# Patient Record
Sex: MALE | Race: BLACK OR AFRICAN AMERICAN | NOT HISPANIC OR LATINO | URBAN - METROPOLITAN AREA
[De-identification: names, ages, dates, MRNs, and addresses within clinical notes are randomized per-mention and may not be internally consistent; named-entity substitution may affect disease eponyms.]

---

## 2019-10-12 ENCOUNTER — EMERGENCY (EMERGENCY)
Facility: HOSPITAL | Age: 59
LOS: 1 days | Discharge: HOME | End: 2019-10-12
Attending: EMERGENCY MEDICINE | Admitting: EMERGENCY MEDICINE
Payer: MEDICAID

## 2019-10-12 VITALS
OXYGEN SATURATION: 99 % | WEIGHT: 154.98 LBS | HEART RATE: 89 BPM | DIASTOLIC BLOOD PRESSURE: 90 MMHG | SYSTOLIC BLOOD PRESSURE: 145 MMHG | TEMPERATURE: 98 F | RESPIRATION RATE: 16 BRPM

## 2019-10-12 LAB
ANION GAP SERPL CALC-SCNC: 8 MMOL/L — LOW (ref 9–16)
BASOPHILS NFR BLD AUTO: 0.5 % — SIGNIFICANT CHANGE UP (ref 0–2)
BUN SERPL-MCNC: 13 MG/DL — SIGNIFICANT CHANGE UP (ref 7–23)
CALCIUM SERPL-MCNC: 9.6 MG/DL — SIGNIFICANT CHANGE UP (ref 8.5–10.5)
CHLORIDE SERPL-SCNC: 101 MMOL/L — SIGNIFICANT CHANGE UP (ref 96–108)
CO2 SERPL-SCNC: 31 MMOL/L — SIGNIFICANT CHANGE UP (ref 22–31)
CREAT SERPL-MCNC: 1.08 MG/DL — SIGNIFICANT CHANGE UP (ref 0.5–1.3)
EOSINOPHIL NFR BLD AUTO: 1.4 % — SIGNIFICANT CHANGE UP (ref 0–6)
GLUCOSE SERPL-MCNC: 173 MG/DL — HIGH (ref 70–99)
HCT VFR BLD CALC: 39.3 % — SIGNIFICANT CHANGE UP (ref 39–50)
HGB BLD-MCNC: 12.7 G/DL — LOW (ref 13–17)
IMM GRANULOCYTES NFR BLD AUTO: 0.8 % — SIGNIFICANT CHANGE UP (ref 0–1.5)
LYMPHOCYTES # BLD AUTO: 21.3 % — SIGNIFICANT CHANGE UP (ref 13–44)
MCHC RBC-ENTMCNC: 26.7 PG — LOW (ref 27–34)
MCHC RBC-ENTMCNC: 32.3 G/DL — SIGNIFICANT CHANGE UP (ref 32–36)
MCV RBC AUTO: 82.6 FL — SIGNIFICANT CHANGE UP (ref 80–100)
MONOCYTES NFR BLD AUTO: 5.8 % — SIGNIFICANT CHANGE UP (ref 2–14)
NEUTROPHILS NFR BLD AUTO: 70.2 % — SIGNIFICANT CHANGE UP (ref 43–77)
PLATELET # BLD AUTO: 182 K/UL — SIGNIFICANT CHANGE UP (ref 150–400)
POTASSIUM SERPL-MCNC: 3.1 MMOL/L — LOW (ref 3.5–5.3)
POTASSIUM SERPL-SCNC: 3.1 MMOL/L — LOW (ref 3.5–5.3)
RBC # BLD: 4.76 M/UL — SIGNIFICANT CHANGE UP (ref 4.2–5.8)
RBC # FLD: 15.9 % — HIGH (ref 10.3–14.5)
SODIUM SERPL-SCNC: 140 MMOL/L — SIGNIFICANT CHANGE UP (ref 132–145)
WBC # BLD: 13.2 K/UL — HIGH (ref 3.8–10.5)
WBC # FLD AUTO: 13.2 K/UL — HIGH (ref 3.8–10.5)

## 2019-10-12 PROCEDURE — 93010 ELECTROCARDIOGRAM REPORT: CPT

## 2019-10-12 PROCEDURE — 99284 EMERGENCY DEPT VISIT MOD MDM: CPT | Mod: 25

## 2019-10-12 NOTE — ED ADULT TRIAGE NOTE - CHIEF COMPLAINT QUOTE
Patient brought in by ambulance for c/o hypoglycemia. Pt given oral glucose for BG 65 on scene as per EMS. Pt alert and awake in triage, oriented x4. PMH IDDM. POC Glucose check in progress.

## 2019-10-12 NOTE — ED ADULT NURSE NOTE - OBJECTIVE STATEMENT
Pt BIBA hypoglycemic at 68 in field. Given oral glucose. Homeless. States missed meal today. Eating with good appetite in ED

## 2019-10-12 NOTE — ED PROVIDER NOTE - NSFOLLOWUPINSTRUCTIONS_ED_ALL_ED_FT
-PLEASE FOLLOW-UP WITH YOUR PRIMARY CARE DOCTOR IN 1-2 DAYS.  BRING ALL PAPERWORK FROM TODAY'S VISIT TO YOUR FOLLOW-UP VISIT.      -PLEASE RETURN TO THE ER IMMEDIATELY OR CALL 911 FOR ANY HIGH FEVER, TROUBLE BREATHING, VOMITING, SEVERE PAIN, OR ANY OTHER CONCERNS.

## 2019-10-12 NOTE — ED PROVIDER NOTE - OBJECTIVE STATEMENT
Pt is a 60yo M with a h/o DM (Takes Lispro sliding scale and Levamer long acting at night).  Reports he is very compliant with his medications.  Pt is currently undomociled and usually has a steady intake of food but today he missed his meals.  He reports a woman usually feeds him but he didn't see her today.  He started feeling dizzy and nauseated so called 911.  EMS reports FS of 68 and they gave oral glucose.  pt reports feeling somewhat better but requesting a more hardy meal.  Currently denies any CP, SOB, HA, F/C, vomiting, diarrhea, abd pain, or other concerns.

## 2019-10-12 NOTE — ED PROVIDER NOTE - PROGRESS NOTE DETAILS
BS stable.  pt tolerating PO without issue.  Feeling much better.  Offered homeless outreach but pt declined.  Resources given.  Instructed to increase potassium rish foods.

## 2019-10-12 NOTE — ED ADULT NURSE NOTE - NSIMPLEMENTINTERV_GEN_ALL_ED
Implemented All Universal Safety Interventions:  [place] to call system. Call bell, personal items and telephone within reach. Instruct patient to call for assistance. Room bathroom lighting operational. Non-slip footwear when patient is off stretcher. Physically safe environment: no spills, clutter or unnecessary equipment. Stretcher in lowest position, wheels locked, appropriate side rails in place.

## 2019-10-12 NOTE — ED PROVIDER NOTE - PATIENT PORTAL LINK FT
You can access the FollowMyHealth Patient Portal offered by [facility] by registering at the following website: [email or web address]. By joining [facility]’s FollowMyHealth portal, you will also be able to view your health information using other applications (apps) compatible with our system.

## 2019-10-12 NOTE — ED PROVIDER NOTE - CLINICAL SUMMARY MEDICAL DECISION MAKING FREE TEXT BOX
Hypoglycemia due to insulin use with poor PO intake.  FS checked in triage and 190s.  Will give full meal as oral glucose is likely to wear off quicker than complex carbohydrates.  Will check basic labs and EKG to r/o other underlying causes.  Will re-evaluated blood sugar.      Pt currently undomociled so will be sure to give resources and offer homeless outreach program.

## 2019-10-13 RX ORDER — POTASSIUM CHLORIDE 20 MEQ
40 PACKET (EA) ORAL ONCE
Refills: 0 | Status: COMPLETED | OUTPATIENT
Start: 2019-10-13 | End: 2019-10-13

## 2019-10-13 RX ADMIN — Medication 40 MILLIEQUIVALENT(S): at 00:27

## 2019-10-18 DIAGNOSIS — E11.649 TYPE 2 DIABETES MELLITUS WITH HYPOGLYCEMIA WITHOUT COMA: ICD-10-CM

## 2019-10-18 DIAGNOSIS — E16.2 HYPOGLYCEMIA, UNSPECIFIED: ICD-10-CM

## 2019-11-27 ENCOUNTER — INPATIENT (INPATIENT)
Facility: HOSPITAL | Age: 59
LOS: 6 days | Discharge: HOME | DRG: 638 | End: 2019-12-04
Attending: STUDENT IN AN ORGANIZED HEALTH CARE EDUCATION/TRAINING PROGRAM | Admitting: STUDENT IN AN ORGANIZED HEALTH CARE EDUCATION/TRAINING PROGRAM
Payer: MEDICAID

## 2019-11-27 VITALS
OXYGEN SATURATION: 100 % | TEMPERATURE: 98 F | SYSTOLIC BLOOD PRESSURE: 201 MMHG | HEART RATE: 100 BPM | DIASTOLIC BLOOD PRESSURE: 126 MMHG | RESPIRATION RATE: 18 BRPM

## 2019-11-27 DIAGNOSIS — L08.9 LOCAL INFECTION OF THE SKIN AND SUBCUTANEOUS TISSUE, UNSPECIFIED: ICD-10-CM

## 2019-11-27 DIAGNOSIS — Z98.890 OTHER SPECIFIED POSTPROCEDURAL STATES: Chronic | ICD-10-CM

## 2019-11-27 DIAGNOSIS — R63.8 OTHER SYMPTOMS AND SIGNS CONCERNING FOOD AND FLUID INTAKE: ICD-10-CM

## 2019-11-27 DIAGNOSIS — E87.1 HYPO-OSMOLALITY AND HYPONATREMIA: ICD-10-CM

## 2019-11-27 DIAGNOSIS — Z91.89 OTHER SPECIFIED PERSONAL RISK FACTORS, NOT ELSEWHERE CLASSIFIED: ICD-10-CM

## 2019-11-27 DIAGNOSIS — Z29.9 ENCOUNTER FOR PROPHYLACTIC MEASURES, UNSPECIFIED: ICD-10-CM

## 2019-11-27 DIAGNOSIS — I10 ESSENTIAL (PRIMARY) HYPERTENSION: ICD-10-CM

## 2019-11-27 DIAGNOSIS — M21.332 WRIST DROP, LEFT WRIST: ICD-10-CM

## 2019-11-27 DIAGNOSIS — R73.9 HYPERGLYCEMIA, UNSPECIFIED: ICD-10-CM

## 2019-11-27 DIAGNOSIS — E11.9 TYPE 2 DIABETES MELLITUS WITHOUT COMPLICATIONS: ICD-10-CM

## 2019-11-27 LAB
ALBUMIN SERPL ELPH-MCNC: 4.4 G/DL — SIGNIFICANT CHANGE UP (ref 3.3–5)
ALP SERPL-CCNC: 176 U/L — HIGH (ref 40–120)
ALT FLD-CCNC: 16 U/L — SIGNIFICANT CHANGE UP (ref 10–45)
ANION GAP SERPL CALC-SCNC: 15 MMOL/L — SIGNIFICANT CHANGE UP (ref 5–17)
APPEARANCE UR: CLEAR — SIGNIFICANT CHANGE UP
AST SERPL-CCNC: 28 U/L — SIGNIFICANT CHANGE UP (ref 10–40)
B-OH-BUTYR SERPL-SCNC: 0.3 MMOL/L — SIGNIFICANT CHANGE UP
BASE EXCESS BLDV CALC-SCNC: 2.3 MMOL/L — SIGNIFICANT CHANGE UP
BASOPHILS # BLD AUTO: 0.07 K/UL — SIGNIFICANT CHANGE UP (ref 0–0.2)
BASOPHILS NFR BLD AUTO: 0.7 % — SIGNIFICANT CHANGE UP (ref 0–2)
BILIRUB SERPL-MCNC: 0.3 MG/DL — SIGNIFICANT CHANGE UP (ref 0.2–1.2)
BILIRUB UR-MCNC: NEGATIVE — SIGNIFICANT CHANGE UP
BUN SERPL-MCNC: 17 MG/DL — SIGNIFICANT CHANGE UP (ref 7–23)
CALCIUM SERPL-MCNC: 9.9 MG/DL — SIGNIFICANT CHANGE UP (ref 8.4–10.5)
CHLORIDE SERPL-SCNC: 92 MMOL/L — LOW (ref 96–108)
CO2 SERPL-SCNC: 26 MMOL/L — SIGNIFICANT CHANGE UP (ref 22–31)
COLOR SPEC: YELLOW — SIGNIFICANT CHANGE UP
CREAT SERPL-MCNC: 0.75 MG/DL — SIGNIFICANT CHANGE UP (ref 0.5–1.3)
DIFF PNL FLD: ABNORMAL
EOSINOPHIL # BLD AUTO: 0.09 K/UL — SIGNIFICANT CHANGE UP (ref 0–0.5)
EOSINOPHIL NFR BLD AUTO: 0.9 % — SIGNIFICANT CHANGE UP (ref 0–6)
GLUCOSE SERPL-MCNC: 867 MG/DL — CRITICAL HIGH (ref 70–99)
GLUCOSE UR QL: >=1000
HCO3 BLDV-SCNC: 27 MMOL/L — SIGNIFICANT CHANGE UP (ref 20–27)
HCT VFR BLD CALC: 40.2 % — SIGNIFICANT CHANGE UP (ref 39–50)
HGB BLD-MCNC: 12.1 G/DL — LOW (ref 13–17)
IMM GRANULOCYTES NFR BLD AUTO: 0.3 % — SIGNIFICANT CHANGE UP (ref 0–1.5)
KETONES UR-MCNC: NEGATIVE — SIGNIFICANT CHANGE UP
LEUKOCYTE ESTERASE UR-ACNC: NEGATIVE — SIGNIFICANT CHANGE UP
LYMPHOCYTES # BLD AUTO: 1.81 K/UL — SIGNIFICANT CHANGE UP (ref 1–3.3)
LYMPHOCYTES # BLD AUTO: 18.7 % — SIGNIFICANT CHANGE UP (ref 13–44)
MCHC RBC-ENTMCNC: 25.8 PG — LOW (ref 27–34)
MCHC RBC-ENTMCNC: 30.1 GM/DL — LOW (ref 32–36)
MCV RBC AUTO: 85.7 FL — SIGNIFICANT CHANGE UP (ref 80–100)
MONOCYTES # BLD AUTO: 0.51 K/UL — SIGNIFICANT CHANGE UP (ref 0–0.9)
MONOCYTES NFR BLD AUTO: 5.3 % — SIGNIFICANT CHANGE UP (ref 2–14)
NEUTROPHILS # BLD AUTO: 7.16 K/UL — SIGNIFICANT CHANGE UP (ref 1.8–7.4)
NEUTROPHILS NFR BLD AUTO: 74.1 % — SIGNIFICANT CHANGE UP (ref 43–77)
NITRITE UR-MCNC: NEGATIVE — SIGNIFICANT CHANGE UP
NRBC # BLD: 0 /100 WBCS — SIGNIFICANT CHANGE UP (ref 0–0)
PCO2 BLDV: 44 MMHG — SIGNIFICANT CHANGE UP (ref 41–51)
PH BLDV: 7.41 — SIGNIFICANT CHANGE UP (ref 7.32–7.43)
PH UR: 6.5 — SIGNIFICANT CHANGE UP (ref 5–8)
PLATELET # BLD AUTO: 214 K/UL — SIGNIFICANT CHANGE UP (ref 150–400)
PO2 BLDV: 45 MMHG — SIGNIFICANT CHANGE UP
POTASSIUM SERPL-MCNC: 4.1 MMOL/L — SIGNIFICANT CHANGE UP (ref 3.5–5.3)
POTASSIUM SERPL-SCNC: 4.1 MMOL/L — SIGNIFICANT CHANGE UP (ref 3.5–5.3)
PROT SERPL-MCNC: 8.3 G/DL — SIGNIFICANT CHANGE UP (ref 6–8.3)
PROT UR-MCNC: NEGATIVE MG/DL — SIGNIFICANT CHANGE UP
RBC # BLD: 4.69 M/UL — SIGNIFICANT CHANGE UP (ref 4.2–5.8)
RBC # FLD: 14 % — SIGNIFICANT CHANGE UP (ref 10.3–14.5)
SAO2 % BLDV: 82 % — SIGNIFICANT CHANGE UP
SODIUM SERPL-SCNC: 133 MMOL/L — LOW (ref 135–145)
SP GR SPEC: <=1.005 — SIGNIFICANT CHANGE UP (ref 1–1.03)
UROBILINOGEN FLD QL: 0.2 E.U./DL — SIGNIFICANT CHANGE UP
WBC # BLD: 9.67 K/UL — SIGNIFICANT CHANGE UP (ref 3.8–10.5)
WBC # FLD AUTO: 9.67 K/UL — SIGNIFICANT CHANGE UP (ref 3.8–10.5)

## 2019-11-27 PROCEDURE — 99222 1ST HOSP IP/OBS MODERATE 55: CPT | Mod: GC

## 2019-11-27 PROCEDURE — 99221 1ST HOSP IP/OBS SF/LOW 40: CPT

## 2019-11-27 PROCEDURE — 99223 1ST HOSP IP/OBS HIGH 75: CPT | Mod: GC

## 2019-11-27 PROCEDURE — 73630 X-RAY EXAM OF FOOT: CPT | Mod: 26,LT

## 2019-11-27 PROCEDURE — 71045 X-RAY EXAM CHEST 1 VIEW: CPT | Mod: 26

## 2019-11-27 PROCEDURE — 99285 EMERGENCY DEPT VISIT HI MDM: CPT

## 2019-11-27 RX ORDER — INSULIN LISPRO 100/ML
6 VIAL (ML) SUBCUTANEOUS
Refills: 0 | Status: DISCONTINUED | OUTPATIENT
Start: 2019-11-27 | End: 2019-11-27

## 2019-11-27 RX ORDER — INSULIN HUMAN 100 [IU]/ML
10 INJECTION, SOLUTION SUBCUTANEOUS ONCE
Refills: 0 | Status: COMPLETED | OUTPATIENT
Start: 2019-11-27 | End: 2019-11-27

## 2019-11-27 RX ORDER — VANCOMYCIN HCL 1 G
1000 VIAL (EA) INTRAVENOUS EVERY 12 HOURS
Refills: 0 | Status: DISCONTINUED | OUTPATIENT
Start: 2019-11-27 | End: 2019-11-27

## 2019-11-27 RX ORDER — PIPERACILLIN AND TAZOBACTAM 4; .5 G/20ML; G/20ML
3.38 INJECTION, POWDER, LYOPHILIZED, FOR SOLUTION INTRAVENOUS ONCE
Refills: 0 | Status: DISCONTINUED | OUTPATIENT
Start: 2019-11-27 | End: 2019-11-27

## 2019-11-27 RX ORDER — ENOXAPARIN SODIUM 100 MG/ML
40 INJECTION SUBCUTANEOUS EVERY 24 HOURS
Refills: 0 | Status: DISCONTINUED | OUTPATIENT
Start: 2019-11-28 | End: 2019-12-03

## 2019-11-27 RX ORDER — SODIUM CHLORIDE 9 MG/ML
1000 INJECTION INTRAMUSCULAR; INTRAVENOUS; SUBCUTANEOUS
Refills: 0 | Status: DISCONTINUED | OUTPATIENT
Start: 2019-11-27 | End: 2019-11-27

## 2019-11-27 RX ORDER — PIPERACILLIN AND TAZOBACTAM 4; .5 G/20ML; G/20ML
4.5 INJECTION, POWDER, LYOPHILIZED, FOR SOLUTION INTRAVENOUS EVERY 6 HOURS
Refills: 0 | Status: DISCONTINUED | OUTPATIENT
Start: 2019-11-27 | End: 2019-11-27

## 2019-11-27 RX ORDER — AMLODIPINE BESYLATE 2.5 MG/1
10 TABLET ORAL ONCE
Refills: 0 | Status: COMPLETED | OUTPATIENT
Start: 2019-11-27 | End: 2019-11-27

## 2019-11-27 RX ORDER — LISINOPRIL 2.5 MG/1
40 TABLET ORAL EVERY 24 HOURS
Refills: 0 | Status: DISCONTINUED | OUTPATIENT
Start: 2019-11-28 | End: 2019-12-01

## 2019-11-27 RX ORDER — AMLODIPINE BESYLATE 2.5 MG/1
10 TABLET ORAL EVERY 24 HOURS
Refills: 0 | Status: DISCONTINUED | OUTPATIENT
Start: 2019-11-28 | End: 2019-12-04

## 2019-11-27 RX ORDER — VANCOMYCIN HCL 1 G
1000 VIAL (EA) INTRAVENOUS ONCE
Refills: 0 | Status: DISCONTINUED | OUTPATIENT
Start: 2019-11-27 | End: 2019-11-27

## 2019-11-27 RX ORDER — SODIUM CHLORIDE 9 MG/ML
1000 INJECTION INTRAMUSCULAR; INTRAVENOUS; SUBCUTANEOUS ONCE
Refills: 0 | Status: COMPLETED | OUTPATIENT
Start: 2019-11-27 | End: 2019-11-27

## 2019-11-27 RX ORDER — DEXTROSE 50 % IN WATER 50 %
15 SYRINGE (ML) INTRAVENOUS ONCE
Refills: 0 | Status: DISCONTINUED | OUTPATIENT
Start: 2019-11-27 | End: 2019-12-04

## 2019-11-27 RX ORDER — DEXTROSE 50 % IN WATER 50 %
12.5 SYRINGE (ML) INTRAVENOUS ONCE
Refills: 0 | Status: DISCONTINUED | OUTPATIENT
Start: 2019-11-27 | End: 2019-12-04

## 2019-11-27 RX ORDER — DEXTROSE 50 % IN WATER 50 %
25 SYRINGE (ML) INTRAVENOUS ONCE
Refills: 0 | Status: DISCONTINUED | OUTPATIENT
Start: 2019-11-27 | End: 2019-12-04

## 2019-11-27 RX ORDER — INSULIN GLARGINE 100 [IU]/ML
25 INJECTION, SOLUTION SUBCUTANEOUS
Refills: 0 | Status: DISCONTINUED | OUTPATIENT
Start: 2019-11-28 | End: 2019-11-28

## 2019-11-27 RX ORDER — PIPERACILLIN AND TAZOBACTAM 4; .5 G/20ML; G/20ML
3.38 INJECTION, POWDER, LYOPHILIZED, FOR SOLUTION INTRAVENOUS ONCE
Refills: 0 | Status: COMPLETED | OUTPATIENT
Start: 2019-11-27 | End: 2019-11-27

## 2019-11-27 RX ORDER — GLUCAGON INJECTION, SOLUTION 0.5 MG/.1ML
1 INJECTION, SOLUTION SUBCUTANEOUS ONCE
Refills: 0 | Status: DISCONTINUED | OUTPATIENT
Start: 2019-11-27 | End: 2019-12-04

## 2019-11-27 RX ORDER — SODIUM CHLORIDE 9 MG/ML
1000 INJECTION, SOLUTION INTRAVENOUS
Refills: 0 | Status: DISCONTINUED | OUTPATIENT
Start: 2019-11-27 | End: 2019-12-04

## 2019-11-27 RX ORDER — FOLIC ACID 0.8 MG
1 TABLET ORAL DAILY
Refills: 0 | Status: DISCONTINUED | OUTPATIENT
Start: 2019-11-27 | End: 2019-12-04

## 2019-11-27 RX ORDER — ASPIRIN/CALCIUM CARB/MAGNESIUM 324 MG
81 TABLET ORAL DAILY
Refills: 0 | Status: DISCONTINUED | OUTPATIENT
Start: 2019-11-27 | End: 2019-12-04

## 2019-11-27 RX ORDER — INSULIN GLARGINE 100 [IU]/ML
20 INJECTION, SOLUTION SUBCUTANEOUS ONCE
Refills: 0 | Status: COMPLETED | OUTPATIENT
Start: 2019-11-27 | End: 2019-11-27

## 2019-11-27 RX ORDER — INSULIN LISPRO 100/ML
10 VIAL (ML) SUBCUTANEOUS
Refills: 0 | Status: DISCONTINUED | OUTPATIENT
Start: 2019-11-27 | End: 2019-12-04

## 2019-11-27 RX ORDER — VANCOMYCIN HCL 1 G
1000 VIAL (EA) INTRAVENOUS ONCE
Refills: 0 | Status: COMPLETED | OUTPATIENT
Start: 2019-11-27 | End: 2019-11-27

## 2019-11-27 RX ORDER — INSULIN LISPRO 100/ML
10 VIAL (ML) SUBCUTANEOUS
Refills: 0 | Status: DISCONTINUED | OUTPATIENT
Start: 2019-11-27 | End: 2019-11-27

## 2019-11-27 RX ORDER — INSULIN LISPRO 100/ML
VIAL (ML) SUBCUTANEOUS
Refills: 0 | Status: DISCONTINUED | OUTPATIENT
Start: 2019-11-27 | End: 2019-11-30

## 2019-11-27 RX ORDER — INSULIN GLARGINE 100 [IU]/ML
30 INJECTION, SOLUTION SUBCUTANEOUS EVERY MORNING
Refills: 0 | Status: DISCONTINUED | OUTPATIENT
Start: 2019-11-27 | End: 2019-11-27

## 2019-11-27 RX ORDER — LISINOPRIL 2.5 MG/1
40 TABLET ORAL ONCE
Refills: 0 | Status: COMPLETED | OUTPATIENT
Start: 2019-11-27 | End: 2019-11-27

## 2019-11-27 RX ADMIN — PIPERACILLIN AND TAZOBACTAM 200 GRAM(S): 4; .5 INJECTION, POWDER, LYOPHILIZED, FOR SOLUTION INTRAVENOUS at 07:44

## 2019-11-27 RX ADMIN — Medication 10 UNIT(S): at 19:21

## 2019-11-27 RX ADMIN — LISINOPRIL 40 MILLIGRAM(S): 2.5 TABLET ORAL at 07:05

## 2019-11-27 RX ADMIN — Medication 12: at 11:31

## 2019-11-27 RX ADMIN — INSULIN HUMAN 10 UNIT(S): 100 INJECTION, SOLUTION SUBCUTANEOUS at 07:44

## 2019-11-27 RX ADMIN — SODIUM CHLORIDE 1000 MILLILITER(S): 9 INJECTION INTRAMUSCULAR; INTRAVENOUS; SUBCUTANEOUS at 06:10

## 2019-11-27 RX ADMIN — INSULIN GLARGINE 30 UNIT(S): 100 INJECTION, SOLUTION SUBCUTANEOUS at 08:47

## 2019-11-27 RX ADMIN — SODIUM CHLORIDE 90 MILLILITER(S): 9 INJECTION INTRAMUSCULAR; INTRAVENOUS; SUBCUTANEOUS at 10:00

## 2019-11-27 RX ADMIN — Medication 2: at 22:38

## 2019-11-27 RX ADMIN — Medication 81 MILLIGRAM(S): at 11:31

## 2019-11-27 RX ADMIN — AMLODIPINE BESYLATE 10 MILLIGRAM(S): 2.5 TABLET ORAL at 07:04

## 2019-11-27 RX ADMIN — Medication 1 MILLIGRAM(S): at 11:31

## 2019-11-27 RX ADMIN — INSULIN GLARGINE 20 UNIT(S): 100 INJECTION, SOLUTION SUBCUTANEOUS at 22:38

## 2019-11-27 RX ADMIN — Medication 250 MILLIGRAM(S): at 09:59

## 2019-11-27 RX ADMIN — Medication 1 TABLET(S): at 11:31

## 2019-11-27 RX ADMIN — Medication 10 UNIT(S): at 11:31

## 2019-11-27 RX ADMIN — SODIUM CHLORIDE 1000 MILLILITER(S): 9 INJECTION INTRAMUSCULAR; INTRAVENOUS; SUBCUTANEOUS at 08:39

## 2019-11-27 NOTE — CONSULT NOTE ADULT - ASSESSMENT
58 y/o undomiciled m h/o uncontrolled DM, uncontrolled HTN, diabetic foot ulcer presents to ER with hyperglycemia and diabetic left foot ulcers. . No DKA. Pt evaluated for PAD. He has no palpable pulses b/l DP/PT  VENTURA 1.0 b/l which may be falsely elevated 2/2 calcifications from DM.  He has had ulcer in the same area of left foot in March that healed with Abx and local wound care. No h/o vascular interventions.  d/w Dr Delgado. Will d/w Dr Springer.     1. Please order PVR/VENTURA study.  2. Unasyn  3. x-ray left foot r/o osteo  4. Start Statin and baby ASA. 58 y/o undomiciled m h/o uncontrolled DM, uncontrolled HTN, diabetic foot ulcer presents to ER with hyperglycemia and diabetic left foot ulcers. . No DKA. Pt evaluated for PAD. He has no palpable pulses b/l DP/PT  VENTURA 1.0 b/l which may be falsely elevated 2/2 calcifications from DM.  He has had ulcer in the same area of left foot in March that healed with Abx and local wound care. No h/o vascular interventions.       1. Please order PVR/VENTURA study.  2. Unasyn  3. x-ray left foot r/o osteo  4. Start Statin and baby ASA.   5.d/w Dr Springer - plan for LLE angio when DM and hypertension controlled.

## 2019-11-27 NOTE — ED PROVIDER NOTE - PHYSICAL EXAMINATION
CONSTITUTIONAL: WD,WN. NAD.    SKIN: Dry skin and lips.  Normal color. No rash.     HEAD: NC/AT.  EYES: Conjunctiva clear. EOMI. PERRL.    ENT: Airway patent, OP without erythema, tonsillar swelling or exudate; uvula midline without swelling. Nasal mucosa clear, no rhinorrhea.   RESPIRATORY:  Breathing non-labored. No retractions or accessory muscle use.  Lungs CTA bilat.  CARDIOVASCULAR:  RRR, S1S2. No M/R/G.      GI:  Abdomen soft, nontender.    MSK: Neck supple with painless ROM.  Left foot MTP and plantar aspect forefoot macerated with shallow ulcer.  DP pulse present.    NEURO: Alert and oriented; CN II-XII grossly intact. Speech clear. 5/5 strength in all extremities.  Normal balance and gait. CONSTITUTIONAL: WD,WN. NAD.    SKIN: Dry skin and lips.  Normal color. No rash.     HEAD: NC/AT.  EYES: Conjunctiva clear. EOMI. PERRL.    ENT: Airway patent, OP without erythema, tonsillar swelling or exudate; uvula midline without swelling. Nasal mucosa clear, no rhinorrhea.   RESPIRATORY:  Breathing non-labored. No retractions or accessory muscle use.  Lungs CTA bilat.  CARDIOVASCULAR:  RRR, S1S2. No M/R/G.      GI:  Abdomen soft, nontender.    MSK: Neck supple with painless ROM.  Left foot MTP and plantar aspect forefoot macerated with shallow ulcer.  DP pulse present.    NEURO: Alert and oriented; CN II-XII grossly intact. Speech clear. 4/5 left wrist extension, slow wrist drop.  Decreased sensation in radial n distribution.  Normal balance and gait.

## 2019-11-27 NOTE — H&P ADULT - PROBLEM SELECTOR PLAN 3
-Diabetic Foot Ulcer present on medial dorsal aspect of left forefoot. Pulses faint on doppler compared to +2 on right foot.  -Podiatry and vascular consulted   -Will continue hunter/zosyn for now   -Wound care recs -Diabetic Foot Ulcer present on medial dorsal aspect of left forefoot. Pulses faint on doppler compared to +2 on right foot.  -Podiatry and vascular consulted   -Will continue vanc/zosyn for now   -Wound care recs  -Follow up ESR/CRP  -Follow up foot x ray. History of hardware in left foot after surgery few years ago -Diabetic Foot Ulcer present on medial dorsal aspect of left forefoot. Pulses faint on doppler compared to +2 on right foot. No drainage. 1/4 SIRS criteria (). No fevers/chills, wbc wnl   -Podiatry and vascular consulted   -Will continue vanc/zosyn for now   -Wound care recs  -Follow up ESR/CRP  -Follow up foot x ray. History of hardware in left foot after surgery few years ago -Diabetic Foot Ulcer present on medial dorsal aspect of left forefoot. Pulses faint on doppler compared to +2 on right foot. No drainage. 1/4 SIRS criteria (). No fevers/chills, wbc wnl   -Podiatry and vascular consulted   -Will continue vanc/zosyn for now   -Wound care recs  -Follow up ESR/CRP  -Follow up foot x ray. History of hardware in left foot after surgery few years ago    ADDENDUM:  ESR mildly elevated, CRP wnl. X ray with soft tissue swelling. More likely chronic ulcer and less likely acutely infected. Will discontinue antibiotics for now  Wound care recs

## 2019-11-27 NOTE — ED ADULT NURSE NOTE - NSIMPLEMENTINTERV_GEN_ALL_ED
Implemented All Fall Risk Interventions:  [place] to call system. Call bell, personal items and telephone within reach. Instruct patient to call for assistance. Room bathroom lighting operational. Non-slip footwear when patient is off stretcher. Physically safe environment: no spills, clutter or unnecessary equipment. Stretcher in lowest position, wheels locked, appropriate side rails in place. Provide visual cue, wrist band, yellow gown, etc. Monitor gait and stability. Monitor for mental status changes and reorient to person, place, and time. Review medications for side effects contributing to fall risk. Reinforce activity limits and safety measures with patient and family.

## 2019-11-27 NOTE — ED ADULT NURSE NOTE - OBJECTIVE STATEMENT
pt states "my sugar is high and it's so bad I can't hold my urine. My left arm is also week for some days now. " Pt noted with "high" BS.

## 2019-11-27 NOTE — CONSULT NOTE ADULT - ASSESSMENT
A/P 58 yo undomiciled male with history of HTN and poorly controlled DM complicated by neuropathy and diabetic foot ulcers presenting with left foot Bright grade 2 overlying dorsum 1st MCJ, Bright grade 2 overlying medial submet 1     - F/u final Xray read  - WBAT LLE  - Applied dry sterile dressing with betadine to left foot   - No acute sign of infection. Left dorsum ulcer overlying 1st MCJ overlying internal hardware from previous Lisfranc fracture in 2007.     ***INCOMPLETE NOTE*** A/P 60 yo undomiciled male with history of HTN and poorly controlled DM complicated by neuropathy and diabetic foot ulcers presenting with left foot Bright grade 2 overlying dorsum 1st MCJ, Bright grade 2 overlying medial submet 1     - F/u final Xray read  - WBAT LLE  - Applied dry sterile dressing with betadine to left foot   - No acute sign of infection. Left dorsum ulcer overlying 1st MCJ overlying internal hardware from previous Lisfranc fracture in 2007.   - Excisional debridement of Bright grade 1 ulcer submet1 with #15 blade down to viable, bleeding dermis tissue. NO clinical signs of infection (drainage, purulence)  Podiatry following     ***INCOMPLETE NOTE*** A/P 58 yo undomiciled male with history of HTN and poorly controlled DM complicated by neuropathy and diabetic foot ulcers presenting with LLE cellulitis 2/2 left foot Bright grade 2 overlying dorsum 1st MCJ, Bright grade 2 overlying medial submet 1     - F/u final Xray read  - WBAT LLE  - Applied dry sterile dressing with betadine to left foot   - Stable wounds. C/w local wound care and abx  - Excisional debridement of Bright grade 1 ulcer submet1 with #15 blade down to viable, bleeding dermis tissue. NO clinical signs of infection (drainage, purulence)  - Podiatry following

## 2019-11-27 NOTE — H&P ADULT - PROBLEM SELECTOR PLAN 8
1) PCP Contacted on Admission: Dr. Augustine ([facility])   2) Date of Contact with PCP:  3) PCP Contacted at Discharge: (Y/N, N/A)  4) Summary of Handoff Given to PCP:   5) Post-Discharge Appointment Date and Location:

## 2019-11-27 NOTE — ED PROVIDER NOTE - OBJECTIVE STATEMENT
60 yo m PMHx HTN, DM c/o frequent large volume urination and urinary incontinence for the past few days; says he is very thirsty all the time.  Just discharged from [place] yesterday admission for a few days due to left foot infection and left hand weakness.  Hand weakness has been present for nearly a week.  He was told that the left hand weakness was not from a stroke.  He says he was supposed to be treated with antibiotics for the foot infection, but was not prescribed any  when he was discharged.

## 2019-11-27 NOTE — ED ADULT TRIAGE NOTE - RESPIRATORY RATE (BREATHS/MIN)
18 Quality 431: Preventive Care And Screening: Unhealthy Alcohol Use - Screening: Patient screened for unhealthy alcohol use using a single question and scores less than 2 times per year Quality 130: Documentation Of Current Medications In The Medical Record: Current Medications Documented Name And Contact Information For Health Care Proxy: Donna Watson [phone] Quality 226: Preventive Care And Screening: Tobacco Use: Screening And Cessation Intervention: Patient screened for tobacco and is an ex-smoker Quality 47: Advance Care Plan: Advance Care Planning discussed and documented; advance care plan or surrogate decision maker documented in the medical record. Detail Level: Detailed Quality 111:Pneumonia Vaccination Status For Older Adults: Pneumococcal Vaccination Previously Received Quality 110: Preventive Care And Screening: Influenza Immunization: Influenza Immunization previously received during influenza season

## 2019-11-27 NOTE — H&P ADULT - NSHPLABSRESULTS_GEN_ALL_CORE
.  LABS:                         12.1   9.67  )-----------( 214      ( 27 Nov 2019 06:05 )             40.2     11-27    133<L>  |  92<L>  |  17  ----------------------------<  867<HH>  4.1   |  26  |  0.75    Ca    9.9      27 Nov 2019 06:05    TPro  8.3  /  Alb  4.4  /  TBili  0.3  /  DBili  x   /  AST  28  /  ALT  16  /  AlkPhos  176<H>  11-27      Urinalysis Basic - ( 27 Nov 2019 06:16 )    Color: Yellow / Appearance: Clear / SG: <=1.005 / pH: x  Gluc: x / Ketone: NEGATIVE  / Bili: Negative / Urobili: 0.2 E.U./dL   Blood: x / Protein: NEGATIVE mg/dL / Nitrite: NEGATIVE   Leuk Esterase: NEGATIVE / RBC: < 5 /HPF / WBC < 5 /HPF   Sq Epi: x / Non Sq Epi: 0-5 /HPF / Bacteria: None /HPF                RADIOLOGY, EKG & ADDITIONAL TESTS: Reviewed. LABS:                         12.1   9.67  )-----------( 214      ( 27 Nov 2019 06:05 )             40.2     11-27    133<L>  |  92<L>  |  17  ----------------------------<  867<HH>  4.1   |  26  |  0.75    Ca    9.9      27 Nov 2019 06:05    TPro  8.3  /  Alb  4.4  /  TBili  0.3  /  DBili  x   /  AST  28  /  ALT  16  /  AlkPhos  176<H>  11-27      Urinalysis Basic - ( 27 Nov 2019 06:16 )    Color: Yellow / Appearance: Clear / SG: <=1.005 / pH: x  Gluc: x / Ketone: NEGATIVE  / Bili: Negative / Urobili: 0.2 E.U./dL   Blood: x / Protein: NEGATIVE mg/dL / Nitrite: NEGATIVE   Leuk Esterase: NEGATIVE / RBC: < 5 /HPF / WBC < 5 /HPF   Sq Epi: x / Non Sq Epi: 0-5 /HPF / Bacteria: None /HPF      RADIOLOGY, EKG & ADDITIONAL TESTS: Reviewed.

## 2019-11-27 NOTE — H&P ADULT - ASSESSMENT
60 yo undomiciled M with history of poorly controlled DM and HTN who presents with increase urination in the setting of hyperglycemia, found to have a diabetic foot ulcer on presentation and likely left wrist drop 60 yo undomiciled M with history of poorly controlled DM and HTN who presents with increase urination in the setting of hyperglycemia, admitted to regional medicine floors for further management.

## 2019-11-27 NOTE — H&P ADULT - NSHPSOCIALHISTORY_GEN_ALL_CORE
Undomiciled  Socially drinks alcohol. 3-4 drinks per week  Smokes 1/2 PPD for the past years, 1 PPD prior to that for over 10 years  Denies any illicit drug abuse Undomiciled  Socially drinks alcohol. 3-4 drinks per week  Smokes 1/2 PPD for the past 5 years, 1 PPD prior to that for over 10 years  Denies any illicit drug abuse

## 2019-11-27 NOTE — CONSULT NOTE ADULT - SUBJECTIVE AND OBJECTIVE BOX
HPI: 59yMale    Age at Dx:  How dx:  Hx and duration of insulin:  Current Therapy:  Hx of hypoglycemia  Hx of DKA/HHS?    Home FSG:  Fasting  Lunch  Dinner  Bed    Hx of other regimens  Complications:  Outpatient Endo:    PMH & Surgical Hx:HYPERGLYCEMIA;FOOT INFEC  FH: type 2 diabetes mellitus  Handoff  MEWS Score  Hypertension  IDDM (insulin dependent diabetes mellitus)  IDDM (insulin dependent diabetes mellitus)  Hyperglycemia  Hyponatremia  Wrist drop, left wrist  Transition of care performed with sharing of clinical summary  Prophylactic measure  Nutrition, metabolism, and development symptoms  Hypertension  Foot infection  IDDM (insulin dependent diabetes mellitus)  Hyperglycemia  History of open reduction and internal fixation (ORIF) procedure  No significant past surgical history  MED EVAL  90+  Foot infection      FH:  DM:  Thyroid:  Autoimmune:  Other:    SH:  Smoking  Etoh:  Recreational Drugs:  Social Life:    Current Meds:  aspirin enteric coated 81 milliGRAM(s) Oral daily  dextrose 40% Gel 15 Gram(s) Oral once PRN  dextrose 5%. 1000 milliLiter(s) IV Continuous <Continuous>  dextrose 50% Injectable 12.5 Gram(s) IV Push once  dextrose 50% Injectable 25 Gram(s) IV Push once  dextrose 50% Injectable 25 Gram(s) IV Push once  folic acid 1 milliGRAM(s) Oral daily  glucagon  Injectable 1 milliGRAM(s) IntraMuscular once PRN  insulin glargine Injectable (LANTUS) 20 Unit(s) SubCutaneous once  insulin lispro (HumaLOG) corrective regimen sliding scale   SubCutaneous Before meals and at bedtime  insulin lispro Injectable (HumaLOG) 10 Unit(s) SubCutaneous three times a day before meals  multivitamin 1 Tablet(s) Oral daily      Allergies:  No Known Allergies      ROS:  Denies the following except as indicated.    General: weight loss/weight gain, decreased appetite, fatigue  Eyes: Blurry vision, double vision, visual changes  ENT: Throat pain, changes in voice,   CV: palpitations, SOB, CP, cough  GI: NVD, difficulty swallowing, abdominal pain  : polyuria, dysuria  Endo: abnormal menses, temperature intolerance, decreased libido  MSK: weakness, joint pain  Skin: rash, dryness, diaphoresis  Heme: Easy bruising,bleeding  Neuro: HA, dizziness, lightheadedness, numbness tingling  Psych: Anxiety, Depression    Vital Signs Last 24 Hrs  T(C): 37.1 (27 Nov 2019 17:18), Max: 37.1 (27 Nov 2019 13:30)  T(F): 98.8 (27 Nov 2019 17:18), Max: 98.8 (27 Nov 2019 13:30)  HR: 90 (27 Nov 2019 17:18) (90 - 100)  BP: 144/82 (27 Nov 2019 17:18) (144/82 - 201/126)  BP(mean): --  RR: 18 (27 Nov 2019 17:18) (18 - 18)  SpO2: 97% (27 Nov 2019 17:18) (95% - 100%)    Weight (kg): 72.5 (11-27 @ 08:37)      Constitutional: wn/wd in NAD.   HEENT: NCAT, MMM, OP clear, EOMI, , no proptosis or lid retraction  Neck: no thyromegaly or palpable thyroid nodules   Respiratory: lungs CTAB.  Cardiovascular: regular rhythm, normal S1 and S2, no audible murmurs, no peripheral edema  GI: soft, NT/ND, no masses/HSM appreciated.  Neurology: no tremors, DTR 2+  Skin: no visible rashes/lesions  Psychiatric: AAO x 3, normal affect/mood.  Ext: radial pulses intact, DP pulses intact, extremities warm, no cyanosis, clubbing or edema.       LABS:                        12.1   9.67  )-----------( 214      ( 27 Nov 2019 06:05 )             40.2     11-27    133<L>  |  92<L>  |  17  ----------------------------<  867<HH>  4.1   |  26  |  0.75    Ca    9.9      27 Nov 2019 06:05    TPro  8.3  /  Alb  4.4  /  TBili  0.3  /  DBili  x   /  AST  28  /  ALT  16  /  AlkPhos  176<H>  11-27      Urinalysis Basic - ( 27 Nov 2019 06:16 )    Color: Yellow / Appearance: Clear / SG: <=1.005 / pH: x  Gluc: x / Ketone: NEGATIVE  / Bili: Negative / Urobili: 0.2 E.U./dL   Blood: x / Protein: NEGATIVE mg/dL / Nitrite: NEGATIVE   Leuk Esterase: NEGATIVE / RBC: < 5 /HPF / WBC < 5 /HPF   Sq Epi: x / Non Sq Epi: 0-5 /HPF / Bacteria: None /HPF      Hemoglobin A1C, Whole Blood: 12.5 (11-27 @ 06:05)        RADIOLOGY & ADDITIONAL STUDIES:  CAPILLARY BLOOD GLUCOSE      POCT Blood Glucose.: 83 mg/dL (27 Nov 2019 17:31)  POCT Blood Glucose.: 472 mg/dL (27 Nov 2019 11:29)  POCT Blood Glucose.: 384 mg/dL (27 Nov 2019 10:23)  POCT Blood Glucose.: 481 mg/dL (27 Nov 2019 08:22)  POCT Blood Glucose.: >600 mg/dL (27 Nov 2019 07:43)  POCT Blood Glucose.: >600 mg/dL (27 Nov 2019 05:33)        A/P:59y Male    1.  DM  Please continue lantus       units at night / morning.  Please continue lispro      units before each meal.  Please continue lispro moderate / low dose sliding scale four times daily with meals and at bedtime    Pt's fingerstick glucose goal is     Will continue to monitor     For discharge, pt can continue    Pt can follow up at discharge with [facility] by calling  to make an appointment.   Will discuss case with     and update primary team HPI: Patient is a 58 yo undomiciled male with history of HTN and poorly controlled DM complicated by neuropathy and diabetic foot ulcers who presents with complaints of increase urination for the past few days. He states he was recently admitted to [facility] for left hand weakness and difficulty grasping objects. The initial suspected diagnosis was stroke however he had an MRI done and there was no evidence of CVA and he was told he has a nerve problem. He presents today with increase urination and increase thirst. His diabetes is very poorly controlled, his last A1c was 19%. He has had 5 diabetic foot ulcers in the past, mostly in his left leg. He has been told his left foot pulses are faint and he should get an amputation. He does not know what happened however states that around 2 weeks ago, he started noticing an ulcer with bloody drainage on the medial plantar aspect of the left forefoot which was gradually worsening and expanding. Denies any trauma. He denies any fevers or chills. He denies taking any antibiotics. He also endorses nausea and up to 3 episodes of NBNB vomiting yesterday. Remaining ROS negative except as mentioned otherwise.   Of note, he has had left foot surgery a few years ago for broken metatarsals and phalanges and has hardware in place. On arrival to [facility] ED: VS: afebrile, , bp 201/126, no respiratory distress. Initial labs s/f Hgb of 12.1, Na 133, Cl 92, , BHB negative, AG 15, VBG with no acidosis. ED course: 2L NS bolus, amlodipine 10 mg x1, lisinopril 40 mg x1, insulin 10 units regular IVx1, vanc/zosyn. Patient is being admitted to regional medical floors for further management. He was seen by podiatry and was told that he did not have any infection at the moment. vascular surgery is going to evaluate the patient and may need an angiogram    Endocrine was consulted for his DM management. His Hba1c was 12.5. On admission, his FSg was 867    11/27  600 Am FSg > 600 - 10 units regular insulin  800 AM FSG > 600 - 30 units of lantus  8:22 Am   1030 AM   1130 AM  - 10 + 12 units lispro     DM history - patient is a very poor historian and is not able to provide any good history  Age at Dx: for years  How dx: blood work  Current Therapy: basaglar 40 units BID and admelog 10 to 15 units each meal. He was on metformin before which he did not tolerate. he said that he was on glipizide which was working for him better but no one is prescribing that to him anymore  Hx of hypoglycemia - he says that he may have FSg in 80s - but unclear when he may have them   Hx of DKA/HHS - denies    Home FSG:  He checks his FSg randomly and may be in 120 to 130s. But for the past 1 week, it was running high with FSg high > 600. he eats lot of fast foods.    Hx of other regimens  Complications: neuropathy.  Outpatient Endo: none. PCP manages    PMH & Surgical Hx:HYPERGLYCEMIA;FOOT INFEC  FH: type 2 diabetes mellitus  Hypertension  IDDM (insulin dependent diabetes mellitus)  Wrist drop, left wrist  Hypertension  Foot infection  History of open reduction and internal fixation (ORIF) procedure    Current Meds:  aspirin enteric coated 81 milliGRAM(s) Oral daily  dextrose 40% Gel 15 Gram(s) Oral once PRN  dextrose 5%. 1000 milliLiter(s) IV Continuous <Continuous>  dextrose 50% Injectable 12.5 Gram(s) IV Push once  dextrose 50% Injectable 25 Gram(s) IV Push once  dextrose 50% Injectable 25 Gram(s) IV Push once  folic acid 1 milliGRAM(s) Oral daily  glucagon  Injectable 1 milliGRAM(s) IntraMuscular once PRN  insulin glargine Injectable (LANTUS) 20 Unit(s) SubCutaneous once  insulin lispro (HumaLOG) corrective regimen sliding scale   SubCutaneous Before meals and at bedtime  insulin lispro Injectable (HumaLOG) 10 Unit(s) SubCutaneous three times a day before meals  multivitamin 1 Tablet(s) Oral daily      Allergies:  No Known Allergies      ROS:  Denies the following except as indicated.    General: weight loss/weight gain, decreased appetite, fatigue  Eyes: Blurry vision, double vision, visual changes  ENT: Throat pain, changes in voice,   CV: palpitations, SOB, CP, cough  GI: NVD, difficulty swallowing, abdominal pain  : polyuria, dysuria  Endo: temperature intolerance, decreased libido  MSK: weakness, joint pain  Skin: rash, dryness, diaphoresis  Heme: Easy bruising,bleeding  Neuro: HA, dizziness, lightheadedness, numbness tingling  Psych: Anxiety, Depression    Vital Signs Last 24 Hrs  T(C): 37.1 (27 Nov 2019 17:18), Max: 37.1 (27 Nov 2019 13:30)  T(F): 98.8 (27 Nov 2019 17:18), Max: 98.8 (27 Nov 2019 13:30)  HR: 90 (27 Nov 2019 17:18) (90 - 100)  BP: 144/82 (27 Nov 2019 17:18) (144/82 - 201/126)  BP(mean): --  RR: 18 (27 Nov 2019 17:18) (18 - 18)  SpO2: 97% (27 Nov 2019 17:18) (95% - 100%)    Weight (kg): 72.5 (11-27 @ 08:37)      Constitutional: wn/wd in NAD.   HEENT: NCAT, MMM, OP clear, EOMI, , no proptosis or lid retraction  Neck: no thyromegaly or palpable thyroid nodules   Respiratory: lungs CTAB.  Cardiovascular: regular rhythm, normal S1 and S2, no audible murmurs, no peripheral edema  GI: soft, NT/ND, no masses/HSM appreciated.  Neurology: no tremors, DTR 2+  Skin: no visible rashes/lesions  Psychiatric: AAO x 3, normal affect/mood.  Ext: radial pulses intact, DP pulses feeble on the right foot. Left foot wrapped.       LABS:                        12.1   9.67  )-----------( 214      ( 27 Nov 2019 06:05 )             40.2     11-27    133<L>  |  92<L>  |  17  ----------------------------<  867<HH>  4.1   |  26  |  0.75    Ca    9.9      27 Nov 2019 06:05    TPro  8.3  /  Alb  4.4  /  TBili  0.3  /  DBili  x   /  AST  28  /  ALT  16  /  AlkPhos  176<H>  11-27      Urinalysis Basic - ( 27 Nov 2019 06:16 )    Color: Yellow / Appearance: Clear / SG: <=1.005 / pH: x  Gluc: x / Ketone: NEGATIVE  / Bili: Negative / Urobili: 0.2 E.U./dL   Blood: x / Protein: NEGATIVE mg/dL / Nitrite: NEGATIVE   Leuk Esterase: NEGATIVE / RBC: < 5 /HPF / WBC < 5 /HPF   Sq Epi: x / Non Sq Epi: 0-5 /HPF / Bacteria: None /HPF      Hemoglobin A1C, Whole Blood: 12.5 (11-27 @ 06:05)        RADIOLOGY & ADDITIONAL STUDIES:  CAPILLARY BLOOD GLUCOSE      POCT Blood Glucose.: 83 mg/dL (27 Nov 2019 17:31)  POCT Blood Glucose.: 472 mg/dL (27 Nov 2019 11:29)  POCT Blood Glucose.: 384 mg/dL (27 Nov 2019 10:23)  POCT Blood Glucose.: 481 mg/dL (27 Nov 2019 08:22)  POCT Blood Glucose.: >600 mg/dL (27 Nov 2019 07:43)  POCT Blood Glucose.: >600 mg/dL (27 Nov 2019 05:33)        A/P: Patient is a 58 yo undomiciled male with history of HTN and poorly controlled DM complicated by neuropathy and diabetic foot ulcers who got admitted for diabetic foot ulcers.    1.  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  From tomorrow, Please continue lantus 25 units BID.  Please continue lispro   10   units before each meal.    Please continue lispro moderate dose sliding scale four times daily with meals and at bedtime    Pt's fingerstick glucose goal is 120 to 150    Will continue to monitor     For discharge, TBD    Pt can follow up at discharge with [facility] by calling  to make an appointment.   discussed case with     and updated primary team  To Make an appointment at [address] [place] [address] for the patient, either:  1. Send a STAT task via [employer] to Sarah Darling or Aleida Pimentel (office managers)   OR  2. Call supervisor's line. P: [phone] (do not give this number to patient). VM is checked periodically  In the message, specify that this is a hospital discharge follow-up, and that the appt can be made with a NP if there is no timely MD availability    REMINDERS FOR INSULIN/DIABETES SUPPLIES at DISCHARGE:  INSULIN:   Long actin/Basal Insulin: Examples: Toujeo, Basaglar, Tresiba, Lantus   Short acting/Bolus Insulin: Humalog, Admelog, Novolog  Please ensure that BOTH short acting and long acting insulin are prescribed in the same preparation (Ex: PEN vs VIAL/SOLUTION)     TESTING SUPPLIES:   All glucometer supplies should be written as generic to avoid issues with insurance. Use the free text option in sunrise prescription writer, and type in glucometer test strips, lancets, etc to order.    If sending patient home on insulin PEN, please send:   •	BD amol insulin pen needles for use up to 4 times daily (total quantity 100)  •	Lancets for use up to 4 times daily (total quantity 100)  •	Glucometer Test strips for use up to 4 times daily (total quantity 100)  •	Alcohol swabs for use up to 4 times daily (total quantity 100)  •	Glucometer (If provided by hospital, still provide scripts for lancets, test strips, and swabs)  If sending patient home on insulin VIAL, please send:   •	Insulin syringes (6mm) - for use up to 4 times daily (total quantity 100)  •	Lancets for use up to 4 times daily (total quantity 100)  •	Generic Glucometer Test strips for use up to 4 times daily (total quantity 100)  •	Alcohol swabs for use up to 4 times daily (total quantity 100)  •	Generic Glucometer (If provided by hospital, still provide scripts for lancets, test strips, and swabs)  •	Do not specify brand for testing supplies (such as contour, freestyle, one touch etc) that way the pharmacy has the freedom to pick and change according to what the insurance dictates.  For patients without insurance:   •	Provide social work with appropriate scripts so they may obtain 1 week of samples  •	Provide with glucometer. Glucometers are located at various nursing stations, the nursing office, education, and endocrine fellows office.  •	Please make appointment with Beverly Teixeira NP or Anyi Yang RN and NAGI Thompson at the [address] [place] [address] endocrinology clinic. They can see patients without insurance, provide appropriate samples, and assist in getting insurance coverage.     PREFERRED PHARMACY:  [facility] (located on 1st floor next to admitting)  P: [phone]  Hours: M – F 8AM – 8PM, Sat 8AM – 4PM, Sun—closed  If not using [facility], please follow up with chosen pharmacy to ensure insulin prescribed is covered.

## 2019-11-27 NOTE — CONSULT NOTE ADULT - SUBJECTIVE AND OBJECTIVE BOX
Vascular Attending:  Diana Springer      HPI:  Patient is a 58 yo undomiciled male with history of HTN and poorly controlled DM complicated by neuropathy and diabetic foot ulcers who presents with complaints of increase urination for the past few days. He states he was recently admitted to [facility] for left hand weakness and difficulty grasping objects. The initial suspected diagnosis was stroke however he had an MRI done and there was no evidence of CVA and he was told he has a nerve problem. He presents today with increase urination, increase thirst and worsening ulcer on his left foot. His diabetes is very poorly controlled, his last A1c was 19%. He has had 5 diabetic foot ulcers in the past, mostly in his left leg. He has been told his left foot pulses are faint and he should get an amputation. He does not know what happened however states that around 2 weeks ago, he started noticing an ulcer with bloody drainage on the medial plantar aspect of the left forefoot which was gradually worsening and expanding. He denies any fevers or chills. He denies taking any antibiotics. He also endorses nausea and up to 3 episodes of NBNB vomiting yesterday. Remaining ROS negative except as mentioned otherwise.   Of note, he has had left foot surgery a few years ago for broken metatarsals and phalanges and has hardware.   On arrival to [facility] ED: VS: afebrile, , bp 201/126, no respiratory distress. Initial labs s/f Hgb of 12.1, Na 133, Cl 92, , BHB negative, AG 15, VBG with no acidosis.   ED course: 2L NS bolus, amlodipine 10 mg x1, lisinopril 40 mg x1, insulin 10 units regular IVx1, vanc/zosyn. Patient is being admitted to regional medical floors for further management. (27 Nov 2019 07:25)      Vascular consulted to evaluate Left diabetic foot ulcers with no pedal pulses.  Pt states recently developed ulcers on left foot. Denies trauma.  He had similar ulcers of the left foot in March 2019 and he was treated at [facility] with antibiotics and local wound care. The ulcers healed. He had no vascular interventions at that time or in the past. He notes 3 -4 block claudication in left foot and leg for several years.  In addition he has h/o left foot fx in the past.     PAST MEDICAL & SURGICAL HISTORY:  Hypertension  IDDM (insulin dependent diabetes mellitus)  History of open reduction and internal fixation (ORIF) procedure: LEFT FOOT      MEDICATIONS  (STANDING):  aspirin enteric coated 81 milliGRAM(s) Oral daily  dextrose 5%. 1000 milliLiter(s) (50 mL/Hr) IV Continuous <Continuous>  dextrose 50% Injectable 12.5 Gram(s) IV Push once  dextrose 50% Injectable 25 Gram(s) IV Push once  dextrose 50% Injectable 25 Gram(s) IV Push once  folic acid 1 milliGRAM(s) Oral daily  insulin glargine Injectable (LANTUS) 30 Unit(s) SubCutaneous every morning  insulin lispro (HumaLOG) corrective regimen sliding scale   SubCutaneous Before meals and at bedtime  insulin lispro Injectable (HumaLOG) 10 Unit(s) SubCutaneous three times a day before meals  multivitamin 1 Tablet(s) Oral daily  sodium chloride 0.9%. 1000 milliLiter(s) (90 mL/Hr) IV Continuous <Continuous>    MEDICATIONS  (PRN):  dextrose 40% Gel 15 Gram(s) Oral once PRN Blood Glucose LESS THAN 70 milliGRAM(s)/deciliter  glucagon  Injectable 1 milliGRAM(s) IntraMuscular once PRN Glucose LESS THAN 70 milligrams/deciliter      Allergies    No Known Allergies    Intolerances      SOCIAL HISTORY:  FAMILY HISTORY:  FH: type 2 diabetes mellitus      Vital Signs Last 24 Hrs  T(C): 36.9 (27 Nov 2019 05:36), Max: 36.9 (27 Nov 2019 05:36)  T(F): 98.4 (27 Nov 2019 05:36), Max: 98.4 (27 Nov 2019 05:36)  HR: 100 (27 Nov 2019 05:36) (100 - 100)  BP: 189/94 (27 Nov 2019 06:50) (189/94 - 201/126)  BP(mean): --  RR: 18 (27 Nov 2019 05:36) (18 - 18)  SpO2: 100% (27 Nov 2019 05:36) (100% - 100%)    General: 58 y/o m awake, alert, NAD  Neck: No bruits  Heart: RRR  Lungs: Clear b/l  Abd: soft, nontender. No bruits  EXt: Left foot deformity with 2 superficial ulcers. 2cm round on dorsum and 4cm round 1st metatarsal head.   Pulses: b/l fem/pop 2+  Doppler: Right DP/PT triphasic.   Left DP/PT biphasic  VENTURA: b/l 1.0      LABS:                        12.1   9.67  )-----------( 214      ( 27 Nov 2019 06:05 )             40.2     11-27    133<L>  |  92<L>  |  17  ----------------------------<  867<HH>  4.1   |  26  |  0.75    Ca    9.9      27 Nov 2019 06:05    TPro  8.3  /  Alb  4.4  /  TBili  0.3  /  DBili  x   /  AST  28  /  ALT  16  /  AlkPhos  176<H>  11-27      Urinalysis Basic - ( 27 Nov 2019 06:16 )    Color: Yellow / Appearance: Clear / SG: <=1.005 / pH: x  Gluc: x / Ketone: NEGATIVE  / Bili: Negative / Urobili: 0.2 E.U./dL   Blood: x / Protein: NEGATIVE mg/dL / Nitrite: NEGATIVE   Leuk Esterase: NEGATIVE / RBC: < 5 /HPF / WBC < 5 /HPF   Sq Epi: x / Non Sq Epi: 0-5 /HPF / Bacteria: None /HPF        RADIOLOGY & ADDITIONAL STUDIES

## 2019-11-27 NOTE — CONSULT NOTE ADULT - SUBJECTIVE AND OBJECTIVE BOX
Attending: Charissa     Patient is a 59y old  Male who presents with a chief complaint of Hyperglycemia  Diabetic Foot Infection (27 Nov 2019 07:25)    HPI:  Patient is a 58 yo undomiciled male with history of HTN and poorly controlled DM complicated by neuropathy and diabetic foot ulcers who presents with complaints of increase urination for the past few days. He states he was recently admitted to [facility] for left hand weakness and difficulty grasping objects. The initial suspected diagnosis was stroke however he had an MRI done and there was no evidence of CVA and he was told he has a nerve problem. He presents today with increase urination, increase thirst and worsening ulcer on his left foot. His diabetes is very poorly controlled, his last A1c was 19%. He has had 5 diabetic foot ulcers in the past, mostly in his left leg. He has been told his left foot pulses are faint and he should get an amputation. He does not know what happened however states that around 2 weeks ago, he started noticing an ulcer with bloody drainage on the medial plantar aspect of the left forefoot which was gradually worsening and expanding. He denies any fevers or chills. He denies taking any antibiotics. He also endorses nausea and up to 3 episodes of NBNB vomiting yesterday. Remaining ROS negative except as mentioned otherwise.   Of note, he has had left foot surgery a few years ago for broken metatarsals and phalanges and has hardware.   On arrival to [facility] ED: VS: afebrile, , bp 201/126, no respiratory distress. Initial labs s/f Hgb of 12.1, Na 133, Cl 92, , BHB negative, AG 15, VBG with no acidosis.   ED course: 2L NS bolus, amlodipine 10 mg x1, lisinopril 40 mg x1, insulin 10 units regular IVx1, vanc/zosyn. Patient is being admitted to regional medical floors for further management. (27 Nov 2019 07:25)    Review of systems negative except per HPI    PAST MEDICAL & SURGICAL HISTORY:  Hypertension  IDDM (insulin dependent diabetes mellitus)  History of open reduction and internal fixation (ORIF) procedure: LEFT FOOT    Home Medications:  Admelog 100 units/mL injectable solution: 10 units TID before meals (27 Nov 2019 08:19)  amLODIPine 10 mg oral tablet: 1 tab(s) orally once a day (27 Nov 2019 08:19)  Aspirin Enteric Coated 81 mg oral delayed release tablet: 1 tab(s) orally once a day (27 Nov 2019 08:19)  Basaglar KwikPen 100 units/mL subcutaneous solution: 30 unit(s) subcutaneous 2 times a day (27 Nov 2019 08:19)  folic acid 1 mg oral tablet: 1 tab(s) orally once a day (27 Nov 2019 08:19)  lisinopril 40 mg oral tablet: 1 tab(s) orally once a day (27 Nov 2019 08:19)  Multiple Vitamins oral tablet: 1 tab(s) orally once a day (27 Nov 2019 08:19)    Allergies  No Known Allergies    FAMILY HISTORY:  FH: type 2 diabetes mellitus    Social History: undomiciled     LABS                        12.1   9.67  )-----------( 214      ( 27 Nov 2019 06:05 )             40.2     11-27    133<L>  |  92<L>  |  17  ----------------------------<  867<HH>  4.1   |  26  |  0.75    Ca    9.9      27 Nov 2019 06:05    TPro  8.3  /  Alb  4.4  /  TBili  0.3  /  DBili  x   /  AST  28  /  ALT  16  /  AlkPhos  176<H>  11-27    ESR: 23  CRP: --  11-27 @ 06:05    Vital Signs Last 24 Hrs  T(C): 36.9 (27 Nov 2019 05:36), Max: 36.9 (27 Nov 2019 05:36)  T(F): 98.4 (27 Nov 2019 05:36), Max: 98.4 (27 Nov 2019 05:36)  HR: 100 (27 Nov 2019 05:36) (100 - 100)  BP: 189/94 (27 Nov 2019 06:50) (189/94 - 201/126)  BP(mean): --  RR: 18 (27 Nov 2019 05:36) (18 - 18)  SpO2: 100% (27 Nov 2019 05:36) (100% - 100%)    PHYSICAL EXAM  General: NAD, AA0x3    Lower Extremity Focused:  Vasc: Nonpalpable pulses, monophasic L DP/ biphasic L PT, biphasic R DP/triphasic R PT  Derm: Bright grade 2 ulcer overlying L dorsum 1st MCJ with fibrogranular base with extension into deep tissue overlying joint. Bright grade 1 ulcer medial aspect submet 1 left foot. NO purulence, abscess, drainage, fluctuance.   Neuro: Grossly diminished B/L  MSK: ambulatory     RADIOLOGY  Final read pending   - Lisfranc ORIF 2 screw fixation. [place] screw across 2nd met-medial cuneiform fractured. Attending: Charissa     Patient is a 59y old  Male who presents with a chief complaint of Hyperglycemia  Diabetic Foot Infection (27 Nov 2019 07:25)    HPI:  Patient is a 58 yo undomiciled male with history of HTN and poorly controlled DM complicated by neuropathy and diabetic foot ulcers who presents with complaints of increase urination for the past few days. He states he was recently admitted to [facility] for left hand weakness and difficulty grasping objects. The initial suspected diagnosis was stroke however he had an MRI done and there was no evidence of CVA and he was told he has a nerve problem. He presents today with increase urination, increase thirst and worsening ulcer on his left foot. His diabetes is very poorly controlled, his last A1c was 19%. He has had 5 diabetic foot ulcers in the past, mostly in his left leg. He has been told his left foot pulses are faint and he should get an amputation. He does not know what happened however states that around 2 weeks ago, he started noticing an ulcer with bloody drainage on the medial plantar aspect of the left forefoot which was gradually worsening and expanding. He denies any fevers or chills. He denies taking any antibiotics. He also endorses nausea and up to 3 episodes of NBNB vomiting yesterday. Remaining ROS negative except as mentioned otherwise.   Of note, he has had left foot surgery a few years ago for broken metatarsals and phalanges and has hardware.   On arrival to [facility] ED: VS: afebrile, , bp 201/126, no respiratory distress. Initial labs s/f Hgb of 12.1, Na 133, Cl 92, , BHB negative, AG 15, VBG with no acidosis.   ED course: 2L NS bolus, amlodipine 10 mg x1, lisinopril 40 mg x1, insulin 10 units regular IVx1, vanc/zosyn. Patient is being admitted to regional medical floors for further management. (27 Nov 2019 07:25)    Review of systems negative except per HPI    PAST MEDICAL & SURGICAL HISTORY:  Hypertension  IDDM (insulin dependent diabetes mellitus)  History of open reduction and internal fixation (ORIF) procedure: LEFT FOOT    Home Medications:  Admelog 100 units/mL injectable solution: 10 units TID before meals (27 Nov 2019 08:19)  amLODIPine 10 mg oral tablet: 1 tab(s) orally once a day (27 Nov 2019 08:19)  Aspirin Enteric Coated 81 mg oral delayed release tablet: 1 tab(s) orally once a day (27 Nov 2019 08:19)  Basaglar KwikPen 100 units/mL subcutaneous solution: 30 unit(s) subcutaneous 2 times a day (27 Nov 2019 08:19)  folic acid 1 mg oral tablet: 1 tab(s) orally once a day (27 Nov 2019 08:19)  lisinopril 40 mg oral tablet: 1 tab(s) orally once a day (27 Nov 2019 08:19)  Multiple Vitamins oral tablet: 1 tab(s) orally once a day (27 Nov 2019 08:19)    Allergies  No Known Allergies    FAMILY HISTORY:  FH: type 2 diabetes mellitus    Social History: undomiciled     LABS                        12.1   9.67  )-----------( 214      ( 27 Nov 2019 06:05 )             40.2     11-27    133<L>  |  92<L>  |  17  ----------------------------<  867<HH>  4.1   |  26  |  0.75    Ca    9.9      27 Nov 2019 06:05    TPro  8.3  /  Alb  4.4  /  TBili  0.3  /  DBili  x   /  AST  28  /  ALT  16  /  AlkPhos  176<H>  11-27    ESR: 23  CRP: --  11-27 @ 06:05    Vital Signs Last 24 Hrs  T(C): 36.9 (27 Nov 2019 05:36), Max: 36.9 (27 Nov 2019 05:36)  T(F): 98.4 (27 Nov 2019 05:36), Max: 98.4 (27 Nov 2019 05:36)  HR: 100 (27 Nov 2019 05:36) (100 - 100)  BP: 189/94 (27 Nov 2019 06:50) (189/94 - 201/126)  BP(mean): --  RR: 18 (27 Nov 2019 05:36) (18 - 18)  SpO2: 100% (27 Nov 2019 05:36) (100% - 100%)    PHYSICAL EXAM  General: NAD, AA0x3    Lower Extremity Focused:  Vasc: Nonpalpable pulses, monophasic L DP/ biphasic L PT, biphasic R DP/triphasic R PT  Derm: Bright grade 2 ulcer overlying L dorsum 1st MCJ with fibrogranular base with extension into deep tissue overlying joint. Bright grade 1 ulcer medial aspect submet 1 left foot. NO purulence, abscess, drainage, fluctuance.   Neuro: Grossly diminished B/L  MSK: ambulatory. Fixed forefoot abduction at level of midfoot left side     RADIOLOGY  Final read pending   - Lisfranc ORIF 2 screw fixation. Screw breakage with overlying ST defect overlying 1st MCJ. Midfoot dislocation at level of metatarsal- tarsal joints with forefoot abduction.

## 2019-11-27 NOTE — ED PROVIDER NOTE - NS ED ROS FT
CONSTITUTIONAL: No fever, chills, or weakness  NEURO: No headache, no dizziness, no syncope  EYES: No visual changes  ENT: No rhinorrhea or sore throat  PULM: No cough or dyspnea  CV: No chest pain or palpitations  GI: Occasional vomiting and diarrhea  : No dysuria, hematuria, frequency  MSK: No neck pain or back pain  SKIN: no rash or unusual bruising

## 2019-11-27 NOTE — H&P ADULT - NSHPPHYSICALEXAM_GEN_ALL_CORE
.  VITAL SIGNS:  T(C): 36.9 (11-27-19 @ 05:36), Max: 36.9 (11-27-19 @ 05:36)  T(F): 98.4 (11-27-19 @ 05:36), Max: 98.4 (11-27-19 @ 05:36)  HR: 100 (11-27-19 @ 05:36) (100 - 100)  BP: 189/94 (11-27-19 @ 06:50) (189/94 - 201/126)  BP(mean): --  RR: 18 (11-27-19 @ 05:36) (18 - 18)  SpO2: 100% (11-27-19 @ 05:36) (100% - 100%)  Wt(kg): --    PHYSICAL EXAM:    Constitutional: WDWN resting comfortably in bed; NAD  Head: NC/AT  Eyes: PERRL, EOMI, anicteric sclera  ENT: no nasal discharge; uvula midline, no oropharyngeal erythema or exudates; MMM  Neck: supple; no JVD or thyromegaly  Respiratory: CTA B/L; no W/R/R, no retractions  Cardiac: +S1/S2; RRR; no M/R/G; PMI non-displaced  Gastrointestinal: abdomen soft, NT/ND; no rebound or guarding; +BSx4  Back: spine midline, no bony tenderness or step-offs; no CVAT B/L  Extremities: WWP, no clubbing or cyanosis; no peripheral edema  Musculoskeletal: NROM x4; no joint swelling, tenderness or erythema  Vascular: 2+ radial, femoral, DP/PT pulses B/L  Dermatologic: skin warm, dry and intact; no rashes, wounds, or scars  Lymphatic: no submandibular or cervical LAD  Neurologic: AAOx3; CNII-XII grossly intact; no focal deficits  Psychiatric: affect and characteristics of appearance, verbalizations, behaviors are appropriate VITAL SIGNS:  T(C): 36.9 (11-27-19 @ 05:36), Max: 36.9 (11-27-19 @ 05:36)  T(F): 98.4 (11-27-19 @ 05:36), Max: 98.4 (11-27-19 @ 05:36)  HR: 100 (11-27-19 @ 05:36) (100 - 100)  BP: 189/94 (11-27-19 @ 06:50) (189/94 - 201/126)  BP(mean): --  RR: 18 (11-27-19 @ 05:36) (18 - 18)  SpO2: 100% (11-27-19 @ 05:36) (100% - 100%)  Wt(kg): --    PHYSICAL EXAM:    Constitutional: WDWN resting comfortably in bed; NAD  Head: NC/AT  Eyes: PERRL, EOMI, anicteric sclera  ENT: no nasal discharge; uvula midline, no oropharyngeal erythema or exudates; MMM  Neck: supple; no JVD or thyromegaly  Respiratory: CTA B/L; no W/R/R, no retractions  Cardiac: +S1/S2; RRR; no M/R/G; PMI non-displaced  Gastrointestinal: abdomen soft, NT/ND; no rebound or guarding; +BSx4  Back: spine midline, no bony tenderness or step-offs; no CVAT B/L  Extremities: WWP, no clubbing or cyanosis; no peripheral edema  Musculoskeletal: NROM x4; no joint swelling, tenderness or erythema  Vascular: 2+ radial, femoral, DP/PT pulses B/L  Dermatologic: skin warm, dry and intact; no rashes, wounds, or scars  Lymphatic: no submandibular or cervical LAD  Neurologic: AAOx3; CNII-XII grossly intact; no focal deficits  Psychiatric: affect and characteristics of appearance, verbalizations, behaviors are appropriate VITAL SIGNS:  T(C): 36.9 (11-27-19 @ 05:36), Max: 36.9 (11-27-19 @ 05:36)  T(F): 98.4 (11-27-19 @ 05:36), Max: 98.4 (11-27-19 @ 05:36)  HR: 100 (11-27-19 @ 05:36) (100 - 100)  BP: 189/94 (11-27-19 @ 06:50) (189/94 - 201/126)  BP(mean): --  RR: 18 (11-27-19 @ 05:36) (18 - 18)  SpO2: 100% (11-27-19 @ 05:36) (100% - 100%)  Wt(kg): --    PHYSICAL EXAM:  Constitutional: NAD  Head: NC/AT  Eyes: PERRL, EOMI, anicteric sclera, conjunctival pallor   ENT: no nasal discharge; uvula midline, no oropharyngeal erythema or exudates;  dry MM  Neck: supple; no JVD or thyromegaly  Respiratory: CTA B/L; no W/R/R, no retractions  Cardiac: +S1/S2; RRR; no M/R/G; PMI non-displaced  Gastrointestinal: abdomen soft, NT/ND; no rebound or guarding; +BSx4  Back: spine midline, no bony tenderness or step-offs; no CVAT B/L  Extremities: WWP, no clubbing or cyanosis; no peripheral edema  Musculoskeletal: NROM x4; no joint swelling, tenderness or erythema  Vascular: 2+ radial, 2+ DP/PT pulses right foot. Unable to palpate left sided DP/PT. Faint pulse on doppler  Dermatologic: [id number] ulcer present on medial plantar aspect of left forefoot, tender to palpation, no visible purulent drainage seen.   Lymphatic: no submandibular or cervical LAD  Neurologic: AAOx3; Left wrist drop. 4/5 strength LUE, 5/5 strength RUE. 5/5 B/L LE. Sensation intact   Psychiatric: affect and characteristics of appearance, verbalizations, behaviors are appropriate VITAL SIGNS:  T(C): 36.9 (11-27-19 @ 05:36), Max: 36.9 (11-27-19 @ 05:36)  T(F): 98.4 (11-27-19 @ 05:36), Max: 98.4 (11-27-19 @ 05:36)  HR: 100 (11-27-19 @ 05:36) (100 - 100)  BP: 189/94 (11-27-19 @ 06:50) (189/94 - 201/126)  BP(mean): --  RR: 18 (11-27-19 @ 05:36) (18 - 18)  SpO2: 100% (11-27-19 @ 05:36) (100% - 100%)  Wt(kg): --    PHYSICAL EXAM:  Constitutional: NAD  Head: NC/AT  Eyes: PERRL, EOMI, anicteric sclera, conjunctival pallor   ENT: no nasal discharge; uvula midline, no oropharyngeal erythema or exudates;  dry MM  Neck: supple; no JVD or thyromegaly  Respiratory: CTA B/L; no W/R/R, no retractions  Cardiac: +S1/S2; RRR; no M/R/G; PMI non-displaced  Gastrointestinal: abdomen soft, NT/ND; no rebound or guarding; +BSx4  Back: spine midline, no bony tenderness or step-offs; no CVAT B/L  Extremities: WWP, no clubbing or cyanosis; no peripheral edema  Musculoskeletal: NROM x4; no joint swelling, tenderness or erythema  Vascular: 2+ radial, 2+ DP/PT pulses right foot. Unable to palpate left sided DP/PT. Faint pulse on doppler  Dermatologic: [id number] ulcer present on medial plantar aspect of left forefoot, no visible purulent drainage seen.   Lymphatic: no submandibular or cervical LAD  Neurologic: AAOx3; Left wrist drop. 4/5 strength LUE, 5/5 strength RUE. 5/5 B/L LE. Sensation intact. No dysmetria.   Psychiatric: affect and characteristics of appearance, verbalizations, behaviors are appropriate

## 2019-11-27 NOTE — H&P ADULT - PROBLEM SELECTOR PLAN 1
-Present with hyperglycemia with initial . AG wnl, BHB negative, no acidosis on VBG. Mental status wnl. No concern for DKA/HHS at this time.   -Etiology of hyperglycemia in the setting of current infection as mentioned below and medication non compliance. Last took his basaglar over 2 days ago.   -Diagnosed in 2007. Was previously on glipizide. His recent HbA1c was 19% as per him. He follows at [facility]. His current regimen is basaglar 30 units BID and admelog 10 units TID prior to meals. His diabetes is complicated by neuropathy and vasculopathy. He has had up to 5 ulcers in his left foot. He has been told by his doctor that he has faint pulses in his left leg and has been recommended amputation in the past however refused.  -S/p 2LNS bolus, 10 units regular insulin IVx1  -Basaglar 30 units STAT now  -Keep NPO till BG improves. Then will start diabetic diet and pre meal 10 units TID  -Follow up HbA1c. Will likely need endocrine consult  -mISS for coverage  -Monitor FS  -Plan for diabetic foot ulcer as mentioned below -Presenting with hyperglycemia with initial . AG wnl, BHB negative, no acidosis on VBG. Mental status wnl. No concern for DKA/HHS at this time.   -Etiology of hyperglycemia in the setting of current infection as mentioned below and medication non compliance. Last took his basaglar over 2 days ago.   -Diagnosed in 2007. Was previously on glipizide. His recent HbA1c was 19% as per him. He follows at [facility]. His current regimen is basaglar 30 units BID and admelog 10 units TID prior to meals. His diabetes is complicated by neuropathy and vasculopathy. He has had up to 5 ulcers in his left foot. He has been told by his doctor that he has faint pulses in his left leg and has been recommended amputation in the past however refused.  -S/p 2LNS bolus, 10 units regular insulin IVx1  -Basaglar 30 units STAT now  -Keep NPO till BG improves. Then will start diabetic diet and pre meal lispro 10 units TID  -Follow up HbA1c. Will likely need endocrine consult  -mISS for coverage  -Monitor FS  -Plan for diabetic foot ulcer as mentioned below -Presenting with hyperglycemia with initial . AG wnl, BHB negative, no acidosis on VBG. Mental status wnl. No concern for DKA/HHS at this time.   -Etiology of hyperglycemia in the setting of medication non compliance. Last took his basaglar over 2 days ago.   -Diagnosed in 2007. Was previously on glipizide. His recent HbA1c was 19% as per him. He follows at [facility]. His current regimen is basaglar 30 units BID and admelog 10 units TID prior to meals. His diabetes is complicated by neuropathy and vasculopathy. He has had up to 5 ulcers in his left foot. He has been told by his doctor that he has faint pulses in his left leg and has been recommended amputation in the past however refused.  -S/p 2LNS bolus, 10 units regular insulin IVx1  -Basaglar 30 units STAT now  -Keep NPO till BG improves. Then will start diabetic diet and pre meal lispro 10 units TID  -Follow up HbA1c. Will likely need endocrine consult  -mISS for coverage  -Monitor FS -Presenting with hyperglycemia with initial . AG wnl, BHB negative, no acidosis on VBG. Mental status wnl. No concern for DKA/HHS at this time.   -Etiology of hyperglycemia in the setting of medication non compliance. Last took his basaglar over 2 days ago.   -Diagnosed in 2007. Was previously on glipizide. His recent HbA1c was 19% as per him. He follows at [facility]. His current regimen is basaglar 30 units BID and admelog 10 units TID prior to meals. His diabetes is complicated by neuropathy and vasculopathy. He has had up to 5 ulcers in his left foot. He has been told by his doctor that he has faint pulses in his left leg and has been recommended amputation in the past however refused.  -S/p 2LNS bolus, 10 units regular insulin IVx1, Lantus 30 unitsx1  -Keep NPO till BG improves. Then will start diabetic diet and pre meal lispro 10 units TID  -Follow up HbA1c. Endocrine consulted. Follow up recommendations.   -mISS for coverage  -Monitor FS

## 2019-11-27 NOTE — H&P ADULT - HISTORY OF PRESENT ILLNESS
On arrival to [facility] ED: VS: afebrile, , bp 201/126, no respiratory distress. Initial labs s/f Hgb of 12.1, Na 133, Cl 92, , BHB negative, AG 15, VBG with no acidosis.   ED course: 2L NS bolus, amlodipine 10 mg x1, lisinopril 40 mg x1, insulin 10 units regular IVx1, vanc/zosyn. Patient is being admitted to regional medical floors for further management. Patient is a 60 yo undomiciled male with history of HTN and poorly controlled DM complicated by neuropathy and diabetic foot ulcers who presents with complaints of increase urination for the past few days. He states he was recently admitted to [facility] for left hand weakness and difficulty grasping objects. The initial suspected diagnosis was stroke however he had an MRI done and there was no evidence of CVA and he was told he has a nerve problem. He presents today with increase urination, increase thirst and worsening ulcer on his left foot. His diabetes is very poorly controlled, his last A1c was 19%. He has had 5 diabetic foot ulcers in the past, mostly in his left leg. He has been told his left foot pulses are faint and he should get an amputation. He does not know what happened however states that around 2 weeks ago, he started noticing an ulcer with bloody drainage on the medial plantar aspect of the elft forefoot which was gradually worsening and expanding. He denies any fevers or chills. He denies taking any antibiotics. He also endorses nausea and up to 3 episodes of NBNB vomiting yesterday. Remaining ROS negative except as mentioned otherwise.     On arrival to [facility] ED: VS: afebrile, , bp 201/126, no respiratory distress. Initial labs s/f Hgb of 12.1, Na 133, Cl 92, , BHB negative, AG 15, VBG with no acidosis.   ED course: 2L NS bolus, amlodipine 10 mg x1, lisinopril 40 mg x1, insulin 10 units regular IVx1, vanc/zosyn. Patient is being admitted to regional medical floors for further management. Patient is a 60 yo undomiciled male with history of HTN and poorly controlled DM complicated by neuropathy and diabetic foot ulcers who presents with complaints of increase urination for the past few days. He states he was recently admitted to [facility] for left hand weakness and difficulty grasping objects. The initial suspected diagnosis was stroke however he had an MRI done and there was no evidence of CVA and he was told he has a nerve problem. He presents today with increase urination, increase thirst and worsening ulcer on his left foot. His diabetes is very poorly controlled, his last A1c was 19%. He has had 5 diabetic foot ulcers in the past, mostly in his left leg. He has been told his left foot pulses are faint and he should get an amputation. He does not know what happened however states that around 2 weeks ago, he started noticing an ulcer with bloody drainage on the medial plantar aspect of the left forefoot which was gradually worsening and expanding. He denies any fevers or chills. He denies taking any antibiotics. He also endorses nausea and up to 3 episodes of NBNB vomiting yesterday. Remaining ROS negative except as mentioned otherwise.   Of note, he has had left foot surgery a few years ago for broken metatarsals and phalanges and has hardware.   On arrival to [facility] ED: VS: afebrile, , bp 201/126, no respiratory distress. Initial labs s/f Hgb of 12.1, Na 133, Cl 92, , BHB negative, AG 15, VBG with no acidosis.   ED course: 2L NS bolus, amlodipine 10 mg x1, lisinopril 40 mg x1, insulin 10 units regular IVx1, vanc/zosyn. Patient is being admitted to regional medical floors for further management. Patient is a 58 yo undomiciled male with history of HTN and poorly controlled DM complicated by neuropathy and diabetic foot ulcers who presents with complaints of increase urination for the past few days. He states he was recently admitted to [facility] for left hand weakness and difficulty grasping objects. The initial suspected diagnosis was stroke however he had an MRI done and there was no evidence of CVA and he was told he has a nerve problem. He presents today with increase urination and increase thirst. His diabetes is very poorly controlled, his last A1c was 19%. He has had 5 diabetic foot ulcers in the past, mostly in his left leg. He has been told his left foot pulses are faint and he should get an amputation. He does not know what happened however states that around 2 weeks ago, he started noticing an ulcer with bloody drainage on the medial plantar aspect of the left forefoot which was gradually worsening and expanding. Denies any trauma. He denies any fevers or chills. He denies taking any antibiotics. He also endorses nausea and up to 3 episodes of NBNB vomiting yesterday. Remaining ROS negative except as mentioned otherwise.   Of note, he has had left foot surgery a few years ago for broken metatarsals and phalanges and has hardware in place.   On arrival to [facility] ED: VS: afebrile, , bp 201/126, no respiratory distress. Initial labs s/f Hgb of 12.1, Na 133, Cl 92, , BHB negative, AG 15, VBG with no acidosis.   ED course: 2L NS bolus, amlodipine 10 mg x1, lisinopril 40 mg x1, insulin 10 units regular IVx1, vanc/zosyn. Patient is being admitted to regional medical floors for further management.

## 2019-11-27 NOTE — ED PROVIDER NOTE - CLINICAL SUMMARY MEDICAL DECISION MAKING FREE TEXT BOX
IDDM man with severe hyperglycemia in high 800s; no ketonuria or BHB elevation; no acidosis on VBG.  Has left plantar foot ulcer at [facility] with skin maceration, possibly cause of severe hyperglycemia despite receiving insulin prior to discharge from [facility] yesterday.  No significant electrolyte abnl.  IV fluids, insulin ordered.  Will eval foot with ESR, CRP, imaging.

## 2019-11-27 NOTE — H&P ADULT - PROBLEM SELECTOR PLAN 7
1) PCP Contacted on Admission: Dr. Augustine ([facility])   2) Date of Contact with PCP:  3) PCP Contacted at Discharge: (Y/N, N/A)  4) Summary of Handoff Given to PCP:   5) Post-Discharge Appointment Date and Location: Diet: NPO. Will start diabetic diet once BG improves  Replete lytes prn  NS @ 100cc/hr     Disposition: [facility]  Code Status: Full Code  VTE ppx: Lovenox

## 2019-11-27 NOTE — CONSULT NOTE ADULT - ATTENDING COMMENTS
Pt seen on rounds this afternoon.  It is almost impossible to obtain a coherent history from him regarding his diabetes.  He reports intervals when he claims his blood sugars are "150," though says that he has been > 400 during the past few weeks without explanation.  Will try to stay with a BID regimen of Lantus so as not to disrupt his "schedule," but would decrease to 25 units BID.  (Give only 20 units tonight since he already received 30 units this morning).  Start the lispro at 10 units pre-meal for now.  Cannot examine his left foot due to dressings, though Podiatry indicates that they do not think that he has an active infection.  I cannot feel a DP pulse in either foot.  He has faint bibasilar crackles on lung auscultation, but denies cough or orthopnea

## 2019-11-27 NOTE — H&P ADULT - PROBLEM SELECTOR PLAN 5
Diet: NPO. Will start diabetic diet once BG improves  Replete lytes prn  NS @ 100cc/hr     Disposition: [facility]  Code Status: Full Code  VTE ppx: Lovenox -Recently admitted to [facility] for left hand weakness and difficulty grasping objects. CVA ruled out with MRI  -Likely wrist drop -Recently admitted to [facility] for left hand weakness and difficulty grasping objects. CVA ruled out with MRI  -Likely wrist drop based on presentation

## 2019-11-28 LAB
ALBUMIN SERPL ELPH-MCNC: 3.5 G/DL — SIGNIFICANT CHANGE UP (ref 3.3–5)
ALP SERPL-CCNC: 111 U/L — SIGNIFICANT CHANGE UP (ref 40–120)
ALT FLD-CCNC: 12 U/L — SIGNIFICANT CHANGE UP (ref 10–45)
ANION GAP SERPL CALC-SCNC: 9 MMOL/L — SIGNIFICANT CHANGE UP (ref 5–17)
AST SERPL-CCNC: 21 U/L — SIGNIFICANT CHANGE UP (ref 10–40)
BASOPHILS # BLD AUTO: 0.08 K/UL — SIGNIFICANT CHANGE UP (ref 0–0.2)
BASOPHILS NFR BLD AUTO: 0.8 % — SIGNIFICANT CHANGE UP (ref 0–2)
BILIRUB SERPL-MCNC: 0.2 MG/DL — SIGNIFICANT CHANGE UP (ref 0.2–1.2)
BUN SERPL-MCNC: 16 MG/DL — SIGNIFICANT CHANGE UP (ref 7–23)
CALCIUM SERPL-MCNC: 8.4 MG/DL — SIGNIFICANT CHANGE UP (ref 8.4–10.5)
CHLORIDE SERPL-SCNC: 103 MMOL/L — SIGNIFICANT CHANGE UP (ref 96–108)
CO2 SERPL-SCNC: 25 MMOL/L — SIGNIFICANT CHANGE UP (ref 22–31)
CREAT SERPL-MCNC: 0.7 MG/DL — SIGNIFICANT CHANGE UP (ref 0.5–1.3)
EOSINOPHIL # BLD AUTO: 0.23 K/UL — SIGNIFICANT CHANGE UP (ref 0–0.5)
EOSINOPHIL NFR BLD AUTO: 2.2 % — SIGNIFICANT CHANGE UP (ref 0–6)
GLUCOSE SERPL-MCNC: 305 MG/DL — HIGH (ref 70–99)
HCT VFR BLD CALC: 33.2 % — LOW (ref 39–50)
HCV AB S/CO SERPL IA: 14.38 S/CO — SIGNIFICANT CHANGE UP
HCV AB SERPL-IMP: REACTIVE
HGB BLD-MCNC: 10.5 G/DL — LOW (ref 13–17)
IMM GRANULOCYTES NFR BLD AUTO: 0.7 % — SIGNIFICANT CHANGE UP (ref 0–1.5)
LYMPHOCYTES # BLD AUTO: 29.4 % — SIGNIFICANT CHANGE UP (ref 13–44)
LYMPHOCYTES # BLD AUTO: 3.03 K/UL — SIGNIFICANT CHANGE UP (ref 1–3.3)
MAGNESIUM SERPL-MCNC: 1.6 MG/DL — SIGNIFICANT CHANGE UP (ref 1.6–2.6)
MCHC RBC-ENTMCNC: 26.4 PG — LOW (ref 27–34)
MCHC RBC-ENTMCNC: 31.6 GM/DL — LOW (ref 32–36)
MCV RBC AUTO: 83.6 FL — SIGNIFICANT CHANGE UP (ref 80–100)
MONOCYTES # BLD AUTO: 0.65 K/UL — SIGNIFICANT CHANGE UP (ref 0–0.9)
MONOCYTES NFR BLD AUTO: 6.3 % — SIGNIFICANT CHANGE UP (ref 2–14)
NEUTROPHILS # BLD AUTO: 6.24 K/UL — SIGNIFICANT CHANGE UP (ref 1.8–7.4)
NEUTROPHILS NFR BLD AUTO: 60.6 % — SIGNIFICANT CHANGE UP (ref 43–77)
NRBC # BLD: 0 /100 WBCS — SIGNIFICANT CHANGE UP (ref 0–0)
PLATELET # BLD AUTO: 176 K/UL — SIGNIFICANT CHANGE UP (ref 150–400)
POTASSIUM SERPL-MCNC: 3.6 MMOL/L — SIGNIFICANT CHANGE UP (ref 3.5–5.3)
POTASSIUM SERPL-SCNC: 3.6 MMOL/L — SIGNIFICANT CHANGE UP (ref 3.5–5.3)
PROT SERPL-MCNC: 6.9 G/DL — SIGNIFICANT CHANGE UP (ref 6–8.3)
RBC # BLD: 3.97 M/UL — LOW (ref 4.2–5.8)
RBC # FLD: 13.9 % — SIGNIFICANT CHANGE UP (ref 10.3–14.5)
SODIUM SERPL-SCNC: 137 MMOL/L — SIGNIFICANT CHANGE UP (ref 135–145)
WBC # BLD: 10.3 K/UL — SIGNIFICANT CHANGE UP (ref 3.8–10.5)
WBC # FLD AUTO: 10.3 K/UL — SIGNIFICANT CHANGE UP (ref 3.8–10.5)

## 2019-11-28 PROCEDURE — 99231 SBSQ HOSP IP/OBS SF/LOW 25: CPT

## 2019-11-28 PROCEDURE — 99233 SBSQ HOSP IP/OBS HIGH 50: CPT | Mod: GC

## 2019-11-28 RX ORDER — ATORVASTATIN CALCIUM 80 MG/1
80 TABLET, FILM COATED ORAL AT BEDTIME
Refills: 0 | Status: DISCONTINUED | OUTPATIENT
Start: 2019-11-28 | End: 2019-12-04

## 2019-11-28 RX ORDER — ACETAMINOPHEN 500 MG
650 TABLET ORAL ONCE
Refills: 0 | Status: COMPLETED | OUTPATIENT
Start: 2019-11-28 | End: 2019-11-28

## 2019-11-28 RX ORDER — INSULIN GLARGINE 100 [IU]/ML
25 INJECTION, SOLUTION SUBCUTANEOUS EVERY MORNING
Refills: 0 | Status: DISCONTINUED | OUTPATIENT
Start: 2019-11-28 | End: 2019-12-02

## 2019-11-28 RX ORDER — POTASSIUM CHLORIDE 20 MEQ
40 PACKET (EA) ORAL ONCE
Refills: 0 | Status: COMPLETED | OUTPATIENT
Start: 2019-11-28 | End: 2019-11-28

## 2019-11-28 RX ORDER — INSULIN GLARGINE 100 [IU]/ML
30 INJECTION, SOLUTION SUBCUTANEOUS AT BEDTIME
Refills: 0 | Status: DISCONTINUED | OUTPATIENT
Start: 2019-11-28 | End: 2019-11-29

## 2019-11-28 RX ADMIN — INSULIN GLARGINE 25 UNIT(S): 100 INJECTION, SOLUTION SUBCUTANEOUS at 09:18

## 2019-11-28 RX ADMIN — LISINOPRIL 40 MILLIGRAM(S): 2.5 TABLET ORAL at 06:48

## 2019-11-28 RX ADMIN — Medication 650 MILLIGRAM(S): at 09:18

## 2019-11-28 RX ADMIN — AMLODIPINE BESYLATE 10 MILLIGRAM(S): 2.5 TABLET ORAL at 06:48

## 2019-11-28 RX ADMIN — Medication 2: at 12:33

## 2019-11-28 RX ADMIN — Medication 1 TABLET(S): at 11:18

## 2019-11-28 RX ADMIN — INSULIN GLARGINE 30 UNIT(S): 100 INJECTION, SOLUTION SUBCUTANEOUS at 22:09

## 2019-11-28 RX ADMIN — Medication 40 MILLIEQUIVALENT(S): at 11:18

## 2019-11-28 RX ADMIN — Medication 1 MILLIGRAM(S): at 11:18

## 2019-11-28 RX ADMIN — Medication 10 UNIT(S): at 12:33

## 2019-11-28 RX ADMIN — Medication 81 MILLIGRAM(S): at 11:18

## 2019-11-28 RX ADMIN — ENOXAPARIN SODIUM 40 MILLIGRAM(S): 100 INJECTION SUBCUTANEOUS at 09:18

## 2019-11-28 RX ADMIN — Medication 650 MILLIGRAM(S): at 09:38

## 2019-11-28 RX ADMIN — Medication 6: at 09:17

## 2019-11-28 RX ADMIN — Medication 4: at 22:09

## 2019-11-28 RX ADMIN — Medication 10 UNIT(S): at 09:17

## 2019-11-28 NOTE — PROGRESS NOTE ADULT - SUBJECTIVE AND OBJECTIVE BOX
Patient is a 59y old  Male who presents with a chief complaint of Hyperglycemia without HHS or DKA (28 Nov 2019 09:57)      INTERVAL HPI/ OVERNIGHT EVENTS: No overnight events      LABS                        10.5   10.30 )-----------( 176      ( 28 Nov 2019 05:55 )             33.2     11-28    137  |  103  |  16  ----------------------------<  305<H>  3.6   |  25  |  0.70    Ca    8.4      28 Nov 2019 05:55  Mg     1.6     11-28    TPro  6.9  /  Alb  3.5  /  TBili  0.2  /  DBili  x   /  AST  21  /  ALT  12  /  AlkPhos  111  11-28        ICU Vital Signs Last 24 Hrs  T(C): 36.3 (28 Nov 2019 08:31), Max: 37.1 (27 Nov 2019 13:30)  T(F): 97.4 (28 Nov 2019 08:31), Max: 98.8 (27 Nov 2019 13:30)  HR: 86 (28 Nov 2019 08:31) (84 - 93)  BP: 165/88 (28 Nov 2019 08:31) (144/82 - 169/98)  BP(mean): --  ABP: --  ABP(mean): --  RR: 20 (28 Nov 2019 08:31) (18 - 20)  SpO2: 99% (28 Nov 2019 08:31) (95% - 99%)      RADIOLOGY    < from: Xray Foot AP + Lateral + Oblique, Left (11.27.19 @ 07:44) >  IMPRESSION: Soft tissue swelling and skin contour irregularity adjacent   to the first metatarsal joint without subjacent bony involvement.   However, if osteomyelitis or abscess is suspected MRI examination of the   foot is recommended.    There is failure of the orthopedic hardware at the proximal first/second   metatarsals and cuboid bones.    Severe osteoarthritis at the tarsometatarsal joints.    < end of copied text >    MICROBIOLOGY    PHYSICAL EXAM  Vasc: Nonpalpable pulses, monophasic L DP/ biphasic L PT, biphasic R DP/triphasic R PT  Derm: Bright grade 2 ulcer overlying L dorsum 1st MCJ with fibrogranular base with extension into deep tissue overlying joint. Bright grade 1 ulcer medial aspect submet 1 left foot. NO purulence, abscess, drainage, fluctuance.   Neuro: Grossly diminished B/L  MSK: ambulatory. Fixed forefoot abduction at level of midfoot left side

## 2019-11-28 NOTE — CONSULT NOTE ADULT - SUBJECTIVE AND OBJECTIVE BOX
Interval:     MEDICATIONS  (STANDING):  amLODIPine   Tablet 10 milliGRAM(s) Oral every 24 hours  aspirin enteric coated 81 milliGRAM(s) Oral daily  dextrose 5%. 1000 milliLiter(s) (50 mL/Hr) IV Continuous <Continuous>  dextrose 50% Injectable 12.5 Gram(s) IV Push once  dextrose 50% Injectable 25 Gram(s) IV Push once  dextrose 50% Injectable 25 Gram(s) IV Push once  enoxaparin Injectable 40 milliGRAM(s) SubCutaneous every 24 hours  folic acid 1 milliGRAM(s) Oral daily  insulin glargine Injectable (LANTUS) 25 Unit(s) SubCutaneous <User Schedule>  insulin lispro (HumaLOG) corrective regimen sliding scale   SubCutaneous Before meals and at bedtime  insulin lispro Injectable (HumaLOG) 10 Unit(s) SubCutaneous three times a day before meals  lisinopril 40 milliGRAM(s) Oral every 24 hours  multivitamin 1 Tablet(s) Oral daily    MEDICATIONS  (PRN):  dextrose 40% Gel 15 Gram(s) Oral once PRN Blood Glucose LESS THAN 70 milliGRAM(s)/deciliter  glucagon  Injectable 1 milliGRAM(s) IntraMuscular once PRN Glucose LESS THAN 70 milligrams/deciliter      Allergies:  No Known Allergies    Vital Signs Last 24 Hrs  T(C): 36.3 (28 Nov 2019 08:31), Max: 37.1 (27 Nov 2019 13:30)  T(F): 97.4 (28 Nov 2019 08:31), Max: 98.8 (27 Nov 2019 13:30)  HR: 86 (28 Nov 2019 08:31) (84 - 93)  BP: 165/88 (28 Nov 2019 08:31) (144/82 - 169/98)  BP(mean): --  RR: 20 (28 Nov 2019 08:31) (18 - 20)  SpO2: 99% (28 Nov 2019 08:31) (95% - 99%)    Exam:  Constitutional: wn/wd in NAD.   HEENT: NCAT, MMM, OP clear, EOMI, , no proptosis or lid retraction  Neck: no thyromegaly or palpable thyroid nodules   Respiratory: lungs CTAB.  Cardiovascular: regular rhythm, normal S1 and S2, no audible murmurs, no peripheral edema  GI: soft, NT/ND, no masses/HSM appreciated.  Neurology: no tremors, DTR 2+  Skin: no visible rashes/lesions  Psychiatric: AAO x 3, normal affect/mood.  Ext: radial pulses intact, DP pulses feeble on the right foot. Left foot wrapped.                           10.5   10.30 )-----------( 176      ( 28 Nov 2019 05:55 )             33.2   LIVER FUNCTIONS - ( 28 Nov 2019 05:55 )  Alb: 3.5 g/dL / Pro: 6.9 g/dL / ALK PHOS: 111 U/L / ALT: 12 U/L / AST: 21 U/L / GGT: x         11-28    137  |  103  |  16  ----------------------------<  305<H>  3.6   |  25  |  0.70    Ca    8.4      28 Nov 2019 05:55  Mg     1.6     11-28    TPro  6.9  /  Alb  3.5  /  TBili  0.2  /  DBili  x   /  AST  21  /  ALT  12  /  AlkPhos  111  11-28  CAPILLARY BLOOD GLUCOSE      POCT Blood Glucose.: 257 mg/dL (28 Nov 2019 08:47)  POCT Blood Glucose.: 166 mg/dL (27 Nov 2019 21:48)  POCT Blood Glucose.: 83 mg/dL (27 Nov 2019 17:31)  POCT Blood Glucose.: 472 mg/dL (27 Nov 2019 11:29)  POCT Blood Glucose.: 384 mg/dL (27 Nov 2019 10:23)      A/P: Mr. Balbuena is a 58 yo undomiciled man with HTN and poorly controlled T2DM (A1c 12.5%) c/b neuropathy and diabetic foot ulcers, admitted for hyperglycemia (no e/o DKA) and diabetic foot ulcers. Home regimen: basaglar 40 units bid, admelog 10-15u qAc.     #DM Type 2 - uncontrolled - with neuropathy and vasculopathy. He's recovering from glucotoxicity. Morning FSG is above goal but   -Change lantus to 25 units in the morning, 30 units at night .  -Continue lispro 10   units before each meal.   -Please continue lispro moderate dose sliding scale four times daily with meals and at bedtime Interval:     MEDICATIONS  (STANDING):  amLODIPine   Tablet 10 milliGRAM(s) Oral every 24 hours  aspirin enteric coated 81 milliGRAM(s) Oral daily  dextrose 5%. 1000 milliLiter(s) (50 mL/Hr) IV Continuous <Continuous>  dextrose 50% Injectable 12.5 Gram(s) IV Push once  dextrose 50% Injectable 25 Gram(s) IV Push once  dextrose 50% Injectable 25 Gram(s) IV Push once  enoxaparin Injectable 40 milliGRAM(s) SubCutaneous every 24 hours  folic acid 1 milliGRAM(s) Oral daily  insulin glargine Injectable (LANTUS) 25 Unit(s) SubCutaneous <User Schedule>  insulin lispro (HumaLOG) corrective regimen sliding scale   SubCutaneous Before meals and at bedtime  insulin lispro Injectable (HumaLOG) 10 Unit(s) SubCutaneous three times a day before meals  lisinopril 40 milliGRAM(s) Oral every 24 hours  multivitamin 1 Tablet(s) Oral daily    MEDICATIONS  (PRN):  dextrose 40% Gel 15 Gram(s) Oral once PRN Blood Glucose LESS THAN 70 milliGRAM(s)/deciliter  glucagon  Injectable 1 milliGRAM(s) IntraMuscular once PRN Glucose LESS THAN 70 milligrams/deciliter      Allergies:  No Known Allergies    Vital Signs Last 24 Hrs  T(C): 36.3 (28 Nov 2019 08:31), Max: 37.1 (27 Nov 2019 13:30)  T(F): 97.4 (28 Nov 2019 08:31), Max: 98.8 (27 Nov 2019 13:30)  HR: 86 (28 Nov 2019 08:31) (84 - 93)  BP: 165/88 (28 Nov 2019 08:31) (144/82 - 169/98)  BP(mean): --  RR: 20 (28 Nov 2019 08:31) (18 - 20)  SpO2: 99% (28 Nov 2019 08:31) (95% - 99%)    Exam:  Constitutional: wn/wd in NAD.   HEENT: NCAT, MMM, OP clear, EOMI, , no proptosis or lid retraction  Neck: no thyromegaly or palpable thyroid nodules   Respiratory: lungs CTAB.  Cardiovascular: regular rhythm, normal S1 and S2, no audible murmurs, no peripheral edema  GI: soft, NT/ND, no masses/HSM appreciated.  Neurology: no tremors, DTR 2+  Skin: no visible rashes/lesions  Psychiatric: AAO x 3, normal affect/mood.  Ext: radial pulses intact, DP pulses feeble on the right foot. Left foot wrapped.                           10.5   10.30 )-----------( 176      ( 28 Nov 2019 05:55 )             33.2   LIVER FUNCTIONS - ( 28 Nov 2019 05:55 )  Alb: 3.5 g/dL / Pro: 6.9 g/dL / ALK PHOS: 111 U/L / ALT: 12 U/L / AST: 21 U/L / GGT: x         11-28    137  |  103  |  16  ----------------------------<  305<H>  3.6   |  25  |  0.70    Ca    8.4      28 Nov 2019 05:55  Mg     1.6     11-28    TPro  6.9  /  Alb  3.5  /  TBili  0.2  /  DBili  x   /  AST  21  /  ALT  12  /  AlkPhos  111  11-28  CAPILLARY BLOOD GLUCOSE      POCT Blood Glucose.: 257 mg/dL (28 Nov 2019 08:47)  POCT Blood Glucose.: 166 mg/dL (27 Nov 2019 21:48)  POCT Blood Glucose.: 83 mg/dL (27 Nov 2019 17:31)  POCT Blood Glucose.: 472 mg/dL (27 Nov 2019 11:29)  POCT Blood Glucose.: 384 mg/dL (27 Nov 2019 10:23)      A/P: Mr. Balbuena is a 60 yo undomiciled man with HTN and poorly controlled T2DM (A1c 12.5%) c/b neuropathy and diabetic foot ulcers, admitted for hyperglycemia (no e/o DKA) and diabetic foot ulcers. Home regimen: basaglar 40 units bid, admelog 10-15u qA.     #DM Type 2 - uncontrolled - with neuropathy and vasculopathy. He's recovering from glucotoxicity, though Morning FSG is still above goal.    -Change lantus to 25 units in the morning, 30 units at night .  -Continue lispro 10   units before each meal.   -Please continue lispro moderate dose sliding scale four times daily with meals and at bedtime Subjective: patient was ambulating in the hallway, agitated when I attempted to visit.    MEDICATIONS  (STANDING):  amLODIPine   Tablet 10 milliGRAM(s) Oral every 24 hours  aspirin enteric coated 81 milliGRAM(s) Oral daily  dextrose 5%. 1000 milliLiter(s) (50 mL/Hr) IV Continuous <Continuous>  dextrose 50% Injectable 12.5 Gram(s) IV Push once  dextrose 50% Injectable 25 Gram(s) IV Push once  dextrose 50% Injectable 25 Gram(s) IV Push once  enoxaparin Injectable 40 milliGRAM(s) SubCutaneous every 24 hours  folic acid 1 milliGRAM(s) Oral daily  insulin glargine Injectable (LANTUS) 25 Unit(s) SubCutaneous <User Schedule>  insulin lispro (HumaLOG) corrective regimen sliding scale   SubCutaneous Before meals and at bedtime  insulin lispro Injectable (HumaLOG) 10 Unit(s) SubCutaneous three times a day before meals  lisinopril 40 milliGRAM(s) Oral every 24 hours  multivitamin 1 Tablet(s) Oral daily    MEDICATIONS  (PRN):  dextrose 40% Gel 15 Gram(s) Oral once PRN Blood Glucose LESS THAN 70 milliGRAM(s)/deciliter  glucagon  Injectable 1 milliGRAM(s) IntraMuscular once PRN Glucose LESS THAN 70 milligrams/deciliter      Allergies:  No Known Allergies    Vital Signs Last 24 Hrs  T(C): 36.3 (28 Nov 2019 08:31), Max: 37.1 (27 Nov 2019 13:30)  T(F): 97.4 (28 Nov 2019 08:31), Max: 98.8 (27 Nov 2019 13:30)  HR: 86 (28 Nov 2019 08:31) (84 - 93)  BP: 165/88 (28 Nov 2019 08:31) (144/82 - 169/98)  BP(mean): --  RR: 20 (28 Nov 2019 08:31) (18 - 20)  SpO2: 99% (28 Nov 2019 08:31) (95% - 99%)    Exam:  Constitutional: Tall thin  man walking with cane. Unable to conduct rest of the exam.                         10.5   10.30 )-----------( 176      ( 28 Nov 2019 05:55 )             33.2   LIVER FUNCTIONS - ( 28 Nov 2019 05:55 )  Alb: 3.5 g/dL / Pro: 6.9 g/dL / ALK PHOS: 111 U/L / ALT: 12 U/L / AST: 21 U/L / GGT: x         11-28    137  |  103  |  16  ----------------------------<  305<H>  3.6   |  25  |  0.70    Ca    8.4      28 Nov 2019 05:55  Mg     1.6     11-28    TPro  6.9  /  Alb  3.5  /  TBili  0.2  /  DBili  x   /  AST  21  /  ALT  12  /  AlkPhos  111  11-28  CAPILLARY BLOOD GLUCOSE      POCT Blood Glucose.: 257 mg/dL (28 Nov 2019 08:47)  POCT Blood Glucose.: 166 mg/dL (27 Nov 2019 21:48)  POCT Blood Glucose.: 83 mg/dL (27 Nov 2019 17:31)  POCT Blood Glucose.: 472 mg/dL (27 Nov 2019 11:29)  POCT Blood Glucose.: 384 mg/dL (27 Nov 2019 10:23)      A/P: Mr. Balbuena is a 58 yo undomiciled man with HTN and poorly controlled T2DM (A1c 12.5%) c/b neuropathy and diabetic foot ulcers, admitted for hyperglycemia (no e/o DKA) and diabetic foot ulcers. Home regimen: basaglar 40 units bid, admelog 10-15u qA.     #DM Type 2 - uncontrolled - with neuropathy and vasculopathy. He's recovering from glucotoxicity, though Morning FSG is still above goal.    -Change lantus to 25 units in the morning, 30 units at night .  -Continue lispro 10   units before each meal.   -Please continue lispro moderate dose sliding scale four times daily with meals and at bedtime      Xavier Solano MD  Endocrinology Attending    I spent 15 minutes on the total encounter. More than 50% of the visit was spent counseling and/or coordinating care by me, the attending physician.

## 2019-11-28 NOTE — PROGRESS NOTE ADULT - PROBLEM SELECTOR PLAN 1
Presenting with hyperglycemia with initial . AG wnl, BHB negative, no acidosis on VBG. Mental status wnl. No concern for DKA/HHS at this time.   -Etiology of hyperglycemia in the setting of medication non compliance. Last took his basaglar over 2 days ago.   -Diagnosed in 2007. Was previously on glipizide. His recent HbA1c was 19% as per him. He follows at [facility]. His current regimen is basaglar 30 units BID and admelog 10 units TID prior to meals. His diabetes is complicated by neuropathy and vasculopathy. He has had up to 5 ulcers in his left foot. He has been told by his doctor that he has faint pulses in his left leg and has been recommended amputation in the past however refused.  - HgA1c 12  - endo rec: lantus 25 in AM; 30 at bedtime; lispro 10 TID   - mISS for coverage  - Monitor FS

## 2019-11-28 NOTE — PROGRESS NOTE ADULT - ASSESSMENT
58 yo undomiciled M with history of poorly controlled DM and HTN who presents with increase urination in the setting of hyperglycemia, admitted to regional medicine floors for further management.

## 2019-11-28 NOTE — PROGRESS NOTE ADULT - ASSESSMENT
A/P 60 yo undomiciled male with history of HTN and poorly controlled DM complicated by neuropathy and diabetic foot ulcers presenting with LLE cellulitis 2/2 left foot Bright grade 2 overlying dorsum 1st MCJ, Bright grade 2 overlying medial submet 1       - WBAT LLE  - Applied dry sterile dressing to left foot   - Stable wounds. C/w local wound care and abx  - Dressing change order entered

## 2019-11-28 NOTE — PROGRESS NOTE ADULT - SUBJECTIVE AND OBJECTIVE BOX
**Incomplete Note** OVERNIGHT EVENTS: No acute events overnight. VSS. Afebrile. HD stable. FSG 80s-200s.     SUBJECTIVE / INTERVAL HPI: Patient seen and examined at bedside this AM. Patient endorses pain in left foot and increased urination, improved from prior to admission but states he is still waking up to pee in large quantities. Patient denies fever, chills, chest pain, nausea, vomiting, or diarrhea. ROS otherwise negative.      VITAL SIGNS:  Vital Signs Last 24 Hrs  T(C): 36.3 (28 Nov 2019 08:31), Max: 37.1 (27 Nov 2019 13:30)  T(F): 97.4 (28 Nov 2019 08:31), Max: 98.8 (27 Nov 2019 13:30)  HR: 86 (28 Nov 2019 08:31) (84 - 93)  BP: 165/88 (28 Nov 2019 08:31) (144/82 - 169/98)  BP(mean): --  RR: 20 (28 Nov 2019 08:31) (18 - 20)  SpO2: 99% (28 Nov 2019 08:31) (95% - 99%)    PHYSICAL EXAM:    General: well appearing male resting comfortably in bed; NAD   HEENT: NC/AT; PERRL, anicteric sclera; MMM; poor dentition   Neck: supple; no JVD; no cervical or submandibular lymphadenopathy   Cardiovascular: +S1/S2; RRR  Respiratory: CTA B/L; no W/R/R  Gastrointestinal: soft, NT/ND; +BSx4  Extremities: WWP; no edema; ulcer on medial plantar aspect of left forefoot  Vascular: 2+ radial, b/l 2+ DP/PT pulses on right foot; not palpable on left   Neurological: AAOx3; no focal deficits    MEDICATIONS:  MEDICATIONS  (STANDING):  amLODIPine   Tablet 10 milliGRAM(s) Oral every 24 hours  aspirin enteric coated 81 milliGRAM(s) Oral daily  atorvastatin 80 milliGRAM(s) Oral at bedtime  dextrose 5%. 1000 milliLiter(s) (50 mL/Hr) IV Continuous <Continuous>  dextrose 50% Injectable 12.5 Gram(s) IV Push once  dextrose 50% Injectable 25 Gram(s) IV Push once  dextrose 50% Injectable 25 Gram(s) IV Push once  enoxaparin Injectable 40 milliGRAM(s) SubCutaneous every 24 hours  folic acid 1 milliGRAM(s) Oral daily  insulin glargine Injectable (LANTUS) 30 Unit(s) SubCutaneous at bedtime  insulin glargine Injectable (LANTUS) 25 Unit(s) SubCutaneous every morning  insulin lispro (HumaLOG) corrective regimen sliding scale   SubCutaneous Before meals and at bedtime  insulin lispro Injectable (HumaLOG) 10 Unit(s) SubCutaneous three times a day before meals  lisinopril 40 milliGRAM(s) Oral every 24 hours  multivitamin 1 Tablet(s) Oral daily    MEDICATIONS  (PRN):  dextrose 40% Gel 15 Gram(s) Oral once PRN Blood Glucose LESS THAN 70 milliGRAM(s)/deciliter  glucagon  Injectable 1 milliGRAM(s) IntraMuscular once PRN Glucose LESS THAN 70 milligrams/deciliter      ALLERGIES:  Allergies    No Known Allergies    Intolerances        LABS:                        10.5   10.30 )-----------( 176      ( 28 Nov 2019 05:55 )             33.2     11-28    137  |  103  |  16  ----------------------------<  305<H>  3.6   |  25  |  0.70    Ca    8.4      28 Nov 2019 05:55  Mg     1.6     11-28    TPro  6.9  /  Alb  3.5  /  TBili  0.2  /  DBili  x   /  AST  21  /  ALT  12  /  AlkPhos  111  11-28      Urinalysis Basic - ( 27 Nov 2019 06:16 )    Color: Yellow / Appearance: Clear / SG: <=1.005 / pH: x  Gluc: x / Ketone: NEGATIVE  / Bili: Negative / Urobili: 0.2 E.U./dL   Blood: x / Protein: NEGATIVE mg/dL / Nitrite: NEGATIVE   Leuk Esterase: NEGATIVE / RBC: < 5 /HPF / WBC < 5 /HPF   Sq Epi: x / Non Sq Epi: 0-5 /HPF / Bacteria: None /HPF      CAPILLARY BLOOD GLUCOSE      POCT Blood Glucose.: 187 mg/dL (28 Nov 2019 12:02)      RADIOLOGY & ADDITIONAL TESTS: Reviewed.    ASSESSMENT:    PLAN:

## 2019-11-29 LAB
ANION GAP SERPL CALC-SCNC: 12 MMOL/L — SIGNIFICANT CHANGE UP (ref 5–17)
BUN SERPL-MCNC: 11 MG/DL — SIGNIFICANT CHANGE UP (ref 7–23)
CALCIUM SERPL-MCNC: 9.5 MG/DL — SIGNIFICANT CHANGE UP (ref 8.4–10.5)
CHLORIDE SERPL-SCNC: 101 MMOL/L — SIGNIFICANT CHANGE UP (ref 96–108)
CHOLEST SERPL-MCNC: 120 MG/DL — SIGNIFICANT CHANGE UP (ref 10–199)
CO2 SERPL-SCNC: 27 MMOL/L — SIGNIFICANT CHANGE UP (ref 22–31)
CREAT SERPL-MCNC: 0.76 MG/DL — SIGNIFICANT CHANGE UP (ref 0.5–1.3)
GLUCOSE BLDC GLUCOMTR-MCNC: 139 MG/DL — HIGH (ref 70–99)
GLUCOSE BLDC GLUCOMTR-MCNC: 222 MG/DL — HIGH (ref 70–99)
GLUCOSE SERPL-MCNC: 66 MG/DL — LOW (ref 70–99)
HCT VFR BLD CALC: 40.3 % — SIGNIFICANT CHANGE UP (ref 39–50)
HDLC SERPL-MCNC: 59 MG/DL — SIGNIFICANT CHANGE UP
HGB BLD-MCNC: 12.2 G/DL — LOW (ref 13–17)
LIPID PNL WITH DIRECT LDL SERPL: 50 MG/DL — SIGNIFICANT CHANGE UP
MAGNESIUM SERPL-MCNC: 1.8 MG/DL — SIGNIFICANT CHANGE UP (ref 1.6–2.6)
MCHC RBC-ENTMCNC: 25.8 PG — LOW (ref 27–34)
MCHC RBC-ENTMCNC: 30.3 GM/DL — LOW (ref 32–36)
MCV RBC AUTO: 85.2 FL — SIGNIFICANT CHANGE UP (ref 80–100)
NRBC # BLD: 0 /100 WBCS — SIGNIFICANT CHANGE UP (ref 0–0)
PLATELET # BLD AUTO: 222 K/UL — SIGNIFICANT CHANGE UP (ref 150–400)
POTASSIUM SERPL-MCNC: 3.2 MMOL/L — LOW (ref 3.5–5.3)
POTASSIUM SERPL-SCNC: 3.2 MMOL/L — LOW (ref 3.5–5.3)
RBC # BLD: 4.73 M/UL — SIGNIFICANT CHANGE UP (ref 4.2–5.8)
RBC # FLD: 13.7 % — SIGNIFICANT CHANGE UP (ref 10.3–14.5)
SODIUM SERPL-SCNC: 140 MMOL/L — SIGNIFICANT CHANGE UP (ref 135–145)
TOTAL CHOLESTEROL/HDL RATIO MEASUREMENT: 2 RATIO — LOW (ref 3.4–9.6)
TRIGL SERPL-MCNC: 55 MG/DL — SIGNIFICANT CHANGE UP (ref 10–149)
WBC # BLD: 11.25 K/UL — HIGH (ref 3.8–10.5)
WBC # FLD AUTO: 11.25 K/UL — HIGH (ref 3.8–10.5)

## 2019-11-29 PROCEDURE — 99232 SBSQ HOSP IP/OBS MODERATE 35: CPT

## 2019-11-29 PROCEDURE — 93925 LOWER EXTREMITY STUDY: CPT | Mod: 26

## 2019-11-29 PROCEDURE — 99233 SBSQ HOSP IP/OBS HIGH 50: CPT | Mod: GC

## 2019-11-29 RX ORDER — INSULIN GLARGINE 100 [IU]/ML
25 INJECTION, SOLUTION SUBCUTANEOUS AT BEDTIME
Refills: 0 | Status: DISCONTINUED | OUTPATIENT
Start: 2019-11-29 | End: 2019-12-02

## 2019-11-29 RX ORDER — POTASSIUM CHLORIDE 20 MEQ
40 PACKET (EA) ORAL EVERY 4 HOURS
Refills: 0 | Status: COMPLETED | OUTPATIENT
Start: 2019-11-29 | End: 2019-11-29

## 2019-11-29 RX ADMIN — Medication 1 TABLET(S): at 11:58

## 2019-11-29 RX ADMIN — INSULIN GLARGINE 25 UNIT(S): 100 INJECTION, SOLUTION SUBCUTANEOUS at 10:24

## 2019-11-29 RX ADMIN — Medication 1 MILLIGRAM(S): at 11:58

## 2019-11-29 RX ADMIN — Medication 40 MILLIEQUIVALENT(S): at 09:45

## 2019-11-29 RX ADMIN — AMLODIPINE BESYLATE 10 MILLIGRAM(S): 2.5 TABLET ORAL at 06:24

## 2019-11-29 RX ADMIN — Medication 10 UNIT(S): at 17:49

## 2019-11-29 RX ADMIN — Medication 2: at 09:40

## 2019-11-29 RX ADMIN — INSULIN GLARGINE 25 UNIT(S): 100 INJECTION, SOLUTION SUBCUTANEOUS at 22:46

## 2019-11-29 RX ADMIN — LISINOPRIL 40 MILLIGRAM(S): 2.5 TABLET ORAL at 06:24

## 2019-11-29 RX ADMIN — Medication 40 MILLIEQUIVALENT(S): at 14:28

## 2019-11-29 RX ADMIN — Medication 81 MILLIGRAM(S): at 11:58

## 2019-11-29 RX ADMIN — Medication 4: at 17:48

## 2019-11-29 RX ADMIN — Medication 10 UNIT(S): at 09:41

## 2019-11-29 NOTE — PROGRESS NOTE ADULT - PROBLEM SELECTOR PLAN 1
Presenting with hyperglycemia with initial . AG wnl, BHB negative, no acidosis on VBG. Mental status wnl. No concern for DKA/HHS at time of admission. Diagnosed in 2007. Was previously on glipizide. His recent HbA1c was 19% as per him. He follows at [facility]. His current regimen is basaglar 30 units BID and admelog 10 units TID prior to meals. His diabetes is complicated by neuropathy and vasculopathy. He has had up to 5 ulcers in his left foot. He has been told by his doctor that he has faint pulses in his left leg and has been recommended amputation in the past however refused.   - HgA1c 12 on this admision  - endo rec: lantus 25 in AM; 30 at bedtime; lispro 10 TID, will follow up with endocrine recommendations today  - mISS for coverage  - Monitor FS

## 2019-11-29 NOTE — PROGRESS NOTE ADULT - ASSESSMENT
58 yo undomiciled M with history of poorly controlled DM and HTN who presents with increase urination in the setting of hyperglycemia (BGL at admission 867), admitted to regional medicine floors for further management. Mild hypoglycemia noted in AM today.

## 2019-11-29 NOTE — CONSULT NOTE ADULT - SUBJECTIVE AND OBJECTIVE BOX
Subjective:  before bed yesterday, received lispro 4u for correction, then lantus 30u. This morning 5am FSG 67. 9am fsg 154 - received lispro 10+2. 10am     Vital Signs Last 24 Hrs  T(C): 36.7 (29 Nov 2019 09:43), Max: 36.8 (28 Nov 2019 20:24)  T(F): 98 (29 Nov 2019 09:43), Max: 98.3 (28 Nov 2019 20:24)  HR: 77 (29 Nov 2019 09:43) (65 - 89)  BP: 120/84 (29 Nov 2019 09:43) (120/84 - 164/88)  BP(mean): --  RR: 19 (29 Nov 2019 09:43) (18 - 19)  SpO2: 98% (29 Nov 2019 09:43) (98% - 100%)    11-29    140  |  101  |  11  ----------------------------<  66<L>  3.2<L>   |  27  |  0.76    Ca    9.5      29 Nov 2019 05:46  Mg     1.8     11-29    TPro  6.9  /  Alb  3.5  /  TBili  0.2  /  DBili  x   /  AST  21  /  ALT  12  /  AlkPhos  111  11-28                        12.2   11.25 )-----------( 222      ( 29 Nov 2019 05:46 )             40.3   LIVER FUNCTIONS - ( 28 Nov 2019 05:55 )  Alb: 3.5 g/dL / Pro: 6.9 g/dL / ALK PHOS: 111 U/L / ALT: 12 U/L / AST: 21 U/L / GGT: x         CAPILLARY BLOOD GLUCOSE      POCT Blood Glucose.: 239 mg/dL (29 Nov 2019 10:19)  POCT Blood Glucose.: 154 mg/dL (29 Nov 2019 08:54)  POCT Blood Glucose.: 67 mg/dL (29 Nov 2019 04:59)  POCT Blood Glucose.: 244 mg/dL (28 Nov 2019 22:05)  POCT Blood Glucose.: 242 mg/dL (28 Nov 2019 19:32)  POCT Blood Glucose.: 78 mg/dL (28 Nov 2019 17:52)  POCT Blood Glucose.: 187 mg/dL (28 Nov 2019 12:02)        A/P: Mr. Balbuena is a 58 yo undomiciled man with HTN and poorly controlled T2DM (A1c 12.5%) c/b neuropathy and diabetic foot ulcers, admitted for hyperglycemia (no e/o DKA) and diabetic foot ulcers. Home regimen: basaglar 40 units bid, admelog 10-15u qAc.     #DM Type 2 - uncontrolled - with neuropathy and vasculopathy. Had an episode of relative hypoglycemia this morning (67).   -Keep lantus 25 units in the morning. Decrease nighttime lantus to 25 units.  -Change lispro sliding scale as follows: moderate dose sliding scale with meals; low dose sliding scale at bedtime.  -Continue lispro 10 units before each meal.         Xavier Solano MD  Endocrinology Attending Interval/Subjective: FSG 78 before dinner. He ate salmon for dinner yesterday without lispro coverage.  before bed yesterday, received lispro 4u for correction, then lantus 30u. This morning 5am FSG 67. He felt hot and then cold - was given juice to drink. 9am fsg 154 - received lispro 10+2 - he ate eggs, turkey sausage. 10am . For lunch ate 2 slices of bread, salad, scoop of tuna.    ROS: Feels well. Denies n/v/d/c. Review of constitutional, eyes, ENT, cardiovascular, respiratory, gastrointestinal, genitourinary, musculoskeletal, integumentary, neurological, psychiatric, endocrine and heme/lymph systems is otherwise negative.     Vital Signs Last 24 Hrs  T(C): 36.7 (29 Nov 2019 09:43), Max: 36.8 (28 Nov 2019 20:24)  T(F): 98 (29 Nov 2019 09:43), Max: 98.3 (28 Nov 2019 20:24)  HR: 77 (29 Nov 2019 09:43) (65 - 89)  BP: 120/84 (29 Nov 2019 09:43) (120/84 - 164/88)  BP(mean): --  RR: 19 (29 Nov 2019 09:43) (18 - 19)  SpO2: 98% (29 Nov 2019 09:43) (98% - 100%)    11-29    140  |  101  |  11  ----------------------------<  66<L>  3.2<L>   |  27  |  0.76    Ca    9.5      29 Nov 2019 05:46  Mg     1.8     11-29    TPro  6.9  /  Alb  3.5  /  TBili  0.2  /  DBili  x   /  AST  21  /  ALT  12  /  AlkPhos  111  11-28                        12.2   11.25 )-----------( 222      ( 29 Nov 2019 05:46 )             40.3   LIVER FUNCTIONS - ( 28 Nov 2019 05:55 )  Alb: 3.5 g/dL / Pro: 6.9 g/dL / ALK PHOS: 111 U/L / ALT: 12 U/L / AST: 21 U/L / GGT: x         CAPILLARY BLOOD GLUCOSE      POCT Blood Glucose.: 239 mg/dL (29 Nov 2019 10:19)  POCT Blood Glucose.: 154 mg/dL (29 Nov 2019 08:54)  POCT Blood Glucose.: 67 mg/dL (29 Nov 2019 04:59)  POCT Blood Glucose.: 244 mg/dL (28 Nov 2019 22:05)  POCT Blood Glucose.: 242 mg/dL (28 Nov 2019 19:32)  POCT Blood Glucose.: 78 mg/dL (28 Nov 2019 17:52)  POCT Blood Glucose.: 187 mg/dL (28 Nov 2019 12:02)        A/P: Mr. Balbuena is a 60 yo undomiciled man with HTN and poorly controlled T2DM (A1c 12.5%) c/b neuropathy and diabetic foot ulcers, admitted for hyperglycemia (no e/o DKA) and diabetic foot ulcers. Home regimen: basaglar 40 units bid, admelog 10-15u qAc.     #DM Type 2 - uncontrolled - with neuropathy and vasculopathy. Had an episode of relative hypoglycemia this morning (67).   -Keep lantus 25 units in the morning. Decrease nighttime lantus to 25 units.  -Change lispro sliding scale as follows: moderate dose sliding scale with meals; low dose sliding scale at bedtime.  -Continue lispro 10 units before each meal.       Xavier Solano MD  Endocrinology Attending    I spent 25 minutes on the total encounter. More than 50% of the visit was spent counseling and/or coordinating care by me, the attending physician. Interval/Subjective: FSG 78 before dinner. He ate salmon for dinner yesterday without lispro coverage.  before bed yesterday, received lispro 4u for correction, then lantus 30u. This morning 5am FSG 67. He felt hot and then cold - was given juice to drink. 9am fsg 154 - received lispro 10+2 - he ate eggs, turkey sausage. 10am . For lunch ate 2 slices of bread, salad, scoop of tuna.    ROS: Feels well. Denies n/v/d/c. Review of constitutional, eyes, ENT, cardiovascular, respiratory, gastrointestinal, genitourinary, musculoskeletal, integumentary, neurological, psychiatric, endocrine and heme/lymph systems is otherwise negative.     Vital Signs Last 24 Hrs  T(C): 36.7 (29 Nov 2019 09:43), Max: 36.8 (28 Nov 2019 20:24)  T(F): 98 (29 Nov 2019 09:43), Max: 98.3 (28 Nov 2019 20:24)  HR: 77 (29 Nov 2019 09:43) (65 - 89)  BP: 120/84 (29 Nov 2019 09:43) (120/84 - 164/88)  BP(mean): --  RR: 19 (29 Nov 2019 09:43) (18 - 19)  SpO2: 98% (29 Nov 2019 09:43) (98% - 100%)    GENERAL: well appearing thin  man lying in bed in NAD.   SKIN: dry  EYE: EOMI, no proptosis nor lid lag.   THYROID: no goiter, masses   CV: RRR  LUNGS: CTAB  ABDOMEN: Soft, nt, nd with normoactive bowel sounds  EXTREMITIES: left foot is wrapped in gauze  NEURO: No tremors. Unable to elicit DTRs  PSYCH: normal mood, affect    11-29    140  |  101  |  11  ----------------------------<  66<L>  3.2<L>   |  27  |  0.76    Ca    9.5      29 Nov 2019 05:46  Mg     1.8     11-29    TPro  6.9  /  Alb  3.5  /  TBili  0.2  /  DBili  x   /  AST  21  /  ALT  12  /  AlkPhos  111  11-28                        12.2   11.25 )-----------( 222      ( 29 Nov 2019 05:46 )             40.3   LIVER FUNCTIONS - ( 28 Nov 2019 05:55 )  Alb: 3.5 g/dL / Pro: 6.9 g/dL / ALK PHOS: 111 U/L / ALT: 12 U/L / AST: 21 U/L / GGT: x         CAPILLARY BLOOD GLUCOSE      POCT Blood Glucose.: 239 mg/dL (29 Nov 2019 10:19)  POCT Blood Glucose.: 154 mg/dL (29 Nov 2019 08:54)  POCT Blood Glucose.: 67 mg/dL (29 Nov 2019 04:59)  POCT Blood Glucose.: 244 mg/dL (28 Nov 2019 22:05)  POCT Blood Glucose.: 242 mg/dL (28 Nov 2019 19:32)  POCT Blood Glucose.: 78 mg/dL (28 Nov 2019 17:52)  POCT Blood Glucose.: 187 mg/dL (28 Nov 2019 12:02)        A/P: Mr. Balbuena is a 58 yo undomiciled man with HTN and poorly controlled T2DM (A1c 12.5%) c/b neuropathy and diabetic foot ulcers, admitted for hyperglycemia (no e/o DKA) and diabetic foot ulcers. Home regimen: basaglar 40 units bid, admelog 10-15u qA.     #DM Type 2 - uncontrolled - with neuropathy and vasculopathy. Had an episode of relative hypoglycemia this morning (67).   -Keep lantus 25 units in the morning. Decrease nighttime lantus to 25 units.  -Change lispro sliding scale as follows: moderate dose sliding scale with meals; low dose sliding scale at bedtime.  -Continue lispro 10 units before each meal.       Xavier Solano MD  Endocrinology Attending    I spent 25 minutes on the total encounter. More than 50% of the visit was spent counseling and/or coordinating care by me, the attending physician.

## 2019-11-29 NOTE — PROGRESS NOTE ADULT - SUBJECTIVE AND OBJECTIVE BOX
OVERNIGHT EVENTS: BGL to 66 overnight, able to tolerate PO intake and BGL returned to normal level. No AMS reported.    SUBJECTIVE / INTERVAL HPI: Patient seen and examined at bedside. No acute complaints. Denies diaphoresis tremors loss of consciousness or abdominal pain. Denies chest pain, shortness of breath or palpitations. Denies nausea, vomiting or other GI complaints. Denies fever, worsening drainage, malodorous discharge or pain in his LLE.     VITAL SIGNS:  Vital Signs Last 24 Hrs  T(C): 36.4 (29 Nov 2019 06:15), Max: 36.8 (28 Nov 2019 20:24)  T(F): 97.5 (29 Nov 2019 06:15), Max: 98.3 (28 Nov 2019 20:24)  HR: 65 (29 Nov 2019 06:15) (65 - 89)  BP: 137/63 (29 Nov 2019 06:15) (135/82 - 164/88)  RR: 18 (29 Nov 2019 06:15) (18 - 18)  SpO2: 99% (29 Nov 2019 06:15) (99% - 100%)    PHYSICAL EXAM:  General: WDWN, NAD  HEENT: NC/AT; PERRL, anicteric sclera; MMM, EOMI  Neck: supple, no JVD noted  Cardiovascular: +S1/S2; RRR  Respiratory: CTA B/L; no W/R/R, no accessory muscle use  Gastrointestinal: soft, NT/ND; +BSx4, no abdominal masses appreciated  Extremities: WWP; wound noted over LLE  Vascular: 2+ radial pulses intact, unable to assess lower extremity pulses due to bandage over left foot  Neurological: AAOx3; CN2-12 grossly intact    MEDICATIONS:  MEDICATIONS  (STANDING):  amLODIPine   Tablet 10 milliGRAM(s) Oral every 24 hours  aspirin enteric coated 81 milliGRAM(s) Oral daily  atorvastatin 80 milliGRAM(s) Oral at bedtime  dextrose 5%. 1000 milliLiter(s) (50 mL/Hr) IV Continuous <Continuous>  dextrose 50% Injectable 12.5 Gram(s) IV Push once  dextrose 50% Injectable 25 Gram(s) IV Push once  dextrose 50% Injectable 25 Gram(s) IV Push once  enoxaparin Injectable 40 milliGRAM(s) SubCutaneous every 24 hours  folic acid 1 milliGRAM(s) Oral daily  insulin glargine Injectable (LANTUS) 30 Unit(s) SubCutaneous at bedtime  insulin glargine Injectable (LANTUS) 25 Unit(s) SubCutaneous every morning  insulin lispro (HumaLOG) corrective regimen sliding scale   SubCutaneous Before meals and at bedtime  insulin lispro Injectable (HumaLOG) 10 Unit(s) SubCutaneous three times a day before meals  lisinopril 40 milliGRAM(s) Oral every 24 hours  multivitamin 1 Tablet(s) Oral daily  potassium chloride    Tablet ER 40 milliEquivalent(s) Oral every 4 hours    MEDICATIONS  (PRN):  dextrose 40% Gel 15 Gram(s) Oral once PRN Blood Glucose LESS THAN 70 milliGRAM(s)/deciliter  glucagon  Injectable 1 milliGRAM(s) IntraMuscular once PRN Glucose LESS THAN 70 milligrams/deciliter      ALLERGIES: NKDA    LABS:             12.2   11.25 )-----------( 222      ( 29 Nov 2019 05:46 )             40.3     140  |  101  |  11  ----------------------------<  66<L>  3.2<L>   |  27  |  0.76    Ca    9.5      29 Nov 2019 05:46  Mg     1.8     11-29    TPro  6.9  /  Alb  3.5  /  TBili  0.2  /  DBili  x   /  AST  21  /  ALT  12  /  AlkPhos  111  11-28    POCT Blood Glucose.: 154 mg/dL (29 Nov 2019 08:54)    RADIOLOGY & ADDITIONAL TESTS: Reviewed.

## 2019-11-29 NOTE — CONSULT NOTE ADULT - CONSULT REQUESTED DATE/TIME
Corine Patel CMA       29-Nov-2019 10:43

## 2019-11-30 DIAGNOSIS — M79.642 PAIN IN LEFT HAND: ICD-10-CM

## 2019-11-30 LAB
ANION GAP SERPL CALC-SCNC: 10 MMOL/L — SIGNIFICANT CHANGE UP (ref 5–17)
BLD GP AB SCN SERPL QL: NEGATIVE — SIGNIFICANT CHANGE UP
BUN SERPL-MCNC: 12 MG/DL — SIGNIFICANT CHANGE UP (ref 7–23)
CALCIUM SERPL-MCNC: 8.6 MG/DL — SIGNIFICANT CHANGE UP (ref 8.4–10.5)
CHLORIDE SERPL-SCNC: 104 MMOL/L — SIGNIFICANT CHANGE UP (ref 96–108)
CO2 SERPL-SCNC: 26 MMOL/L — SIGNIFICANT CHANGE UP (ref 22–31)
CREAT SERPL-MCNC: 0.76 MG/DL — SIGNIFICANT CHANGE UP (ref 0.5–1.3)
GLUCOSE BLDC GLUCOMTR-MCNC: 123 MG/DL — HIGH (ref 70–99)
GLUCOSE BLDC GLUCOMTR-MCNC: 137 MG/DL — HIGH (ref 70–99)
GLUCOSE BLDC GLUCOMTR-MCNC: 221 MG/DL — HIGH (ref 70–99)
GLUCOSE BLDC GLUCOMTR-MCNC: 224 MG/DL — HIGH (ref 70–99)
GLUCOSE BLDC GLUCOMTR-MCNC: 81 MG/DL — SIGNIFICANT CHANGE UP (ref 70–99)
GLUCOSE SERPL-MCNC: 100 MG/DL — HIGH (ref 70–99)
HCT VFR BLD CALC: 38.2 % — LOW (ref 39–50)
HGB BLD-MCNC: 11.5 G/DL — LOW (ref 13–17)
MAGNESIUM SERPL-MCNC: 1.8 MG/DL — SIGNIFICANT CHANGE UP (ref 1.6–2.6)
MCHC RBC-ENTMCNC: 26 PG — LOW (ref 27–34)
MCHC RBC-ENTMCNC: 30.1 GM/DL — LOW (ref 32–36)
MCV RBC AUTO: 86.4 FL — SIGNIFICANT CHANGE UP (ref 80–100)
NRBC # BLD: 0 /100 WBCS — SIGNIFICANT CHANGE UP (ref 0–0)
PLATELET # BLD AUTO: 193 K/UL — SIGNIFICANT CHANGE UP (ref 150–400)
POTASSIUM SERPL-MCNC: 3.7 MMOL/L — SIGNIFICANT CHANGE UP (ref 3.5–5.3)
POTASSIUM SERPL-SCNC: 3.7 MMOL/L — SIGNIFICANT CHANGE UP (ref 3.5–5.3)
RBC # BLD: 4.42 M/UL — SIGNIFICANT CHANGE UP (ref 4.2–5.8)
RBC # FLD: 14 % — SIGNIFICANT CHANGE UP (ref 10.3–14.5)
RH IG SCN BLD-IMP: POSITIVE — SIGNIFICANT CHANGE UP
SODIUM SERPL-SCNC: 140 MMOL/L — SIGNIFICANT CHANGE UP (ref 135–145)
WBC # BLD: 9.3 K/UL — SIGNIFICANT CHANGE UP (ref 3.8–10.5)
WBC # FLD AUTO: 9.3 K/UL — SIGNIFICANT CHANGE UP (ref 3.8–10.5)

## 2019-11-30 PROCEDURE — 99233 SBSQ HOSP IP/OBS HIGH 50: CPT | Mod: GC

## 2019-11-30 RX ORDER — INSULIN LISPRO 100/ML
VIAL (ML) SUBCUTANEOUS AT BEDTIME
Refills: 0 | Status: DISCONTINUED | OUTPATIENT
Start: 2019-11-30 | End: 2019-12-03

## 2019-11-30 RX ORDER — INSULIN LISPRO 100/ML
VIAL (ML) SUBCUTANEOUS
Refills: 0 | Status: DISCONTINUED | OUTPATIENT
Start: 2019-11-30 | End: 2019-12-04

## 2019-11-30 RX ORDER — ACETAMINOPHEN 500 MG
325 TABLET ORAL EVERY 4 HOURS
Refills: 0 | Status: DISCONTINUED | OUTPATIENT
Start: 2019-11-30 | End: 2019-12-02

## 2019-11-30 RX ADMIN — Medication 325 MILLIGRAM(S): at 09:37

## 2019-11-30 RX ADMIN — Medication 10 UNIT(S): at 17:57

## 2019-11-30 RX ADMIN — Medication 10 UNIT(S): at 09:28

## 2019-11-30 RX ADMIN — Medication 4: at 09:27

## 2019-11-30 RX ADMIN — Medication 325 MILLIGRAM(S): at 10:30

## 2019-11-30 RX ADMIN — Medication 1 TABLET(S): at 12:37

## 2019-11-30 RX ADMIN — Medication 1 MILLIGRAM(S): at 12:37

## 2019-11-30 RX ADMIN — Medication 10 UNIT(S): at 12:37

## 2019-11-30 RX ADMIN — AMLODIPINE BESYLATE 10 MILLIGRAM(S): 2.5 TABLET ORAL at 06:06

## 2019-11-30 RX ADMIN — INSULIN GLARGINE 25 UNIT(S): 100 INJECTION, SOLUTION SUBCUTANEOUS at 21:58

## 2019-11-30 RX ADMIN — Medication 81 MILLIGRAM(S): at 12:37

## 2019-11-30 RX ADMIN — INSULIN GLARGINE 25 UNIT(S): 100 INJECTION, SOLUTION SUBCUTANEOUS at 09:28

## 2019-11-30 RX ADMIN — Medication 4: at 17:56

## 2019-11-30 RX ADMIN — LISINOPRIL 40 MILLIGRAM(S): 2.5 TABLET ORAL at 06:06
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
## 2019-11-30 NOTE — PROGRESS NOTE ADULT - PROBLEM SELECTOR PLAN 1
Presenting with hyperglycemia with initial . AG wnl, BHB negative, no acidosis on VBG. Mental status wnl. No concern for DKA/HHS at time of admission. Diagnosed in 2007. Was previously on glipizide. His recent HbA1c was 19% as per him. He follows at [facility]. His current regimen is basaglar 30 units BID and admelog 10 units TID prior to meals. His diabetes is complicated by neuropathy and vasculopathy. He has had up to 5 ulcers in his left foot. He has been told by his doctor that he has faint pulses in his left leg and has been recommended amputation in the past however refused.   - HgA1c 12 on this admision  - endo rec: lantus 25 in AM; 30 at bedtime; lispro 10 TID,  - mISS premeal, low dose at bedtime as per endocrine recs

## 2019-11-30 NOTE — PROGRESS NOTE ADULT - PROBLEM SELECTOR PLAN 9
1) PCP Contacted on Admission: Dr. Augustine ([facility])   2) Date of Contact with PCP:  3) PCP Contacted at Discharge: (Y/N, N/A)  4) Summary of Handoff Given to PCP:   5) Post-Discharge Appointment Date and Location:

## 2019-11-30 NOTE — PROGRESS NOTE ADULT - ASSESSMENT
60 y/o undomiciled m h/o uncontrolled DM, uncontrolled HTN, diabetic foot ulcer presents to ER with hyperglycemia and diabetic left foot ulcers. . No DKA. Pt evaluated for PAD. He has no palpable pulses b/l DP/PT  VENTURA 1.0 b/l which may be falsely elevated 2/2 calcifications from DM.  He has had ulcer in the same area of left foot in March that healed with Abx and local wound care. No h/o vascular interventions.       - Continue ASA/Statin  - Plan for LLE angiogram once medically optimized  - Call [phone] with questions or acute changes 60 y/o undomiciled m h/o uncontrolled DM, uncontrolled HTN, diabetic foot ulcer presents to ER with hyperglycemia and diabetic left foot ulcers. . No DKA. Pt evaluated for PAD. He has no palpable pulses b/l DP/PT  VENTURA 1.0 b/l which may be falsely elevated 2/2 calcifications from DM.  He has had ulcer in the same area of left foot in March that healed with Abx and local wound care. No h/o vascular interventions.       - Continue ASA/Statin  - Plan for LLE angiogram once medically optimized  - Please make sure patient has T+S and coags ordered for tomorrow  - Call [phone] with questions or acute changes

## 2019-11-30 NOTE — CHART NOTE - NSCHARTNOTEFT_GEN_A_CORE
FSGs and insulin administration reviewed. Please make the following change to insulin orders:    -DISCONTINUE the order for insulin lispro corrective regimen sliding scale before meals and at bedtime and WRITE THE FOLLOWING NEW ORDERS:   -insulin lispro corrective regimen - MODERATE dose - sliding scale BEFORE MEALS  -insulin lispro corrective regimen - LOW dose - sliding scale AT BEDTIME.    Xavier Solano MD  Endocrinology Attending

## 2019-11-30 NOTE — PROGRESS NOTE ADULT - SUBJECTIVE AND OBJECTIVE BOX
Vascular Surgery Consult - Progress Note    Subjective:  No acute complaints. Denies pain in feet. Denies CP/SOB. Denies N/V.     Vital Signs Last 24 Hrs  T(C): 36.7 (30 Nov 2019 06:00), Max: 36.7 (29 Nov 2019 20:50)  T(F): 98 (30 Nov 2019 06:00), Max: 98 (29 Nov 2019 20:50)  HR: 90 (30 Nov 2019 06:00) (83 - 90)  BP: 173/99 (30 Nov 2019 06:00) (147/82 - 173/99)  BP(mean): --  RR: 18 (30 Nov 2019 06:00) (18 - 18)  SpO2: 100% (30 Nov 2019 06:00) (99% - 100%)    I&O's Summary  29 Nov 2019 07:01  -  30 Nov 2019 07:00  --------------------------------------------------------  IN: 0 mL / OUT: 375 mL / NET: -375 mL    Physical Exam:  General: NAD  Lungs: Normal respiratory effort  EXt: Left foot deformity with 2 superficial ulcers. 2cm round on dorsum and 4cm round 1st metatarsal head.   Pulses: b/l fem/pop 2+  Doppler: Right DP/PT triphasic.   Left DP/PT biphasic    LABS:                      11.5   9.30  )-----------( 193      ( 30 Nov 2019 06:38 )             38.2     11-30  140  |  104  |  12  ----------------------------<  100<H>  3.7   |  26  |  0.76    Ca    8.6      30 Nov 2019 06:38  Mg     1.8     11-30    CAPILLARY BLOOD GLUCOSE  POCT Blood Glucose.: 123 mg/dL (30 Nov 2019 11:51)  POCT Blood Glucose.: 221 mg/dL (30 Nov 2019 08:40)  POCT Blood Glucose.: 81 mg/dL (30 Nov 2019 05:58)  POCT Blood Glucose.: 139 mg/dL (29 Nov 2019 21:56)  POCT Blood Glucose.: 222 mg/dL (29 Nov 2019 17:18)

## 2019-11-30 NOTE — PROGRESS NOTE ADULT - ASSESSMENT
60 yo undomiciled M with history of poorly controlled DM and HTN who presents with increase urination in the setting of hyperglycemia (BGL at admission 867), admitted to regional medicine floors for further management. New complaint of left hand pain.

## 2019-12-01 LAB
ANION GAP SERPL CALC-SCNC: 12 MMOL/L — SIGNIFICANT CHANGE UP (ref 5–17)
APTT BLD: 30 SEC — SIGNIFICANT CHANGE UP (ref 27.5–36.3)
BUN SERPL-MCNC: 11 MG/DL — SIGNIFICANT CHANGE UP (ref 7–23)
CALCIUM SERPL-MCNC: 9.2 MG/DL — SIGNIFICANT CHANGE UP (ref 8.4–10.5)
CHLORIDE SERPL-SCNC: 101 MMOL/L — SIGNIFICANT CHANGE UP (ref 96–108)
CO2 SERPL-SCNC: 24 MMOL/L — SIGNIFICANT CHANGE UP (ref 22–31)
CREAT SERPL-MCNC: 0.67 MG/DL — SIGNIFICANT CHANGE UP (ref 0.5–1.3)
GLUCOSE BLDC GLUCOMTR-MCNC: 135 MG/DL — HIGH (ref 70–99)
GLUCOSE BLDC GLUCOMTR-MCNC: 144 MG/DL — HIGH (ref 70–99)
GLUCOSE BLDC GLUCOMTR-MCNC: 153 MG/DL — HIGH (ref 70–99)
GLUCOSE BLDC GLUCOMTR-MCNC: 170 MG/DL — HIGH (ref 70–99)
GLUCOSE BLDC GLUCOMTR-MCNC: 178 MG/DL — HIGH (ref 70–99)
GLUCOSE SERPL-MCNC: 145 MG/DL — HIGH (ref 70–99)
HCT VFR BLD CALC: 37 % — LOW (ref 39–50)
HGB BLD-MCNC: 11.7 G/DL — LOW (ref 13–17)
INR BLD: 1.08 — SIGNIFICANT CHANGE UP (ref 0.88–1.16)
MAGNESIUM SERPL-MCNC: 1.8 MG/DL — SIGNIFICANT CHANGE UP (ref 1.6–2.6)
MCHC RBC-ENTMCNC: 26.5 PG — LOW (ref 27–34)
MCHC RBC-ENTMCNC: 31.6 GM/DL — LOW (ref 32–36)
MCV RBC AUTO: 83.7 FL — SIGNIFICANT CHANGE UP (ref 80–100)
NRBC # BLD: 0 /100 WBCS — SIGNIFICANT CHANGE UP (ref 0–0)
PLATELET # BLD AUTO: 183 K/UL — SIGNIFICANT CHANGE UP (ref 150–400)
POTASSIUM SERPL-MCNC: 3.4 MMOL/L — LOW (ref 3.5–5.3)
POTASSIUM SERPL-SCNC: 3.4 MMOL/L — LOW (ref 3.5–5.3)
PROTHROM AB SERPL-ACNC: 12.2 SEC — SIGNIFICANT CHANGE UP (ref 10–12.9)
RBC # BLD: 4.42 M/UL — SIGNIFICANT CHANGE UP (ref 4.2–5.8)
RBC # FLD: 14.1 % — SIGNIFICANT CHANGE UP (ref 10.3–14.5)
SODIUM SERPL-SCNC: 137 MMOL/L — SIGNIFICANT CHANGE UP (ref 135–145)
WBC # BLD: 10.29 K/UL — SIGNIFICANT CHANGE UP (ref 3.8–10.5)
WBC # FLD AUTO: 10.29 K/UL — SIGNIFICANT CHANGE UP (ref 3.8–10.5)

## 2019-12-01 PROCEDURE — 99233 SBSQ HOSP IP/OBS HIGH 50: CPT | Mod: GC

## 2019-12-01 RX ORDER — ACETAMINOPHEN 500 MG
650 TABLET ORAL ONCE
Refills: 0 | Status: COMPLETED | OUTPATIENT
Start: 2019-12-01 | End: 2019-12-01

## 2019-12-01 RX ORDER — POTASSIUM CHLORIDE 20 MEQ
40 PACKET (EA) ORAL EVERY 4 HOURS
Refills: 0 | Status: COMPLETED | OUTPATIENT
Start: 2019-12-01 | End: 2019-12-01

## 2019-12-01 RX ADMIN — Medication 10 UNIT(S): at 09:20

## 2019-12-01 RX ADMIN — Medication 1 TABLET(S): at 11:55

## 2019-12-01 RX ADMIN — Medication 650 MILLIGRAM(S): at 12:55

## 2019-12-01 RX ADMIN — INSULIN GLARGINE 25 UNIT(S): 100 INJECTION, SOLUTION SUBCUTANEOUS at 09:21

## 2019-12-01 RX ADMIN — Medication 2: at 12:24

## 2019-12-01 RX ADMIN — Medication 10 UNIT(S): at 12:24

## 2019-12-01 RX ADMIN — Medication 40 MILLIEQUIVALENT(S): at 11:55

## 2019-12-01 RX ADMIN — INSULIN GLARGINE 25 UNIT(S): 100 INJECTION, SOLUTION SUBCUTANEOUS at 22:30

## 2019-12-01 RX ADMIN — Medication 1 MILLIGRAM(S): at 11:55

## 2019-12-01 RX ADMIN — Medication 325 MILLIGRAM(S): at 03:57

## 2019-12-01 RX ADMIN — Medication 81 MILLIGRAM(S): at 11:55

## 2019-12-01 RX ADMIN — Medication 325 MILLIGRAM(S): at 02:55

## 2019-12-01 RX ADMIN — LISINOPRIL 40 MILLIGRAM(S): 2.5 TABLET ORAL at 06:42

## 2019-12-01 RX ADMIN — Medication 10 UNIT(S): at 18:30

## 2019-12-01 RX ADMIN — Medication 650 MILLIGRAM(S): at 11:55

## 2019-12-01 RX ADMIN — AMLODIPINE BESYLATE 10 MILLIGRAM(S): 2.5 TABLET ORAL at 06:42

## 2019-12-01 RX ADMIN — Medication 2: at 18:30

## 2019-12-01 RX ADMIN — Medication 40 MILLIEQUIVALENT(S): at 15:28
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
## 2019-12-01 NOTE — PROGRESS NOTE ADULT - PROBLEM SELECTOR PLAN 8
1) PCP Contacted on Admission: Dr. Augustine ([facility])   2) Date of Contact with PCP:  3) PCP Contacted at Discharge: (Y/N, N/A)  4) Summary of Handoff Given to PCP:   5) Post-Discharge Appointment Date and Location:
Diet: Diabetic diet  Replete lytes prn  Disposition: [facility]  Code Status: Full Code  VTE ppx: Lovenox
Diet: Diabetic diet  Replete lytes prn  Disposition: [facility]  Code Status: Full Code  VTE ppx: Lovenox
1) PCP Contacted on Admission: Dr. Augustine ([facility])   2) Date of Contact with PCP:  3) PCP Contacted at Discharge: (Y/N, N/A)  4) Summary of Handoff Given to PCP:   5) Post-Discharge Appointment Date and Location:

## 2019-12-01 NOTE — PROGRESS NOTE ADULT - SUBJECTIVE AND OBJECTIVE BOX
**Incomplete Note**    OVERNIGHT EVENTS:    SUBJECTIVE / INTERVAL HPI: Patient seen and examined at bedside.     VITAL SIGNS:  Vital Signs Last 24 Hrs  T(C): 36.6 (30 Nov 2019 20:56), Max: 36.8 (30 Nov 2019 17:26)  T(F): 97.8 (30 Nov 2019 20:56), Max: 98.3 (30 Nov 2019 17:26)  HR: 84 (30 Nov 2019 20:56) (78 - 90)  BP: 146/82 (30 Nov 2019 20:56) (146/82 - 173/99)  BP(mean): --  RR: 16 (30 Nov 2019 20:56) (16 - 18)  SpO2: 97% (30 Nov 2019 20:56) (97% - 100%)    PHYSICAL EXAM:    General: WDWN  HEENT: NC/AT; PERRL, anicteric sclera; MMM  Neck: supple  Cardiovascular: +S1/S2; RRR  Respiratory: CTA B/L; no W/R/R  Gastrointestinal: soft, NT/ND; +BSx4  Extremities: WWP; no edema, clubbing or cyanosis  Vascular: 2+ radial, DP/PT pulses B/L  Neurological: AAOx3; no focal deficits    MEDICATIONS:  MEDICATIONS  (STANDING):  amLODIPine   Tablet 10 milliGRAM(s) Oral every 24 hours  aspirin enteric coated 81 milliGRAM(s) Oral daily  atorvastatin 80 milliGRAM(s) Oral at bedtime  dextrose 5%. 1000 milliLiter(s) (50 mL/Hr) IV Continuous <Continuous>  dextrose 50% Injectable 12.5 Gram(s) IV Push once  dextrose 50% Injectable 25 Gram(s) IV Push once  dextrose 50% Injectable 25 Gram(s) IV Push once  enoxaparin Injectable 40 milliGRAM(s) SubCutaneous every 24 hours  folic acid 1 milliGRAM(s) Oral daily  insulin glargine Injectable (LANTUS) 25 Unit(s) SubCutaneous every morning  insulin glargine Injectable (LANTUS) 25 Unit(s) SubCutaneous at bedtime  insulin lispro (HumaLOG) corrective regimen sliding scale   SubCutaneous three times a day before meals  insulin lispro (HumaLOG) corrective regimen sliding scale   SubCutaneous at bedtime  insulin lispro Injectable (HumaLOG) 10 Unit(s) SubCutaneous three times a day before meals  lisinopril 40 milliGRAM(s) Oral every 24 hours  multivitamin 1 Tablet(s) Oral daily    MEDICATIONS  (PRN):  acetaminophen   Tablet .. 325 milliGRAM(s) Oral every 4 hours PRN Mild Pain (1 - 3), Moderate Pain (4 - 6)  dextrose 40% Gel 15 Gram(s) Oral once PRN Blood Glucose LESS THAN 70 milliGRAM(s)/deciliter  glucagon  Injectable 1 milliGRAM(s) IntraMuscular once PRN Glucose LESS THAN 70 milligrams/deciliter      ALLERGIES:  Allergies    No Known Allergies    Intolerances        LABS:                        11.5   9.30  )-----------( 193      ( 30 Nov 2019 06:38 )             38.2     11-30    140  |  104  |  12  ----------------------------<  100<H>  3.7   |  26  |  0.76    Ca    8.6      30 Nov 2019 06:38  Mg     1.8     11-30          CAPILLARY BLOOD GLUCOSE      POCT Blood Glucose.: 135 mg/dL (01 Dec 2019 02:57)      RADIOLOGY & ADDITIONAL TESTS: Reviewed.    ASSESSMENT:    PLAN:

## 2019-12-01 NOTE — PROGRESS NOTE ADULT - ASSESSMENT
60 y/o undomiciled m h/o uncontrolled DM, uncontrolled HTN, diabetic foot ulcer presents to ER with hyperglycemia and diabetic left foot ulcers. . No DKA. Pt evaluated for PAD. He has no palpable pulses b/l DP/PT  VENTURA 1.0 b/l which may be falsely elevated 2/2 calcifications from DM.  He has had ulcer in the same area of left foot in March that healed with Abx and local wound care. No h/o vascular interventions.       - Continue ASA/Statin  - Plan for LLE angiogram once medically optimized  - Please make sure patient has T+S ordered on Monday 12/2 for 12/3  - Call [phone] with questions or acute changes

## 2019-12-01 NOTE — PROGRESS NOTE ADULT - SUBJECTIVE AND OBJECTIVE BOX
Subjective:  No acute complaints. Denies pain in feet. Denies CP/SOB. Denies N/V.    amLODIPine   Tablet 10  aspirin enteric coated 81  enoxaparin Injectable 40  lisinopril 40        Vital Signs Last 24 Hrs  T(C): 36.5 (01 Dec 2019 05:53), Max: 36.8 (30 Nov 2019 17:26)  T(F): 97.7 (01 Dec 2019 05:53), Max: 98.3 (30 Nov 2019 17:26)  HR: 77 (01 Dec 2019 05:53) (77 - 84)  BP: 146/90 (01 Dec 2019 05:53) (146/82 - 150/90)  BP(mean): --  RR: 18 (01 Dec 2019 05:53) (16 - 18)  SpO2: 99% (01 Dec 2019 05:53) (97% - 99%)  I&O's Summary    30 Nov 2019 07:01  -  01 Dec 2019 07:00  --------------------------------------------------------  IN: 480 mL / OUT: 1700 mL / NET: -1220 mL        Physical Exam:  General: NAD, resting comfortably in bed  Pulmonary: Nonlabored breathing, no respiratory distress  EXt: Left foot deformity with 2 superficial ulcers. 2cm round on dorsum and 4cm round 1st metatarsal head.   Pulses: b/l fem/pop 2+  Doppler: Right DP/PT triphasic.   Left DP/PT biphasic      LABS:                        11.7   10.29 )-----------( 183      ( 01 Dec 2019 06:58 )             37.0     12-01    137  |  101  |  11  ----------------------------<  145<H>  3.4<L>   |  24  |  0.67    Ca    9.2      01 Dec 2019 06:58  Mg     1.8     12-01      PT/INR - ( 01 Dec 2019 06:58 )   PT: 12.2 sec;   INR: 1.08          PTT - ( 01 Dec 2019 06:58 )  PTT:30.0 sec

## 2019-12-01 NOTE — PROGRESS NOTE ADULT - PROBLEM SELECTOR PLAN 9
1) PCP Contacted on Admission: Dr. Augustine ([facility])   2) Date of Contact with PCP:  3) PCP Contacted at Discharge: (Y/N, N/A)  4) Summary of Handoff Given to PCP:   5) Post-Discharge Appointment Date and Location:

## 2019-12-01 NOTE — PROGRESS NOTE ADULT - PROBLEM SELECTOR PROBLEM 7
Hyponatremia
Hyponatremia
Nutrition, metabolism, and development symptoms
Nutrition, metabolism, and development symptoms

## 2019-12-01 NOTE — PROGRESS NOTE ADULT - ASSESSMENT
Briefly, this is a 58yo undomiciled gentleman with a PMH of poorly controlled IDDM c/b peripheral vascular disease and HTN who presented with increase urination in the setting of hyperglycemia and found to have a L-foot (non-infected) ulcer c/f PAD.    #PAD c/b L foot ulcer  -- ESR and CRP not c/w infection  -- no need for antibiotics  -- Vascular following, recommending angiogram on 12/2 --> medically optimized for angio  -- needs teaching for wound care   -- will likely need VNS upon discharge    #Poorly controlled DM2  -- improved w/current regimen  -- c/w Lantus 25U BID  -- c/w moderate ISS during day and low ISS during night per Endo  -- Endocrine following, appreciate assistance     #HTN  -- normotensive  -- c/w Amlodpine  -- c/w ACEi today but will hold for day of angiogram    #L Hand Pain  -- exam unchanged since admission  -- L hand x-ray  -- Tylenol PRN     #DVT PPx - Lovenox   #Dispo - SW, [facility] as [facility] a [facility]    Rebeka Fowler  [phone]

## 2019-12-01 NOTE — PROGRESS NOTE ADULT - PROBLEM SELECTOR PROBLEM 8
Nutrition, metabolism, and development symptoms
Nutrition, metabolism, and development symptoms
Transition of care performed with sharing of clinical summary
Transition of care performed with sharing of clinical summary

## 2019-12-01 NOTE — PROGRESS NOTE ADULT - PROBLEM SELECTOR PLAN 7
Diet: Diabetic diet  Replete lytes prn  Disposition: [facility]  Code Status: Full Code  VTE ppx: Lovenox
RESOLVED - Na again 140 today  - Continue to trend daily BMP
RESOLVED - Na again 140 today  - Continue to trend daily BMP
Diet: NPO. Will start diabetic diet once BG improves  Replete lytes prn  NS @ 100cc/hr     Disposition: [facility]  Code Status: Full Code  VTE ppx: Lovenox

## 2019-12-02 DIAGNOSIS — L97.529 NON-PRESSURE CHRONIC ULCER OF OTHER PART OF LEFT FOOT WITH UNSPECIFIED SEVERITY: ICD-10-CM

## 2019-12-02 LAB
ANION GAP SERPL CALC-SCNC: 9 MMOL/L — SIGNIFICANT CHANGE UP (ref 5–17)
BLD GP AB SCN SERPL QL: NEGATIVE — SIGNIFICANT CHANGE UP
BUN SERPL-MCNC: 13 MG/DL — SIGNIFICANT CHANGE UP (ref 7–23)
CALCIUM SERPL-MCNC: 9 MG/DL — SIGNIFICANT CHANGE UP (ref 8.4–10.5)
CHLORIDE SERPL-SCNC: 100 MMOL/L — SIGNIFICANT CHANGE UP (ref 96–108)
CO2 SERPL-SCNC: 24 MMOL/L — SIGNIFICANT CHANGE UP (ref 22–31)
CREAT SERPL-MCNC: 0.76 MG/DL — SIGNIFICANT CHANGE UP (ref 0.5–1.3)
GLUCOSE BLDC GLUCOMTR-MCNC: 111 MG/DL — HIGH (ref 70–99)
GLUCOSE BLDC GLUCOMTR-MCNC: 124 MG/DL — HIGH (ref 70–99)
GLUCOSE BLDC GLUCOMTR-MCNC: 146 MG/DL — HIGH (ref 70–99)
GLUCOSE BLDC GLUCOMTR-MCNC: 99 MG/DL — SIGNIFICANT CHANGE UP (ref 70–99)
GLUCOSE SERPL-MCNC: 151 MG/DL — HIGH (ref 70–99)
HCT VFR BLD CALC: 36.6 % — LOW (ref 39–50)
HGB BLD-MCNC: 11.5 G/DL — LOW (ref 13–17)
MAGNESIUM SERPL-MCNC: 1.8 MG/DL — SIGNIFICANT CHANGE UP (ref 1.6–2.6)
MCHC RBC-ENTMCNC: 26.2 PG — LOW (ref 27–34)
MCHC RBC-ENTMCNC: 31.4 GM/DL — LOW (ref 32–36)
MCV RBC AUTO: 83.4 FL — SIGNIFICANT CHANGE UP (ref 80–100)
NRBC # BLD: 0 /100 WBCS — SIGNIFICANT CHANGE UP (ref 0–0)
PLATELET # BLD AUTO: 175 K/UL — SIGNIFICANT CHANGE UP (ref 150–400)
POTASSIUM SERPL-MCNC: 3.9 MMOL/L — SIGNIFICANT CHANGE UP (ref 3.5–5.3)
POTASSIUM SERPL-SCNC: 3.9 MMOL/L — SIGNIFICANT CHANGE UP (ref 3.5–5.3)
RBC # BLD: 4.39 M/UL — SIGNIFICANT CHANGE UP (ref 4.2–5.8)
RBC # FLD: 13.9 % — SIGNIFICANT CHANGE UP (ref 10.3–14.5)
RH IG SCN BLD-IMP: POSITIVE — SIGNIFICANT CHANGE UP
SODIUM SERPL-SCNC: 133 MMOL/L — LOW (ref 135–145)
WBC # BLD: 12.21 K/UL — HIGH (ref 3.8–10.5)
WBC # FLD AUTO: 12.21 K/UL — HIGH (ref 3.8–10.5)

## 2019-12-02 PROCEDURE — 73130 X-RAY EXAM OF HAND: CPT | Mod: 26,LT

## 2019-12-02 PROCEDURE — 99233 SBSQ HOSP IP/OBS HIGH 50: CPT | Mod: GC

## 2019-12-02 PROCEDURE — 99232 SBSQ HOSP IP/OBS MODERATE 35: CPT | Mod: GC

## 2019-12-02 RX ORDER — SODIUM CHLORIDE 9 MG/ML
1000 INJECTION INTRAMUSCULAR; INTRAVENOUS; SUBCUTANEOUS
Refills: 0 | Status: DISCONTINUED | OUTPATIENT
Start: 2019-12-02 | End: 2019-12-02

## 2019-12-02 RX ORDER — INSULIN GLARGINE 100 [IU]/ML
13 INJECTION, SOLUTION SUBCUTANEOUS EVERY MORNING
Refills: 0 | Status: DISCONTINUED | OUTPATIENT
Start: 2019-12-02 | End: 2019-12-02

## 2019-12-02 RX ORDER — ACETAMINOPHEN 500 MG
650 TABLET ORAL EVERY 6 HOURS
Refills: 0 | Status: DISCONTINUED | OUTPATIENT
Start: 2019-12-02 | End: 2019-12-04

## 2019-12-02 RX ORDER — INSULIN GLARGINE 100 [IU]/ML
13 INJECTION, SOLUTION SUBCUTANEOUS AT BEDTIME
Refills: 0 | Status: DISCONTINUED | OUTPATIENT
Start: 2019-12-02 | End: 2019-12-02

## 2019-12-02 RX ORDER — SODIUM CHLORIDE 9 MG/ML
1000 INJECTION INTRAMUSCULAR; INTRAVENOUS; SUBCUTANEOUS
Refills: 0 | Status: DISCONTINUED | OUTPATIENT
Start: 2019-12-02 | End: 2019-12-03

## 2019-12-02 RX ORDER — INSULIN GLARGINE 100 [IU]/ML
25 INJECTION, SOLUTION SUBCUTANEOUS AT BEDTIME
Refills: 0 | Status: DISCONTINUED | OUTPATIENT
Start: 2019-12-02 | End: 2019-12-03

## 2019-12-02 RX ORDER — INSULIN GLARGINE 100 [IU]/ML
15 INJECTION, SOLUTION SUBCUTANEOUS EVERY MORNING
Refills: 0 | Status: DISCONTINUED | OUTPATIENT
Start: 2019-12-03 | End: 2019-12-03

## 2019-12-02 RX ADMIN — AMLODIPINE BESYLATE 10 MILLIGRAM(S): 2.5 TABLET ORAL at 05:20

## 2019-12-02 RX ADMIN — Medication 10 UNIT(S): at 17:49

## 2019-12-02 RX ADMIN — Medication 81 MILLIGRAM(S): at 12:32

## 2019-12-02 RX ADMIN — Medication 10 UNIT(S): at 12:32

## 2019-12-02 RX ADMIN — Medication 10 UNIT(S): at 09:13

## 2019-12-02 RX ADMIN — Medication 1 MILLIGRAM(S): at 12:32

## 2019-12-02 RX ADMIN — Medication 1 TABLET(S): at 12:32

## 2019-12-02 RX ADMIN — Medication 650 MILLIGRAM(S): at 16:07

## 2019-12-02 RX ADMIN — INSULIN GLARGINE 25 UNIT(S): 100 INJECTION, SOLUTION SUBCUTANEOUS at 09:13

## 2019-12-02 RX ADMIN — INSULIN GLARGINE 25 UNIT(S): 100 INJECTION, SOLUTION SUBCUTANEOUS at 22:43

## 2019-12-02 RX ADMIN — Medication 650 MILLIGRAM(S): at 17:07

## 2019-12-02 NOTE — DIETITIAN INITIAL EVALUATION ADULT. - PERTINENT LABORATORY DATA
12/2: hemoglobin 11.5, hematocrit 36.6, Na 133, , 11/27: HgbA1c 12.5%, POCT: 12/2: 111-146, 12/1: 135-178, 11/30:

## 2019-12-02 NOTE — DIETITIAN INITIAL EVALUATION ADULT. - PROBLEM SELECTOR PLAN 1
-Presenting with hyperglycemia with initial . AG wnl, BHB negative, no acidosis on VBG. Mental status wnl. No concern for DKA/HHS at this time.   -Etiology of hyperglycemia in the setting of medication non compliance. Last took his basaglar over 2 days ago.   -Diagnosed in 2007. Was previously on glipizide. His recent HbA1c was 19% as per him. He follows at [facility]. His current regimen is basaglar 30 units BID and admelog 10 units TID prior to meals. His diabetes is complicated by neuropathy and vasculopathy. He has had up to 5 ulcers in his left foot. He has been told by his doctor that he has faint pulses in his left leg and has been recommended amputation in the past however refused.  -S/p 2LNS bolus, 10 units regular insulin IVx1, Lantus 30 unitsx1  -Keep NPO till BG improves. Then will start diabetic diet and pre meal lispro 10 units TID  -Follow up HbA1c. Endocrine consulted. Follow up recommendations.   -mISS for coverage  -Monitor FS

## 2019-12-02 NOTE — DIETITIAN INITIAL EVALUATION ADULT. - PROBLEM SELECTOR PLAN 3
-Diabetic Foot Ulcer present on medial dorsal aspect of left forefoot. Pulses faint on doppler compared to +2 on right foot. No drainage. 1/4 SIRS criteria (). No fevers/chills, wbc wnl   -Podiatry and vascular consulted   -Will continue vanc/zosyn for now   -Wound care recs  -Follow up ESR/CRP  -Follow up foot x ray. History of hardware in left foot after surgery few years ago    ADDENDUM:  ESR mildly elevated, CRP wnl. X ray with soft tissue swelling. More likely chronic ulcer and less likely acutely infected. Will discontinue antibiotics for now  Wound care recs

## 2019-12-02 NOTE — DIETITIAN INITIAL EVALUATION ADULT. - PROBLEM SELECTOR PLAN 7
Diet: NPO. Will start diabetic diet once BG improves  Replete lytes prn  NS @ 100cc/hr     Disposition: [facility]  Code Status: Full Code  VTE ppx: Lovenox

## 2019-12-02 NOTE — DIETITIAN INITIAL EVALUATION ADULT. - OTHER INFO
Pt seen for initial assessment. 59M, un-domiciled, with PMH of poorly controlled IDDM complicated by PVD, diabetic foot ulcers, and HTN who presented with increased urination and thirst and n/v x3 with NBNB emesis in the setting of hyperglycemia and was found to have a L foot ulcer c/f PAD without s/s of infection. Skin: no edema, +diabetic ulcer L foot. Pt seen resting in bed, sleeping soundly, unable to arouse. Pt is on cstCHO no snack diet with good PO intake, consumed 100% of meal tray per PCA. Unable to obtain subjective information. NKFA. No apparent GI distress, although last BM 11/27, continue to monitor, bowel regimen per team. RD to follow up per protocol.

## 2019-12-02 NOTE — PROGRESS NOTE ADULT - SUBJECTIVE AND OBJECTIVE BOX
amLODIPine   Tablet 10  aspirin enteric coated 81  enoxaparin Injectable 40        Vital Signs Last 24 Hrs  T(C): 37.1 (02 Dec 2019 04:58), Max: 37.1 (01 Dec 2019 21:05)  T(F): 98.8 (02 Dec 2019 04:58), Max: 98.8 (02 Dec 2019 04:58)  HR: 108 (02 Dec 2019 04:58) (89 - 108)  BP: 161/91 (02 Dec 2019 04:58) (130/85 - 161/98)  BP(mean): --  RR: 20 (02 Dec 2019 04:58) (18 - 20)  SpO2: 98% (02 Dec 2019 04:58) (97% - 99%)  I&O's Summary    30 Nov 2019 07:01  -  01 Dec 2019 07:00  --------------------------------------------------------  IN: 480 mL / OUT: 1700 mL / NET: -1220 mL        Physical Exam:  General: NAD, resting comfortably in bed  Pulmonary: Nonlabored breathing, no respiratory distress  Cardiovascular:  Abdominal:  Extremities:  Vascular:      LABS:                        11.5   12.21 )-----------( 175      ( 02 Dec 2019 05:48 )             36.6     12-02    133<L>  |  100  |  13  ----------------------------<  151<H>  3.9   |  24  |  0.76    Ca    9.0      02 Dec 2019 05:48  Mg     1.8     12-02      PT/INR - ( 01 Dec 2019 06:58 )   PT: 12.2 sec;   INR: 1.08          PTT - ( 01 Dec 2019 06:58 )  PTT:30.0 sec    Type & screen: Type + Screen (11.30.19 @ 20:24)    ABO Interpretation: O    Rh Interpretation: Positive    Antibody Screen: Negative    CXR: < from: Xray Chest 1 View- PORTABLE-Urgent (11.27.19 @ 07:51) >  IMPRESSION: Clear lungs.    < end of copied text >    ekg: < from: 12 Lead ECG (11.27.19 @ 07:51) >  Diagnosis Line Normal sinus rhythm  Possible Left atrial enlargement  Nonspecific ST and T wave abnormality  Prolonged QT    < end of copied text > Patient seen and examined at bedside.  Patient signed consent form.  Also signed the portion about not receiving blood.  I explained the risk and benefits and he still does not want blood if needed.  Otherwise patient doing well no complaints.    amLODIPine   Tablet 10  aspirin enteric coated 81  enoxaparin Injectable 40        Vital Signs Last 24 Hrs  T(C): 37.1 (02 Dec 2019 04:58), Max: 37.1 (01 Dec 2019 21:05)  T(F): 98.8 (02 Dec 2019 04:58), Max: 98.8 (02 Dec 2019 04:58)  HR: 108 (02 Dec 2019 04:58) (89 - 108)  BP: 161/91 (02 Dec 2019 04:58) (130/85 - 161/98)  BP(mean): --  RR: 20 (02 Dec 2019 04:58) (18 - 20)  SpO2: 98% (02 Dec 2019 04:58) (97% - 99%)  I&O's Summary    30 Nov 2019 07:01  -  01 Dec 2019 07:00  --------------------------------------------------------  IN: 480 mL / OUT: 1700 mL / NET: -1220 mL        Physical Exam:  General: NAD, resting comfortably in bed  Pulmonary: Nonlabored breathing, no respiratory distress  EXt: Left foot deformity with 2 superficial ulcers. 2cm round on dorsum and 4cm round 1st metatarsal head.   Pulses: b/l fem/pop 2+  Doppler: Right DP/PT triphasic.   Left DP/PT biphasic   VENTURA- b/l 1.0      LABS:                        11.5   12.21 )-----------( 175      ( 02 Dec 2019 05:48 )             36.6     12-02    133<L>  |  100  |  13  ----------------------------<  151<H>  3.9   |  24  |  0.76    Ca    9.0      02 Dec 2019 05:48  Mg     1.8     12-02      PT/INR - ( 01 Dec 2019 06:58 )   PT: 12.2 sec;   INR: 1.08          PTT - ( 01 Dec 2019 06:58 )  PTT:30.0 sec    Type & screen: Type + Screen (11.30.19 @ 20:24)    ABO Interpretation: O    Rh Interpretation: Positive    Antibody Screen: Negative    CXR: < from: Xray Chest 1 View- PORTABLE-Urgent (11.27.19 @ 07:51) >  IMPRESSION: Clear lungs.    < end of copied text >    ekg: < from: 12 Lead ECG (11.27.19 @ 07:51) >  Diagnosis Line Normal sinus rhythm  Possible Left atrial enlargement  Nonspecific ST and T wave abnormality  Prolonged QT    < end of copied text >

## 2019-12-02 NOTE — PROGRESS NOTE ADULT - ASSESSMENT
59M, un-domiciled, with PMH of poorly controlled IDDM complicated by PVD, diabetic foot ulcers, and HTN who presented with increased urination and thirst and n/v x3 with NBNB emesis in the setting of hyperglycemia and was found to have a L foot ulcer c/f PAD without s/s of infection.

## 2019-12-02 NOTE — PROGRESS NOTE ADULT - PROBLEM SELECTOR PLAN 1
Ulcer of the left foot. Seen by podiatry who saw no indication of infection. Vascular consulted for evaluation, plan for LLE angiogram today. ESR and CRP not consistent with infection. No indication of ostemylitis. No SIRS criteria. No need for antibiotics at this time.  - Plan for LLE angiogram tomorrow, NPO after midnight  - Daily CBC to ensure no leukocytosis noted Ulcer of the left foot. Seen by podiatry who saw no indication of infection. Vascular consulted for evaluation, plan for LLE angiogram tomorrow. ESR and CRP not consistent with infection. No indication of ostemylitis. No SIRS criteria. No need for antibiotics at this time.  - Plan for LLE angiogram tomorrow, NPO after midnight  - Daily CBC to ensure no leukocytosis noted

## 2019-12-02 NOTE — DIETITIAN INITIAL EVALUATION ADULT. - PROBLEM SELECTOR PLAN 8
1) PCP Contacted on Admission: Dr. Augustine ([facility])   2) Date of Contact with PCP:  3) PCP Contacted at Discharge: (Y/N, N/A)  4) Summary of Handoff Given to PCP:   5) Post-Discharge Appointment Date and Location:

## 2019-12-02 NOTE — DIETITIAN INITIAL EVALUATION ADULT. - PROBLEM SELECTOR PLAN 5
-Recently admitted to [facility] for left hand weakness and difficulty grasping objects. CVA ruled out with MRI  -Likely wrist drop based on presentation

## 2019-12-02 NOTE — PROGRESS NOTE ADULT - ASSESSMENT
60 y/o undomiciled m h/o uncontrolled DM, uncontrolled HTN, diabetic foot ulcer presents to ER with hyperglycemia and diabetic left foot ulcers. . No DKA. Pt evaluated for PAD. He has no palpable pulses b/l DP/PT  VENTURA 1.0 b/l which may be falsely elevated 2/2 calcifications from DM.  He has had ulcer in the same area of left foot in March that healed with Abx and local wound care. No h/o vascular interventions.       - Continue ASA/Statin  - NPO c mdnt/IVF for LLE angiogram 12/3 once medically optimized  - f/u am labs  - Call [phone] with questions or acute changes

## 2019-12-02 NOTE — DIETITIAN INITIAL EVALUATION ADULT. - PROBLEM SELECTOR PLAN 4
-History of poorly controlled HTN. On lisinopril 40 mg once daily and amlodipine 10 mg once daily at home.   -BP elevated on arrival. No signs of end organ damage. Home meds given STAT  -Monitor BP  -Continue home medications
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
## 2019-12-02 NOTE — PROGRESS NOTE ADULT - SUBJECTIVE AND OBJECTIVE BOX
INTERVAL HPI/OVERNIGHT EVENTS:    Patient is a 59y old  Male who presents with a chief complaint of Hyperglycemia (02 Dec 2019 12:07)      Pt reports the following symptoms:    CONSTITUTIONAL:  Negative fever or chills, feels well, good appetite  EYES:  Negative  blurry vision or double vision  CARDIOVASCULAR:  Negative for chest pain or palpitations  RESPIRATORY:  Negative for cough, wheezing, or SOB   GASTROINTESTINAL:  Negative for nausea, vomiting, diarrhea, constipation, or abdominal pain  GENITOURINARY:  Negative frequency, urgency or dysuria  NEUROLOGIC:  No headache, confusion, dizziness, lightheadedness    MEDICATIONS  (STANDING):  amLODIPine   Tablet 10 milliGRAM(s) Oral every 24 hours  aspirin enteric coated 81 milliGRAM(s) Oral daily  atorvastatin 80 milliGRAM(s) Oral at bedtime  dextrose 5%. 1000 milliLiter(s) (50 mL/Hr) IV Continuous <Continuous>  dextrose 50% Injectable 12.5 Gram(s) IV Push once  dextrose 50% Injectable 25 Gram(s) IV Push once  dextrose 50% Injectable 25 Gram(s) IV Push once  enoxaparin Injectable 40 milliGRAM(s) SubCutaneous every 24 hours  folic acid 1 milliGRAM(s) Oral daily  insulin glargine Injectable (LANTUS) 25 Unit(s) SubCutaneous every morning  insulin glargine Injectable (LANTUS) 25 Unit(s) SubCutaneous at bedtime  insulin lispro (HumaLOG) corrective regimen sliding scale   SubCutaneous three times a day before meals  insulin lispro (HumaLOG) corrective regimen sliding scale   SubCutaneous at bedtime  insulin lispro Injectable (HumaLOG) 10 Unit(s) SubCutaneous three times a day before meals  multivitamin 1 Tablet(s) Oral daily    MEDICATIONS  (PRN):  acetaminophen   Tablet .. 325 milliGRAM(s) Oral every 4 hours PRN Mild Pain (1 - 3), Moderate Pain (4 - 6)  dextrose 40% Gel 15 Gram(s) Oral once PRN Blood Glucose LESS THAN 70 milliGRAM(s)/deciliter  glucagon  Injectable 1 milliGRAM(s) IntraMuscular once PRN Glucose LESS THAN 70 milligrams/deciliter      PHYSICAL EXAM  Vital Signs Last 24 Hrs  T(C): 36.7 (02 Dec 2019 09:18), Max: 37.1 (01 Dec 2019 21:05)  T(F): 98.1 (02 Dec 2019 09:18), Max: 98.8 (02 Dec 2019 04:58)  HR: 96 (02 Dec 2019 09:18) (96 - 108)  BP: 128/80 (02 Dec 2019 09:18) (128/80 - 161/98)  BP(mean): --  RR: 18 (02 Dec 2019 09:18) (18 - 20)  SpO2: 99% (02 Dec 2019 09:18) (97% - 99%)    Constitutional: wn/wd in NAD.   HEENT: NCAT, MMM, OP clear, EOMI, no proptosis or lid retraction  Neck: no thyromegaly or palpable thyroid nodules   Respiratory: lungs CTAB.  Cardiovascular: regular rhythm, normal S1 and S2, no audible murmurs, no peripheral edema  GI: soft, NT/ND, no masses/HSM appreciated.  Neurology: no tremors, DTR 2+  Skin: no visible rashes/lesions  Psychiatric: AAO x 3, normal affect/mood.    LABS:                        11.5   12.21 )-----------( 175      ( 02 Dec 2019 05:48 )             36.6     12-02    133<L>  |  100  |  13  ----------------------------<  151<H>  3.9   |  24  |  0.76    Ca    9.0      02 Dec 2019 05:48  Mg     1.8     12-02      PT/INR - ( 01 Dec 2019 06:58 )   PT: 12.2 sec;   INR: 1.08          PTT - ( 01 Dec 2019 06:58 )  PTT:30.0 sec        HbA1C: 12.5 % (11-27 @ 06:05)    CAPILLARY BLOOD GLUCOSE      POCT Blood Glucose.: 111 mg/dL (02 Dec 2019 12:01)  POCT Blood Glucose.: 146 mg/dL (02 Dec 2019 08:56)  POCT Blood Glucose.: 170 mg/dL (01 Dec 2019 22:11)  POCT Blood Glucose.: 178 mg/dL (01 Dec 2019 18:05)      Insulin Sliding Scale requirements X 24 Hours:    RADIOLOGY & ADDITIONAL TESTS:    A/P: 59y Male with history of DM type II presenting for       1.  DM -     Please continue           units lantus at bedtime  / in the morning and        units lispro with meals and lispro moderate / low dose sliding scale 4 times daily with meals and at bedtime.  Please continue consistent carbohydrate diet.      Goal FSG is   Will continue to monitor   For discharge, pt can continue    Pt can follow up at discharge with [facility] by calling  to make an appointment.   Will discuss case with     and update primary team INTERVAL HPI/OVERNIGHT EVENTS:    Patient seen and examined at the bedside. no acute events overnight. he is being planned for LLE angiogram tomorrow and will be NPO from midnight.    FSG & Insulin received:  Yesterday:  pre-dinner fs, 10 nutritional lispro   units +  2 units lispro SS  bedtime fs, 25 lantus   units +    units lispro SS    Today:  pre-breakfast fs, 10 nutritional lispro   units+  25 units lantus  pre-lunch fs, 10 nutritional lispro   units      Pt reports the following symptoms:    CONSTITUTIONAL:  Negative fever or chills, feels well, good appetite  EYES:  Negative  blurry vision or double vision  CARDIOVASCULAR:  Negative for chest pain or palpitations  RESPIRATORY:  Negative for cough, wheezing, or SOB   GASTROINTESTINAL:  Negative for nausea, vomiting, diarrhea, constipation, or abdominal pain  GENITOURINARY:  Negative frequency, urgency or dysuria  NEUROLOGIC:  No headache, confusion, dizziness, lightheadedness    MEDICATIONS  (STANDING):  amLODIPine   Tablet 10 milliGRAM(s) Oral every 24 hours  aspirin enteric coated 81 milliGRAM(s) Oral daily  atorvastatin 80 milliGRAM(s) Oral at bedtime  dextrose 5%. 1000 milliLiter(s) (50 mL/Hr) IV Continuous <Continuous>  dextrose 50% Injectable 12.5 Gram(s) IV Push once  dextrose 50% Injectable 25 Gram(s) IV Push once  dextrose 50% Injectable 25 Gram(s) IV Push once  enoxaparin Injectable 40 milliGRAM(s) SubCutaneous every 24 hours  folic acid 1 milliGRAM(s) Oral daily  insulin glargine Injectable (LANTUS) 25 Unit(s) SubCutaneous every morning  insulin glargine Injectable (LANTUS) 25 Unit(s) SubCutaneous at bedtime  insulin lispro (HumaLOG) corrective regimen sliding scale   SubCutaneous three times a day before meals  insulin lispro (HumaLOG) corrective regimen sliding scale   SubCutaneous at bedtime  insulin lispro Injectable (HumaLOG) 10 Unit(s) SubCutaneous three times a day before meals  multivitamin 1 Tablet(s) Oral daily    MEDICATIONS  (PRN):  acetaminophen   Tablet .. 325 milliGRAM(s) Oral every 4 hours PRN Mild Pain (1 - 3), Moderate Pain (4 - 6)  dextrose 40% Gel 15 Gram(s) Oral once PRN Blood Glucose LESS THAN 70 milliGRAM(s)/deciliter  glucagon  Injectable 1 milliGRAM(s) IntraMuscular once PRN Glucose LESS THAN 70 milligrams/deciliter      PHYSICAL EXAM  Vital Signs Last 24 Hrs  T(C): 36.7 (02 Dec 2019 09:18), Max: 37.1 (01 Dec 2019 21:05)  T(F): 98.1 (02 Dec 2019 09:18), Max: 98.8 (02 Dec 2019 04:58)  HR: 96 (02 Dec 2019 09:18) (96 - 108)  BP: 128/80 (02 Dec 2019 09:18) (128/80 - 161/98)  BP(mean): --  RR: 18 (02 Dec 2019 09:18) (18 - 20)  SpO2: 99% (02 Dec 2019 09:18) (97% - 99%)    Constitutional: wn/wd in NAD.   Respiratory: lungs CTAB.  Cardiovascular: regular rhythm, normal S1 and S2, no peripheral edema  GI: soft, NT/ND, no masses/HSM appreciated.  Neurology: no tremors, DTR 2+      LABS:                        11.5   12.21 )-----------( 175      ( 02 Dec 2019 05:48 )             36.6     12-02    133<L>  |  100  |  13  ----------------------------<  151<H>  3.9   |  24  |  0.76    Ca    9.0      02 Dec 2019 05:48  Mg     1.8     12-02      PT/INR - ( 01 Dec 2019 06:58 )   PT: 12.2 sec;   INR: 1.08          PTT - ( 01 Dec 2019 06:58 )  PTT:30.0 sec        HbA1C: 12.5 % ( @ 06:05)    CAPILLARY BLOOD GLUCOSE      POCT Blood Glucose.: 111 mg/dL (02 Dec 2019 12:01)  POCT Blood Glucose.: 146 mg/dL (02 Dec 2019 08:56)  POCT Blood Glucose.: 170 mg/dL (01 Dec 2019 22:11)  POCT Blood Glucose.: 178 mg/dL (01 Dec 2019 18:05)      Insulin Sliding Scale requirements X 24 Hours:    RADIOLOGY & ADDITIONAL TESTS:    A/P: Patient is a 58 yo undomiciled male with history of HTN and poorly controlled DM complicated by neuropathy and diabetic foot ulcers who got admitted for diabetic foot ulcers.    1.  DM Type 2 - uncontrolled - with neuropathy and vasculopathy  - hba1c 12.5  Cr/GFR 0.75/118  Wt 72.5 kg  Please continue lantus 25 units at bedtime and decrease to Lantus 15 units in the morning  Please continue lispro   10   units before each meal.    Please continue lispro moderate dose sliding scale 3 times daily with meals and a very low dose sliding scale starting at 250 for nighttime.    Pt's fingerstick glucose goal is 120 to 150    Will continue to monitor     For discharge, TBD    Pt can follow up at discharge with [facility] by calling  to make an appointment.   discussed case with     and updated primary team  To Make an appointment at [address] [place] [address] for the patient, either:  1. Send a STAT task via [employer] to Sarah Darling or Aleida Pimentel (office managers)   OR  2. Call supervisor's line. P: [phone] (do not give this number to patient). VM is checked periodically  In the message, specify that this is a hospital discharge follow-up, and that the appt can be made with a NP if there is no timely MD availability    REMINDERS FOR INSULIN/DIABETES SUPPLIES at DISCHARGE:  INSULIN:   Long actin/Basal Insulin: Examples: Toujeo, Basaglar, Tresiba, Lantus   Short acting/Bolus Insulin: Humalog, Admelog, Novolog  Please ensure that BOTH short acting and long acting insulin are prescribed in the same preparation (Ex: PEN vs VIAL/SOLUTION)     TESTING SUPPLIES:   All glucometer supplies should be written as generic to avoid issues with insurance. Use the free text option in [facility]rise prescription writer, and type in glucometer test strips, lancets, etc to order.    If sending patient home on insulin PEN, please send:   •	BD amol insulin pen needles for use up to 4 times daily (total quantity 100)  •	Lancets for use up to 4 times daily (total quantity 100)  •	Glucometer Test strips for use up to 4 times daily (total quantity 100)  •	Alcohol swabs for use up to 4 times daily (total quantity 100)  •	Glucometer (If provided by hospital, still provide scripts for lancets, test strips, and swabs)  If sending patient home on insulin VIAL, please send:   •	Insulin syringes (6mm) - for use up to 4 times daily (total quantity 100)  •	Lancets for use up to 4 times daily (total quantity 100)  •	Generic Glucometer Test strips for use up to 4 times daily (total quantity 100)  •	Alcohol swabs for use up to 4 times daily (total quantity 100)  •	Generic Glucometer (If provided by hospital, still provide scripts for lancets, test strips, and swabs)  •	Do not specify brand for testing supplies (such as contour, freestyle, one touch etc) that way the pharmacy has the freedom to pick and change according to what the insurance dictates.  For patients without insurance:   •	Provide social work with appropriate scripts so they may obtain 1 week of samples  •	Provide with glucometer. Glucometers are located at various nursing stations, the nursing office, education, and endocrine fellows office.  •	Please make appointment with Beverly Teixeira NP or Anyi Yang RN and NAGI Thompson at the [address] [place] [address] endocrinology clinic. They can see patients without insurance, provide appropriate samples, and assist in getting insurance coverage.     PREFERRED PHARMACY:  [facility] (located on 1st floor next to admitting)  P: [phone]  Hours: M – F 8AM – 8PM, Sat 8AM – 4PM, Sun—closed  If not using [facility], please follow up with chosen pharmacy to ensure insulin prescribed is covered.

## 2019-12-02 NOTE — DIETITIAN INITIAL EVALUATION ADULT. - ENERGY NEEDS
Ht: 6'2", Wt: 72.5kg, IBW: 86.3kg, 84.0%IBW.   Needs calculated based on [facility] standards of care. Needs adjusted for foot ulcer (upper end of kcal range should be used).

## 2019-12-03 LAB
ANION GAP SERPL CALC-SCNC: 9 MMOL/L — SIGNIFICANT CHANGE UP (ref 5–17)
BUN SERPL-MCNC: 12 MG/DL — SIGNIFICANT CHANGE UP (ref 7–23)
CALCIUM SERPL-MCNC: 8.2 MG/DL — LOW (ref 8.4–10.5)
CHLORIDE SERPL-SCNC: 104 MMOL/L — SIGNIFICANT CHANGE UP (ref 96–108)
CO2 SERPL-SCNC: 25 MMOL/L — SIGNIFICANT CHANGE UP (ref 22–31)
CREAT SERPL-MCNC: 0.68 MG/DL — SIGNIFICANT CHANGE UP (ref 0.5–1.3)
GLUCOSE BLDC GLUCOMTR-MCNC: 106 MG/DL — HIGH (ref 70–99)
GLUCOSE BLDC GLUCOMTR-MCNC: 166 MG/DL — HIGH (ref 70–99)
GLUCOSE BLDC GLUCOMTR-MCNC: 208 MG/DL — HIGH (ref 70–99)
GLUCOSE BLDC GLUCOMTR-MCNC: 73 MG/DL — SIGNIFICANT CHANGE UP (ref 70–99)
GLUCOSE SERPL-MCNC: 137 MG/DL — HIGH (ref 70–99)
HCT VFR BLD CALC: 36.8 % — LOW (ref 39–50)
HGB BLD-MCNC: 11.1 G/DL — LOW (ref 13–17)
MAGNESIUM SERPL-MCNC: 1.8 MG/DL — SIGNIFICANT CHANGE UP (ref 1.6–2.6)
MCHC RBC-ENTMCNC: 25.8 PG — LOW (ref 27–34)
MCHC RBC-ENTMCNC: 30.2 GM/DL — LOW (ref 32–36)
MCV RBC AUTO: 85.6 FL — SIGNIFICANT CHANGE UP (ref 80–100)
NRBC # BLD: 0 /100 WBCS — SIGNIFICANT CHANGE UP (ref 0–0)
PLATELET # BLD AUTO: 174 K/UL — SIGNIFICANT CHANGE UP (ref 150–400)
POTASSIUM SERPL-MCNC: 3.7 MMOL/L — SIGNIFICANT CHANGE UP (ref 3.5–5.3)
POTASSIUM SERPL-SCNC: 3.7 MMOL/L — SIGNIFICANT CHANGE UP (ref 3.5–5.3)
RBC # BLD: 4.3 M/UL — SIGNIFICANT CHANGE UP (ref 4.2–5.8)
RBC # FLD: 13.8 % — SIGNIFICANT CHANGE UP (ref 10.3–14.5)
SODIUM SERPL-SCNC: 138 MMOL/L — SIGNIFICANT CHANGE UP (ref 135–145)
WBC # BLD: 10.31 K/UL — SIGNIFICANT CHANGE UP (ref 3.8–10.5)
WBC # FLD AUTO: 10.31 K/UL — SIGNIFICANT CHANGE UP (ref 3.8–10.5)

## 2019-12-03 PROCEDURE — 36245 INS CATH ABD/L-EXT ART 1ST: CPT | Mod: GC

## 2019-12-03 PROCEDURE — 75710 ARTERY X-RAYS ARM/LEG: CPT | Mod: 26,GC

## 2019-12-03 PROCEDURE — 99232 SBSQ HOSP IP/OBS MODERATE 35: CPT | Mod: GC

## 2019-12-03 PROCEDURE — 76937 US GUIDE VASCULAR ACCESS: CPT | Mod: 26,GC

## 2019-12-03 RX ORDER — ENOXAPARIN SODIUM 100 MG/ML
40 INJECTION SUBCUTANEOUS ONCE
Refills: 0 | Status: DISCONTINUED | OUTPATIENT
Start: 2019-12-03 | End: 2019-12-03

## 2019-12-03 RX ORDER — INSULIN GLARGINE 100 [IU]/ML
35 INJECTION, SOLUTION SUBCUTANEOUS AT BEDTIME
Refills: 0 | Status: DISCONTINUED | OUTPATIENT
Start: 2019-12-03 | End: 2019-12-04

## 2019-12-03 RX ORDER — ENOXAPARIN SODIUM 100 MG/ML
40 INJECTION SUBCUTANEOUS EVERY 24 HOURS
Refills: 0 | Status: DISCONTINUED | OUTPATIENT
Start: 2019-12-03 | End: 2019-12-04

## 2019-12-03 RX ADMIN — INSULIN GLARGINE 35 UNIT(S): 100 INJECTION, SOLUTION SUBCUTANEOUS at 22:39

## 2019-12-03 RX ADMIN — Medication 1 MILLIGRAM(S): at 16:05

## 2019-12-03 RX ADMIN — Medication 1 TABLET(S): at 16:05

## 2019-12-03 RX ADMIN — SODIUM CHLORIDE 80 MILLILITER(S): 9 INJECTION INTRAMUSCULAR; INTRAVENOUS; SUBCUTANEOUS at 00:03

## 2019-12-03 RX ADMIN — Medication 81 MILLIGRAM(S): at 13:49

## 2019-12-03 RX ADMIN — Medication 10 UNIT(S): at 17:41

## 2019-12-03 RX ADMIN — Medication 4: at 17:41

## 2019-12-03 RX ADMIN — AMLODIPINE BESYLATE 10 MILLIGRAM(S): 2.5 TABLET ORAL at 06:10

## 2019-12-03 RX ADMIN — Medication 2: at 22:38

## 2019-12-03 NOTE — PROGRESS NOTE ADULT - ASSESSMENT
59M homeless PMH of poorly controlled IDDM complicated by PVD, diabetic foot ulcers, and HTN who presented with increased urination and thirst and n/v x3 with NBNB emesis in the setting of hyperglycemia and was found to have a L foot ulcer c/f PAD without s/s of infection. For LLE angiogram today.

## 2019-12-03 NOTE — PROGRESS NOTE ADULT - PROBLEM SELECTOR PROBLEM 1
Hyperglycemia
Ulcer of left foot, unspecified ulcer stage
Ulcer of left foot, unspecified ulcer stage
Hyperglycemia
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
## 2019-12-03 NOTE — BRIEF OPERATIVE NOTE - NSICDXBRIEFPROCEDURE_GEN_ALL_CORE_FT
PROCEDURES:  Angiogram, lower extremity, fluoroscopic, with topical ultrasound 03-Dec-2019 11:18:18  [place]

## 2019-12-03 NOTE — PROGRESS NOTE ADULT - PROBLEM SELECTOR PROBLEM 2
IDDM (insulin dependent diabetes mellitus)

## 2019-12-03 NOTE — PROGRESS NOTE ADULT - NSHPATTENDINGPLANDISCUSS_GEN_ALL_CORE
resident team and patient.
resident team and patient.
Dr. Manning and [facility] team
Dr. Manning and Surgery
resident team and patient.
resident team and patient.

## 2019-12-03 NOTE — PROGRESS NOTE ADULT - PROBLEM SELECTOR PLAN 6
1) PCP Contacted on Admission: (Y/N) --> Name & Phone #:  2) Date of Contact with PCP:  3) PCP Contacted at Discharge: (Y/N, N/A)  4) Summary of Handoff Given to PCP:   5) Post-Discharge Appointment Date and Location:
1) PCP Contacted on Admission: (Y/N) --> Name & Phone #:  2) Date of Contact with PCP:  3) PCP Contacted at Discharge: (Y/N, N/A)  4) Summary of Handoff Given to PCP:   5) Post-Discharge Appointment Date and Location:
RESOLVED - Na 140 today  - Continue to trend daily BMP
Recently admitted to [facility] for left hand weakness and difficulty grasping objects. CVA ruled out with MRI  -Likely wrist drop based on presentation  - Will follow up with xray left hand
Recently admitted to [facility] for left hand weakness and difficulty grasping objects. CVA ruled out with MRI  -Likely wrist drop based on presentation  - Will follow up with xray left hand
-In the setting of hyperglycemia. Corrected Na 145. Trend BMP

## 2019-12-03 NOTE — PROGRESS NOTE ADULT - SUBJECTIVE AND OBJECTIVE BOX
Pre-op Diagnosis: PAD  Procedure: LLE Angiogram  Surgeon: Dr. Springer    Consent in chart                          11.5   12.21 )-----------( 175      ( 02 Dec 2019 05:48 )             36.6     12-02    133<L>  |  100  |  13  ----------------------------<  151<H>  3.9   |  24  |  0.76    Ca    9.0      02 Dec 2019 05:48  Mg     1.8     12-02      PT/INR - ( 01 Dec 2019 06:58 )   PT: 12.2 sec;   INR: 1.08          PTT - ( 01 Dec 2019 06:58 )  PTT:30.0 sec      Type & Screen: O+ Ab neg  CXR: Clear lungs  EKG: NSR@98bpm        A/P: 59yMale planned for above procedure  1. NPO/IVF  2.  Home medication as appropriate  3. [x] Blood on hold, Units: 2u PRBC

## 2019-12-03 NOTE — BRIEF OPERATIVE NOTE - OPERATION/FINDINGS
Access via 5Fr sheath in R groin. LLE angiogram showed patent KARIME, EIA, IIA, CFA, DFA, SFA, & Jojo, w/ filling of TP trunk, Peroneal A, PTA, & SU to the top of the foot before branching into collaterals; SU did not fill into DPA. Some filling defects noted in TP trunk & SU however they were not treated given that the Peroneal A & PTA filled and SU filled without continuing into the DPA. R groin sheath removed in OR. Hemostasis achieved w/ 25 minutes direct manual pressure. No hematoma at end of case. RLE DP/PT triphasic.

## 2019-12-03 NOTE — PROGRESS NOTE ADULT - PROBLEM SELECTOR PROBLEM 5
Hypertension
Hypertension
Nutrition, metabolism, and development symptoms
Nutrition, metabolism, and development symptoms
Wrist drop, left wrist
Wrist drop, left wrist

## 2019-12-03 NOTE — PROGRESS NOTE ADULT - PROBLEM SELECTOR PROBLEM 6
Hyponatremia
Transition of care performed with sharing of clinical summary
Transition of care performed with sharing of clinical summary
Wrist drop, left wrist
Wrist drop, left wrist
Hyponatremia

## 2019-12-03 NOTE — PROGRESS NOTE ADULT - PROBLEM SELECTOR PLAN 2
BGL stable with current management   - c/w Lantus 25U BID and moderate ISS during day and low ISS during night  - endocrine following, appreciate recommendations
BGL stable with current management   - c/w Lantus 25U BID and moderate ISS during day and low ISS during night  - endocrine following, appreciate recommendations
See above, as per endocrine recomendations
See above, as per endocrine recommendations
See above, as per endocrine recommendations
See plan above

## 2019-12-03 NOTE — PROGRESS NOTE ADULT - ATTENDING COMMENTS
Pt seen on rounds this afternoon and events of the weekend reviewed.  Glucoses since yesterday AM have been nearly all in target range (110-180 and quite stable).  Currently on Lantus 25 BID, premeal lispro 10 units TID.  Has mild neuropathic symptoms in his R foot, but a more aching type of pain (?? ischemic) in his left leg below the knee.  L foot is in a Kerlex wrap.  Will be NPO for OR tomorrow.  Can give his usual Lantus tonight, but decrease the morning Lantus tomorrow to 15 units.
Pt seen on rounds this afternoon post return from OR.  Results of angio noted--no intervention warranted.  Glucose was down to 73 mg% this afternoon despite the pt not having received any AM Lantus today.  Last dose was 25 units last night.  Will try to convert him to a single bedtime dose, and decrease the total daily dose to 35 units.  Continue pre-meal dose at 10 units
Patient was seen and examined with the resident team today.  I agree with Dr. Dyer's assessment and plan with the following exceptions/additions:     Briefly, this is a 60yo undomiciled gentleman with a PMH of poorly controlled IDDM c/b peripheral vascular disease and HTN who presented with increase urination in the setting of hyperglycemia and found to have a L-foot (non-infected) ulcer c/f PAD.    Gen: NAD, sitting upright in bed  HEENT: NCAT, MMM, clear OP w/poor dentition  CV: RRR, no m/g/r appreciated  Pulm: CTA B, no w/r/r  Abd: normoactive BS, soft, NTND  Ext: WWP, no c/e/e; L foot w/diminished DP and dressed medial ulcer w/o fluctuance or erythema or purulent discharge  Neuro: AOx3, nonfocal  Psych: conversant and appropriate    -- no need for antibiotics  -- Vascular following, recommending angiogram on 12/2  -- glycemic control, currently on 25U qAM and 30U qPM but FS in the 60s and symptomatic this AM --> adjust insulin with Endocrine  -- Endocrine following, appreciate assistance   -- [facility]  -- c/w Amlodipine and Lisinopril; will need to hold Lisinopril on the day of angio  -- DVT PPx - Lovenox   -- Dispo - TBD    Rebeka Fowler  [phone]

## 2019-12-03 NOTE — CONSULT NOTE ADULT - REASON FOR ADMISSION
Hyperglycemia
Hyperglycemia
Hyperglycemia without HHS or DKA
Hyperglycemia  Diabetic Foot Infection
Hyperglycemia  Diabetic Foot Infection
Hyperglycemia

## 2019-12-03 NOTE — CONSULT NOTE ADULT - ASSESSMENT
XR shows non displaced fracture at left fifth metacarpal neck    A/P  no acute surgical intervention  this fracture, per report from patient, is at least a few weeks old  non displaced therefore not indicated for reduction  would benefit from splint- please request prefabricated left ulnar gutter splint from OT  also recommend hand therapy three times weekly for 6 weeks for stiffness  splint should be worn other than bathing and therapy for 4 weeks then can be removed if pain has subsided  no surgical intervention indicated    thank you for consultation

## 2019-12-03 NOTE — PROGRESS NOTE ADULT - SUBJECTIVE AND OBJECTIVE BOX
Hospital Course: 59M w. poorly controlled DM and HTN presented with increased urination and found to have hyperglycemia. A nonhealing wound on the left foot was noted, and podiatry was consulted who found no sign of infection. Insulin started and uptitrated, resulting in good glycemic control. A LLE angiogram was performed by vascular surgery, and the patient was transferred to [facility] to be followed by surgery following his procedure.     OVERNIGHT EVENTS: No acute events reported overnight.    SUBJECTIVE / INTERVAL HPI: Patient seen and examined at bedside. Patient irritable but in no acute distress. Denies acute symptoms. Denies chest pain or shortness of breath. Denies nausea, vomiting, diarrhea or constipation. Denies sensation of fever. Denies URI symptoms. Reports pain in hand managed with current therapy.     VITAL SIGNS:  Vital Signs Last 24 Hrs  T(C): 36.9 (03 Dec 2019 08:46), Max: 37.1 (02 Dec 2019 22:47)  T(F): 98.4 (03 Dec 2019 08:46), Max: 98.7 (02 Dec 2019 22:47)  HR: 86 (03 Dec 2019 08:46) (71 - 93)  BP: 150/88 (03 Dec 2019 08:46) (127/74 - 150/88)  RR: 18 (03 Dec 2019 08:46) (17 - 18)  SpO2: 98% (03 Dec 2019 08:46) (98% - 100%)    PHYSICAL EXAM:  General: WDWN, NAD  HEENT: PERRL, anicteric sclera; MMM, EOMI  Neck: supple, no JVD  Cardiovascular: +S1/S2; RRR, no M/G/R  Respiratory: CTA B/L; no W/R/R, no accessory muscle use  Gastrointestinal: soft, NT/ND, no masses appreciated  Extremities: WWP extremities, unable to assess LLE wound due to dressing. No edema noted, sensation equal in all extremities  Vascular: 2+ radial pulses  Neurological: AAOx3, CN2-12 grossly intact    MEDICATIONS:  MEDICATIONS  (STANDING):  amLODIPine   Tablet 10 milliGRAM(s) Oral every 24 hours  aspirin enteric coated 81 milliGRAM(s) Oral daily  atorvastatin 80 milliGRAM(s) Oral at bedtime  dextrose 5%. 1000 milliLiter(s) (50 mL/Hr) IV Continuous <Continuous>  dextrose 50% Injectable 12.5 Gram(s) IV Push once  dextrose 50% Injectable 25 Gram(s) IV Push once  dextrose 50% Injectable 25 Gram(s) IV Push once  enoxaparin Injectable 40 milliGRAM(s) SubCutaneous every 24 hours  folic acid 1 milliGRAM(s) Oral daily  insulin glargine Injectable (LANTUS) 25 Unit(s) SubCutaneous at bedtime  insulin glargine Injectable (LANTUS) 15 Unit(s) SubCutaneous every morning  insulin lispro (HumaLOG) corrective regimen sliding scale   SubCutaneous three times a day before meals  insulin lispro (HumaLOG) corrective regimen sliding scale   SubCutaneous at bedtime  insulin lispro Injectable (HumaLOG) 10 Unit(s) SubCutaneous three times a day before meals  multivitamin 1 Tablet(s) Oral daily  sodium chloride 0.9%. 1000 milliLiter(s) (80 mL/Hr) IV Continuous <Continuous>    MEDICATIONS  (PRN):  acetaminophen   Tablet .. 650 milliGRAM(s) Oral every 6 hours PRN Mild Pain (1 - 3)  dextrose 40% Gel 15 Gram(s) Oral once PRN Blood Glucose LESS THAN 70 milliGRAM(s)/deciliter  glucagon  Injectable 1 milliGRAM(s) IntraMuscular once PRN Glucose LESS THAN 70 milligrams/deciliter    ALLERGIES: NKDA    LABS:                      11.1   10.31 )-----------( 174      ( 03 Dec 2019 08:03 )             36.8     138  |  104  |  12  ----------------------------<  137<H>  3.7   |  25  |  0.68    Ca    8.2<L>      03 Dec 2019 08:03  Mg     1.8     12-03    POCT Blood Glucose.: 106 mg/dL (03 Dec 2019 08:42)    RADIOLOGY & ADDITIONAL TESTS: XR hand reviewed

## 2019-12-03 NOTE — CONSULT NOTE ADULT - SUBJECTIVE AND OBJECTIVE BOX
admitted for management of LE vascular issue  also found to have a left hand fracture of indeterminate age    on exam  well appearing  complains of mild pain at the site  no associated symptoms    no deformity  mild swelling  sensation and perfusion intact  has some stiffness

## 2019-12-03 NOTE — PROGRESS NOTE ADULT - PROBLEM SELECTOR PLAN 5
F - none  E - Replete if K < 4 or Mg < 2  N - Diabetic diet, NPO after midnight  A - RMF
F - none  E - Replete if K < 4 or Mg < 2  N - Diabetic diet, NPO after midnight  A - RMF
History of poorly controlled HTN. On lisinopril 40 mg once daily and amlodipine 10 mg once daily at home. BP elevated on arrival. No signs of end organ damage.   -Monitor BP, currently stable on amlodipine 10 mg and lisinopril 40 mg daily  -Continue home medications
History of poorly controlled HTN. On lisinopril 40 mg once daily and amlodipine 10 mg once daily at home. BP elevated on arrival. No signs of end organ damage.   -Monitor BP, currently stable on amlodipine 10 mg and lisinopril 40 mg daily  -Continue home medications
Recently admitted to [facility] for left hand weakness and difficulty grasping objects. CVA ruled out with MRI  -Likely wrist drop based on presentation
-Recently admitted to [facility] for left hand weakness and difficulty grasping objects. CVA ruled out with MRI  -Likely wrist drop based on presentation

## 2019-12-03 NOTE — PROGRESS NOTE ADULT - PROBLEM SELECTOR PLAN 1
Ulcer of the left foot. Seen by podiatry who saw no indication of infection. Vascular consulted for evaluation, plan for LLE angiogram tomorrow. ESR and CRP not consistent with infection. No indication of ostemylitis. No SIRS criteria. No need for antibiotics at this time.  - Pt taken for LLE angiogram today, will follow up with vascular recommendations following procedure  - Continue daily CBC to ensure no leukocytosis noted on labs today

## 2019-12-03 NOTE — PROGRESS NOTE ADULT - SUBJECTIVE AND OBJECTIVE BOX
INTERVAL HPI/OVERNIGHT EVENTS:    Patient is a 59y old  Male who presents with a chief complaint of Hyperglycemia (03 Dec 2019 10:54)      Pt reports the following symptoms:    CONSTITUTIONAL:  Negative fever or chills, feels well, good appetite  EYES:  Negative  blurry vision or double vision  CARDIOVASCULAR:  Negative for chest pain or palpitations  RESPIRATORY:  Negative for cough, wheezing, or SOB   GASTROINTESTINAL:  Negative for nausea, vomiting, diarrhea, constipation, or abdominal pain  GENITOURINARY:  Negative frequency, urgency or dysuria  NEUROLOGIC:  No headache, confusion, dizziness, lightheadedness    MEDICATIONS  (STANDING):  amLODIPine   Tablet 10 milliGRAM(s) Oral every 24 hours  aspirin enteric coated 81 milliGRAM(s) Oral daily  atorvastatin 80 milliGRAM(s) Oral at bedtime  dextrose 5%. 1000 milliLiter(s) (50 mL/Hr) IV Continuous <Continuous>  dextrose 50% Injectable 12.5 Gram(s) IV Push once  dextrose 50% Injectable 25 Gram(s) IV Push once  dextrose 50% Injectable 25 Gram(s) IV Push once  folic acid 1 milliGRAM(s) Oral daily  insulin glargine Injectable (LANTUS) 25 Unit(s) SubCutaneous at bedtime  insulin glargine Injectable (LANTUS) 15 Unit(s) SubCutaneous every morning  insulin lispro (HumaLOG) corrective regimen sliding scale   SubCutaneous Before meals and at bedtime  insulin lispro Injectable (HumaLOG) 10 Unit(s) SubCutaneous three times a day before meals  multivitamin 1 Tablet(s) Oral daily  sodium chloride 0.9%. 1000 milliLiter(s) (80 mL/Hr) IV Continuous <Continuous>    MEDICATIONS  (PRN):  acetaminophen   Tablet .. 650 milliGRAM(s) Oral every 6 hours PRN Mild Pain (1 - 3)  dextrose 40% Gel 15 Gram(s) Oral once PRN Blood Glucose LESS THAN 70 milliGRAM(s)/deciliter  glucagon  Injectable 1 milliGRAM(s) IntraMuscular once PRN Glucose LESS THAN 70 milligrams/deciliter      PHYSICAL EXAM  Vital Signs Last 24 Hrs  T(C): 36.2 (03 Dec 2019 11:00), Max: 37.1 (02 Dec 2019 22:47)  T(F): 97.1 (03 Dec 2019 11:00), Max: 98.7 (02 Dec 2019 22:47)  HR: 74 (03 Dec 2019 12:15) (71 - 93)  BP: 141/85 (03 Dec 2019 12:00) (127/74 - 150/88)  BP(mean): 108 (03 Dec 2019 11:30) (101 - 109)  RR: 19 (03 Dec 2019 12:15) (14 - 19)  SpO2: 100% (03 Dec 2019 12:15) (98% - 100%)    Constitutional: wn/wd in NAD.   HEENT: NCAT, MMM, OP clear, EOMI, no proptosis or lid retraction  Neck: no thyromegaly or palpable thyroid nodules   Respiratory: lungs CTAB.  Cardiovascular: regular rhythm, normal S1 and S2, no audible murmurs, no peripheral edema  GI: soft, NT/ND, no masses/HSM appreciated.  Neurology: no tremors, DTR 2+  Skin: no visible rashes/lesions  Psychiatric: AAO x 3, normal affect/mood.    LABS:                        11.1   10.31 )-----------( 174      ( 03 Dec 2019 08:03 )             36.8     12-03    138  |  104  |  12  ----------------------------<  137<H>  3.7   |  25  |  0.68    Ca    8.2<L>      03 Dec 2019 08:03  Mg     1.8     12-03              HbA1C: 12.5 % (11-27 @ 06:05)    CAPILLARY BLOOD GLUCOSE      POCT Blood Glucose.: 73 mg/dL (03 Dec 2019 11:03)  POCT Blood Glucose.: 106 mg/dL (03 Dec 2019 08:42)  POCT Blood Glucose.: 99 mg/dL (02 Dec 2019 22:19)  POCT Blood Glucose.: 124 mg/dL (02 Dec 2019 17:20)      Insulin Sliding Scale requirements X 24 Hours:    RADIOLOGY & ADDITIONAL TESTS:    A/P: 59y Male with history of DM type II presenting for       1.  DM -     Please continue           units lantus at bedtime  / in the morning and        units lispro with meals and lispro moderate / low dose sliding scale 4 times daily with meals and at bedtime.  Please continue consistent carbohydrate diet.      Goal FSG is   Will continue to monitor   For discharge, pt can continue    Pt can follow up at discharge with [facility] by calling  to make an appointment.   Will discuss case with     and update primary team INTERVAL HPI/OVERNIGHT EVENTS:    Patient seen and examined at the bedside. He got his LLE angiogram done today and no intervention was performed. He was started back on diet    FSG & Insulin received:  Yesterday:  pre-dinner fs, 10 nutritional lispro   units  bedtime fs, 25 Lantus   units  Today:  pre-breakfast fs, no insulin, NPO  pre-lunch fs, No insulin was given. patient had lunch with chicken, potatoes, milk and carrots  Pre-dinner FSg 208    Pt reports the following symptoms:    CONSTITUTIONAL:  Negative fever or chills, feels well, good appetite  EYES:  Negative  blurry vision or double vision  CARDIOVASCULAR:  Negative for chest pain or palpitations  RESPIRATORY:  Negative for cough, wheezing, or SOB   GASTROINTESTINAL:  Negative for nausea, vomiting, diarrhea, constipation, or abdominal pain  GENITOURINARY:  Negative frequency, urgency or dysuria  NEUROLOGIC:  No headache, confusion, dizziness, lightheadedness    MEDICATIONS  (STANDING):  amLODIPine   Tablet 10 milliGRAM(s) Oral every 24 hours  aspirin enteric coated 81 milliGRAM(s) Oral daily  atorvastatin 80 milliGRAM(s) Oral at bedtime  dextrose 5%. 1000 milliLiter(s) (50 mL/Hr) IV Continuous <Continuous>  dextrose 50% Injectable 12.5 Gram(s) IV Push once  dextrose 50% Injectable 25 Gram(s) IV Push once  dextrose 50% Injectable 25 Gram(s) IV Push once  folic acid 1 milliGRAM(s) Oral daily  insulin glargine Injectable (LANTUS) 25 Unit(s) SubCutaneous at bedtime  insulin glargine Injectable (LANTUS) 15 Unit(s) SubCutaneous every morning  insulin lispro (HumaLOG) corrective regimen sliding scale   SubCutaneous Before meals and at bedtime  insulin lispro Injectable (HumaLOG) 10 Unit(s) SubCutaneous three times a day before meals  multivitamin 1 Tablet(s) Oral daily  sodium chloride 0.9%. 1000 milliLiter(s) (80 mL/Hr) IV Continuous <Continuous>    MEDICATIONS  (PRN):  acetaminophen   Tablet .. 650 milliGRAM(s) Oral every 6 hours PRN Mild Pain (1 - 3)  dextrose 40% Gel 15 Gram(s) Oral once PRN Blood Glucose LESS THAN 70 milliGRAM(s)/deciliter  glucagon  Injectable 1 milliGRAM(s) IntraMuscular once PRN Glucose LESS THAN 70 milligrams/deciliter      PHYSICAL EXAM  Vital Signs Last 24 Hrs  T(C): 36.2 (03 Dec 2019 11:00), Max: 37.1 (02 Dec 2019 22:47)  T(F): 97.1 (03 Dec 2019 11:00), Max: 98.7 (02 Dec 2019 22:47)  HR: 74 (03 Dec 2019 12:15) (71 - 93)  BP: 141/85 (03 Dec 2019 12:00) (127/74 - 150/88)  BP(mean): 108 (03 Dec 2019 11:30) (101 - 109)  RR: 19 (03 Dec 2019 12:15) (14 - 19)  SpO2: 100% (03 Dec 2019 12:15) (98% - 100%)    Constitutional: wn/wd in NAD.   Respiratory: lungs CTAB.  Cardiovascular: regular rhythm, normal S1 and S2, no peripheral edema  GI: soft, NT/ND, no masses/HSM appreciated.  Neurology: no tremors, DTR 2+    LABS:                        11.1   10.31 )-----------( 174      ( 03 Dec 2019 08:03 )             36.8     12-    138  |  104  |  12  ----------------------------<  137<H>  3.7   |  25  |  0.68    Ca    8.2<L>      03 Dec 2019 08:03  Mg     1.8     12-              HbA1C: 12.5 % ( @ 06:05)    CAPILLARY BLOOD GLUCOSE      POCT Blood Glucose.: 73 mg/dL (03 Dec 2019 11:03)  POCT Blood Glucose.: 106 mg/dL (03 Dec 2019 08:42)  POCT Blood Glucose.: 99 mg/dL (02 Dec 2019 22:19)  POCT Blood Glucose.: 124 mg/dL (02 Dec 2019 17:20)      Insulin Sliding Scale requirements X 24 Hours:    RADIOLOGY & ADDITIONAL TESTS:    A/P: Patient is a 58 yo undomiciled male with history of HTN and poorly controlled DM complicated by neuropathy and diabetic foot ulcers who got admitted for diabetic foot ulcers.    1.  DM Type 2 - uncontrolled - with neuropathy and vasculopathy  - hba1c 12.5  Cr/GFR 0.75/118  Wt 72.5 kg  Please discontinue the morning dose of lantus  - PLease increase to Lantus 35 units at nighttime  Please continue lispro   10   units before each meal.    Please continue lispro moderate dose sliding scale 4 times daily with meals and at nighttime.    Pt's fingerstick glucose goal is 120 to 150    Will continue to monitor     For discharge, TBD    Pt can follow up at discharge with [facility] by calling  to make an appointment.   discussed case with     and updated primary team    To Make an appointment at [address] [place] [address] for the patient, either:  1. Send a STAT task via [email or web address] to Sarah Darling or Aleida Pimentel (office managers)   OR  2. Call supervisor's line. P: [phone] (do not give this number to patient). VM is checked periodically  In the message, specify that this is a hospital discharge follow-up, and that the appt can be made with a NP if there is no timely MD availability    REMINDERS FOR INSULIN/DIABETES SUPPLIES at DISCHARGE:  INSULIN:   Long actin/Basal Insulin: Examples: Toujeo, Basaglar, Tresiba, Lantus   Short acting/Bolus Insulin: Humalog, Admelog, Novolog  Please ensure that BOTH short acting and long acting insulin are prescribed in the same preparation (Ex: PEN vs VIAL/SOLUTION)     TESTING SUPPLIES:   All glucometer supplies should be written as generic to avoid issues with insurance. Use the free text option in [employer] prescription writer, and type in glucometer test strips, lancets, etc to order.    If sending patient home on insulin PEN, please send:   •	BD amol insulin pen needles for use up to 4 times daily (total quantity 100)  •	Lancets for use up to 4 times daily (total quantity 100)  •	Glucometer Test strips for use up to 4 times daily (total quantity 100)  •	Alcohol swabs for use up to 4 times daily (total quantity 100)  •	Glucometer (If provided by hospital, still provide scripts for lancets, test strips, and swabs)  If sending patient home on insulin VIAL, please send:   •	Insulin syringes (6mm) - for use up to 4 times daily (total quantity 100)  •	Lancets for use up to 4 times daily (total quantity 100)  •	Generic Glucometer Test strips for use up to 4 times daily (total quantity 100)  •	Alcohol swabs for use up to 4 times daily (total quantity 100)  •	Generic Glucometer (If provided by hospital, still provide scripts for lancets, test strips, and swabs)  •	Do not specify brand for testing supplies (such as contour, freestyle, one touch etc) that way the pharmacy has the freedom to pick and change according to what the insurance dictates.  For patients without insurance:   •	Provide social work with appropriate scripts so they may obtain 1 week of samples  •	Provide with glucometer. Glucometers are located at various nursing stations, the nursing office, education, and endocrine fellows office.  •	Please make appointment with Beverly Teixeira NP or Anyi Yang RN and NAGI Thompson at the [address] [place] [address] endocrinology clinic. They can see patients without insurance, provide appropriate samples, and assist in getting insurance coverage.     PREFERRED PHARMACY:  [facility] (located on 1st floor next to admitting)  P: [phone]  Hours: M – F 8AM – 8PM, Sat 8AM – 4PM, Sun—closed  If not using [employer], please follow up with chosen pharmacy to ensure insulin prescribed is covered.

## 2019-12-04 ENCOUNTER — TRANSCRIPTION ENCOUNTER (OUTPATIENT)
Age: 59
End: 2019-12-04

## 2019-12-04 VITALS
RESPIRATION RATE: 16 BRPM | DIASTOLIC BLOOD PRESSURE: 79 MMHG | OXYGEN SATURATION: 99 % | HEART RATE: 81 BPM | SYSTOLIC BLOOD PRESSURE: 135 MMHG

## 2019-12-04 LAB
ANION GAP SERPL CALC-SCNC: 10 MMOL/L — SIGNIFICANT CHANGE UP (ref 5–17)
BUN SERPL-MCNC: 12 MG/DL — SIGNIFICANT CHANGE UP (ref 7–23)
CALCIUM SERPL-MCNC: 9 MG/DL — SIGNIFICANT CHANGE UP (ref 8.4–10.5)
CHLORIDE SERPL-SCNC: 101 MMOL/L — SIGNIFICANT CHANGE UP (ref 96–108)
CO2 SERPL-SCNC: 26 MMOL/L — SIGNIFICANT CHANGE UP (ref 22–31)
CREAT SERPL-MCNC: 0.71 MG/DL — SIGNIFICANT CHANGE UP (ref 0.5–1.3)
GLUCOSE BLDC GLUCOMTR-MCNC: 107 MG/DL — HIGH (ref 70–99)
GLUCOSE BLDC GLUCOMTR-MCNC: 158 MG/DL — HIGH (ref 70–99)
GLUCOSE SERPL-MCNC: 203 MG/DL — HIGH (ref 70–99)
HCT VFR BLD CALC: 34.9 % — LOW (ref 39–50)
HGB BLD-MCNC: 10.7 G/DL — LOW (ref 13–17)
MAGNESIUM SERPL-MCNC: 1.9 MG/DL — SIGNIFICANT CHANGE UP (ref 1.6–2.6)
MCHC RBC-ENTMCNC: 26.2 PG — LOW (ref 27–34)
MCHC RBC-ENTMCNC: 30.7 GM/DL — LOW (ref 32–36)
MCV RBC AUTO: 85.3 FL — SIGNIFICANT CHANGE UP (ref 80–100)
NRBC # BLD: 0 /100 WBCS — SIGNIFICANT CHANGE UP (ref 0–0)
PHOSPHATE SERPL-MCNC: 3.2 MG/DL — SIGNIFICANT CHANGE UP (ref 2.5–4.5)
PLATELET # BLD AUTO: 190 K/UL — SIGNIFICANT CHANGE UP (ref 150–400)
POTASSIUM SERPL-MCNC: 4.2 MMOL/L — SIGNIFICANT CHANGE UP (ref 3.5–5.3)
POTASSIUM SERPL-SCNC: 4.2 MMOL/L — SIGNIFICANT CHANGE UP (ref 3.5–5.3)
RBC # BLD: 4.09 M/UL — LOW (ref 4.2–5.8)
RBC # FLD: 13.7 % — SIGNIFICANT CHANGE UP (ref 10.3–14.5)
SODIUM SERPL-SCNC: 137 MMOL/L — SIGNIFICANT CHANGE UP (ref 135–145)
WBC # BLD: 9.79 K/UL — SIGNIFICANT CHANGE UP (ref 3.8–10.5)
WBC # FLD AUTO: 9.79 K/UL — SIGNIFICANT CHANGE UP (ref 3.8–10.5)

## 2019-12-04 RX ORDER — INSULIN LISPRO 100/ML
5 VIAL (ML) SUBCUTANEOUS
Qty: 0 | Refills: 0 | DISCHARGE
Start: 2019-12-04 | End: 2020-01-02

## 2019-12-04 RX ORDER — INSULIN LISPRO 100/ML
10 VIAL (ML) SUBCUTANEOUS
Qty: 1 | Refills: 0
Start: 2019-12-04 | End: 2020-01-02

## 2019-12-04 RX ORDER — INSULIN GLARGINE 100 [IU]/ML
30 INJECTION, SOLUTION SUBCUTANEOUS
Qty: 0 | Refills: 0 | DISCHARGE

## 2019-12-04 RX ORDER — INSULIN GLARGINE 100 [IU]/ML
20 INJECTION, SOLUTION SUBCUTANEOUS
Qty: 0 | Refills: 0 | DISCHARGE
Start: 2019-12-04 | End: 2020-01-02

## 2019-12-04 RX ORDER — ENOXAPARIN SODIUM 100 MG/ML
35 INJECTION SUBCUTANEOUS
Qty: 1 | Refills: 0
Start: 2019-12-04 | End: 2020-01-02

## 2019-12-04 RX ORDER — INSULIN LISPRO 100/ML
8 VIAL (ML) SUBCUTANEOUS
Qty: 0 | Refills: 0 | DISCHARGE
Start: 2019-12-04 | End: 2020-01-02

## 2019-12-04 RX ORDER — INSULIN GLARGINE 100 [IU]/ML
16 INJECTION, SOLUTION SUBCUTANEOUS
Qty: 0 | Refills: 0 | DISCHARGE
Start: 2019-12-04 | End: 2020-01-02

## 2019-12-04 RX ORDER — INSULIN LISPRO 100/ML
0 VIAL (ML) SUBCUTANEOUS
Qty: 0 | Refills: 0 | DISCHARGE

## 2019-12-04 RX ADMIN — Medication 2: at 12:15

## 2019-12-04 RX ADMIN — Medication 10 UNIT(S): at 12:14

## 2019-12-04 RX ADMIN — Medication 81 MILLIGRAM(S): at 11:35

## 2019-12-04 RX ADMIN — Medication 1 TABLET(S): at 11:34

## 2019-12-04 RX ADMIN — Medication 650 MILLIGRAM(S): at 07:05

## 2019-12-04 RX ADMIN — Medication 10 UNIT(S): at 09:12

## 2019-12-04 RX ADMIN — Medication 1 MILLIGRAM(S): at 11:34

## 2019-12-04 RX ADMIN — Medication 650 MILLIGRAM(S): at 06:33

## 2019-12-04 RX ADMIN — AMLODIPINE BESYLATE 10 MILLIGRAM(S): 2.5 TABLET ORAL at 06:33

## 2019-12-04 NOTE — DISCHARGE NOTE PROVIDER - NSDCFUADDINST_GEN_ALL_CORE_FT
Activity: No strenuous activity or heavy lifting over 10lbs.  You may shower 24 hours after procedure. Remove dressing and gently wash the site with soap and water (peroxide). Pat dry. Wash puncture site daily. Do not take baths, swim or use a hot tub until your doctor approves. Follow up with Dr Springer in 1 - 2 weeks after discharge. Call office for appointment. Office: [phone]. Call office for fever >101.4, redness, swelling or drainage from puncture site.

## 2019-12-04 NOTE — DISCHARGE NOTE PROVIDER - NSDCFUADDAPPT_GEN_ALL_CORE_FT
For your glucose control per endocrinology:   Please take Lantus 35 units at nighttime, daily  Please continue lispro 10 units before each meal, daily

## 2019-12-04 NOTE — DISCHARGE NOTE PROVIDER - NSDCCPCAREPLAN_GEN_ALL_CORE_FT
PRINCIPAL DISCHARGE DIAGNOSIS  Diagnosis: Hyperglycemia  Assessment and Plan of Treatment:       SECONDARY DISCHARGE DIAGNOSES  Diagnosis: Foot infection  Assessment and Plan of Treatment:

## 2019-12-04 NOTE — PROGRESS NOTE ADULT - SUBJECTIVE AND OBJECTIVE BOX
24hr Events:  O/N: ENEDINA, right groin soft, no bleeding or hematoma, hand surgery called, will see Pt in AM  12/3: OR for LLE angio, 24hr obs, POC wnl, HL, lantus increased to 35u at night and 10u TID humalog with meals      Assessment/Plan:    59M homeless PMH of poorly controlled IDDM complicated by PVD, diabetic foot ulcers, and HTN who presented with increased urination and thirst and n/v x3 with NBNB emesis in the setting of hyperglycemia and was found to have a L foot ulcer c/f PAD without s/s of infection now s/p diagnostic LLE angiogram.      Home medication as appropriate  Glucose control  Pain control  Possible discharge today 24hr Events:  O/N: ENEDINA, right groin soft, no bleeding or hematoma, hand surgery called, will see Pt in AM  12/3: OR for LLE angio, 24hr obs, POC wnl, HL, lantus increased to 35u at night and 10u TID humalog with meals        amLODIPine   Tablet 10  aspirin enteric coated 81  enoxaparin Injectable 40      Allergies    No Known Allergies    Intolerances        Vital Signs Last 24 Hrs  T(C): 37.4 (04 Dec 2019 06:05), Max: 37.8 (03 Dec 2019 22:41)  T(F): 99.3 (04 Dec 2019 06:05), Max: 100.1 (03 Dec 2019 22:41)  HR: 81 (04 Dec 2019 09:00) (74 - 97)  BP: 135/79 (04 Dec 2019 09:00) (126/85 - 161/84)  BP(mean): 108 (03 Dec 2019 11:30) (101 - 109)  RR: 16 (04 Dec 2019 09:00) (14 - 19)  SpO2: 99% (04 Dec 2019 09:00) (97% - 100%)  I&O's Summary    03 Dec 2019 07:01  -  04 Dec 2019 07:00  --------------------------------------------------------  IN: 1050 mL / OUT: 2450 mL / NET: -1400 mL      Physical Exam:  General: NAD, resting comfortably in bed  Pulmonary: Nonlabored breathing, no respiratory distress  EXt: Left foot deformity with 2 superficial ulcers. 2cm round on dorsum and 4cm round 1st metatarsal head.   Pulses: b/l fem/pop 2+  Doppler: Right DP/PT triphasic.   Left DP/PT biphasic   VENTURA- b/l 1.0      LABS:                        11.1   10.31 )-----------( 174      ( 03 Dec 2019 08:03 )             36.8     12-03    138  |  104  |  12  ----------------------------<  137<H>  3.7   |  25  |  0.68    Ca    8.2<L>      03 Dec 2019 08:03  Mg     1.8     12-03    PLEASE CHECK WHEN PRESENT:     [  ]Heart Failure     [  ] Acute     [  ] Acute on Chronic     [  ] Chronic  -------------------------------------------------------------------     [  ]Diastolic [HFpEF]     [  ]Systolic [HFrEF]     [  ]Combined [HFpEF & HFrEF]     [  ]Other:  -------------------------------------------------------------------  [ ] Respiratory failure  [ ] Acute cor pulmonale  [ ] Asthma/COPD Exacerbation  [ ] Pleural effusion  [ ] Aspiration pneumonia  -------------------------------------------------------------------  [  ]DEBBY     [  ]ATN     [  ]Reneal Medullary Necrosis     [  ]Renal Cortical Necrosis     [  ]Other Pathological Lesions:    [  ]CKD 1  [  ]CKD 2  [  ]CKD 3  [  ]CKD 4  [  ]CKD 5  [  ]Other  -------------------------------------------------------------------  [x ]Diabetes  [  ] Diabetic PVD Ulcer  [  ] Neuropathic ulcer to DM  [  ] Diabetes with Nephropathy  [  ] Osteomyelitis due to diabetes  --------------------------------------------------------------------  [  ]Malnutrition: See Nutrition Note  [  ]Cachexia  [  ]Other:   [  ]Supplement Ordered:  [  ]Morbid Obesity (BMI >=40]  ---------------------------------------------------------------------  [ ] Sepsis/severe sepsis/septic shock  [ ] UTI  [ ] Pneumonia  -----------------------------------------------------------------------  [ ] Acidosis/alkalosis  [ ] Fluid overload  [ ] Hypokalemia  [ ] Hyperkalemia  [ ] Hypomagnesemia  [ ] Hypophosphatemia  [ ] Hyperphosphatemia  ------------------------------------------------------------------------  [ ] Acute blood loss anemia  [ ] Post op blood loss anemia  [ ] Iron deficiency anemia  [ ] Anemia due to chronic disease  [ ] Hypercoagulable state  ----------------------------------------------------------------------  [ ] Cerebral infarction  [ ] Transient ischemia attack  [ ] Encephalopathy        Assessment/Plan:    59M homeless PMH of poorly controlled IDDM complicated by PVD, diabetic foot ulcers, and HTN who presented with increased urination and thirst and n/v x3 with NBNB emesis in the setting of hyperglycemia and was found to have a L foot ulcer c/f PAD without s/s of infection now s/p diagnostic LLE angiogram.      Home medication as appropriate  Glucose control, f/u Endo recs  Local wound care daily  Pain control  Discharge today

## 2019-12-04 NOTE — DISCHARGE NOTE NURSING/CASE MANAGEMENT/SOCIAL WORK - PATIENT PORTAL LINK FT
You can access the FollowMyHealth Patient Portal offered by [facility] by registering at the following website: [email or web address]. By joining [employer]’s FollowMyHealth portal, you will also be able to view your health information using other applications (apps) compatible with our system.

## 2019-12-04 NOTE — PROGRESS NOTE ADULT - REASON FOR ADMISSION
Hyperglycemia

## 2019-12-04 NOTE — PROGRESS NOTE ADULT - SUBJECTIVE AND OBJECTIVE BOX
INTERVAL HPI/OVERNIGHT EVENTS:    Patient is a 59y old  Male who presents with a chief complaint of Hyperglycemia (04 Dec 2019 03:22)      Pt reports the following symptoms:    CONSTITUTIONAL:  Negative fever or chills, feels well, good appetite  EYES:  Negative  blurry vision or double vision  CARDIOVASCULAR:  Negative for chest pain or palpitations  RESPIRATORY:  Negative for cough, wheezing, or SOB   GASTROINTESTINAL:  Negative for nausea, vomiting, diarrhea, constipation, or abdominal pain  GENITOURINARY:  Negative frequency, urgency or dysuria  NEUROLOGIC:  No headache, confusion, dizziness, lightheadedness    MEDICATIONS  (STANDING):  amLODIPine   Tablet 10 milliGRAM(s) Oral every 24 hours  aspirin enteric coated 81 milliGRAM(s) Oral daily  atorvastatin 80 milliGRAM(s) Oral at bedtime  dextrose 5%. 1000 milliLiter(s) (50 mL/Hr) IV Continuous <Continuous>  dextrose 50% Injectable 12.5 Gram(s) IV Push once  dextrose 50% Injectable 25 Gram(s) IV Push once  dextrose 50% Injectable 25 Gram(s) IV Push once  enoxaparin Injectable 40 milliGRAM(s) SubCutaneous every 24 hours  folic acid 1 milliGRAM(s) Oral daily  insulin glargine Injectable (LANTUS) 35 Unit(s) SubCutaneous at bedtime  insulin lispro (HumaLOG) corrective regimen sliding scale   SubCutaneous Before meals and at bedtime  insulin lispro Injectable (HumaLOG) 10 Unit(s) SubCutaneous three times a day before meals  multivitamin 1 Tablet(s) Oral daily    MEDICATIONS  (PRN):  acetaminophen   Tablet .. 650 milliGRAM(s) Oral every 6 hours PRN Mild Pain (1 - 3)  dextrose 40% Gel 15 Gram(s) Oral once PRN Blood Glucose LESS THAN 70 milliGRAM(s)/deciliter  glucagon  Injectable 1 milliGRAM(s) IntraMuscular once PRN Glucose LESS THAN 70 milligrams/deciliter      PHYSICAL EXAM  Vital Signs Last 24 Hrs  T(C): 37.4 (04 Dec 2019 06:05), Max: 37.8 (03 Dec 2019 22:41)  T(F): 99.3 (04 Dec 2019 06:05), Max: 100.1 (03 Dec 2019 22:41)  HR: 81 (04 Dec 2019 09:00) (74 - 97)  BP: 135/79 (04 Dec 2019 09:00) (126/85 - 161/84)  BP(mean): 108 (03 Dec 2019 11:30) (108 - 108)  RR: 16 (04 Dec 2019 09:00) (14 - 19)  SpO2: 99% (04 Dec 2019 09:00) (97% - 100%)    Constitutional: wn/wd in NAD.   HEENT: NCAT, MMM, OP clear, EOMI, no proptosis or lid retraction  Neck: no thyromegaly or palpable thyroid nodules   Respiratory: lungs CTAB.  Cardiovascular: regular rhythm, normal S1 and S2, no audible murmurs, no peripheral edema  GI: soft, NT/ND, no masses/HSM appreciated.  Neurology: no tremors, DTR 2+  Skin: no visible rashes/lesions  Psychiatric: AAO x 3, normal affect/mood.    LABS:                        10.7   9.79  )-----------( 190      ( 04 Dec 2019 10:48 )             34.9     12-04    137  |  101  |  12  ----------------------------<  203<H>  4.2   |  26  |  0.71    Ca    9.0      04 Dec 2019 10:48  Phos  3.2     12-04  Mg     1.9     12-04              HbA1C: 12.5 % (11-27 @ 06:05)    CAPILLARY BLOOD GLUCOSE      POCT Blood Glucose.: 107 mg/dL (04 Dec 2019 06:46)  POCT Blood Glucose.: 166 mg/dL (03 Dec 2019 22:03)  POCT Blood Glucose.: 208 mg/dL (03 Dec 2019 17:25)      Insulin Sliding Scale requirements X 24 Hours:    RADIOLOGY & ADDITIONAL TESTS:    A/P: 59y Male with history of DM type II presenting for       1.  DM -     Please continue           units lantus at bedtime  / in the morning and        units lispro with meals and lispro moderate / low dose sliding scale 4 times daily with meals and at bedtime.  Please continue consistent carbohydrate diet.      Goal FSG is   Will continue to monitor   For discharge, pt can continue    Pt can follow up at discharge with [facility] by calling  to make an appointment.   Will discuss case with     and update primary team INTERVAL HPI/OVERNIGHT EVENTS:    Patient seen and examined at the bedside. He is being planned for discharge today      FSG & Insulin received:  Yesterday:  pre-dinner fs, 10 nutritional lispro   units + 4 units lispro SS  bedtime fs, 35 Lantus   units + 2 units lispro SS  Today:  pre-breakfast fs, 10 units lispro nutritional  pre-lunch fs      Pt reports the following symptoms:    CONSTITUTIONAL:  Negative fever or chills, feels well, good appetite  EYES:  Negative  blurry vision or double vision  CARDIOVASCULAR:  Negative for chest pain or palpitations  RESPIRATORY:  Negative for cough, wheezing, or SOB   GASTROINTESTINAL:  Negative for nausea, vomiting, diarrhea, constipation, or abdominal pain  GENITOURINARY:  Negative frequency, urgency or dysuria  NEUROLOGIC:  No headache, confusion, dizziness, lightheadedness    MEDICATIONS  (STANDING):  amLODIPine   Tablet 10 milliGRAM(s) Oral every 24 hours  aspirin enteric coated 81 milliGRAM(s) Oral daily  atorvastatin 80 milliGRAM(s) Oral at bedtime  dextrose 5%. 1000 milliLiter(s) (50 mL/Hr) IV Continuous <Continuous>  dextrose 50% Injectable 12.5 Gram(s) IV Push once  dextrose 50% Injectable 25 Gram(s) IV Push once  dextrose 50% Injectable 25 Gram(s) IV Push once  enoxaparin Injectable 40 milliGRAM(s) SubCutaneous every 24 hours  folic acid 1 milliGRAM(s) Oral daily  insulin glargine Injectable (LANTUS) 35 Unit(s) SubCutaneous at bedtime  insulin lispro (HumaLOG) corrective regimen sliding scale   SubCutaneous Before meals and at bedtime  insulin lispro Injectable (HumaLOG) 10 Unit(s) SubCutaneous three times a day before meals  multivitamin 1 Tablet(s) Oral daily    MEDICATIONS  (PRN):  acetaminophen   Tablet .. 650 milliGRAM(s) Oral every 6 hours PRN Mild Pain (1 - 3)  dextrose 40% Gel 15 Gram(s) Oral once PRN Blood Glucose LESS THAN 70 milliGRAM(s)/deciliter  glucagon  Injectable 1 milliGRAM(s) IntraMuscular once PRN Glucose LESS THAN 70 milligrams/deciliter      PHYSICAL EXAM  Vital Signs Last 24 Hrs  T(C): 37.4 (04 Dec 2019 06:05), Max: 37.8 (03 Dec 2019 22:41)  T(F): 99.3 (04 Dec 2019 06:05), Max: 100.1 (03 Dec 2019 22:41)  HR: 81 (04 Dec 2019 09:00) (74 - 97)  BP: 135/79 (04 Dec 2019 09:00) (126/85 - 161/84)  BP(mean): 108 (03 Dec 2019 11:30) (108 - 108)  RR: 16 (04 Dec 2019 09:00) (14 - 19)  SpO2: 99% (04 Dec 2019 09:00) (97% - 100%)    Constitutional: wn/wd in NAD.   HEENT: NCAT, MMM, OP clear, EOMI, no proptosis or lid retraction  Neck: no thyromegaly or palpable thyroid nodules   Respiratory: lungs CTAB.  Cardiovascular: regular rhythm, normal S1 and S2, no audible murmurs, no peripheral edema  GI: soft, NT/ND, no masses/HSM appreciated.  Neurology: no tremors, DTR 2+  Skin: no visible rashes/lesions  Psychiatric: AAO x 3, normal affect/mood.    LABS:                        10.7   9.79  )-----------( 190      ( 04 Dec 2019 10:48 )             34.9     12-04    137  |  101  |  12  ----------------------------<  203<H>  4.2   |  26  |  0.71    Ca    9.0      04 Dec 2019 10:48  Phos  3.2     12-04  Mg     1.9     12-04              HbA1C: 12.5 % ( @ 06:05)    CAPILLARY BLOOD GLUCOSE      POCT Blood Glucose.: 107 mg/dL (04 Dec 2019 06:46)  POCT Blood Glucose.: 166 mg/dL (03 Dec 2019 22:03)  POCT Blood Glucose.: 208 mg/dL (03 Dec 2019 17:25)      Insulin Sliding Scale requirements X 24 Hours:    RADIOLOGY & ADDITIONAL TESTS:    A/P: 59y Male with history of DM type II presenting for       1.  DM -     Please continue           units lantus at bedtime  / in the morning and        units lispro with meals and lispro moderate / low dose sliding scale 4 times daily with meals and at bedtime.  Please continue consistent carbohydrate diet.      Goal FSG is   Will continue to monitor   For discharge, pt can continue    Pt can follow up at discharge with [facility] by calling  to make an appointment.   Will discuss case with     and update primary team INTERVAL HPI/OVERNIGHT EVENTS:    Patient seen and examined at the bedside. He is being planned for discharge today      FSG & Insulin received:  Yesterday:  pre-dinner fs, 10 nutritional lispro   units + 4 units lispro SS  bedtime fs, 35 Lantus   units + 2 units lispro SS  Today:  pre-breakfast fs, 10 units lispro nutritional  pre-lunch fs      Pt reports the following symptoms:    CONSTITUTIONAL:  Negative fever or chills, feels well, good appetite  EYES:  Negative  blurry vision or double vision  CARDIOVASCULAR:  Negative for chest pain or palpitations  RESPIRATORY:  Negative for cough, wheezing, or SOB   GASTROINTESTINAL:  Negative for nausea, vomiting, diarrhea, constipation, or abdominal pain  GENITOURINARY:  Negative frequency, urgency or dysuria  NEUROLOGIC:  No headache, confusion, dizziness, lightheadedness    MEDICATIONS  (STANDING):  amLODIPine   Tablet 10 milliGRAM(s) Oral every 24 hours  aspirin enteric coated 81 milliGRAM(s) Oral daily  atorvastatin 80 milliGRAM(s) Oral at bedtime  dextrose 5%. 1000 milliLiter(s) (50 mL/Hr) IV Continuous <Continuous>  dextrose 50% Injectable 12.5 Gram(s) IV Push once  dextrose 50% Injectable 25 Gram(s) IV Push once  dextrose 50% Injectable 25 Gram(s) IV Push once  enoxaparin Injectable 40 milliGRAM(s) SubCutaneous every 24 hours  folic acid 1 milliGRAM(s) Oral daily  insulin glargine Injectable (LANTUS) 35 Unit(s) SubCutaneous at bedtime  insulin lispro (HumaLOG) corrective regimen sliding scale   SubCutaneous Before meals and at bedtime  insulin lispro Injectable (HumaLOG) 10 Unit(s) SubCutaneous three times a day before meals  multivitamin 1 Tablet(s) Oral daily    MEDICATIONS  (PRN):  acetaminophen   Tablet .. 650 milliGRAM(s) Oral every 6 hours PRN Mild Pain (1 - 3)  dextrose 40% Gel 15 Gram(s) Oral once PRN Blood Glucose LESS THAN 70 milliGRAM(s)/deciliter  glucagon  Injectable 1 milliGRAM(s) IntraMuscular once PRN Glucose LESS THAN 70 milligrams/deciliter      PHYSICAL EXAM  Vital Signs Last 24 Hrs  T(C): 37.4 (04 Dec 2019 06:05), Max: 37.8 (03 Dec 2019 22:41)  T(F): 99.3 (04 Dec 2019 06:05), Max: 100.1 (03 Dec 2019 22:41)  HR: 81 (04 Dec 2019 09:00) (74 - 97)  BP: 135/79 (04 Dec 2019 09:00) (126/85 - 161/84)  BP(mean): 108 (03 Dec 2019 11:30) (108 - 108)  RR: 16 (04 Dec 2019 09:00) (14 - 19)  SpO2: 99% (04 Dec 2019 09:00) (97% - 100%)    Constitutional: wn/wd in NAD.   HEENT: NCAT, MMM, OP clear, EOMI, no proptosis or lid retraction  Neck: no thyromegaly or palpable thyroid nodules   Respiratory: lungs CTAB.  Cardiovascular: regular rhythm, normal S1 and S2, no audible murmurs, no peripheral edema  GI: soft, NT/ND, no masses/HSM appreciated.  Neurology: no tremors, DTR 2+  Skin: no visible rashes/lesions  Psychiatric: AAO x 3, normal affect/mood.    LABS:                        10.7   9.79  )-----------( 190      ( 04 Dec 2019 10:48 )             34.9     12-04    137  |  101  |  12  ----------------------------<  203<H>  4.2   |  26  |  0.71    Ca    9.0      04 Dec 2019 10:48  Phos  3.2     12-04  Mg     1.9     12-04              HbA1C: 12.5 % ( @ 06:05)    CAPILLARY BLOOD GLUCOSE      POCT Blood Glucose.: 107 mg/dL (04 Dec 2019 06:46)  POCT Blood Glucose.: 166 mg/dL (03 Dec 2019 22:03)  POCT Blood Glucose.: 208 mg/dL (03 Dec 2019 17:25)      Insulin Sliding Scale requirements X 24 Hours:    RADIOLOGY & ADDITIONAL TESTS:    A/P: Patient is a 58 yo undomiciled male with history of HTN and poorly controlled DM complicated by neuropathy and diabetic foot ulcers who got admitted for diabetic foot ulcers.    1.  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    Please continue lispro moderate dose sliding scale 4 times daily with meals and at nighttime.    Pt's fingerstick glucose goal is 120 to 150    Will continue to monitor     For discharge, TBD    Pt can follow up at discharge with [facility] by calling  to make an appointment.   discussed case with     and updated primary team    To Make an appointment at [address] [place] [address] for the patient, either:  1. Send a STAT task via inFreeDA to Sarah Darling or Aleida Pimentel (office managers)   OR  2. Call supervisor's line. P: [phone] (do not give this number to patient). VM is checked periodically  In the message, specify that this is a hospital discharge follow-up, and that the appt can be made with a NP if there is no timely MD availability    REMINDERS FOR INSULIN/DIABETES SUPPLIES at DISCHARGE:  INSULIN:   Long actin/Basal Insulin: Examples: Toujeo, Basaglar, Tresiba, Lantus   Short acting/Bolus Insulin: Humalog, Admelog, Novolog  Please ensure that BOTH short acting and long acting insulin are prescribed in the same preparation (Ex: PEN vs VIAL/SOLUTION)     TESTING SUPPLIES:   All glucometer supplies should be written as generic to avoid issues with insurance. Use the free text option in sunrise prescription writer, and type in glucometer test strips, lancets, etc to order.    If sending patient home on insulin PEN, please send:   •	BD amol insulin pen needles for use up to 4 times daily (total quantity 100)  •	Lancets for use up to 4 times daily (total quantity 100)  •	Glucometer Test strips for use up to 4 times daily (total quantity 100)  •	Alcohol swabs for use up to 4 times daily (total quantity 100)  •	Glucometer (If provided by hospital, still provide scripts for lancets, test strips, and swabs)  If sending patient home on insulin VIAL, please send:   •	Insulin syringes (6mm) - for use up to 4 times daily (total quantity 100)  •	Lancets for use up to 4 times daily (total quantity 100)  •	Generic Glucometer Test strips for use up to 4 times daily (total quantity 100)  •	Alcohol swabs for use up to 4 times daily (total quantity 100)  •	Generic Glucometer (If provided by hospital, still provide scripts for lancets, test strips, and swabs)  •	Do not specify brand for testing supplies (such as contour, freestyle, one touch etc) that way the pharmacy has the freedom to pick and change according to what the insurance dictates.  For patients without insurance:   •	Provide social work with appropriate scripts so they may obtain 1 week of samples  •	Provide with glucometer. Glucometers are located at various nursing stations, the nursing office, education, and endocrine fellows office.  •	Please make appointment with Beverly Teixeira NP or Anyi Yang RN and NAGI Thompson at the [address] [place] [address] endocrinology clinic. They can see patients without insurance, provide appropriate samples, and assist in getting insurance coverage.     PREFERRED PHARMACY:  [facility] (located on 1st floor next to admitting)  P: [phone]  Hours: M – F 8AM – 8PM, Sat 8AM – 4PM, Sun—closed  If not using [facility], please follow up with chosen pharmacy to ensure insulin prescribed is covered.

## 2019-12-04 NOTE — DISCHARGE NOTE PROVIDER - CARE PROVIDER_API CALL
Diana Springer)  [facility]  Vascular  [address]  [place], [place] [address]  Phone: [phone]  Fax: [phone]  Follow Up Time:

## 2019-12-04 NOTE — CHART NOTE - NSCHARTNOTEFT_GEN_A_CORE
patient's FSG and insulin requirement noted. he is being planned for discharge today.    FSG & Insulin received:    Yesterday:  pre-dinner fs, 10 nutritional lispro   units + 4  units lispro SS  bedtime fs, 35 lantus   units + 2   units lispro SS    Today:  pre-breakfast fs, 10 nutritional lispro   units  pre-lunch fs.    Patient can be discharged on Lantus 35 units QHS and Lispro 10 units before each meal.   As per the primary team, patient has plans to follow-up with his PCP and has a  who takes care of his appointments.  Plan discussed with  and updated the primary team.

## 2019-12-04 NOTE — DISCHARGE NOTE PROVIDER - HOSPITAL COURSE
59M w. poorly controlled DM and HTN presented with increased urination and found to have hyperglycemia. A nonhealing wound on the left foot was noted, and podiatry was consulted who found no sign of infection. Insulin started and uptitrated, resulting in good glycemic control. Patient was transferred to vascular surgery and a LLE angiogram. Based on the results of the angiogram, there was no intervention needed and the patient was taken to the PACU stable. His postoperative course was unremarkable with advancement of diet, passing trial of void, and pain control. On day of discharge patient was stable to be discharged home.

## 2019-12-13 PROCEDURE — 80053 COMPREHEN METABOLIC PANEL: CPT

## 2019-12-13 PROCEDURE — 86900 BLOOD TYPING SEROLOGIC ABO: CPT

## 2019-12-13 PROCEDURE — 85610 PROTHROMBIN TIME: CPT

## 2019-12-13 PROCEDURE — 76000 FLUOROSCOPY <1 HR PHYS/QHP: CPT

## 2019-12-13 PROCEDURE — 85652 RBC SED RATE AUTOMATED: CPT

## 2019-12-13 PROCEDURE — 86901 BLOOD TYPING SEROLOGIC RH(D): CPT

## 2019-12-13 PROCEDURE — 82962 GLUCOSE BLOOD TEST: CPT

## 2019-12-13 PROCEDURE — 85730 THROMBOPLASTIN TIME PARTIAL: CPT

## 2019-12-13 PROCEDURE — C1894: CPT

## 2019-12-13 PROCEDURE — 87521 HEPATITIS C PROBE&RVRS TRNSC: CPT

## 2019-12-13 PROCEDURE — 86850 RBC ANTIBODY SCREEN: CPT

## 2019-12-13 PROCEDURE — 73130 X-RAY EXAM OF HAND: CPT

## 2019-12-13 PROCEDURE — 85025 COMPLETE CBC W/AUTO DIFF WBC: CPT

## 2019-12-13 PROCEDURE — 36415 COLL VENOUS BLD VENIPUNCTURE: CPT

## 2019-12-13 PROCEDURE — 93005 ELECTROCARDIOGRAM TRACING: CPT

## 2019-12-13 PROCEDURE — 80048 BASIC METABOLIC PNL TOTAL CA: CPT

## 2019-12-13 PROCEDURE — 84100 ASSAY OF PHOSPHORUS: CPT

## 2019-12-13 PROCEDURE — C1769: CPT

## 2019-12-13 PROCEDURE — C1887: CPT

## 2019-12-13 PROCEDURE — 86803 HEPATITIS C AB TEST: CPT

## 2019-12-13 PROCEDURE — 82803 BLOOD GASES ANY COMBINATION: CPT

## 2019-12-13 PROCEDURE — 73630 X-RAY EXAM OF FOOT: CPT

## 2019-12-13 PROCEDURE — 80061 LIPID PANEL: CPT

## 2019-12-13 PROCEDURE — 99285 EMERGENCY DEPT VISIT HI MDM: CPT | Mod: 25

## 2019-12-13 PROCEDURE — 71045 X-RAY EXAM CHEST 1 VIEW: CPT

## 2019-12-13 PROCEDURE — 93925 LOWER EXTREMITY STUDY: CPT

## 2019-12-13 PROCEDURE — 86140 C-REACTIVE PROTEIN: CPT

## 2019-12-13 PROCEDURE — 85027 COMPLETE CBC AUTOMATED: CPT

## 2019-12-13 PROCEDURE — 83036 HEMOGLOBIN GLYCOSYLATED A1C: CPT

## 2019-12-13 PROCEDURE — 81001 URINALYSIS AUTO W/SCOPE: CPT

## 2019-12-13 PROCEDURE — 82010 KETONE BODYS QUAN: CPT

## 2019-12-13 PROCEDURE — 83735 ASSAY OF MAGNESIUM: CPT

## 2019-12-19 DIAGNOSIS — Z96.698 PRESENCE OF OTHER ORTHOPEDIC JOINT IMPLANTS: ICD-10-CM

## 2019-12-19 DIAGNOSIS — L97.529 NON-PRESSURE CHRONIC ULCER OF OTHER PART OF LEFT FOOT WITH UNSPECIFIED SEVERITY: ICD-10-CM

## 2019-12-19 DIAGNOSIS — R73.9 HYPERGLYCEMIA, UNSPECIFIED: ICD-10-CM

## 2019-12-19 DIAGNOSIS — E11.65 TYPE 2 DIABETES MELLITUS WITH HYPERGLYCEMIA: ICD-10-CM

## 2019-12-19 DIAGNOSIS — E11.621 TYPE 2 DIABETES MELLITUS WITH FOOT ULCER: ICD-10-CM

## 2019-12-19 DIAGNOSIS — D64.9 ANEMIA, UNSPECIFIED: ICD-10-CM

## 2019-12-19 DIAGNOSIS — I70.244 ATHEROSCLEROSIS OF NATIVE ARTERIES OF LEFT LEG WITH ULCERATION OF HEEL AND MIDFOOT: ICD-10-CM

## 2019-12-19 DIAGNOSIS — Z79.82 LONG TERM (CURRENT) USE OF ASPIRIN: ICD-10-CM

## 2019-12-19 DIAGNOSIS — Z59.0 HOMELESSNESS: ICD-10-CM

## 2019-12-19 DIAGNOSIS — M86.672 OTHER CHRONIC OSTEOMYELITIS, LEFT ANKLE AND FOOT: ICD-10-CM

## 2019-12-19 DIAGNOSIS — Z91.14 PATIENT'S OTHER NONCOMPLIANCE WITH MEDICATION REGIMEN: ICD-10-CM

## 2019-12-19 DIAGNOSIS — Z79.4 LONG TERM (CURRENT) USE OF INSULIN: ICD-10-CM

## 2019-12-19 DIAGNOSIS — E11.51 TYPE 2 DIABETES MELLITUS WITH DIABETIC PERIPHERAL ANGIOPATHY WITHOUT GANGRENE: ICD-10-CM

## 2019-12-19 DIAGNOSIS — F17.210 NICOTINE DEPENDENCE, CIGARETTES, UNCOMPLICATED: ICD-10-CM

## 2019-12-19 DIAGNOSIS — E11.40 TYPE 2 DIABETES MELLITUS WITH DIABETIC NEUROPATHY, UNSPECIFIED: ICD-10-CM

## 2019-12-19 DIAGNOSIS — Z87.81 PERSONAL HISTORY OF (HEALED) TRAUMATIC FRACTURE: ICD-10-CM

## 2019-12-19 DIAGNOSIS — E11.649 TYPE 2 DIABETES MELLITUS WITH HYPOGLYCEMIA WITHOUT COMA: ICD-10-CM

## 2019-12-19 DIAGNOSIS — E87.1 HYPO-OSMOLALITY AND HYPONATREMIA: ICD-10-CM

## 2019-12-19 DIAGNOSIS — K08.9 DISORDER OF TEETH AND SUPPORTING STRUCTURES, UNSPECIFIED: ICD-10-CM

## 2019-12-19 DIAGNOSIS — M21.332 WRIST DROP, LEFT WRIST: ICD-10-CM

## 2019-12-19 SDOH — ECONOMIC STABILITY - HOUSING INSECURITY: HOMELESSNESS: Z59.0

## 2022-01-01 NOTE — PROGRESS NOTE ADULT - PROBLEM SELECTOR PLAN 3
Continue with antihypertensive medications with amlodipine   - Holding ACEi, will restart following LLE angiogram
Continue with antihypertensive medications with amlodipine   - Holding ACEi, will restart following LLE angiogram
Podiatry and vascular consulted , vascular plans for LLE angiogram after DM and HTN stabilized. Patient currently medically optimized for procedure.  - Case discussed with vascular team, plan for tentative angiogram on Monday, will follow up with vascular recommendations  - abx d/c, unlikely acute infection likely chronic osteomyelitis   - Appreciate wound care and podatry recommendations
Podiatry and vascular consulted , vascular plans for LLE angiogram after DM and HTN stabilized. Patient currently medically optimized for procedure.  - Case discussed with vascular team, plan for tentative angiogram on Monday, will follow up with vascular recommendations  - abx d/c, unlikely acute infection likely chronic osteomyelitis   - Appreciate wound care and podatry recommendations
Podiatry and vascular consulted , vascular plans for LLE angiogram after DM and HTN stabilized. Patient currently medically optimized for procedure.  - Case discussed with vascular team, plan for tentative angiogram on Monday, will follow up with vascular recommendations  - abx d/c, unlikely acute infection likely chronic osteomyelitis   - f/u Wound care recs  - F/u podatry recomendations  -Follow up foot x ray. History of hardware in left foot after surgery few years ago.
Diabetic Foot Ulcer present on medial dorsal aspect of left forefoot. Pulses faint on doppler compared to +2 on right foot. No drainage. 1/4 SIRS criteria (). No fevers/chills, wbc wnl. X ray with soft tissue swelling. ESR mildly elevated; CRP WNL.   -Podiatry and vascular consulted -> no intervention at this time   - f/u [facility]  - abx d/c; unlikely acute infection likely chronic osteomyelitis   - f/u Wound care recs  -Follow up ESR/CRP  -Follow up foot x ray. History of hardware in left foot after surgery few years ago.
CCHD Screen [02-16]: Initial  Pre-Ductal SpO2(%): 100  Post-Ductal SpO2(%): 100  SpO2 Difference(Pre MINUS Post): 0  Extremities Used: Right Hand,Right Foot  Result: Passed  Follow up: Normal Screen- (No follow-up needed)

## 2022-01-07 VITALS
HEIGHT: 74 IN | SYSTOLIC BLOOD PRESSURE: 159 MMHG | HEART RATE: 96 BPM | TEMPERATURE: 99 F | DIASTOLIC BLOOD PRESSURE: 95 MMHG | RESPIRATION RATE: 18 BRPM | WEIGHT: 179.9 LBS | OXYGEN SATURATION: 97 %

## 2022-01-07 LAB
ALBUMIN SERPL ELPH-MCNC: 3.6 G/DL — SIGNIFICANT CHANGE UP (ref 3.4–5)
ALP SERPL-CCNC: 125 U/L — HIGH (ref 40–120)
ALT FLD-CCNC: 34 U/L — SIGNIFICANT CHANGE UP (ref 12–42)
ANION GAP SERPL CALC-SCNC: 5 MMOL/L — LOW (ref 9–16)
AST SERPL-CCNC: 55 U/L — HIGH (ref 15–37)
BASOPHILS # BLD AUTO: 0.08 K/UL — SIGNIFICANT CHANGE UP (ref 0–0.2)
BASOPHILS NFR BLD AUTO: 0.6 % — SIGNIFICANT CHANGE UP (ref 0–2)
BILIRUB SERPL-MCNC: 0.3 MG/DL — SIGNIFICANT CHANGE UP (ref 0.2–1.2)
BUN SERPL-MCNC: 35 MG/DL — HIGH (ref 7–23)
CALCIUM SERPL-MCNC: 9.4 MG/DL — SIGNIFICANT CHANGE UP (ref 8.5–10.5)
CHLORIDE SERPL-SCNC: 101 MMOL/L — SIGNIFICANT CHANGE UP (ref 96–108)
CK SERPL-CCNC: 559 U/L — HIGH (ref 39–308)
CO2 SERPL-SCNC: 29 MMOL/L — SIGNIFICANT CHANGE UP (ref 22–31)
CREAT SERPL-MCNC: 1.46 MG/DL — HIGH (ref 0.5–1.3)
EOSINOPHIL # BLD AUTO: 0.2 K/UL — SIGNIFICANT CHANGE UP (ref 0–0.5)
EOSINOPHIL NFR BLD AUTO: 1.6 % — SIGNIFICANT CHANGE UP (ref 0–6)
GLUCOSE BLDC GLUCOMTR-MCNC: 226 MG/DL — HIGH (ref 70–99)
GLUCOSE SERPL-MCNC: 220 MG/DL — HIGH (ref 70–99)
HCT VFR BLD CALC: 33.3 % — LOW (ref 39–50)
HGB BLD-MCNC: 10.3 G/DL — LOW (ref 13–17)
IMM GRANULOCYTES NFR BLD AUTO: 0.5 % — SIGNIFICANT CHANGE UP (ref 0–1.5)
LACTATE SERPL-SCNC: 0.8 MMOL/L — SIGNIFICANT CHANGE UP (ref 0.4–2)
LYMPHOCYTES # BLD AUTO: 17.8 % — SIGNIFICANT CHANGE UP (ref 13–44)
LYMPHOCYTES # BLD AUTO: 2.21 K/UL — SIGNIFICANT CHANGE UP (ref 1–3.3)
MAGNESIUM SERPL-MCNC: 2.3 MG/DL — SIGNIFICANT CHANGE UP (ref 1.6–2.6)
MCHC RBC-ENTMCNC: 24.2 PG — LOW (ref 27–34)
MCHC RBC-ENTMCNC: 30.9 GM/DL — LOW (ref 32–36)
MCV RBC AUTO: 78.4 FL — LOW (ref 80–100)
MONOCYTES # BLD AUTO: 1.31 K/UL — HIGH (ref 0–0.9)
MONOCYTES NFR BLD AUTO: 10.6 % — SIGNIFICANT CHANGE UP (ref 2–14)
NEUTROPHILS # BLD AUTO: 8.54 K/UL — HIGH (ref 1.8–7.4)
NEUTROPHILS NFR BLD AUTO: 68.9 % — SIGNIFICANT CHANGE UP (ref 43–77)
NRBC # BLD: 0 /100 WBCS — SIGNIFICANT CHANGE UP (ref 0–0)
PCO2 BLDV: 48 MMHG — SIGNIFICANT CHANGE UP (ref 42–55)
PH BLDV: 7.41 — SIGNIFICANT CHANGE UP (ref 7.32–7.43)
PLATELET # BLD AUTO: 258 K/UL — SIGNIFICANT CHANGE UP (ref 150–400)
PO2 BLDV: 40 MMHG — SIGNIFICANT CHANGE UP (ref 25–45)
POTASSIUM SERPL-MCNC: 4 MMOL/L — SIGNIFICANT CHANGE UP (ref 3.5–5.3)
POTASSIUM SERPL-SCNC: 4 MMOL/L — SIGNIFICANT CHANGE UP (ref 3.5–5.3)
PROT SERPL-MCNC: 8.2 G/DL — SIGNIFICANT CHANGE UP (ref 6.4–8.2)
RBC # BLD: 4.25 M/UL — SIGNIFICANT CHANGE UP (ref 4.2–5.8)
RBC # FLD: 15.8 % — HIGH (ref 10.3–14.5)
SAO2 % BLDV: 70.7 % — SIGNIFICANT CHANGE UP (ref 67–88)
SARS-COV-2 RNA SPEC QL NAA+PROBE: DETECTED
SODIUM SERPL-SCNC: 135 MMOL/L — SIGNIFICANT CHANGE UP (ref 132–145)
TROPONIN I, HIGH SENSITIVITY RESULT: 7 NG/L — SIGNIFICANT CHANGE UP
WBC # BLD: 12.4 K/UL — HIGH (ref 3.8–10.5)
WBC # FLD AUTO: 12.4 K/UL — HIGH (ref 3.8–10.5)

## 2022-01-07 RX ORDER — INSULIN GLARGINE 100 [IU]/ML
20 INJECTION, SOLUTION SUBCUTANEOUS ONCE
Refills: 0 | Status: COMPLETED | OUTPATIENT
Start: 2022-01-07 | End: 2022-01-07

## 2022-01-07 RX ORDER — INSULIN LISPRO 100/ML
5 VIAL (ML) SUBCUTANEOUS ONCE
Refills: 0 | Status: COMPLETED | OUTPATIENT
Start: 2022-01-07 | End: 2022-01-07

## 2022-01-07 RX ORDER — SODIUM CHLORIDE 9 MG/ML
1000 INJECTION INTRAMUSCULAR; INTRAVENOUS; SUBCUTANEOUS ONCE
Refills: 0 | Status: COMPLETED | OUTPATIENT
Start: 2022-01-07 | End: 2022-01-07

## 2022-01-07 NOTE — ED ADULT NURSE NOTE - OBJECTIVE STATEMENT
Pt presents to ed reporting generalized abd pain and generalized weakness going on for a few days,   also reports he hasn't taken insulin in a few days  [id number]  has a wound to right gluteal area

## 2022-01-08 ENCOUNTER — INPATIENT (INPATIENT)
Facility: HOSPITAL | Age: 62
LOS: 10 days | Discharge: NURSING, REHAB OR LONG-TERM CARE | DRG: 871 | End: 2022-01-19
Attending: STUDENT IN AN ORGANIZED HEALTH CARE EDUCATION/TRAINING PROGRAM | Admitting: FAMILY MEDICINE
Payer: MEDICAID

## 2022-01-08 DIAGNOSIS — D50.9 IRON DEFICIENCY ANEMIA, UNSPECIFIED: ICD-10-CM

## 2022-01-08 DIAGNOSIS — E11.9 TYPE 2 DIABETES MELLITUS WITHOUT COMPLICATIONS: ICD-10-CM

## 2022-01-08 DIAGNOSIS — L02.31 CUTANEOUS ABSCESS OF BUTTOCK: ICD-10-CM

## 2022-01-08 DIAGNOSIS — L08.9 LOCAL INFECTION OF THE SKIN AND SUBCUTANEOUS TISSUE, UNSPECIFIED: ICD-10-CM

## 2022-01-08 DIAGNOSIS — Z89.422 ACQUIRED ABSENCE OF OTHER LEFT TOE(S): Chronic | ICD-10-CM

## 2022-01-08 DIAGNOSIS — U07.1 COVID-19: ICD-10-CM

## 2022-01-08 DIAGNOSIS — Z98.890 OTHER SPECIFIED POSTPROCEDURAL STATES: Chronic | ICD-10-CM

## 2022-01-08 DIAGNOSIS — A41.9 SEPSIS, UNSPECIFIED ORGANISM: ICD-10-CM

## 2022-01-08 DIAGNOSIS — Z00.00 ENCOUNTER FOR GENERAL ADULT MEDICAL EXAMINATION WITHOUT ABNORMAL FINDINGS: ICD-10-CM

## 2022-01-08 DIAGNOSIS — I10 ESSENTIAL (PRIMARY) HYPERTENSION: ICD-10-CM

## 2022-01-08 DIAGNOSIS — T81.89XA OTHER COMPLICATIONS OF PROCEDURES, NOT ELSEWHERE CLASSIFIED, INITIAL ENCOUNTER: ICD-10-CM

## 2022-01-08 DIAGNOSIS — L02.213 CUTANEOUS ABSCESS OF CHEST WALL: ICD-10-CM

## 2022-01-08 DIAGNOSIS — N17.9 ACUTE KIDNEY FAILURE, UNSPECIFIED: ICD-10-CM

## 2022-01-08 LAB
ALBUMIN SERPL ELPH-MCNC: 3.6 G/DL — SIGNIFICANT CHANGE UP (ref 3.3–5)
ALP SERPL-CCNC: 110 U/L — SIGNIFICANT CHANGE UP (ref 40–120)
ALT FLD-CCNC: 24 U/L — SIGNIFICANT CHANGE UP (ref 10–45)
ANION GAP SERPL CALC-SCNC: 11 MMOL/L — SIGNIFICANT CHANGE UP (ref 5–17)
APPEARANCE UR: CLEAR — SIGNIFICANT CHANGE UP
APTT BLD: 32.6 SEC — SIGNIFICANT CHANGE UP (ref 27.5–35.5)
AST SERPL-CCNC: 51 U/L — HIGH (ref 10–40)
BACTERIA # UR AUTO: PRESENT /HPF
BASOPHILS # BLD AUTO: 0.06 K/UL — SIGNIFICANT CHANGE UP (ref 0–0.2)
BASOPHILS NFR BLD AUTO: 0.6 % — SIGNIFICANT CHANGE UP (ref 0–2)
BILIRUB SERPL-MCNC: 0.2 MG/DL — SIGNIFICANT CHANGE UP (ref 0.2–1.2)
BILIRUB UR-MCNC: NEGATIVE — SIGNIFICANT CHANGE UP
BUN SERPL-MCNC: 23 MG/DL — SIGNIFICANT CHANGE UP (ref 7–23)
CALCIUM SERPL-MCNC: 8.8 MG/DL — SIGNIFICANT CHANGE UP (ref 8.4–10.5)
CHLORIDE SERPL-SCNC: 104 MMOL/L — SIGNIFICANT CHANGE UP (ref 96–108)
CO2 SERPL-SCNC: 25 MMOL/L — SIGNIFICANT CHANGE UP (ref 22–31)
COLOR SPEC: YELLOW — SIGNIFICANT CHANGE UP
CREAT ?TM UR-MCNC: 26 MG/DL — SIGNIFICANT CHANGE UP
CREAT SERPL-MCNC: 1.08 MG/DL — SIGNIFICANT CHANGE UP (ref 0.5–1.3)
DIFF PNL FLD: ABNORMAL
EOSINOPHIL # BLD AUTO: 0.3 K/UL — SIGNIFICANT CHANGE UP (ref 0–0.5)
EOSINOPHIL NFR BLD AUTO: 3.2 % — SIGNIFICANT CHANGE UP (ref 0–6)
FERRITIN SERPL-MCNC: 77 NG/ML — SIGNIFICANT CHANGE UP (ref 30–400)
GLUCOSE BLDC GLUCOMTR-MCNC: 132 MG/DL — HIGH (ref 70–99)
GLUCOSE BLDC GLUCOMTR-MCNC: 149 MG/DL — HIGH (ref 70–99)
GLUCOSE BLDC GLUCOMTR-MCNC: 151 MG/DL — HIGH (ref 70–99)
GLUCOSE BLDC GLUCOMTR-MCNC: 183 MG/DL — HIGH (ref 70–99)
GLUCOSE BLDC GLUCOMTR-MCNC: 217 MG/DL — HIGH (ref 70–99)
GLUCOSE BLDC GLUCOMTR-MCNC: 260 MG/DL — HIGH (ref 70–99)
GLUCOSE BLDC GLUCOMTR-MCNC: 97 MG/DL — SIGNIFICANT CHANGE UP (ref 70–99)
GLUCOSE SERPL-MCNC: 149 MG/DL — HIGH (ref 70–99)
GLUCOSE UR QL: 500
HCT VFR BLD CALC: 31.8 % — LOW (ref 39–50)
HGB BLD-MCNC: 10 G/DL — LOW (ref 13–17)
IMM GRANULOCYTES NFR BLD AUTO: 0.4 % — SIGNIFICANT CHANGE UP (ref 0–1.5)
INR BLD: 1.02 — SIGNIFICANT CHANGE UP (ref 0.88–1.16)
IRON SATN MFR SERPL: 23 UG/DL — LOW (ref 45–165)
IRON SATN MFR SERPL: 7 % — LOW (ref 16–55)
KETONES UR-MCNC: NEGATIVE — SIGNIFICANT CHANGE UP
LEUKOCYTE ESTERASE UR-ACNC: NEGATIVE — SIGNIFICANT CHANGE UP
LYMPHOCYTES # BLD AUTO: 1.91 K/UL — SIGNIFICANT CHANGE UP (ref 1–3.3)
LYMPHOCYTES # BLD AUTO: 20.6 % — SIGNIFICANT CHANGE UP (ref 13–44)
MAGNESIUM SERPL-MCNC: 1.9 MG/DL — SIGNIFICANT CHANGE UP (ref 1.6–2.6)
MCHC RBC-ENTMCNC: 25.1 PG — LOW (ref 27–34)
MCHC RBC-ENTMCNC: 31.4 GM/DL — LOW (ref 32–36)
MCV RBC AUTO: 79.7 FL — LOW (ref 80–100)
MONOCYTES # BLD AUTO: 1.42 K/UL — HIGH (ref 0–0.9)
MONOCYTES NFR BLD AUTO: 15.4 % — HIGH (ref 2–14)
NEUTROPHILS # BLD AUTO: 5.52 K/UL — SIGNIFICANT CHANGE UP (ref 1.8–7.4)
NEUTROPHILS NFR BLD AUTO: 59.8 % — SIGNIFICANT CHANGE UP (ref 43–77)
NITRITE UR-MCNC: NEGATIVE — SIGNIFICANT CHANGE UP
NRBC # BLD: 0 /100 WBCS — SIGNIFICANT CHANGE UP (ref 0–0)
PH UR: 6.5 — SIGNIFICANT CHANGE UP (ref 5–8)
PHOSPHATE SERPL-MCNC: 2.8 MG/DL — SIGNIFICANT CHANGE UP (ref 2.5–4.5)
PLATELET # BLD AUTO: 225 K/UL — SIGNIFICANT CHANGE UP (ref 150–400)
POTASSIUM SERPL-MCNC: 3.3 MMOL/L — LOW (ref 3.5–5.3)
POTASSIUM SERPL-SCNC: 3.3 MMOL/L — LOW (ref 3.5–5.3)
PROT SERPL-MCNC: 7.2 G/DL — SIGNIFICANT CHANGE UP (ref 6–8.3)
PROT UR-MCNC: 30 MG/DL
PROTHROM AB SERPL-ACNC: 12.2 SEC — SIGNIFICANT CHANGE UP (ref 10.6–13.6)
RBC # BLD: 3.99 M/UL — LOW (ref 4.2–5.8)
RBC # FLD: 15.9 % — HIGH (ref 10.3–14.5)
RBC CASTS # UR COMP ASSIST: ABNORMAL /HPF
SODIUM SERPL-SCNC: 140 MMOL/L — SIGNIFICANT CHANGE UP (ref 135–145)
SODIUM UR-SCNC: 140 MMOL/L — SIGNIFICANT CHANGE UP
SP GR SPEC: 1.01 — SIGNIFICANT CHANGE UP (ref 1–1.03)
TIBC SERPL-MCNC: 309 UG/DL — SIGNIFICANT CHANGE UP (ref 220–430)
TRANSFERRIN SERPL-MCNC: 275 MG/DL — SIGNIFICANT CHANGE UP (ref 200–360)
TSH SERPL-MCNC: 0.41 UIU/ML — SIGNIFICANT CHANGE UP (ref 0.27–4.2)
UIBC SERPL-MCNC: 286 UG/DL — SIGNIFICANT CHANGE UP (ref 110–370)
UROBILINOGEN FLD QL: 0.2 E.U./DL — SIGNIFICANT CHANGE UP
UUN UR-MCNC: 283 MG/DL — SIGNIFICANT CHANGE UP
WBC # BLD: 9.25 K/UL — SIGNIFICANT CHANGE UP (ref 3.8–10.5)
WBC # FLD AUTO: 9.25 K/UL — SIGNIFICANT CHANGE UP (ref 3.8–10.5)
WBC UR QL: < 5 /HPF — SIGNIFICANT CHANGE UP

## 2022-01-08 PROCEDURE — 99223 1ST HOSP IP/OBS HIGH 75: CPT | Mod: GC

## 2022-01-08 PROCEDURE — 99285 EMERGENCY DEPT VISIT HI MDM: CPT | Mod: 25

## 2022-01-08 PROCEDURE — 72193 CT PELVIS W/DYE: CPT | Mod: 26

## 2022-01-08 PROCEDURE — 71045 X-RAY EXAM CHEST 1 VIEW: CPT | Mod: 26

## 2022-01-08 PROCEDURE — 93010 ELECTROCARDIOGRAM REPORT: CPT

## 2022-01-08 RX ORDER — DEXTROSE 50 % IN WATER 50 %
15 SYRINGE (ML) INTRAVENOUS ONCE
Refills: 0 | Status: DISCONTINUED | OUTPATIENT
Start: 2022-01-08 | End: 2022-01-08

## 2022-01-08 RX ORDER — PIPERACILLIN AND TAZOBACTAM 4; .5 G/20ML; G/20ML
4.5 INJECTION, POWDER, LYOPHILIZED, FOR SOLUTION INTRAVENOUS EVERY 6 HOURS
Refills: 0 | Status: DISCONTINUED | OUTPATIENT
Start: 2022-01-08 | End: 2022-01-10

## 2022-01-08 RX ORDER — INSULIN LISPRO 100/ML
VIAL (ML) SUBCUTANEOUS
Refills: 0 | Status: DISCONTINUED | OUTPATIENT
Start: 2022-01-08 | End: 2022-01-08

## 2022-01-08 RX ORDER — INSULIN GLARGINE 100 [IU]/ML
15 INJECTION, SOLUTION SUBCUTANEOUS AT BEDTIME
Refills: 0 | Status: DISCONTINUED | OUTPATIENT
Start: 2022-01-08 | End: 2022-01-19

## 2022-01-08 RX ORDER — DEXTROSE 50 % IN WATER 50 %
15 SYRINGE (ML) INTRAVENOUS ONCE
Refills: 0 | Status: DISCONTINUED | OUTPATIENT
Start: 2022-01-08 | End: 2022-01-19

## 2022-01-08 RX ORDER — SODIUM CHLORIDE 9 MG/ML
1000 INJECTION, SOLUTION INTRAVENOUS
Refills: 0 | Status: DISCONTINUED | OUTPATIENT
Start: 2022-01-08 | End: 2022-01-19

## 2022-01-08 RX ORDER — INSULIN LISPRO 100/ML
VIAL (ML) SUBCUTANEOUS AT BEDTIME
Refills: 0 | Status: DISCONTINUED | OUTPATIENT
Start: 2022-01-08 | End: 2022-01-19

## 2022-01-08 RX ORDER — POTASSIUM CHLORIDE 20 MEQ
40 PACKET (EA) ORAL EVERY 4 HOURS
Refills: 0 | Status: COMPLETED | OUTPATIENT
Start: 2022-01-08 | End: 2022-01-08

## 2022-01-08 RX ORDER — INSULIN LISPRO 100/ML
5 VIAL (ML) SUBCUTANEOUS
Refills: 0 | Status: DISCONTINUED | OUTPATIENT
Start: 2022-01-08 | End: 2022-01-19

## 2022-01-08 RX ORDER — SODIUM CHLORIDE 9 MG/ML
1000 INJECTION, SOLUTION INTRAVENOUS
Refills: 0 | Status: DISCONTINUED | OUTPATIENT
Start: 2022-01-08 | End: 2022-01-08

## 2022-01-08 RX ORDER — DEXTROSE 50 % IN WATER 50 %
25 SYRINGE (ML) INTRAVENOUS ONCE
Refills: 0 | Status: DISCONTINUED | OUTPATIENT
Start: 2022-01-08 | End: 2022-01-08

## 2022-01-08 RX ORDER — ASPIRIN/CALCIUM CARB/MAGNESIUM 324 MG
81 TABLET ORAL DAILY
Refills: 0 | Status: DISCONTINUED | OUTPATIENT
Start: 2022-01-08 | End: 2022-01-19

## 2022-01-08 RX ORDER — AMLODIPINE BESYLATE 2.5 MG/1
10 TABLET ORAL DAILY
Refills: 0 | Status: DISCONTINUED | OUTPATIENT
Start: 2022-01-08 | End: 2022-01-19

## 2022-01-08 RX ORDER — DEXTROSE 50 % IN WATER 50 %
25 SYRINGE (ML) INTRAVENOUS ONCE
Refills: 0 | Status: DISCONTINUED | OUTPATIENT
Start: 2022-01-08 | End: 2022-01-19

## 2022-01-08 RX ORDER — VANCOMYCIN HCL 1 G
1000 VIAL (EA) INTRAVENOUS EVERY 12 HOURS
Refills: 0 | Status: DISCONTINUED | OUTPATIENT
Start: 2022-01-08 | End: 2022-01-09

## 2022-01-08 RX ORDER — HEPARIN SODIUM 5000 [USP'U]/ML
5000 INJECTION INTRAVENOUS; SUBCUTANEOUS EVERY 8 HOURS
Refills: 0 | Status: DISCONTINUED | OUTPATIENT
Start: 2022-01-08 | End: 2022-01-11

## 2022-01-08 RX ORDER — DEXTROSE 50 % IN WATER 50 %
12.5 SYRINGE (ML) INTRAVENOUS ONCE
Refills: 0 | Status: DISCONTINUED | OUTPATIENT
Start: 2022-01-08 | End: 2022-01-08

## 2022-01-08 RX ORDER — VANCOMYCIN HCL 1 G
1000 VIAL (EA) INTRAVENOUS ONCE
Refills: 0 | Status: COMPLETED | OUTPATIENT
Start: 2022-01-08 | End: 2022-01-08

## 2022-01-08 RX ORDER — DEXTROSE 50 % IN WATER 50 %
12.5 SYRINGE (ML) INTRAVENOUS ONCE
Refills: 0 | Status: DISCONTINUED | OUTPATIENT
Start: 2022-01-08 | End: 2022-01-19

## 2022-01-08 RX ORDER — ONDANSETRON 8 MG/1
4 TABLET, FILM COATED ORAL EVERY 8 HOURS
Refills: 0 | Status: DISCONTINUED | OUTPATIENT
Start: 2022-01-08 | End: 2022-01-08

## 2022-01-08 RX ORDER — GLUCAGON INJECTION, SOLUTION 0.5 MG/.1ML
1 INJECTION, SOLUTION SUBCUTANEOUS ONCE
Refills: 0 | Status: DISCONTINUED | OUTPATIENT
Start: 2022-01-08 | End: 2022-01-08

## 2022-01-08 RX ORDER — AMPICILLIN SODIUM AND SULBACTAM SODIUM 250; 125 MG/ML; MG/ML
3 INJECTION, POWDER, FOR SUSPENSION INTRAMUSCULAR; INTRAVENOUS EVERY 6 HOURS
Refills: 0 | Status: DISCONTINUED | OUTPATIENT
Start: 2022-01-08 | End: 2022-01-08

## 2022-01-08 RX ORDER — FOLIC ACID 0.8 MG
1 TABLET ORAL
Qty: 0 | Refills: 0 | DISCHARGE

## 2022-01-08 RX ORDER — INSULIN LISPRO 100/ML
4 VIAL (ML) SUBCUTANEOUS ONCE
Refills: 0 | Status: COMPLETED | OUTPATIENT
Start: 2022-01-08 | End: 2022-01-08

## 2022-01-08 RX ORDER — SODIUM CHLORIDE 9 MG/ML
250 INJECTION INTRAMUSCULAR; INTRAVENOUS; SUBCUTANEOUS
Refills: 0 | Status: COMPLETED | OUTPATIENT
Start: 2022-01-08 | End: 2022-01-08

## 2022-01-08 RX ORDER — INFLUENZA VIRUS VACCINE 15; 15; 15; 15 UG/.5ML; UG/.5ML; UG/.5ML; UG/.5ML
0.5 SUSPENSION INTRAMUSCULAR ONCE
Refills: 0 | Status: DISCONTINUED | OUTPATIENT
Start: 2022-01-08 | End: 2022-01-19

## 2022-01-08 RX ORDER — LANOLIN ALCOHOL/MO/W.PET/CERES
3 CREAM (GRAM) TOPICAL AT BEDTIME
Refills: 0 | Status: DISCONTINUED | OUTPATIENT
Start: 2022-01-08 | End: 2022-01-19

## 2022-01-08 RX ORDER — ACETAMINOPHEN 500 MG
650 TABLET ORAL EVERY 6 HOURS
Refills: 0 | Status: DISCONTINUED | OUTPATIENT
Start: 2022-01-08 | End: 2022-01-08

## 2022-01-08 RX ORDER — GLUCAGON INJECTION, SOLUTION 0.5 MG/.1ML
1 INJECTION, SOLUTION SUBCUTANEOUS ONCE
Refills: 0 | Status: DISCONTINUED | OUTPATIENT
Start: 2022-01-08 | End: 2022-01-19

## 2022-01-08 RX ORDER — INSULIN LISPRO 100/ML
VIAL (ML) SUBCUTANEOUS
Refills: 0 | Status: DISCONTINUED | OUTPATIENT
Start: 2022-01-08 | End: 2022-01-19

## 2022-01-08 RX ORDER — SOTROVIMAB 62.5 MG/ML
500 INJECTION, SOLUTION, CONCENTRATE INTRAVENOUS ONCE
Refills: 0 | Status: COMPLETED | OUTPATIENT
Start: 2022-01-08 | End: 2022-01-08

## 2022-01-08 RX ADMIN — Medication 4 UNIT(S): at 01:36

## 2022-01-08 RX ADMIN — Medication 40 MILLIEQUIVALENT(S): at 17:52

## 2022-01-08 RX ADMIN — Medication 40 MILLIEQUIVALENT(S): at 14:24

## 2022-01-08 RX ADMIN — Medication 5 UNIT(S): at 00:29

## 2022-01-08 RX ADMIN — AMLODIPINE BESYLATE 10 MILLIGRAM(S): 2.5 TABLET ORAL at 13:27

## 2022-01-08 RX ADMIN — SODIUM CHLORIDE 1000 MILLILITER(S): 9 INJECTION INTRAMUSCULAR; INTRAVENOUS; SUBCUTANEOUS at 00:24

## 2022-01-08 RX ADMIN — Medication 6: at 17:52

## 2022-01-08 RX ADMIN — AMPICILLIN SODIUM AND SULBACTAM SODIUM 200 GRAM(S): 250; 125 INJECTION, POWDER, FOR SUSPENSION INTRAMUSCULAR; INTRAVENOUS at 06:21

## 2022-01-08 RX ADMIN — Medication 250 MILLIGRAM(S): at 14:24

## 2022-01-08 RX ADMIN — HEPARIN SODIUM 5000 UNIT(S): 5000 INJECTION INTRAVENOUS; SUBCUTANEOUS at 22:40

## 2022-01-08 RX ADMIN — AMPICILLIN SODIUM AND SULBACTAM SODIUM 200 GRAM(S): 250; 125 INJECTION, POWDER, FOR SUSPENSION INTRAMUSCULAR; INTRAVENOUS at 01:36

## 2022-01-08 RX ADMIN — PIPERACILLIN AND TAZOBACTAM 200 GRAM(S): 4; .5 INJECTION, POWDER, LYOPHILIZED, FOR SOLUTION INTRAVENOUS at 13:48

## 2022-01-08 RX ADMIN — AMPICILLIN SODIUM AND SULBACTAM SODIUM 200 GRAM(S): 250; 125 INJECTION, POWDER, FOR SUSPENSION INTRAMUSCULAR; INTRAVENOUS at 11:01

## 2022-01-08 RX ADMIN — Medication 81 MILLIGRAM(S): at 13:27

## 2022-01-08 RX ADMIN — Medication 5 UNIT(S): at 17:52

## 2022-01-08 RX ADMIN — INSULIN GLARGINE 20 UNIT(S): 100 INJECTION, SOLUTION SUBCUTANEOUS at 00:28

## 2022-01-08 RX ADMIN — SOTROVIMAB 116 MILLIGRAM(S): 62.5 INJECTION, SOLUTION, CONCENTRATE INTRAVENOUS at 17:49

## 2022-01-08 RX ADMIN — SODIUM CHLORIDE 25 MILLILITER(S): 9 INJECTION INTRAMUSCULAR; INTRAVENOUS; SUBCUTANEOUS at 16:47

## 2022-01-08 RX ADMIN — PIPERACILLIN AND TAZOBACTAM 200 GRAM(S): 4; .5 INJECTION, POWDER, LYOPHILIZED, FOR SOLUTION INTRAVENOUS at 19:06

## 2022-01-08 RX ADMIN — Medication 250 MILLIGRAM(S): at 02:28

## 2022-01-08 RX ADMIN — INSULIN GLARGINE 15 UNIT(S): 100 INJECTION, SOLUTION SUBCUTANEOUS at 22:37

## 2022-01-08 RX ADMIN — Medication 2: at 08:51

## 2022-01-08 NOTE — DIETITIAN INITIAL EVALUATION ADULT. - PROBLEM SELECTOR PLAN 8
F: s/p 1L NS  E: replete prn  N: Consistent carb/DASH diet, no snacks  GI: None  A/c: Heparin SQ  Activity: As tolerated  Code: FC  Dispo: [facility], PT alvin for d/c planning, NAGI for d/c planning

## 2022-01-08 NOTE — DIETITIAN INITIAL EVALUATION ADULT. - PROBLEM SELECTOR PLAN 3
Takes 8-10 U premeal and 20U lantus at home  -inpatient, 5U premeal and 15U lantus with mISS for now  -f/u A1C, potential endocrine consult warranted

## 2022-01-08 NOTE — PATIENT PROFILE ADULT - FOOD INSECURITY
CONTINUE to keep umbilicus clean and dry    (No need for Vaseline to circ now)    Continue to feed Enfacare 2-3 oz per feed (every 3-4 hours during the daytime)           Child's Well Visit, Birth to 4 Weeks: Care Instructions  Your Care Instructions    Your baby is already watching and listening to you. Talking, cuddling, hugs, and kisses are all ways that you can help your baby grow and develop. At this age, your baby may look at faces and follow an object with his or her eyes. He or she may respond to sounds by blinking, crying, or appearing to be startled. Your baby may lift his or her head briefly while on the tummy. Your baby will likely have periods where he or she is awake for 2 or 3 hours straight. Although  sleeping and eating patterns vary, your baby will probably sleep for a total of 18 hours each day. Follow-up care is a key part of your child's treatment and safety. Be sure to make and go to all appointments, and call your doctor if your child is having problems. It's also a good idea to know your child's test results and keep a list of the medicines your child takes. How can you care for your child at home? Feeding  · Breast milk is the best food for your baby. Let your baby decide when and how long to nurse. · If you do not breastfeed, use a formula with iron. Your baby may take 2 to 3 ounces of formula every 3 to 4 hours. · Always check the temperature of the formula by putting a few drops on your wrist.  · Do not warm bottles in the microwave. The milk can get too hot and burn your baby's mouth. Sleep  · Put your baby to sleep on his or her back, not on the side or tummy. This reduces the risk of SIDS. Use a firm, flat mattress. Do not put pillows in the crib. Do not use crib bumpers. · Do not hang toys across the crib. · Make sure that the crib slats are less than 2 3/8 inches apart. Your baby's head can get trapped if the openings are too wide.   · Remove the knobs on the corners of the crib so that they do not fall off into the crib. · Tighten all nuts, bolts, and screws on the crib every few months. Check the mattress support hangers and hooks regularly. · Do not use older or used cribs. They may not meet current safety standards. · For more information on crib safety, call the [place] Consumer Product Safety Commission (4-696.341.5713). Crying  · Your baby may cry for 1 to 3 hours a day. Babies usually cry for a reason, such as being hungry, hot, cold, or in pain, or having dirty diapers. Sometimes babies cry but you do not know why. When your baby cries:  ¨ Change your baby's clothes or blankets if you think your baby may be too cold or warm. Change your baby's diaper if it is dirty or wet. ¨ Feed your baby if you think he or she is hungry. Try burping your baby, especially after feeding. ¨ Look for a problem, such as an open diaper pin, that may be causing pain. ¨ Hold your baby close to your body to comfort your baby. ¨ Rock in a rocking chair. ¨ Sing or play soft music, go for a walk in a stroller, or take a ride in the car. ¨ Wrap your baby snugly in a blanket, give him or her a warm bath, or take a bath together. ¨ If your baby still cries, put your baby in the crib and close the door. Go to another room and wait to see if your baby falls asleep. If your baby is still crying after 15 minutes, pick your baby up and try all of the above tips again. First shot to prevent hepatitis B  · Most babies have had the first dose of hepatitis B vaccine by now. Make sure that your baby gets the recommended childhood vaccines over the next few months. These vaccines will help keep your baby healthy and prevent the spread of disease. When should you call for help? Watch closely for changes in your baby's health, and be sure to contact your doctor if:  · You are concerned that your baby is not getting enough to eat or is not developing normally. · Your baby seems sick.   · Your baby has a fever. · You need more information about how to care for your baby, or you have questions or concerns. Where can you learn more? Go to http://kaley-bharti.info/. Enter [phone] [id number] in the search box to learn more about \"Child's Well Visit, Birth to 4 Weeks: Care Instructions. \"  Current as of: May 12, 2017  Content Version: 11.4  © 5302-6252 [employer]. Care instructions adapted under license by [employer] (which disclaims liability or warranty for this information). If you have questions about a medical condition or this instruction, always ask your healthcare professional. [place] [address] any warranty or liability for your use of this information. Umbilical Granuloma: Care Instructions  Your Care Instructions    An umbilical granuloma is a moist, red lump of tissue that can form on a baby's navel (belly button). It can be seen in the first few weeks of life, after the umbilical cord has dried and fallen off. It's usually a minor problem that looks worse than it is. An umbilical granuloma does not cause pain. It may ooze a small amount of fluid that can make the skin around it red and irritated. Your child's doctor may treat the granuloma if it doesn't go away by itself. The doctor may:  · Apply silver nitrate to shrink and slowly remove the granuloma. It may take 3 to 6 doctor visits to finish the treatment. · Use surgical thread to tie off the granuloma at its base. The thread cuts off the blood supply to the granuloma. This will make it shrivel and fall off. Neither of these treatments is painful. Follow-up care is a key part of your child's treatment and safety. Be sure to make and go to all appointments, and call your doctor if your child is having problems. It's also a good idea to know your child's test results and keep a list of the medicines your child takes. How can you care for your child at home?   · Clean the area at least once a day and as needed during diaper changes or baths. ¨ Soak a cotton swab in warm water and mild soap. Squeeze out the excess water. Gently wipe around the sides of the navel. Also wipe the skin around the navel. ¨ Gently pat the area dry with a soft cloth. · Keep the area dry. ¨ Keep your baby's diaper folded below the navel until the granuloma is healed. If that doesn't work well, try cutting out an area in the front of the diaper (before you put it on your baby) to keep the navel exposed to air. ¨ Bathe your baby carefully. Keep the area above the water level until it heals. When should you call for help? Call your doctor now or seek immediate medical care if:  ? · Your baby has signs of an infection, such as:  ¨ Increased swelling, warmth, or redness. ¨ Red streaks leading from the area. ¨ Pus draining from the area. ¨ A fever. ? · Your baby cries when you touch the navel or the skin around it. ? Watch closely for changes in your child's health, and be sure to contact your doctor if your child has any problems. Where can you learn more? Go to http://kaley-bharti.info/. Enter R727 in the search box to learn more about \"Umbilical Granuloma: Care Instructions. \"  Current as of: May 12, 2017  Content Version: 11.4  © 3960-8558 [employer]. Care instructions adapted under license by [employer] (which disclaims liability or warranty for this information). If you have questions about a medical condition or this instruction, always ask your healthcare professional. [place] [address] any warranty or liability for your use of this information. no

## 2022-01-08 NOTE — H&P ADULT - PROBLEM SELECTOR PLAN 8
F: s/p 1L NS  E: replete prn  N: Consistent carb/DASH diet, no snacks  GI: None  A/c: Heparin SQ  Activity: As tolerated  Code: FC  Dispo: RMF, PT eval for d/c planning F: s/p 1L NS  E: replete prn  N: Consistent carb/DASH diet, no snacks  GI: None  A/c: Heparin SQ  Activity: As tolerated  Code: FC  Dispo: RMF, PT alvin for d/c planning, NAGI for d/c planning No

## 2022-01-08 NOTE — DIETITIAN INITIAL EVALUATION ADULT. - OTHER INFO
61M, vaccined with J&J for COVID-19, undomicilied, current smoker with PMH T2DM (insulin-dependent) c/b neuropathy and HTN as well as hx of multiple skin/soft tissue infections presenting with wound on R buttock and incidentally found to be COVID+    Pt seen in room, resting in bed. Pt reports good appetite PTA, reports he cooks for himself at home. Usually has 2-3 meals per day. Currently on DASH, consistent carbohydrate diet eating 100% of breakfast. Of note, pt very knowledgeable about importance of protein for wound healing. Reports using Ronald at home. Pending order placed for liquid protein supplement +Glucerna shakes. Recommend addition of MVI, zinc, vit C for wound healing. Per EMR pt was 73kg in Dec 2019, admit wt 82kg reflects 9kg/12% gain. Of note, pt height incorrect in EMR. Pt 75 inches, corrected BMI 22.6. Pt denies N/V/D/C at present. No edema noted. Please see full recs below. Will continue to follow per RD protocol.

## 2022-01-08 NOTE — H&P ADULT - PROBLEM SELECTOR PLAN 4
Takes lisinopril at home?  -hold i/s/o DEBBY Takes lisinopril 5mg and norvasc 10mg at home  -hold lisinopril i/s/o DEBYB Takes lisinopril 20mg and norvasc 10mg at home  -hold lisinopril i/s/o DEBBY  -continue norvasc

## 2022-01-08 NOTE — H&P ADULT - NSHPREVIEWOFSYSTEMS_GEN_ALL_CORE
CONSTITUTIONAL: +weakness, fever. PO intake adequate  EYES/ENT: +mild congestion. no cough  RESPIRATORY: No SOB  CARDIOVASCULAR: No chest pain  GASTROINTESTINAL: +Loose stools. No abdominal pain. No nausea, vomiting.  GENITOURINARY: No dysuria, frequency, or hematuria  NEUROLOGICAL: +HA. No dizziness  SKIN: +R gluteal wound with ss drainage  All other review of systems is negative unless indicated above.

## 2022-01-08 NOTE — ED PROVIDER NOTE - CLINICAL SUMMARY MEDICAL DECISION MAKING FREE TEXT BOX
ck all labs, ct imaging of region of wound, no signs of fascitis, covid test, possible admission. given non compliance with medications.

## 2022-01-08 NOTE — H&P ADULT - ATTENDING COMMENTS
Pt. seen and examined by me earlier today; I have read Dr. Hinkle's H&P, I agree w/ her findings and plan of care as documented; cont. vanc + Zosyn for now, f/u cultures; Wound Care consult; may need ID input, pending progress; Pt. also COVID+, but not hypoxic; will give MAb

## 2022-01-08 NOTE — H&P ADULT - PROBLEM SELECTOR PLAN 1
2/4 SIRS criteria met based on WBC >12 and HR >90. Likely source of infection is gluteal abscess  -s/p vanc 1x  -On Unasyn 2/4 SIRS criteria met based on WBC >12 and HR >90. Likely source of infection is gluteal abscess  -s/p vanc 1g, will continue and dose based on trough (due 12pm 1/9)  -c/w Unasyn  -f/u wound Cx and BCx 2/4 SIRS criteria met based on WBC >12 and HR >90. Likely source of infection is gluteal abscess  -s/p vanc 1g, will continue and dose based on trough (due 12pm 1/9)  -d/c unasyn and start Zosyn given poorly controlled DM, foul drainage, and recent abx use thus needs coverage for pseudomonal infection  -f/u wound Cx and BCx

## 2022-01-08 NOTE — H&P ADULT - PROBLEM SELECTOR PLAN 3
Take ___ at home  -inpatient, ___ Take ___ at home  -inpatient, ___  -f/u A1C, potential endocrine consult warrented Takes 8-10 U premeal and 20U lantus at home  -inpatient, 5U premeal and 15U lantus with mISS for now  -f/u A1C, potential endocrine consult warranted

## 2022-01-08 NOTE — H&P ADULT - NSHPLABSRESULTS_GEN_ALL_CORE
Labs:                        10.0   9.25  )-----------( 225      ( 2022 11:49 )             31.8         135  |  101  |  35<H>  ----------------------------<  220<H>  4.0   |  29  |  1.46<H>    Ca    9.4      2022 23:07  Mg     2.3         TPro  8.2  /  Alb  3.6  /  TBili  0.3  /  DBili  x   /  AST  55<H>  /  ALT  34  /  AlkPhos  125<H>      PT/INR - ( 2022 00:50 )   PT: 12.2 sec;   INR: 1.02          PTT - ( 2022 00:50 )  PTT:32.6 sec  Urinalysis Basic - ( 2022 04:23 )    Color: Yellow / Appearance: Clear / S.010 / pH: x  Gluc: x / Ketone: NEGATIVE  / Bili: NEGATIVE / Urobili: 0.2 E.U./dL   Blood: x / Protein: 30 mg/dL / Nitrite: NEGATIVE   Leuk Esterase: NEGATIVE / RBC: 5-10 /HPF / WBC < 5 /HPF   Sq Epi: x / Non Sq Epi: x / Bacteria: Present /HPF      COVID-19 PCR: Detected (2022 23:07)      Radiology: Reviewed

## 2022-01-08 NOTE — H&P ADULT - HISTORY OF PRESENT ILLNESS
In the ED:  VS: Temp 98.6, HR 96, /95, RR 18, SpO2 97% on RA  Labs: wbc 12.40 (neutrophilic predominance), hgb 10.3, mcv 78.4, plt 258, Cr 1.46 (0.7 in Dec 2019), glucose 220, AlkPhos 125, AST 55, ALT 34, coags wnl, trops neg, lactate 0.8, , VBG wnl. UA w/ protein, trace blood, and 500 glucose. COVID +  ECG: NSR, nl axis, no ischemic changes  Imaging: CXR - scattered small infiltrates? CT Pelvis w/ IV contrast - Right gluteal wound with associated phlegmonous changes extending into the hamstring tendon. No associated abscess/collection.  Interventions: 1L NS, 20U lantus, 9U lispro, started Unasyn and vancomycin In the ED:  VS: Temp 98.6, HR 96, /95, RR 18, SpO2 97% on RA  Labs: wbc 12.40 (neutrophilic predominance), hgb 10.3, mcv 78.4, plt 258, Cr 1.46 (0.7 in Dec 2019), glucose 220, AlkPhos 125, AST 55, ALT 34, coags wnl, trops neg, lactate 0.8, , VBG wnl. UA w/ protein, trace blood, and 500 glucose. COVID +  ECG: NSR, nl axis, no ischemic changes, QTc 480  Imaging: CXR - scattered small infiltrates? CT Pelvis w/ IV contrast - Right gluteal wound with associated phlegmonous changes extending into the hamstring tendon. No associated abscess/collection.  Interventions: 1L NS, 20U lantus, 9U lispro, started Unasyn and vancomycin In the ED:  VS: Temp 98.6, HR 96, /95, RR 18, SpO2 97% on RA  Labs: wbc 12.40 (neutrophilic predominance), hgb 10.3, mcv 78.4, plt 258, Cr 1.46 (0.7 in Dec 2019), glucose 220, AlkPhos 125, AST 55, ALT 34, coags wnl, trops neg, lactate 0.8, , VBG wnl. UA w/ protein, trace blood, and 500 glucose. COVID +  ECG: NSR, nl axis, no ischemic changes, QTc 480  Imaging: CXR - clear. CT Pelvis w/ IV contrast - Right gluteal wound with associated phlegmonous changes extending into the hamstring tendon. No associated abscess/collection.  Interventions: 1L NS, 20U lantus, 9U lispro, started Unasyn and vancomycin 61M, vaccined with J&J for COVID-19, undomicilied, current smoker with PMH T2DM (insulin-dependent) c/b neuropathy and HTN as well as hx of multiple skin/soft tissue infections presenting with wound on R buttock. Patient states wound as been present for a few months but 3 days ago started having a foul smell and draining yellow/green pus. Yesterday, started draining ss fluid. Patient notes mild pain in the area. He reports he had fevers at home, highest was 101F oral, but no fevers in past 24hr. He also notes looser stools (only one per day), mild congestion, weakness and recent HA. He denies CP, SOB, cough, n/v, blood in stool/urine, and dysuria. He reports good PO intake.    Of note, patient was recently d/[facility]'d [facility] or [facility] (he cannot remember) on doxycyline for 7d for the same wound.    In the ED:  VS: Temp 98.6, HR 96, /95, RR 18, SpO2 97% on RA  Labs: wbc 12.40 (neutrophilic predominance), hgb 10.3, mcv 78.4, plt 258, Cr 1.46 (0.7 in Dec 2019), glucose 220, AlkPhos 125, AST 55, ALT 34, coags wnl, trops neg, lactate 0.8, , VBG wnl. UA w/ protein, trace blood, and 500 glucose. COVID +  ECG: NSR, nl axis, no ischemic changes, QTc 480  Imaging: CXR - clear. CT Pelvis w/ IV contrast - Right gluteal wound with associated phlegmonous changes extending into the hamstring tendon. No associated abscess/collection.  Interventions: 1L NS, 20U lantus, 9U lispro, started Unasyn and vancomycin

## 2022-01-08 NOTE — H&P ADULT - PROBLEM SELECTOR PLAN 7
Incidentally COVID+. Vaccinated with J&J. On RA.  -MAB as high risk w/ DM Incidentally COVID+. Vaccinated with J&J. On RA. CXR clear.  -MAB as high risk w/ DM Incidentally COVID+. Vaccinated with J&J. On RA. CXR clear.  -MAB as high risk w/ DM  -f/u COVID labs in AM

## 2022-01-08 NOTE — H&P ADULT - ASSESSMENT
61M, vaccined with J&J for COVID-19, undomicilied, current smoker with PMH T2DM (insulin-dependent) c/b neuropathy and HTN as well as hx of multiple skin/soft tissue infections presenting with wound on R buttock and incidentally found to be COVID+

## 2022-01-08 NOTE — PATIENT PROFILE ADULT - NSPROSPHOSPCHAPLAINYN_GEN_A_NUR
[place] [address]  [facility] [address]  Phone: [phone] February 6, 2018     Patient: Beto Mckinney   YOB: 1997   Date of Visit: 2/6/2018       To Whom It May Concern:    Beto Mckinney is a patient of Dr. Sumit Simeon.  Please excuse him from missed classes today while he attended a doctor's appointment.    If you have any questions or concerns, please don't hesitate to contact my office.    Sincerely,    Sumit Simeon MD      yes

## 2022-01-08 NOTE — ED PROVIDER NOTE - SKIN WOUND EXPOSED
Open wound circular, with whitish dry borders, irregular, 3 cm, deep 2 cm, no fluctuance, induration or surrounding erythema, or streaking. no crepitus. To R gluteal fold, appearing chronic. active serosanguinous drainage, no foul odor.

## 2022-01-08 NOTE — ED PROVIDER NOTE - OBJECTIVE STATEMENT
62 yo M, hx IDDM, on lantus qhs 20u, admalog TID, HTN on amlodipine and lisinopril, hx of poorly controlled DM, with prior admissions for wound care, recently per patient admitted to [facility] or [facility] for IV abx to treat and R gluteal chronic wound, patient presents with multiple medical complaints - generalized weakness, fatigue, decreased appetite, associated with increased drainage, which is foul smelling from R gluteal chronic wound. denies fever, chills, denies N/V/D, denies abd or pelvic pain. denies cp, sob or cough. patient is currently homeless, sometimes spends the night in his brother's apartment. daily smoker, marijuana, denies other drugs or etoh abuse. patient notes fully vaccinated, no booster. completed a 7 day course of doxycycline after being discharged from the hospital 1 week ago. patient is noncompliant with medication refills, has no pmd, currently ran out of insulin for the past 4 days. denies sick contacts or travel. denies rash.

## 2022-01-08 NOTE — DIETITIAN INITIAL EVALUATION ADULT. - PROBLEM SELECTOR PLAN 2
CT Pelvis w/ IV contrast - Right gluteal wound with associated phlegmonous changes extending into the hamstring tendon. No associated abscess/collection. Recent tx at [facility] and d/c on doxycyline for 7d  -abx as above  -wound care consult  -hold off on surgical consult at this time as low suspicion for abscess/nec fasc base on CT findings

## 2022-01-08 NOTE — H&P ADULT - NSHPPHYSICALEXAM_GEN_ALL_CORE
Vital Signs Last 24 Hrs  T(C): 37.1 (08 Jan 2022 05:30), Max: 37.2 (08 Jan 2022 04:24)  T(F): 98.8 (08 Jan 2022 05:30), Max: 98.9 (08 Jan 2022 04:24)  HR: 92 (08 Jan 2022 05:30) (89 - 96)  BP: 164/83 (08 Jan 2022 05:30) (159/85 - 167/91)  BP(mean): --  RR: 14 (08 Jan 2022 05:30) (14 - 18)  SpO2: 99% (08 Jan 2022 05:30) (97% - 99%)  I&O's Detail    07 Jan 2022 07:01  -  08 Jan 2022 07:00  --------------------------------------------------------  IN:    IV PiggyBack: 100 mL    Oral Fluid: 250 mL  Total IN: 350 mL    OUT:    Voided (mL): 400 mL  Total OUT: 400 mL    Total NET: -50 mL        Physical Exam:  GENERAL: Awake, alert and interactive, no acute distress  NEURO: A&Ox4, no focal deficits, 5/5 strength in all ext, reflexes 2+ throughout  HEENT: Normocephalic, atraumatic, CN2-12 intact, no conjunctivitis or scleral icterus, oral mucosa moist, no oral lesions noted  NECK: Supple, no JVD, no LAD, thyroid not palpable  CARDIAC: Regular rate and rhythm, +S1/S2, no murmurs/rubs/gallops  PULM: Breathing comfortably on RA, clear to auscultation bilaterally, no wheezes/rales/rhonchi  ABDOMEN: Soft, nontender, nondistended, +bs, no hepatosplenomegaly, no rebound tenderness or fluid wave, no CVA tenderness  : Deferred  MSK: Range of motion grossly intact  SKIN: Warm and dry, no rashes, no lesions  VASC: 2+ peripheral pulses, no edema, no LE tenderness  Psych: Appropriate affect Vital Signs Last 24 Hrs  T(C): 37.1 (08 Jan 2022 05:30), Max: 37.2 (08 Jan 2022 04:24)  T(F): 98.8 (08 Jan 2022 05:30), Max: 98.9 (08 Jan 2022 04:24)  HR: 92 (08 Jan 2022 05:30) (89 - 96)  BP: 164/83 (08 Jan 2022 05:30) (159/85 - 167/91)  BP(mean): --  RR: 14 (08 Jan 2022 05:30) (14 - 18)  SpO2: 99% (08 Jan 2022 05:30) (97% - 99%)  I&O's Detail    07 Jan 2022 07:01  -  08 Jan 2022 07:00  --------------------------------------------------------  IN:    IV PiggyBack: 100 mL    Oral Fluid: 250 mL  Total IN: 350 mL    OUT:    Voided (mL): 400 mL  Total OUT: 400 mL    Total NET: -50 mL        Physical Exam:  GENERAL: Awake, alert and interactive, no acute distress  NEURO: A&Ox4, no focal deficits  HEENT: Normocephalic, atraumatic, oral mucosa moist, no oral lesions noted  NECK: Supple  CARDIAC: Regular rate and rhythm, +S1/S2, no murmurs/rubs/gallops  PULM: Breathing comfortably on RA, clear to auscultation bilaterally, no wheezes/rales/rhonchi  ABDOMEN: Soft, nontender, nondistended, +bs  MSK: Range of motion grossly intact  SKIN: +R gluteal wound dressed with pink dressing. No drainage or erythema around dressing noted. Not ttp. Skin warm and dry  VASC: 2+ peripheral pulses, no edema, no LE tenderness  Psych: Appropriate affect GENERAL: Awake, alert and interactive, no acute distress  NEURO: A&Ox4, no focal deficits  HEENT: Normocephalic, atraumatic, oral mucosa moist, no oral lesions noted  NECK: Supple  CARDIAC: Regular rate and rhythm, +S1/S2, no murmurs/rubs/gallops  PULM: Breathing comfortably on RA, clear to auscultation bilaterally, no wheezes/rales/rhonchi  ABDOMEN: Soft, nontender, nondistended, +bs  MSK: Range of motion grossly intact  SKIN: +R gluteal wound dressed with pink dressing. No drainage or erythema around dressing noted. Not ttp. Skin warm and dry  VASC: 1+ peripheral pulses, no edema, no LE tenderness  Psych: Appropriate affect

## 2022-01-08 NOTE — H&P ADULT - NSHPSOCIALHISTORY_GEN_ALL_CORE
Undomiciled, often stays with brother  No EtOH use  Current smoker, 1/2 ppd. Has been smoking since before he was a teen. Was smoking 2ppd.  Current MJ use almost daily. Hx of cocaine/heroin use, not using currently.

## 2022-01-08 NOTE — DIETITIAN INITIAL EVALUATION ADULT. - ADD RECOMMEND
1. Continue consistent carbohydrate, DASH diet 2. Verify pending order for LPS and Glucerna shakes 3. MVI, zinc, vit C for wound healing 4. Trend wts 5. Monitor lytes, glucose, skin 6. RD to remain available prn

## 2022-01-08 NOTE — H&P ADULT - NSICDXPASTSURGICALHX_GEN_ALL_CORE_FT
PAST SURGICAL HISTORY:  History of open reduction and internal fixation (ORIF) procedure LEFT FOOT     PAST SURGICAL HISTORY:  Left toe amputee Great toe bone removal

## 2022-01-08 NOTE — H&P ADULT - PROBLEM SELECTOR PLAN 2
Care as above -abx as above  -wound care consult CT Pelvis w/ IV contrast - Right gluteal wound with associated phlegmonous changes extending into the hamstring tendon. No associated abscess/collection. Recent tx at [facility] and d/c on doxycyline for 7d  -abx as above  -wound care consult  -hold off on surgical consult at this time as low suspicion for abscess/nec fasc base on CT findings

## 2022-01-08 NOTE — PATIENT PROFILE ADULT - FALL HARM RISK - HARM RISK INTERVENTIONS
Communicate Risk of Fall with Harm to all staff/Reinforce activity limits and safety measures with patient and family/Tailored Fall Risk Interventions/Visual Cue: Yellow wristband and red socks/Bed in lowest position, wheels locked, appropriate side rails in place/Call bell, personal items and telephone in reach/Instruct patient to call for assistance before getting out of bed or chair/Non-slip footwear when patient is out of bed/[place] to call system/Physically safe environment - no spills, clutter or unnecessary equipment/Purposeful Proactive Rounding/Room/bathroom lighting operational, light cord in reach Assistance with ambulation/Assistance OOB with selected safe patient handling equipment/Communicate Risk of Fall with Harm to all staff/Discuss with provider need for PT consult/Monitor gait and stability/Reinforce activity limits and safety measures with patient and family/Tailored Fall Risk Interventions/Visual Cue: Yellow wristband and red socks/Bed in lowest position, wheels locked, appropriate side rails in place/Call bell, personal items and telephone in reach/Instruct patient to call for assistance before getting out of bed or chair/Non-slip footwear when patient is out of bed/[place] to call system/Physically safe environment - no spills, clutter or unnecessary equipment/Purposeful Proactive Rounding/Room/bathroom lighting operational, light cord in reach

## 2022-01-08 NOTE — DIETITIAN INITIAL EVALUATION ADULT. - PROBLEM SELECTOR PLAN 1
2/4 SIRS criteria met based on WBC >12 and HR >90. Likely source of infection is gluteal abscess  -s/p vanc 1g, will continue and dose based on trough (due 12pm 1/9)  -d/c unasyn and start Zosyn given poorly controlled DM, foul drainage, and recent abx use thus needs coverage for pseudomonal infection  -f/u wound Cx and BCx

## 2022-01-09 DIAGNOSIS — E11.9 TYPE 2 DIABETES MELLITUS WITHOUT COMPLICATIONS: ICD-10-CM

## 2022-01-09 LAB
A1C WITH ESTIMATED AVERAGE GLUCOSE RESULT: 10 % — HIGH (ref 4–5.6)
ALBUMIN SERPL ELPH-MCNC: 3.5 G/DL — SIGNIFICANT CHANGE UP (ref 3.3–5)
ALP SERPL-CCNC: 106 U/L — SIGNIFICANT CHANGE UP (ref 40–120)
ALT FLD-CCNC: 20 U/L — SIGNIFICANT CHANGE UP (ref 10–45)
ANION GAP SERPL CALC-SCNC: 11 MMOL/L — SIGNIFICANT CHANGE UP (ref 5–17)
APTT BLD: 31.2 SEC — SIGNIFICANT CHANGE UP (ref 27.5–35.5)
AST SERPL-CCNC: 39 U/L — SIGNIFICANT CHANGE UP (ref 10–40)
BASOPHILS # BLD AUTO: 0.08 K/UL — SIGNIFICANT CHANGE UP (ref 0–0.2)
BASOPHILS NFR BLD AUTO: 0.7 % — SIGNIFICANT CHANGE UP (ref 0–2)
BILIRUB SERPL-MCNC: 0.2 MG/DL — SIGNIFICANT CHANGE UP (ref 0.2–1.2)
BUN SERPL-MCNC: 20 MG/DL — SIGNIFICANT CHANGE UP (ref 7–23)
CALCIUM SERPL-MCNC: 9.2 MG/DL — SIGNIFICANT CHANGE UP (ref 8.4–10.5)
CHLORIDE SERPL-SCNC: 102 MMOL/L — SIGNIFICANT CHANGE UP (ref 96–108)
CO2 SERPL-SCNC: 24 MMOL/L — SIGNIFICANT CHANGE UP (ref 22–31)
CREAT SERPL-MCNC: 1.38 MG/DL — HIGH (ref 0.5–1.3)
CRP SERPL-MCNC: 5.6 MG/L — HIGH (ref 0–4)
CULTURE RESULTS: SIGNIFICANT CHANGE UP
D DIMER BLD IA.RAPID-MCNC: <150 NG/ML DDU — SIGNIFICANT CHANGE UP
EOSINOPHIL # BLD AUTO: 0.27 K/UL — SIGNIFICANT CHANGE UP (ref 0–0.5)
EOSINOPHIL NFR BLD AUTO: 2.4 % — SIGNIFICANT CHANGE UP (ref 0–6)
ESTIMATED AVERAGE GLUCOSE: 240 MG/DL — HIGH (ref 68–114)
FERRITIN SERPL-MCNC: 70 NG/ML — SIGNIFICANT CHANGE UP (ref 30–400)
GLUCOSE BLDC GLUCOMTR-MCNC: 165 MG/DL — HIGH (ref 70–99)
GLUCOSE BLDC GLUCOMTR-MCNC: 190 MG/DL — HIGH (ref 70–99)
GLUCOSE BLDC GLUCOMTR-MCNC: 192 MG/DL — HIGH (ref 70–99)
GLUCOSE BLDC GLUCOMTR-MCNC: 207 MG/DL — HIGH (ref 70–99)
GLUCOSE SERPL-MCNC: 192 MG/DL — HIGH (ref 70–99)
HCT VFR BLD CALC: 32.6 % — LOW (ref 39–50)
HGB BLD-MCNC: 10.1 G/DL — LOW (ref 13–17)
IMM GRANULOCYTES NFR BLD AUTO: 0.3 % — SIGNIFICANT CHANGE UP (ref 0–1.5)
INR BLD: 0.97 — SIGNIFICANT CHANGE UP (ref 0.88–1.16)
LDH SERPL L TO P-CCNC: 190 U/L — SIGNIFICANT CHANGE UP (ref 50–242)
LYMPHOCYTES # BLD AUTO: 2.37 K/UL — SIGNIFICANT CHANGE UP (ref 1–3.3)
LYMPHOCYTES # BLD AUTO: 20.6 % — SIGNIFICANT CHANGE UP (ref 13–44)
MAGNESIUM SERPL-MCNC: 1.9 MG/DL — SIGNIFICANT CHANGE UP (ref 1.6–2.6)
MCHC RBC-ENTMCNC: 24.5 PG — LOW (ref 27–34)
MCHC RBC-ENTMCNC: 31 GM/DL — LOW (ref 32–36)
MCV RBC AUTO: 79.1 FL — LOW (ref 80–100)
MONOCYTES # BLD AUTO: 1.49 K/UL — HIGH (ref 0–0.9)
MONOCYTES NFR BLD AUTO: 13 % — SIGNIFICANT CHANGE UP (ref 2–14)
NEUTROPHILS # BLD AUTO: 7.24 K/UL — SIGNIFICANT CHANGE UP (ref 1.8–7.4)
NEUTROPHILS NFR BLD AUTO: 63 % — SIGNIFICANT CHANGE UP (ref 43–77)
NRBC # BLD: 0 /100 WBCS — SIGNIFICANT CHANGE UP (ref 0–0)
PHOSPHATE SERPL-MCNC: 2.8 MG/DL — SIGNIFICANT CHANGE UP (ref 2.5–4.5)
PLATELET # BLD AUTO: 232 K/UL — SIGNIFICANT CHANGE UP (ref 150–400)
POTASSIUM SERPL-MCNC: 3.7 MMOL/L — SIGNIFICANT CHANGE UP (ref 3.5–5.3)
POTASSIUM SERPL-SCNC: 3.7 MMOL/L — SIGNIFICANT CHANGE UP (ref 3.5–5.3)
PROCALCITONIN SERPL-MCNC: 0.08 NG/ML — SIGNIFICANT CHANGE UP (ref 0.02–0.1)
PROT SERPL-MCNC: 7.4 G/DL — SIGNIFICANT CHANGE UP (ref 6–8.3)
PROTHROM AB SERPL-ACNC: 11.7 SEC — SIGNIFICANT CHANGE UP (ref 10.6–13.6)
RBC # BLD: 4.12 M/UL — LOW (ref 4.2–5.8)
RBC # FLD: 15.8 % — HIGH (ref 10.3–14.5)
SODIUM SERPL-SCNC: 137 MMOL/L — SIGNIFICANT CHANGE UP (ref 135–145)
SPECIMEN SOURCE: SIGNIFICANT CHANGE UP
VANCOMYCIN TROUGH SERPL-MCNC: 4.6 UG/ML — LOW (ref 10–20)
WBC # BLD: 11.48 K/UL — HIGH (ref 3.8–10.5)
WBC # FLD AUTO: 11.48 K/UL — HIGH (ref 3.8–10.5)

## 2022-01-09 PROCEDURE — 99233 SBSQ HOSP IP/OBS HIGH 50: CPT | Mod: GC

## 2022-01-09 RX ORDER — VANCOMYCIN HCL 1 G
1250 VIAL (EA) INTRAVENOUS EVERY 8 HOURS
Refills: 0 | Status: COMPLETED | OUTPATIENT
Start: 2022-01-09 | End: 2022-01-10

## 2022-01-09 RX ORDER — ASCORBIC ACID 60 MG
500 TABLET,CHEWABLE ORAL DAILY
Refills: 0 | Status: DISCONTINUED | OUTPATIENT
Start: 2022-01-09 | End: 2022-01-19

## 2022-01-09 RX ORDER — ZINC SULFATE TAB 220 MG (50 MG ZINC EQUIVALENT) 220 (50 ZN) MG
220 TAB ORAL DAILY
Refills: 0 | Status: DISCONTINUED | OUTPATIENT
Start: 2022-01-09 | End: 2022-01-19

## 2022-01-09 RX ORDER — POTASSIUM CHLORIDE 20 MEQ
20 PACKET (EA) ORAL ONCE
Refills: 0 | Status: COMPLETED | OUTPATIENT
Start: 2022-01-09 | End: 2022-01-09

## 2022-01-09 RX ORDER — VANCOMYCIN HCL 1 G
1250 VIAL (EA) INTRAVENOUS EVERY 12 HOURS
Refills: 0 | Status: DISCONTINUED | OUTPATIENT
Start: 2022-01-09 | End: 2022-01-09

## 2022-01-09 RX ADMIN — INSULIN GLARGINE 15 UNIT(S): 100 INJECTION, SOLUTION SUBCUTANEOUS at 22:57

## 2022-01-09 RX ADMIN — Medication 20 MILLIEQUIVALENT(S): at 20:17

## 2022-01-09 RX ADMIN — PIPERACILLIN AND TAZOBACTAM 200 GRAM(S): 4; .5 INJECTION, POWDER, LYOPHILIZED, FOR SOLUTION INTRAVENOUS at 23:29

## 2022-01-09 RX ADMIN — Medication 500 MILLIGRAM(S): at 18:13

## 2022-01-09 RX ADMIN — Medication 166.67 MILLIGRAM(S): at 16:38

## 2022-01-09 RX ADMIN — Medication 2: at 12:20

## 2022-01-09 RX ADMIN — PIPERACILLIN AND TAZOBACTAM 200 GRAM(S): 4; .5 INJECTION, POWDER, LYOPHILIZED, FOR SOLUTION INTRAVENOUS at 05:49

## 2022-01-09 RX ADMIN — Medication 1 TABLET(S): at 18:13

## 2022-01-09 RX ADMIN — Medication 5 UNIT(S): at 17:41

## 2022-01-09 RX ADMIN — PIPERACILLIN AND TAZOBACTAM 200 GRAM(S): 4; .5 INJECTION, POWDER, LYOPHILIZED, FOR SOLUTION INTRAVENOUS at 18:13

## 2022-01-09 RX ADMIN — PIPERACILLIN AND TAZOBACTAM 200 GRAM(S): 4; .5 INJECTION, POWDER, LYOPHILIZED, FOR SOLUTION INTRAVENOUS at 11:25

## 2022-01-09 RX ADMIN — Medication 2: at 08:57

## 2022-01-09 RX ADMIN — Medication 5 UNIT(S): at 08:58

## 2022-01-09 RX ADMIN — Medication 5 UNIT(S): at 12:21

## 2022-01-09 RX ADMIN — Medication 81 MILLIGRAM(S): at 11:25

## 2022-01-09 RX ADMIN — Medication 3 MILLIGRAM(S): at 22:21

## 2022-01-09 RX ADMIN — AMLODIPINE BESYLATE 10 MILLIGRAM(S): 2.5 TABLET ORAL at 05:49

## 2022-01-09 RX ADMIN — HEPARIN SODIUM 5000 UNIT(S): 5000 INJECTION INTRAVENOUS; SUBCUTANEOUS at 05:50

## 2022-01-09 RX ADMIN — Medication 2: at 17:41

## 2022-01-09 RX ADMIN — PIPERACILLIN AND TAZOBACTAM 200 GRAM(S): 4; .5 INJECTION, POWDER, LYOPHILIZED, FOR SOLUTION INTRAVENOUS at 00:33

## 2022-01-09 NOTE — PHYSICAL THERAPY INITIAL EVALUATION ADULT - GENERAL OBSERVATIONS, REHAB EVAL
Patient encountered semi-fowlers. (+)dressing to R buttock intact. Patient encountered semi-fowlers. (+)dressing to R buttock intact. 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

## 2022-01-09 NOTE — PHYSICAL THERAPY INITIAL EVALUATION ADULT - IMPAIRMENTS CONTRIBUTING IMPAIRED BED MOBILITY, REHAB EVAL
patient sat EOB supported on his L elbow to off weight R buttock and declined to orient himself to midline./pain

## 2022-01-09 NOTE — PHYSICAL THERAPY INITIAL EVALUATION ADULT - SITTING BALANCE: STATIC
unable to formally assess sitting balance as patient declined to position himself midline due to pain in R buttock

## 2022-01-09 NOTE — PROVIDER CONTACT NOTE (MEDICATION) - BACKGROUND
fs 207, pt states that he does not need it and verbalized understanding of adverse events that can occur from refusal of medication.

## 2022-01-09 NOTE — PHYSICAL THERAPY INITIAL EVALUATION ADULT - LIVES WITH, PROFILE
undomiciled per chart; patient reports that he stays with his brother approximately 5 nights per week

## 2022-01-09 NOTE — PROGRESS NOTE ADULT - SUBJECTIVE AND OBJECTIVE BOX
Patient is a 61y old  Male who presents with a chief complaint of     INTERVAL HPI/OVERNIGHT EVENTS:    Pt. seen and examined earlier today  Pt. has no new complaints  Pt. denies F/C, CP, SOB, cough    Review of Systems: 12 point review of systems otherwise negative    MEDICATIONS  (STANDING):  amLODIPine   Tablet 10 milliGRAM(s) Oral daily  ascorbic acid 500 milliGRAM(s) Oral daily  aspirin enteric coated 81 milliGRAM(s) Oral daily  dextrose 40% Gel 15 Gram(s) Oral once  dextrose 5%. 1000 milliLiter(s) (50 mL/Hr) IV Continuous <Continuous>  dextrose 5%. 1000 milliLiter(s) (100 mL/Hr) IV Continuous <Continuous>  dextrose 50% Injectable 25 Gram(s) IV Push once  dextrose 50% Injectable 12.5 Gram(s) IV Push once  dextrose 50% Injectable 25 Gram(s) IV Push once  glucagon  Injectable 1 milliGRAM(s) IntraMuscular once  heparin   Injectable 5000 Unit(s) SubCutaneous every 8 hours  influenza   Vaccine 0.5 milliLiter(s) IntraMuscular once  insulin glargine Injectable (LANTUS) 15 Unit(s) SubCutaneous at bedtime  insulin lispro (ADMELOG) corrective regimen sliding scale   SubCutaneous three times a day before meals  insulin lispro (ADMELOG) corrective regimen sliding scale   SubCutaneous at bedtime  insulin lispro Injectable (ADMELOG) 5 Unit(s) SubCutaneous three times a day before meals  multivitamin 1 Tablet(s) Oral daily  piperacillin/tazobactam IVPB.. 4.5 Gram(s) IV Intermittent every 6 hours  vancomycin  IVPB 1250 milliGRAM(s) IV Intermittent every 8 hours  zinc sulfate 220 milliGRAM(s) Oral daily    MEDICATIONS  (PRN):  melatonin 3 milliGRAM(s) Oral at bedtime PRN Insomnia      Allergies    No Known Drug Allergies  pork (Unknown)    Intolerances          Vital Signs Last 24 Hrs  T(C): 36.8 (2022 12:45), Max: 37.3 (2022 17:45)  T(F): 98.2 (2022 12:45), Max: 99.2 (2022 17:59)  HR: 81 (2022 12:45) (71 - 94)  BP: 153/80 (2022 12:45) (137/89 - 170/80)  BP(mean): 103 (2022 21:10) (103 - 103)  RR: 17 (2022 12:45) (17 - 18)  SpO2: 99% (2022 12:45) (98% - 99%)  CAPILLARY BLOOD GLUCOSE      POCT Blood Glucose.: 165 mg/dL (2022 17:06)  POCT Blood Glucose.: 190 mg/dL (2022 11:40)  POCT Blood Glucose.: 192 mg/dL (2022 08:53)  POCT Blood Glucose.: 97 mg/dL (2022 22:06)        Physical Exam:  (earlier today)  Daily     Daily   General:  comfortable-appearing in NAD  HEENT:  MMM  Lungs:  normal WOB on RA  Extremities:  +Charcot foot  Skin:  WWP  Neuro:  AAOx3  dressing C/D/I    LABS:                        10.1   11.48 )-----------( 232      ( 2022 08:10 )             32.6     01-09    137  |  102  |  20  ----------------------------<  192<H>  3.7   |  24  |  1.38<H>    Ca    9.2      2022 08:10  Phos  2.8     01-09  Mg     1.9     -09    TPro  7.4  /  Alb  3.5  /  TBili  0.2  /  DBili  x   /  AST  39  /  ALT  20  /  AlkPhos  106  01-09    PT/INR - ( 2022 08:10 )   PT: 11.7 sec;   INR: 0.97          PTT - ( 2022 08:10 )  PTT:31.2 sec  Urinalysis Basic - ( 2022 04:23 )    Color: Yellow / Appearance: Clear / S.010 / pH: x  Gluc: x / Ketone: NEGATIVE  / Bili: NEGATIVE / Urobili: 0.2 E.U./dL   Blood: x / Protein: 30 mg/dL / Nitrite: NEGATIVE   Leuk Esterase: NEGATIVE / RBC: 5-10 /HPF / WBC < 5 /HPF   Sq Epi: x / Non Sq Epi: x / Bacteria: Present /HPF

## 2022-01-09 NOTE — PHYSICAL THERAPY INITIAL EVALUATION ADULT - ADDITIONAL COMMENTS
Patient reports that he resides with his brother when available (approximately 5 nights per week). He reports using a cane for ambulation since 2007 since unspecified injury to L foot.

## 2022-01-10 LAB
-  AMIKACIN: SIGNIFICANT CHANGE UP
-  AMIKACIN: SIGNIFICANT CHANGE UP
-  AMOXICILLIN/CLAVULANIC ACID: SIGNIFICANT CHANGE UP
-  AMPICILLIN/SULBACTAM: SIGNIFICANT CHANGE UP
-  AMPICILLIN: SIGNIFICANT CHANGE UP
-  AMPICILLIN: SIGNIFICANT CHANGE UP
-  AZTREONAM: SIGNIFICANT CHANGE UP
-  AZTREONAM: SIGNIFICANT CHANGE UP
-  CEFAZOLIN: SIGNIFICANT CHANGE UP
-  CEFEPIME: SIGNIFICANT CHANGE UP
-  CEFEPIME: SIGNIFICANT CHANGE UP
-  CEFOXITIN: SIGNIFICANT CHANGE UP
-  CEFTAZIDIME: SIGNIFICANT CHANGE UP
-  CEFTRIAXONE: SIGNIFICANT CHANGE UP
-  CIPROFLOXACIN: SIGNIFICANT CHANGE UP
-  CIPROFLOXACIN: SIGNIFICANT CHANGE UP
-  ERTAPENEM: SIGNIFICANT CHANGE UP
-  GENTAMICIN: SIGNIFICANT CHANGE UP
-  GENTAMICIN: SIGNIFICANT CHANGE UP
-  IMIPENEM: SIGNIFICANT CHANGE UP
-  IMIPENEM: SIGNIFICANT CHANGE UP
-  LEVOFLOXACIN: SIGNIFICANT CHANGE UP
-  LEVOFLOXACIN: SIGNIFICANT CHANGE UP
-  MEROPENEM: SIGNIFICANT CHANGE UP
-  MEROPENEM: SIGNIFICANT CHANGE UP
-  PIPERACILLIN/TAZOBACTAM: SIGNIFICANT CHANGE UP
-  PIPERACILLIN/TAZOBACTAM: SIGNIFICANT CHANGE UP
-  TETRACYCLINE: SIGNIFICANT CHANGE UP
-  TOBRAMYCIN: SIGNIFICANT CHANGE UP
-  TOBRAMYCIN: SIGNIFICANT CHANGE UP
-  TRIMETHOPRIM/SULFAMETHOXAZOLE: SIGNIFICANT CHANGE UP
-  VANCOMYCIN: SIGNIFICANT CHANGE UP
ALBUMIN SERPL ELPH-MCNC: 3.7 G/DL — SIGNIFICANT CHANGE UP (ref 3.3–5)
ALP SERPL-CCNC: 106 U/L — SIGNIFICANT CHANGE UP (ref 40–120)
ALT FLD-CCNC: 23 U/L — SIGNIFICANT CHANGE UP (ref 10–45)
ANION GAP SERPL CALC-SCNC: 13 MMOL/L — SIGNIFICANT CHANGE UP (ref 5–17)
AST SERPL-CCNC: 42 U/L — HIGH (ref 10–40)
BASOPHILS # BLD AUTO: 0.06 K/UL — SIGNIFICANT CHANGE UP (ref 0–0.2)
BASOPHILS NFR BLD AUTO: 0.5 % — SIGNIFICANT CHANGE UP (ref 0–2)
BILIRUB SERPL-MCNC: 0.3 MG/DL — SIGNIFICANT CHANGE UP (ref 0.2–1.2)
BUN SERPL-MCNC: 23 MG/DL — SIGNIFICANT CHANGE UP (ref 7–23)
CALCIUM SERPL-MCNC: 9.6 MG/DL — SIGNIFICANT CHANGE UP (ref 8.4–10.5)
CHLORIDE SERPL-SCNC: 100 MMOL/L — SIGNIFICANT CHANGE UP (ref 96–108)
CO2 SERPL-SCNC: 24 MMOL/L — SIGNIFICANT CHANGE UP (ref 22–31)
CREAT SERPL-MCNC: 1.41 MG/DL — HIGH (ref 0.5–1.3)
CRP SERPL-MCNC: 11.3 MG/L — HIGH (ref 0–4)
CULTURE RESULTS: SIGNIFICANT CHANGE UP
D DIMER BLD IA.RAPID-MCNC: 164 NG/ML DDU — SIGNIFICANT CHANGE UP
EOSINOPHIL # BLD AUTO: 0.22 K/UL — SIGNIFICANT CHANGE UP (ref 0–0.5)
EOSINOPHIL NFR BLD AUTO: 2 % — SIGNIFICANT CHANGE UP (ref 0–6)
FERRITIN SERPL-MCNC: 75 NG/ML — SIGNIFICANT CHANGE UP (ref 30–400)
GLUCOSE BLDC GLUCOMTR-MCNC: 144 MG/DL — HIGH (ref 70–99)
GLUCOSE BLDC GLUCOMTR-MCNC: 197 MG/DL — HIGH (ref 70–99)
GLUCOSE BLDC GLUCOMTR-MCNC: 252 MG/DL — HIGH (ref 70–99)
GLUCOSE BLDC GLUCOMTR-MCNC: 285 MG/DL — HIGH (ref 70–99)
GLUCOSE SERPL-MCNC: 256 MG/DL — HIGH (ref 70–99)
HCT VFR BLD CALC: 35.6 % — LOW (ref 39–50)
HGB BLD-MCNC: 10.9 G/DL — LOW (ref 13–17)
IMM GRANULOCYTES NFR BLD AUTO: 0.4 % — SIGNIFICANT CHANGE UP (ref 0–1.5)
LYMPHOCYTES # BLD AUTO: 2.57 K/UL — SIGNIFICANT CHANGE UP (ref 1–3.3)
LYMPHOCYTES # BLD AUTO: 23.3 % — SIGNIFICANT CHANGE UP (ref 13–44)
MAGNESIUM SERPL-MCNC: 1.9 MG/DL — SIGNIFICANT CHANGE UP (ref 1.6–2.6)
MCHC RBC-ENTMCNC: 24.4 PG — LOW (ref 27–34)
MCHC RBC-ENTMCNC: 30.6 GM/DL — LOW (ref 32–36)
MCV RBC AUTO: 79.6 FL — LOW (ref 80–100)
METHOD TYPE: SIGNIFICANT CHANGE UP
MONOCYTES # BLD AUTO: 0.94 K/UL — HIGH (ref 0–0.9)
MONOCYTES NFR BLD AUTO: 8.5 % — SIGNIFICANT CHANGE UP (ref 2–14)
NEUTROPHILS # BLD AUTO: 7.21 K/UL — SIGNIFICANT CHANGE UP (ref 1.8–7.4)
NEUTROPHILS NFR BLD AUTO: 65.3 % — SIGNIFICANT CHANGE UP (ref 43–77)
NRBC # BLD: 0 /100 WBCS — SIGNIFICANT CHANGE UP (ref 0–0)
ORGANISM # SPEC MICROSCOPIC CNT: SIGNIFICANT CHANGE UP
PHOSPHATE SERPL-MCNC: 3.5 MG/DL — SIGNIFICANT CHANGE UP (ref 2.5–4.5)
PLATELET # BLD AUTO: 254 K/UL — SIGNIFICANT CHANGE UP (ref 150–400)
POTASSIUM SERPL-MCNC: 3.6 MMOL/L — SIGNIFICANT CHANGE UP (ref 3.5–5.3)
POTASSIUM SERPL-SCNC: 3.6 MMOL/L — SIGNIFICANT CHANGE UP (ref 3.5–5.3)
PROT SERPL-MCNC: 8.2 G/DL — SIGNIFICANT CHANGE UP (ref 6–8.3)
RBC # BLD: 4.47 M/UL — SIGNIFICANT CHANGE UP (ref 4.2–5.8)
RBC # FLD: 15.8 % — HIGH (ref 10.3–14.5)
SODIUM SERPL-SCNC: 137 MMOL/L — SIGNIFICANT CHANGE UP (ref 135–145)
SPECIMEN SOURCE: SIGNIFICANT CHANGE UP
VANCOMYCIN TROUGH SERPL-MCNC: 16.7 UG/ML — SIGNIFICANT CHANGE UP (ref 10–20)
WBC # BLD: 11.04 K/UL — HIGH (ref 3.8–10.5)
WBC # FLD AUTO: 11.04 K/UL — HIGH (ref 3.8–10.5)

## 2022-01-10 PROCEDURE — 99222 1ST HOSP IP/OBS MODERATE 55: CPT

## 2022-01-10 PROCEDURE — 99233 SBSQ HOSP IP/OBS HIGH 50: CPT | Mod: GC

## 2022-01-10 RX ORDER — MAGNESIUM SULFATE 500 MG/ML
2 VIAL (ML) INJECTION ONCE
Refills: 0 | Status: COMPLETED | OUTPATIENT
Start: 2022-01-10 | End: 2022-01-10

## 2022-01-10 RX ORDER — MEROPENEM 1 G/30ML
1000 INJECTION INTRAVENOUS
Qty: 42 | Refills: 0
Start: 2022-01-10 | End: 2022-01-23

## 2022-01-10 RX ORDER — AMOXICILLIN 250 MG/5ML
1 SUSPENSION, RECONSTITUTED, ORAL (ML) ORAL
Qty: 42 | Refills: 0
Start: 2022-01-10 | End: 2022-01-23

## 2022-01-10 RX ORDER — AMOXICILLIN 250 MG/5ML
500 SUSPENSION, RECONSTITUTED, ORAL (ML) ORAL THREE TIMES A DAY
Refills: 0 | Status: DISCONTINUED | OUTPATIENT
Start: 2022-01-10 | End: 2022-01-19

## 2022-01-10 RX ORDER — POTASSIUM CHLORIDE 20 MEQ
40 PACKET (EA) ORAL ONCE
Refills: 0 | Status: COMPLETED | OUTPATIENT
Start: 2022-01-10 | End: 2022-01-10

## 2022-01-10 RX ORDER — MEROPENEM 1 G/30ML
1000 INJECTION INTRAVENOUS EVERY 8 HOURS
Refills: 0 | Status: DISCONTINUED | OUTPATIENT
Start: 2022-01-10 | End: 2022-01-19

## 2022-01-10 RX ADMIN — Medication 3 MILLIGRAM(S): at 21:48

## 2022-01-10 RX ADMIN — Medication 5 UNIT(S): at 18:21

## 2022-01-10 RX ADMIN — Medication 500 MILLIGRAM(S): at 18:22

## 2022-01-10 RX ADMIN — Medication 25 GRAM(S): at 16:57

## 2022-01-10 RX ADMIN — Medication 81 MILLIGRAM(S): at 11:52

## 2022-01-10 RX ADMIN — INSULIN GLARGINE 15 UNIT(S): 100 INJECTION, SOLUTION SUBCUTANEOUS at 22:15

## 2022-01-10 RX ADMIN — Medication 1 TABLET(S): at 11:52

## 2022-01-10 RX ADMIN — Medication 6: at 12:35

## 2022-01-10 RX ADMIN — Medication 6: at 09:03

## 2022-01-10 RX ADMIN — Medication 500 MILLIGRAM(S): at 11:53

## 2022-01-10 RX ADMIN — Medication 5 UNIT(S): at 09:04

## 2022-01-10 RX ADMIN — Medication 500 MILLIGRAM(S): at 21:48

## 2022-01-10 RX ADMIN — PIPERACILLIN AND TAZOBACTAM 200 GRAM(S): 4; .5 INJECTION, POWDER, LYOPHILIZED, FOR SOLUTION INTRAVENOUS at 05:52

## 2022-01-10 RX ADMIN — ZINC SULFATE TAB 220 MG (50 MG ZINC EQUIVALENT) 220 MILLIGRAM(S): 220 (50 ZN) TAB at 11:52

## 2022-01-10 RX ADMIN — MEROPENEM 100 MILLIGRAM(S): 1 INJECTION INTRAVENOUS at 18:22

## 2022-01-10 RX ADMIN — Medication 5 UNIT(S): at 12:35

## 2022-01-10 RX ADMIN — Medication 166.67 MILLIGRAM(S): at 00:35

## 2022-01-10 RX ADMIN — AMLODIPINE BESYLATE 10 MILLIGRAM(S): 2.5 TABLET ORAL at 05:52

## 2022-01-10 RX ADMIN — PIPERACILLIN AND TAZOBACTAM 200 GRAM(S): 4; .5 INJECTION, POWDER, LYOPHILIZED, FOR SOLUTION INTRAVENOUS at 11:53

## 2022-01-10 NOTE — PROGRESS NOTE ADULT - PROBLEM SELECTOR PLAN 3
Takes 8-10 U premeal and 20U lantus at home  -inpatient, 5U premeal and 15U lantus with mISS for now  -f/u A1C, potential endocrine consult warranted Takes lisinopril 20mg and norvasc 10mg at home  -hold lisinopril i/s/o DEBBY  -continue norvasc

## 2022-01-10 NOTE — PROGRESS NOTE ADULT - PROBLEM SELECTOR PLAN 6
Likely pre-renal  -f/u BMP post-1L NS  -f/u urine lytes Incidentally COVID+. Vaccinated with J&J. On RA. CXR clear.  - s/p MAB

## 2022-01-10 NOTE — PROGRESS NOTE ADULT - PROBLEM SELECTOR PLAN 1
2/4 SIRS criteria met based on WBC >12 and HR >90. Likely source of infection is gluteal abscess  -s/p vanc 1g, will continue and dose based on trough (due 12pm 1/9)  -d/c unasyn and start Zosyn given poorly controlled DM, foul drainage, and recent abx use thus needs coverage for pseudomonal infection  -f/u wound Cx and BCx CT Pelvis w/ IV contrast - Right gluteal wound with associated phlegmonous changes extending into the hamstring tendon. No associated abscess/collection. Recent tx at [facility] and d/c on doxycyline for 7d. Wound growing pseudomonas, ESBL   - Cont zosyn and vanc, pending vanc trough  - f/u susceptibilites  - f/u wound care consult  -hold off on surgical consult at this time as low suspicion for abscess/nec fasc base on CT findings

## 2022-01-10 NOTE — PROGRESS NOTE ADULT - PROBLEM SELECTOR PLAN 4
Takes lisinopril 20mg and norvasc 10mg at home  -hold lisinopril i/s/o DEBBY  -continue norvasc hgb 10.3, mcv 78.3  Likely AOCD, TSH .408

## 2022-01-10 NOTE — PROGRESS NOTE ADULT - PROBLEM SELECTOR PLAN 1
cont. vanc + Zosyn for now; no e/o abscess on CT; f/u cultures; ID and Wound Care consulted, f/u recs

## 2022-01-10 NOTE — PROGRESS NOTE ADULT - PROBLEM SELECTOR PLAN 7
Incidentally COVID+. Vaccinated with J&J. On RA. CXR clear.  -MAB as high risk w/ DM  -f/u COVID labs in AM F: s/p 1L NS  E: replete prn  N: Consistent carb/DASH diet, no snacks  GI: None  A/c: Heparin SQ  Activity: As tolerated  Code: FC  Dispo: [facility], PT alvin for d/c planning, [facility] for d/c planning

## 2022-01-10 NOTE — PROGRESS NOTE ADULT - SUBJECTIVE AND OBJECTIVE BOX
Patient is a 61y old  Male who presents with a chief complaint of     INTERVAL HPI/OVERNIGHT EVENTS:    Pt. seen and examined earlier today  Pt. has no new medical complaints  Pt. denies F/C, CP, SOB, cough  Pt. sometimes refuses HSQ and insulin    Review of Systems: 12 point review of systems otherwise negative    MEDICATIONS  (STANDING):  amLODIPine   Tablet 10 milliGRAM(s) Oral daily  ascorbic acid 500 milliGRAM(s) Oral daily  aspirin enteric coated 81 milliGRAM(s) Oral daily  dextrose 40% Gel 15 Gram(s) Oral once  dextrose 5%. 1000 milliLiter(s) (50 mL/Hr) IV Continuous <Continuous>  dextrose 5%. 1000 milliLiter(s) (100 mL/Hr) IV Continuous <Continuous>  dextrose 50% Injectable 25 Gram(s) IV Push once  dextrose 50% Injectable 12.5 Gram(s) IV Push once  dextrose 50% Injectable 25 Gram(s) IV Push once  glucagon  Injectable 1 milliGRAM(s) IntraMuscular once  heparin   Injectable 5000 Unit(s) SubCutaneous every 8 hours  influenza   Vaccine 0.5 milliLiter(s) IntraMuscular once  insulin glargine Injectable (LANTUS) 15 Unit(s) SubCutaneous at bedtime  insulin lispro (ADMELOG) corrective regimen sliding scale   SubCutaneous three times a day before meals  insulin lispro (ADMELOG) corrective regimen sliding scale   SubCutaneous at bedtime  insulin lispro Injectable (ADMELOG) 5 Unit(s) SubCutaneous three times a day before meals  multivitamin 1 Tablet(s) Oral daily  piperacillin/tazobactam IVPB.. 4.5 Gram(s) IV Intermittent every 6 hours  zinc sulfate 220 milliGRAM(s) Oral daily    MEDICATIONS  (PRN):  melatonin 3 milliGRAM(s) Oral at bedtime PRN Insomnia      Allergies    No Known Drug Allergies  pork (Unknown)    Intolerances          Vital Signs Last 24 Hrs  T(C): 36.9 (10 Freeman 2022 05:00), Max: 36.9 (09 Jan 2022 20:23)  T(F): 98.4 (10 Freeman 2022 05:00), Max: 98.4 (09 Jan 2022 20:23)  HR: 73 (10 Freeman 2022 05:00) (73 - 83)  BP: 161/90 (10 Freeman 2022 05:00) (153/80 - 161/91)  BP(mean): --  RR: 19 (10 Freeman 2022 05:00) (17 - 19)  SpO2: 100% (10 Freeman 2022 05:00) (97% - 100%)  CAPILLARY BLOOD GLUCOSE      POCT Blood Glucose.: 285 mg/dL (10 Freeman 2022 12:06)  POCT Blood Glucose.: 252 mg/dL (10 Freeman 2022 08:31)  POCT Blood Glucose.: 207 mg/dL (09 Jan 2022 21:51)  POCT Blood Glucose.: 165 mg/dL (09 Jan 2022 17:06)      01-09 @ 07:01  -  01-10 @ 07:00  --------------------------------------------------------  IN: 0 mL / OUT: 800 mL / NET: -800 mL        Physical Exam:  (earlier today)  Daily     Daily   General:  comfortable-appearing in NAD  HEENT:  MMM  Lungs:  normal WOB on RA  Extremities:  +Charcot foot  Skin:  WWP  Neuro:  AAOx3  R gluteal wound dressing C/D/I    LABS:                        10.9   11.04 )-----------( 254      ( 10 Freeman 2022 11:30 )             35.6     01-10    137  |  100  |  23  ----------------------------<  256<H>  3.6   |  24  |  1.41<H>    Ca    9.6      10 Freeman 2022 11:26  Phos  3.5     01-10  Mg     1.9     01-10    TPro  8.2  /  Alb  3.7  /  TBili  0.3  /  DBili  x   /  AST  42<H>  /  ALT  23  /  AlkPhos  106  01-10    PT/INR - ( 09 Jan 2022 08:10 )   PT: 11.7 sec;   INR: 0.97          PTT - ( 09 Jan 2022 08:10 )  PTT:31.2 sec

## 2022-01-10 NOTE — CONSULT NOTE ADULT - ASSESSMENT
per Internal Medicine    62 y/o M w/    Problem/Plan - 1:  ·  Problem: Soft tissue infection.   ·  Plan: cont. vanc + Zosyn for now; no e/o abscess on CT; f/u cultures; Wound Care consult.    Problem/Plan - 2:  ·  Problem: Hypertension.   ·  Plan: cont. Norvasc, DASH diet.    Problem/Plan - 3:  ·  Problem: Type 2 diabetes mellitus, with long-term current use of insulin.   ·  Plan: HbA1c 10%; cont. insulin, monitor blood glucose.    Problem/Plan - 4:  ·  Problem: DEBBY (acute kidney injury).   ·  Plan: monitor BMP, encourage PO intake, dose vanc by level.    Problem/Plan - 5:  ·  Problem: 2019 novel coronavirus disease (COVID-19).   ·  Plan: not hypoxic, asymptomatic; cont. supportive care, contact + airborne precautions; received MAb.

## 2022-01-10 NOTE — PROGRESS NOTE ADULT - PROBLEM SELECTOR PLAN 2
CT Pelvis w/ IV contrast - Right gluteal wound with associated phlegmonous changes extending into the hamstring tendon. No associated abscess/collection. Recent tx at [facility] and d/c on doxycyline for 7d  -abx as above  -wound care consult  -hold off on surgical consult at this time as low suspicion for abscess/nec fasc base on CT findings Takes 8-10 U premeal and 20U lantus at home. A1c 10  - inpatient, 5U premeal and 15U lantus with mISS for now  - Endo consult

## 2022-01-10 NOTE — CONSULT NOTE ADULT - ASSESSMENT
Impression: 61 year old male with right gluteal wound with incomplete treatment after being discharged from [facility]. Patient was recieving vancomycin, he interrupted his course now with abscess cultures showing ESBL E. Coli, Pseudomonas, and Enterococcus Fecalis.     Suggest:   -Stop Vancomycin and Zosyn   -Start Meropenum 1gram every 8 hours to complete a 2 week course   -Start Amoxicillin 500 mg every 8 hours to complete a 2 week course    ID Team One Signing Off  Impression: 61 year old male with right gluteal wound with incomplete treatment after being discharged from [facility]. Patient was recieving vancomycin, he interrupted his course now with abscess cultures showing ESBL E. Coli, Pseudomonas, and Enterococcus Fecalis.     Suggest:   -Stop Vancomycin and Zosyn   -Start Meropenum 1gram every 8 hours to complete a 2 week course   -Start Amoxicillin 500 mg every 8 hours to complete a 2 week course  - needs weekly CBC, CMP.  Can fax results to Dr. Doe:  [phone]    ID Team One Signing Off

## 2022-01-10 NOTE — PROGRESS NOTE ADULT - SUBJECTIVE AND OBJECTIVE BOX
***Note in progress***    OVERNIGHT EVENTS: NAEO    SUBJECTIVE / INTERVAL HPI: Patient seen and examined at bedside. Patient denying chest pain, SOB, palpitations, cough. Patient denies fever, chills, HA, Dizziness, change in vision/hearing, N/V, abdominal pain, diarrhea, constipation, hematochezia/melena, dysuria, hematuria, new onset weakness/numbness, LE pain and/or swelling.    Remaining ROS negative       PHYSICAL EXAM:    General: WDWN  HEENT: NC/AT; PERRL, anicteric sclera; MMM  Neck: supple  Cardiovascular: +S1/S2, RRR  Respiratory: CTA B/L; no W/R/R  Gastrointestinal: soft, NT/ND; +BSx4  Extremities: WWP; no edema, clubbing or cyanosis  Vascular: 2+ radial, DP/PT pulses B/L  Neurological: AAOx3; no focal deficits  Psychiatric: pleasant mood and affect  Dermatologic: no appreciable wounds or damage to the skin    VITAL SIGNS:  Vital Signs Last 24 Hrs  T(C): 36.9 (10 Freeman 2022 05:00), Max: 36.9 (09 Jan 2022 20:23)  T(F): 98.4 (10 Freeman 2022 05:00), Max: 98.4 (09 Jan 2022 20:23)  HR: 73 (10 Freeman 2022 05:00) (73 - 83)  BP: 161/90 (10 Freeman 2022 05:00) (153/80 - 161/91)  BP(mean): --  RR: 19 (10 Freeman 2022 05:00) (17 - 19)  SpO2: 100% (10 Freeman 2022 05:00) (97% - 100%)      MEDICATIONS:  MEDICATIONS  (STANDING):  amLODIPine   Tablet 10 milliGRAM(s) Oral daily  ascorbic acid 500 milliGRAM(s) Oral daily  aspirin enteric coated 81 milliGRAM(s) Oral daily  dextrose 40% Gel 15 Gram(s) Oral once  dextrose 5%. 1000 milliLiter(s) (50 mL/Hr) IV Continuous <Continuous>  dextrose 5%. 1000 milliLiter(s) (100 mL/Hr) IV Continuous <Continuous>  dextrose 50% Injectable 25 Gram(s) IV Push once  dextrose 50% Injectable 12.5 Gram(s) IV Push once  dextrose 50% Injectable 25 Gram(s) IV Push once  glucagon  Injectable 1 milliGRAM(s) IntraMuscular once  heparin   Injectable 5000 Unit(s) SubCutaneous every 8 hours  influenza   Vaccine 0.5 milliLiter(s) IntraMuscular once  insulin glargine Injectable (LANTUS) 15 Unit(s) SubCutaneous at bedtime  insulin lispro (ADMELOG) corrective regimen sliding scale   SubCutaneous three times a day before meals  insulin lispro (ADMELOG) corrective regimen sliding scale   SubCutaneous at bedtime  insulin lispro Injectable (ADMELOG) 5 Unit(s) SubCutaneous three times a day before meals  multivitamin 1 Tablet(s) Oral daily  piperacillin/tazobactam IVPB.. 4.5 Gram(s) IV Intermittent every 6 hours  zinc sulfate 220 milliGRAM(s) Oral daily    MEDICATIONS  (PRN):  melatonin 3 milliGRAM(s) Oral at bedtime PRN Insomnia      ALLERGIES:  Allergies    No Known Drug Allergies  pork (Unknown)    Intolerances        LABS:                        10.1   11.48 )-----------( 232      ( 09 Jan 2022 08:10 )             32.6     01-09    137  |  102  |  20  ----------------------------<  192<H>  3.7   |  24  |  1.38<H>    Ca    9.2      09 Jan 2022 08:10  Phos  2.8     01-09  Mg     1.9     01-09    TPro  7.4  /  Alb  3.5  /  TBili  0.2  /  DBili  x   /  AST  39  /  ALT  20  /  AlkPhos  106  01-09    PT/INR - ( 09 Jan 2022 08:10 )   PT: 11.7 sec;   INR: 0.97          PTT - ( 09 Jan 2022 08:10 )  PTT:31.2 sec    CAPILLARY BLOOD GLUCOSE      POCT Blood Glucose.: 252 mg/dL (10 Freeman 2022 08:31)      RADIOLOGY & ADDITIONAL TESTS: Reviewed. OVERNIGHT EVENTS: refused lantus and heparin    SUBJECTIVE / INTERVAL HPI: Patient seen and examined at bedside. Pt reports that he refused lantus last night because his FS was 205 at that time and he didn't want to bottom out. I attempted to explain the use of lantus to which he explained, he "knows his body" and his goal glucode for himself in 150-200. Patient denying chest pain, SOB, palpitations, cough. Patient denies fever, chills, HA, Dizziness, change in vision/hearing, N/V, abdominal pain, diarrhea, constipation, hematochezia/melena, dysuria, hematuria, new onset weakness/numbness, LE pain and/or swelling.    Remaining ROS negative     PHYSICAL EXAM:  GENERAL: Awake, alert and interactive, no acute distress  NEURO: A&Ox4, no focal deficits  HEENT: Normocephalic, atraumatic, oral mucosa moist, no oral lesions noted  CARDIAC: RRR, +S1/S2, no murmurs/rubs/gallops  PULM: Breathing comfortably on RA, clear to auscultation bilaterally, no wheezes/rales/rhonchi  ABDOMEN: Soft, nontender, nondistended, no masses and hepatsplenomegaly  SKIN: +R gluteal wound dressed with pink dressing. No drainage or erythema around dressing noted. Not ttp. Skin warm and dry  VASC: 1+ peripheral pulses, no edema, no LE tenderness    VITAL SIGNS:  Vital Signs Last 24 Hrs  T(C): 36.9 (10 Freeman 2022 05:00), Max: 36.9 (09 Jan 2022 20:23)  T(F): 98.4 (10 Freeman 2022 05:00), Max: 98.4 (09 Jan 2022 20:23)  HR: 73 (10 Freeman 2022 05:00) (73 - 83)  BP: 161/90 (10 Freeman 2022 05:00) (153/80 - 161/91)  BP(mean): --  RR: 19 (10 Freeman 2022 05:00) (17 - 19)  SpO2: 100% (10 Freeman 2022 05:00) (97% - 100%)    MEDICATIONS:  MEDICATIONS  (STANDING):  amLODIPine   Tablet 10 milliGRAM(s) Oral daily  ascorbic acid 500 milliGRAM(s) Oral daily  aspirin enteric coated 81 milliGRAM(s) Oral daily  dextrose 40% Gel 15 Gram(s) Oral once  dextrose 5%. 1000 milliLiter(s) (50 mL/Hr) IV Continuous <Continuous>  dextrose 5%. 1000 milliLiter(s) (100 mL/Hr) IV Continuous <Continuous>  dextrose 50% Injectable 25 Gram(s) IV Push once  dextrose 50% Injectable 12.5 Gram(s) IV Push once  dextrose 50% Injectable 25 Gram(s) IV Push once  glucagon  Injectable 1 milliGRAM(s) IntraMuscular once  heparin   Injectable 5000 Unit(s) SubCutaneous every 8 hours  influenza   Vaccine 0.5 milliLiter(s) IntraMuscular once  insulin glargine Injectable (LANTUS) 15 Unit(s) SubCutaneous at bedtime  insulin lispro (ADMELOG) corrective regimen sliding scale   SubCutaneous three times a day before meals  insulin lispro (ADMELOG) corrective regimen sliding scale   SubCutaneous at bedtime  insulin lispro Injectable (ADMELOG) 5 Unit(s) SubCutaneous three times a day before meals  multivitamin 1 Tablet(s) Oral daily  piperacillin/tazobactam IVPB.. 4.5 Gram(s) IV Intermittent every 6 hours  zinc sulfate 220 milliGRAM(s) Oral daily    MEDICATIONS  (PRN):  melatonin 3 milliGRAM(s) Oral at bedtime PRN Insomnia    ALLERGIES:  Allergies    No Known Drug Allergies  pork (Unknown)    Intolerances    LABS:                        10.1   11.48 )-----------( 232      ( 09 Jan 2022 08:10 )             32.6     01-09    137  |  102  |  20  ----------------------------<  192<H>  3.7   |  24  |  1.38<H>    Ca    9.2      09 Jan 2022 08:10  Phos  2.8     01-09  Mg     1.9     01-09    TPro  7.4  /  Alb  3.5  /  TBili  0.2  /  DBili  x   /  AST  39  /  ALT  20  /  AlkPhos  106  01-09    PT/INR - ( 09 Jan 2022 08:10 )   PT: 11.7 sec;   INR: 0.97          PTT - ( 09 Jan 2022 08:10 )  PTT:31.2 sec    CAPILLARY BLOOD GLUCOSE      POCT Blood Glucose.: 252 mg/dL (10 Freeman 2022 08:31)      RADIOLOGY & ADDITIONAL TESTS: Reviewed.

## 2022-01-11 LAB
ANION GAP SERPL CALC-SCNC: 9 MMOL/L — SIGNIFICANT CHANGE UP (ref 5–17)
BUN SERPL-MCNC: 26 MG/DL — HIGH (ref 7–23)
CALCIUM SERPL-MCNC: 9.1 MG/DL — SIGNIFICANT CHANGE UP (ref 8.4–10.5)
CHLORIDE SERPL-SCNC: 102 MMOL/L — SIGNIFICANT CHANGE UP (ref 96–108)
CO2 SERPL-SCNC: 24 MMOL/L — SIGNIFICANT CHANGE UP (ref 22–31)
CREAT SERPL-MCNC: 1.22 MG/DL — SIGNIFICANT CHANGE UP (ref 0.5–1.3)
CRP SERPL-MCNC: 7.7 MG/L — HIGH (ref 0–4)
D DIMER BLD IA.RAPID-MCNC: 157 NG/ML DDU — SIGNIFICANT CHANGE UP
FERRITIN SERPL-MCNC: 63 NG/ML — SIGNIFICANT CHANGE UP (ref 30–400)
GLUCOSE BLDC GLUCOMTR-MCNC: 155 MG/DL — HIGH (ref 70–99)
GLUCOSE BLDC GLUCOMTR-MCNC: 164 MG/DL — HIGH (ref 70–99)
GLUCOSE BLDC GLUCOMTR-MCNC: 259 MG/DL — HIGH (ref 70–99)
GLUCOSE BLDC GLUCOMTR-MCNC: 307 MG/DL — HIGH (ref 70–99)
GLUCOSE SERPL-MCNC: 249 MG/DL — HIGH (ref 70–99)
HCT VFR BLD CALC: 33.2 % — LOW (ref 39–50)
HGB BLD-MCNC: 10.4 G/DL — LOW (ref 13–17)
MAGNESIUM SERPL-MCNC: 1.9 MG/DL — SIGNIFICANT CHANGE UP (ref 1.6–2.6)
MCHC RBC-ENTMCNC: 24.8 PG — LOW (ref 27–34)
MCHC RBC-ENTMCNC: 31.3 GM/DL — LOW (ref 32–36)
MCV RBC AUTO: 79 FL — LOW (ref 80–100)
NRBC # BLD: 0 /100 WBCS — SIGNIFICANT CHANGE UP (ref 0–0)
PHOSPHATE SERPL-MCNC: 3.2 MG/DL — SIGNIFICANT CHANGE UP (ref 2.5–4.5)
PLATELET # BLD AUTO: 220 K/UL — SIGNIFICANT CHANGE UP (ref 150–400)
POTASSIUM SERPL-MCNC: 3.3 MMOL/L — LOW (ref 3.5–5.3)
POTASSIUM SERPL-SCNC: 3.3 MMOL/L — LOW (ref 3.5–5.3)
RBC # BLD: 4.2 M/UL — SIGNIFICANT CHANGE UP (ref 4.2–5.8)
RBC # FLD: 15.6 % — HIGH (ref 10.3–14.5)
SODIUM SERPL-SCNC: 135 MMOL/L — SIGNIFICANT CHANGE UP (ref 135–145)
WBC # BLD: 9.84 K/UL — SIGNIFICANT CHANGE UP (ref 3.8–10.5)
WBC # FLD AUTO: 9.84 K/UL — SIGNIFICANT CHANGE UP (ref 3.8–10.5)

## 2022-01-11 PROCEDURE — 76937 US GUIDE VASCULAR ACCESS: CPT | Mod: 26,59

## 2022-01-11 PROCEDURE — 99232 SBSQ HOSP IP/OBS MODERATE 35: CPT

## 2022-01-11 PROCEDURE — 36573 INSJ PICC RS&I 5 YR+: CPT

## 2022-01-11 RX ORDER — ENOXAPARIN SODIUM 100 MG/ML
40 INJECTION SUBCUTANEOUS EVERY 24 HOURS
Refills: 0 | Status: DISCONTINUED | OUTPATIENT
Start: 2022-01-11 | End: 2022-01-19

## 2022-01-11 RX ORDER — CHLORHEXIDINE GLUCONATE 213 G/1000ML
1 SOLUTION TOPICAL
Refills: 0 | Status: DISCONTINUED | OUTPATIENT
Start: 2022-01-11 | End: 2022-01-19

## 2022-01-11 RX ORDER — SODIUM CHLORIDE 9 MG/ML
10 INJECTION INTRAMUSCULAR; INTRAVENOUS; SUBCUTANEOUS
Refills: 0 | Status: DISCONTINUED | OUTPATIENT
Start: 2022-01-11 | End: 2022-01-19

## 2022-01-11 RX ADMIN — MEROPENEM 100 MILLIGRAM(S): 1 INJECTION INTRAVENOUS at 01:01

## 2022-01-11 RX ADMIN — CHLORHEXIDINE GLUCONATE 1 APPLICATION(S): 213 SOLUTION TOPICAL at 17:48

## 2022-01-11 RX ADMIN — Medication 500 MILLIGRAM(S): at 13:02

## 2022-01-11 RX ADMIN — Medication 2: at 12:08

## 2022-01-11 RX ADMIN — AMLODIPINE BESYLATE 10 MILLIGRAM(S): 2.5 TABLET ORAL at 06:19

## 2022-01-11 RX ADMIN — Medication 3 MILLIGRAM(S): at 21:33

## 2022-01-11 RX ADMIN — Medication 500 MILLIGRAM(S): at 21:33

## 2022-01-11 RX ADMIN — MEROPENEM 100 MILLIGRAM(S): 1 INJECTION INTRAVENOUS at 09:40

## 2022-01-11 RX ADMIN — Medication 81 MILLIGRAM(S): at 12:08

## 2022-01-11 RX ADMIN — Medication 2: at 17:47

## 2022-01-11 RX ADMIN — ZINC SULFATE TAB 220 MG (50 MG ZINC EQUIVALENT) 220 MILLIGRAM(S): 220 (50 ZN) TAB at 12:08

## 2022-01-11 RX ADMIN — Medication 500 MILLIGRAM(S): at 12:08

## 2022-01-11 RX ADMIN — Medication 5 UNIT(S): at 08:46

## 2022-01-11 RX ADMIN — INSULIN GLARGINE 15 UNIT(S): 100 INJECTION, SOLUTION SUBCUTANEOUS at 21:33

## 2022-01-11 RX ADMIN — Medication 5 UNIT(S): at 17:48

## 2022-01-11 RX ADMIN — Medication 4: at 21:33

## 2022-01-11 RX ADMIN — Medication 500 MILLIGRAM(S): at 06:19

## 2022-01-11 RX ADMIN — MEROPENEM 100 MILLIGRAM(S): 1 INJECTION INTRAVENOUS at 17:47

## 2022-01-11 RX ADMIN — Medication 1 TABLET(S): at 12:08

## 2022-01-11 RX ADMIN — Medication 6: at 08:46

## 2022-01-11 RX ADMIN — Medication 5 UNIT(S): at 12:09

## 2022-01-11 NOTE — CONSULT NOTE ADULT - SUBJECTIVE AND OBJECTIVE BOX
Patient is a 61y old  Male who presents with a chief complaint of      HPI:  61M, vaccined with J&J for COVID-19, undomicilied, current smoker with PMH T2DM (insulin-dependent) c/b neuropathy and HTN as well as hx of multiple skin/soft tissue infections presenting with wound on R buttock. Patient states wound as been present for a few months but 3 days ago started having a foul smell and draining yellow/green pus. Yesterday, started draining ss fluid. Patient notes mild pain in the area. He reports he had fevers at home, highest was 101F oral, but no fevers in past 24hr. He also notes looser stools (only one per day), mild congestion, weakness and recent HA. He denies CP, SOB, cough, n/v, blood in stool/urine, and dysuria. He reports good PO intake.    Of note, patient was recently d/c'd from [facility] or [facility] (he cannot remember) on doxycyline for 7d for the same wound.    In the ED:  VS: Temp 98.6, HR 96, /95, RR 18, SpO2 97% on RA  Labs: wbc 12.40 (neutrophilic predominance), hgb 10.3, mcv 78.4, plt 258, Cr 1.46 (0.7 in Dec 2019), glucose 220, AlkPhos 125, AST 55, ALT 34, coags wnl, trops neg, lactate 0.8, , VBG wnl. UA w/ protein, trace blood, and 500 glucose. COVID +  ECG: NSR, nl axis, no ischemic changes, QTc 480  Imaging: CXR - clear. CT Pelvis w/ IV contrast - Right gluteal wound with associated phlegmonous changes extending into the hamstring tendon. No associated abscess/collection.  Interventions: 1L NS, 20U lantus, 9U lispro, started Unasyn and vancomycin (08 Jan 2022 08:08)      PAST MEDICAL & SURGICAL HISTORY:  IDDM (insulin dependent diabetes mellitus)    Hypertension    Left toe amputee  Great toe bone removal        MEDICATIONS  (STANDING):  amLODIPine   Tablet 10 milliGRAM(s) Oral daily  ascorbic acid 500 milliGRAM(s) Oral daily  aspirin enteric coated 81 milliGRAM(s) Oral daily  dextrose 40% Gel 15 Gram(s) Oral once  dextrose 5%. 1000 milliLiter(s) (50 mL/Hr) IV Continuous <Continuous>  dextrose 5%. 1000 milliLiter(s) (100 mL/Hr) IV Continuous <Continuous>  dextrose 50% Injectable 25 Gram(s) IV Push once  dextrose 50% Injectable 12.5 Gram(s) IV Push once  dextrose 50% Injectable 25 Gram(s) IV Push once  glucagon  Injectable 1 milliGRAM(s) IntraMuscular once  heparin   Injectable 5000 Unit(s) SubCutaneous every 8 hours  influenza   Vaccine 0.5 milliLiter(s) IntraMuscular once  insulin glargine Injectable (LANTUS) 15 Unit(s) SubCutaneous at bedtime  insulin lispro (ADMELOG) corrective regimen sliding scale   SubCutaneous three times a day before meals  insulin lispro (ADMELOG) corrective regimen sliding scale   SubCutaneous at bedtime  insulin lispro Injectable (ADMELOG) 5 Unit(s) SubCutaneous three times a day before meals  multivitamin 1 Tablet(s) Oral daily  piperacillin/tazobactam IVPB.. 4.5 Gram(s) IV Intermittent every 6 hours  zinc sulfate 220 milliGRAM(s) Oral daily    MEDICATIONS  (PRN):  melatonin 3 milliGRAM(s) Oral at bedtime PRN Insomnia      FAMILY HISTORY:  FH: type 2 diabetes mellitus        CBC Full  -  ( 09 Jan 2022 08:10 )  WBC Count : 11.48 K/uL  RBC Count : 4.12 M/uL  Hemoglobin : 10.1 g/dL  Hematocrit : 32.6 %  Platelet Count - Automated : 232 K/uL  Mean Cell Volume : 79.1 fl  Mean Cell Hemoglobin : 24.5 pg  Mean Cell Hemoglobin Concentration : 31.0 gm/dL  Auto Neutrophil # : 7.24 K/uL  Auto Lymphocyte # : 2.37 K/uL  Auto Monocyte # : 1.49 K/uL  Auto Eosinophil # : 0.27 K/uL  Auto Basophil # : 0.08 K/uL  Auto Neutrophil % : 63.0 %  Auto Lymphocyte % : 20.6 %  Auto Monocyte % : 13.0 %  Auto Eosinophil % : 2.4 %  Auto Basophil % : 0.7 %      01-09    137  |  102  |  20  ----------------------------<  192<H>  3.7   |  24  |  1.38<H>    Ca    9.2      09 Jan 2022 08:10  Phos  2.8     01-09  Mg     1.9     01-09    TPro  7.4  /  Alb  3.5  /  TBili  0.2  /  DBili  x   /  AST  39  /  ALT  20  /  AlkPhos  106  01-09            Radiology:    < from: Xray Chest 1 View AP/PA (01.08.22 @ 00:13) >  EXAM:  XR CHEST AP OR PA 1V                           PROCEDURE DATE:  01/08/2022          INTERPRETATION:  XR CHEST dated 1/8/2022 12:13 AM    HISTORY: weakness hyperglycemia    COMPARISON: Chest x-ray 11/27/2019    FINDINGS: The lungs are clear. There are no pleural effusions. No   pneumothorax. The cardiomediastinal silhouette, bones and soft tissues   are unremarkable.      IMPRESSION: No radiographic evidence of active cardiopulmonary disease.   No acute infiltrates      < from: CT Pelvis w/ IV Cont (01.08.22 @ 01:04) >  EXAM:  CT PELVIS ONLY IC                           PROCEDURE DATE:  01/08/2022          INTERPRETATION:  CLINICAL INFORMATION: Right gluteal wound.    COMPARISON: None.    PROCEDURE:  CT of the Pelvis was performed with intravenous contrast.  Intravenous contrast: 90 ml Omnipaque 350. 10 ml discarded.  Oral contrast:None.  Sagittal and coronal reformats were performed.    FINDINGS:  There is an approximately 5 cm deep right gluteal wound defect extending   into the soft tissues of the right buttocks extending to the level of the   ischial tuberosity hamstring tendon attachment. There is associated fat   stranding and phlegmonous changes measuring 9 x 4 cm with no   collection/abscess. There is relative mild atrophy of the right gluteus   chanelle.  BLADDER: Distended.  REPRODUCTIVE ORGANS: The prostate is within normal limits.  LYMPH NODES: No pelvic lymphadenopathy.    VISUALIZED PORTIONS:    ABDOMINAL ORGANS: Within normal limits.  BOWEL: Moderate stool burden. Appendix is normal.  PERITONEUM: No ascites.  VESSELS: Severe calcified plaque aortoiliac system.  ABDOMINAL WALL: Small fat-containing umbilical hernia.  BONES: Mild degenerative disease of the lumbosacral spine.    IMPRESSION:    Right gluteal wound with associated phlegmonous changes extending into   the hamstring tendon. No associated abscess/collection.                Vital Signs Last 24 Hrs  T(C): 36.9 (10 Freeman 2022 05:00), Max: 36.9 (09 Jan 2022 20:23)  T(F): 98.4 (10 Freeman 2022 05:00), Max: 98.4 (09 Jan 2022 20:23)  HR: 73 (10 Freeman 2022 05:00) (73 - 83)  BP: 161/90 (10 Freeman 2022 05:00) (153/80 - 161/91)  BP(mean): --  RR: 19 (10 Freeman 2022 05:00) (17 - 19)  SpO2: 100% (10 Freeman 2022 05:00) (97% - 100%)        REVIEW OF SYSTEMS:  per HPI        Physical Exam:  on COVID isolation, in accordance with current standards limiting patient contact, please refer to exam performed on 1/10/2022    GENERAL: Awake, alert and interactive, no acute distress  NEURO: A&Ox4, no focal deficits  HEENT: Normocephalic, atraumatic, oral mucosa moist, no oral lesions noted  NECK: Supple  CARDIAC: Regular rate and rhythm, +S1/S2, no murmurs/rubs/gallops  PULM: Breathing comfortably on RA, clear to auscultation bilaterally, no wheezes/rales/rhonchi  ABDOMEN: Soft, nontender, nondistended, +bs  MSK: Range of motion grossly intact  SKIN: +R gluteal wound dressed with pink dressing. No drainage or erythema around dressing noted. Not ttp. Skin warm and dry  VASC: 1+ peripheral pulses, no edema, no LE tenderness  Psych: Appropriate affect        PM&R Impression:    1) deconditioned  2) no focal weakness    Recommendations/ Plan :    1) Physical therapy focusing on therapeutic exercises, bed mobility/transfer out of bed evaluation, progressive ambulation with assistive devices prn.    2) Anticipated Disposition Plan/Recs:    subacute rehab placement                  
HPI:  61M, vaccined with J&J for COVID-19, undomicilied, current smoker with PMH T2DM (insulin-dependent) c/b neuropathy and HTN as well as hx of multiple skin/soft tissue infections presenting with wound on R buttock. Patient states wound as been present for a few months but 3 days ago started having a foul smell and draining yellow/green pus. Yesterday, started draining ss fluid. Patient notes mild pain in the area. He reports he had fevers at home, highest was 101F oral, but no fevers in past 24hr. He also notes looser stools (only one per day), mild congestion, weakness and recent HA. He denies CP, SOB, cough, n/v, blood in stool/urine, and dysuria. He reports good PO intake.    Of note, patient was recently d/c'd from [facility] or [facility] (he cannot remember) on doxycyline for 7d for the same wound.    In the ED:  VS: Temp 98.6, HR 96, /95, RR 18, SpO2 97% on RA  Labs: wbc 12.40 (neutrophilic predominance), hgb 10.3, mcv 78.4, plt 258, Cr 1.46 (0.7 in Dec 2019), glucose 220, AlkPhos 125, AST 55, ALT 34, coags wnl, trops neg, lactate 0.8, , VBG wnl. UA w/ protein, trace blood, and 500 glucose. COVID +  ECG: NSR, nl axis, no ischemic changes, QTc 480  Imaging: CXR - clear. CT Pelvis w/ IV contrast - Right gluteal wound with associated phlegmonous changes extending into the hamstring tendon. No associated abscess/collection.  Interventions: 1L NS, 20U lantus, 9U lispro, started Unasyn and vancomycin (08 Jan 2022 08:08)    GENERAL SURGERY ADDENDUM: 62 yo undomciled M, smoker, PMH of diabetes, neuropathy, HTN, multiple soft tissue infections (for duration of 3 months, he was hospitalized for recently in [facility] and received Abx course for) who is currently admitted for gluteal wound that reportedly was draining foul smelling yellow/green pus for 3 days. CT scan revealed, phlegmonus changes extending to the hamstring tendon without fluid collection. He was incidentally found to be COVID positive. We are consulted for the role of debridement of the that ulcer. Off note, he is fully mobile.  Surgery Consult Note      PAST MEDICAL & SURGICAL HISTORY:  IDDM (insulin dependent diabetes mellitus)    Hypertension    Left toe amputee  Great toe bone removal        MEDICATIONS  (STANDING):  amLODIPine   Tablet 10 milliGRAM(s) Oral daily  amoxicillin 500 milliGRAM(s) Oral three times a day  ascorbic acid 500 milliGRAM(s) Oral daily  aspirin enteric coated 81 milliGRAM(s) Oral daily  chlorhexidine 2% Cloths 1 Application(s) Topical <User Schedule>  dextrose 40% Gel 15 Gram(s) Oral once  dextrose 5%. 1000 milliLiter(s) (50 mL/Hr) IV Continuous <Continuous>  dextrose 5%. 1000 milliLiter(s) (100 mL/Hr) IV Continuous <Continuous>  dextrose 50% Injectable 25 Gram(s) IV Push once  dextrose 50% Injectable 12.5 Gram(s) IV Push once  dextrose 50% Injectable 25 Gram(s) IV Push once  enoxaparin Injectable 40 milliGRAM(s) SubCutaneous every 24 hours  glucagon  Injectable 1 milliGRAM(s) IntraMuscular once  influenza   Vaccine 0.5 milliLiter(s) IntraMuscular once  insulin glargine Injectable (LANTUS) 15 Unit(s) SubCutaneous at bedtime  insulin lispro (ADMELOG) corrective regimen sliding scale   SubCutaneous three times a day before meals  insulin lispro (ADMELOG) corrective regimen sliding scale   SubCutaneous at bedtime  insulin lispro Injectable (ADMELOG) 5 Unit(s) SubCutaneous three times a day before meals  meropenem  IVPB 1000 milliGRAM(s) IV Intermittent every 8 hours  multivitamin 1 Tablet(s) Oral daily  zinc sulfate 220 milliGRAM(s) Oral daily    MEDICATIONS  (PRN):  melatonin 3 milliGRAM(s) Oral at bedtime PRN Insomnia  sodium chloride 0.9% lock flush 10 milliLiter(s) IV Push every 1 hour PRN Pre/post blood products, medications, blood draw, and to maintain line patency      Allergies    No Known Drug Allergies  pork (Unknown)    Intolerances        SOCIAL HISTORY:    FAMILY HISTORY:  FH: type 2 diabetes mellitus        Vital Signs Last 24 Hrs  T(C): 36.4 (11 Jan 2022 13:00), Max: 36.7 (11 Jan 2022 06:26)  T(F): 97.6 (11 Jan 2022 13:00), Max: 98.1 (11 Jan 2022 06:26)  HR: 82 (11 Jan 2022 13:00) (79 - 82)  BP: 157/88 (11 Jan 2022 13:00) (144/68 - 157/88)  BP(mean): --  RR: 19 (11 Jan 2022 13:00) (18 - 19)  SpO2: 98% (11 Jan 2022 13:00) (96% - 98%)    I&O's Summary      Physical Exam:  General: NAD, resting comfortably  HEENT: NC/AT, EOMI, normal hearing,   Pulmonary: normal resp effort  Cardiovascular: NSR  Abdominal: soft, ND/NT  Extremities: WWP, normal strength. Right gluteal region: 7 X 7 cm pale looking open ulcer, 5 cm deep, no drainage, no bleeding, no elle-wound edema, erythema or crepitus.     Lines/drains/tubes:    LABS:                        10.4   9.84  )-----------( 220      ( 11 Jan 2022 06:55 )             33.2     01-11    135  |  102  |  26<H>  ----------------------------<  249<H>  3.3<L>   |  24  |  1.22    Ca    9.1      11 Jan 2022 06:55  Phos  3.2     01-11  Mg     1.9     01-11    TPro  8.2  /  Alb  3.7  /  TBili  0.3  /  DBili  x   /  AST  42<H>  /  ALT  23  /  AlkPhos  106  01-10        CAPILLARY BLOOD GLUCOSE      POCT Blood Glucose.: 155 mg/dL (11 Jan 2022 16:53)  POCT Blood Glucose.: 164 mg/dL (11 Jan 2022 11:59)  POCT Blood Glucose.: 259 mg/dL (11 Jan 2022 08:25)  POCT Blood Glucose.: 197 mg/dL (10 Freeman 2022 22:13)    LIVER FUNCTIONS - ( 10 Freeman 2022 11:26 )  Alb: 3.7 g/dL / Pro: 8.2 g/dL / ALK PHOS: 106 U/L / ALT: 23 U/L / AST: 42 U/L / GGT: x             Cultures:      RADIOLOGY & ADDITIONAL STUDIES:         
History: 61M, vaccined with J&J for COVID-19, undomicilied, current smoker with PMH T2DM (insulin-dependent) c/b neuropathy and HTN as well as hx of multiple skin/soft tissue infections presenting with wound on R buttock. Patient states wound as been present for a few months but 3 days ago started having a foul smell and draining yellow/green pus. Yesterday, started draining ss fluid. Patient notes mild pain in the area. He reports he had fevers at home, highest was 101F oral, but no fevers in past 24hr. He also notes looser stools (only one per day), mild congestion, weakness and recent HA. He denies CP, SOB, cough, n/v, blood in stool/urine, and dysuria. He reports good PO intake. Of note, patient was recently d/c'd from [facility] or [facility] (he cannot remember) on doxycyline for 7d for the same wound.    VITALS  Vital Signs Last 24 Hrs  T(C): 36.7 (10 Freeman 2022 13:30), Max: 36.9 (09 Jan 2022 20:23)  T(F): 98 (10 Freeman 2022 13:30), Max: 98.4 (09 Jan 2022 20:23)  HR: 87 (10 Freeman 2022 13:30) (73 - 87)  BP: 134/86 (10 Freeman 2022 13:30) (134/86 - 161/91)  BP(mean): --  RR: 19 (10 Freeman 2022 13:30) (18 - 19)  SpO2: 99% (10 Freeman 2022 13:30) (97% - 100%)    CAPILLARY BLOOD GLUCOSE      POCT Blood Glucose.: 285 mg/dL (10 Freeman 2022 12:06)  POCT Blood Glucose.: 252 mg/dL (10 Freeman 2022 08:31)  POCT Blood Glucose.: 207 mg/dL (09 Jan 2022 21:51)      PHYSICAL EXAM  General: NAD  Head: NC/AT; MMM  Neck: Supple  Respiratory: CTAB  Cardiovascular: Regular rhythm/rate; S1/S2   Gastrointestinal: Soft; NTND; normoactive BS  Extremities: WWP  Buttock: Right gluteal wound with active drainage no pus noted, 3 centimeters in diameter   Neurological: no obvious focal deficits    MEDICATIONS  (STANDING):  amLODIPine   Tablet 10 milliGRAM(s) Oral daily  amoxicillin 500 milliGRAM(s) Oral three times a day  ascorbic acid 500 milliGRAM(s) Oral daily  aspirin enteric coated 81 milliGRAM(s) Oral daily  dextrose 40% Gel 15 Gram(s) Oral once  dextrose 5%. 1000 milliLiter(s) (50 mL/Hr) IV Continuous <Continuous>  dextrose 5%. 1000 milliLiter(s) (100 mL/Hr) IV Continuous <Continuous>  dextrose 50% Injectable 25 Gram(s) IV Push once  dextrose 50% Injectable 12.5 Gram(s) IV Push once  dextrose 50% Injectable 25 Gram(s) IV Push once  glucagon  Injectable 1 milliGRAM(s) IntraMuscular once  heparin   Injectable 5000 Unit(s) SubCutaneous every 8 hours  influenza   Vaccine 0.5 milliLiter(s) IntraMuscular once  insulin glargine Injectable (LANTUS) 15 Unit(s) SubCutaneous at bedtime  insulin lispro (ADMELOG) corrective regimen sliding scale   SubCutaneous three times a day before meals  insulin lispro (ADMELOG) corrective regimen sliding scale   SubCutaneous at bedtime  insulin lispro Injectable (ADMELOG) 5 Unit(s) SubCutaneous three times a day before meals  meropenem  IVPB 1000 milliGRAM(s) IV Intermittent every 8 hours  multivitamin 1 Tablet(s) Oral daily  potassium chloride   Powder 40 milliEquivalent(s) Oral once  zinc sulfate 220 milliGRAM(s) Oral daily    MEDICATIONS  (PRN):  melatonin 3 milliGRAM(s) Oral at bedtime PRN Insomnia      No Known Drug Allergies  pork (Unknown)      LABS                        10.9   11.04 )-----------( 254      ( 10 Freeman 2022 11:30 )             35.6     01-10    137  |  100  |  23  ----------------------------<  256<H>  3.6   |  24  |  1.41<H>    Ca    9.6      10 Freeman 2022 11:26  Phos  3.5     01-10  Mg     1.9     01-10    TPro  8.2  /  Alb  3.7  /  TBili  0.3  /  DBili  x   /  AST  42<H>  /  ALT  23  /  AlkPhos  106  01-10    PT/INR - ( 09 Jan 2022 08:10 )   PT: 11.7 sec;   INR: 0.97          PTT - ( 09 Jan 2022 08:10 )  PTT:31.2 sec      
Vascular Access Service Consult Note    [facility] - [facility]OB[facility]M Dx:      Diagnosis: cellulitis    Indications for Vascular Access (Check all that apply)  [x  ]  Antibiotic Therapy       Antibiotic Prescribed:  meropemen x 2 weeks                                                                                  [  ]  IV Hydration  [  ]  Total Parenteral Nutrition  [  ]  Chemotherapy  [  ]  Difficult Venous Access  [  ]  CVP monitoring  [  ]  Medications with high potential for tissue necrosis on extravasation  [  ]  Other    Screening (Check all that apply)  Previous Radiation to chest  [  ] Yes      [  x]  No  Breast Cancer                          [  ] Left     [  ]  Right    [ x ]  No  Lymph Node Dissection         [  ] Left     [  ]  Right    [x  ]  No  Pacemaker or ICD                   [  ] Left     [  ]  Right    [x  ]  No  Upper Extremity DVT             [  ] Left     [  ]  Right    [ x ]  No  Chronic Kidney Disease         [  ]  Yes     [ x ]  No  Hemodialysis                           [  ]  Yes     [x  ]  No  AV Fistula/ Graft                     [  ]  Left    [  ]  Right    [ x ]  No  Temp>101F in past 24 H       [  ]  Yes     [x  ]  No  H/O PICC/Midline                   [  ]  Yes     [ x ]  No    Lab data:                        10.4   9.84  )-----------( 220      ( 11 Jan 2022 06:55 )             33.2     01-11    135  |  102  |  26<H>  ----------------------------<  249<H>  3.3<L>   |  24  |  1.22    Ca    9.1      11 Jan 2022 06:55  Phos  3.2     01-11  Mg     1.9     01-11    TPro  8.2  /  Alb  3.7  /  TBili  0.3  /  DBili  x   /  AST  42<H>  /  ALT  23  /  AlkPhos  106  01-10              I have reviewed the chart, interviewed and examined the patient and determined that this patient:  [  ] Is a candidate for a PICC line  [x  ] Is a candidate for a Midline  [  ] Is not a candidate for vascular access device (reason)    Lumens:    [ x ] Single  [  ] Double

## 2022-01-11 NOTE — PROGRESS NOTE ADULT - PROBLEM SELECTOR PLAN 3
Takes lisinopril 20mg and norvasc 10mg at home  -hold lisinopril i/s/o DEBBY  -continue norvasc Takes lisinopril 20mg and norvasc 10mg at home    Plan:  -hold lisinopril i/s/o DEBBY  -continue norvasc

## 2022-01-11 NOTE — CONSULT NOTE ADULT - ASSESSMENT
62 yo undomiciled M, smoker, PMH of diabetes, neuropathy, HTN, multiple soft tissue infections who is currently for gluteal wound. CT revealed no fluid collection or abscess. We are consulted for the role of debridement. At this time the wound looks clean. No role for surgical debridement. We recommend continuing the wound nurses care and possible closure with plastic surgery. Team 4C will follow.  60 yo undomiciled M, smoker, PMH of diabetes, neuropathy, HTN, multiple soft tissue infections who is currently for gluteal wound. CT revealed no fluid collection or abscess. We are consulted for the role of debridement. At this time the wound looks clean. No role for surgical debridement. We recommend continuing the wound nurses care and possible closure with plastic surgery. Team 4C will follow.     senior addendum  patient seen and examined  states that the wound is a cause of diabetes, and that he knows his body best. before physical examination by the team, he immediately states he would refuse debridements, and that he has had them in the past, but would not accept any now. we reassured him that we would only examine and do nothing more than that without a discussion and his permission.     would appears as deep tunnel with smooth surfaces inside, at least 4 cm, with no fluctuant areas appreciated, and no drainage. edge appears pale. no erythema around the wound. it is an odd wound for pressure ulcer. patient denies laying in one position for long time, and also denies any injections in the area (denies IVDA), and states he has not had any other surgeries in the area, no prosthesis.     at this time, no sign of infection, no indication for urgent debridement of the wound. continue off loading, nutritional optimization (albumin on arrival 3.6 is not malnourished), and local wound care. continue discussion with wound care nurse and plastics on how to promote healing of the wound.

## 2022-01-11 NOTE — ADVANCED PRACTICE NURSE CONSULT - REASON FOR CONSULT
WOC nurse consult to assess right ischial tuberosity pressure injury. Per the H&P, the patient is a 61M, vaccinated with J&J for COVID-19, undomiciled, current smoker with PMH T2DM (insulin-dependent) c/b neuropathy and HTN as well as hx of multiple skin/soft tissue infections presented with wound on right buttock. Patient stated wound as been present for a few months but 3 days ago started having a foul smell and draining yellow/green pus.  WOC nurse consult to assess right ischial tuberosity pressure injury that was present on admission. Per the H&P, the patient is a 61M, vaccinated with J&J for COVID-19, undomiciled, current smoker with PMH T2DM (insulin-dependent) c/b neuropathy and HTN as well as hx of multiple skin/soft tissue infections presented with wound on right buttock. Patient stated wound as been present for a few months but 3 days ago started having a foul smell and draining yellow/green pus.

## 2022-01-11 NOTE — ADVANCED PRACTICE NURSE CONSULT - RECOMMEDATIONS
Recommend plastics evaluation of the right ischial tuberosity pressure injury.   Re-consult WOCN if needed.   WOCN discussed assessment and discussion with patient with house staff.  Recommendations by WOCN - irrigate the wound with normal saline, fill wound lightly with Aquacel Extra, cover with foam dressing every other day. The patient refused the WOCN's wound care  recommendations. Patient allowed WOCN to apply an Allevyn foam dressing, which can be applied every other day.    Plastics evaluation of the right ischial tuberosity pressure injury.   Re-consult WOCN if needed.   WOCN discussed assessment and discussion with patient with house staff.  Recommendations by WOCN - irrigate the wound with normal saline, fill wound lightly with Aquacel Extra, cover with foam dressing every other day. The patient refused the WOCN's wound care  recommendations. Patient allowed WOCN to apply an Allevyn foam dressing, which can be applied every other day.    Recommend Plastics evaluation of the right ischial tuberosity pressure injury.   Re-consult WOCN if needed.   WOCN discussed assessment and discussion with patient with house staff.

## 2022-01-11 NOTE — PROGRESS NOTE ADULT - SUBJECTIVE AND OBJECTIVE BOX
***Note in progress***    OVERNIGHT EVENTS: NAEO    SUBJECTIVE / INTERVAL HPI: Patient seen and examined at bedside. Patient denying chest pain, SOB, palpitations, cough. Patient denies fever, chills, HA, Dizziness, change in vision/hearing, N/V, abdominal pain, diarrhea, constipation, hematochezia/melena, dysuria, hematuria, new onset weakness/numbness, LE pain and/or swelling.    Remaining ROS negative       PHYSICAL EXAM:    General: WDWN  HEENT: NC/AT; PERRL, anicteric sclera; MMM  Neck: supple  Cardiovascular: +S1/S2, RRR  Respiratory: CTA B/L; no W/R/R  Gastrointestinal: soft, NT/ND; +BSx4  Extremities: WWP; no edema, clubbing or cyanosis  Vascular: 2+ radial, DP/PT pulses B/L  Neurological: AAOx3; no focal deficits  Psychiatric: pleasant mood and affect  Dermatologic: no appreciable wounds or damage to the skin    VITAL SIGNS:  Vital Signs Last 24 Hrs  T(C): 36.7 (11 Jan 2022 06:26), Max: 36.7 (10 Freeman 2022 13:30)  T(F): 98.1 (11 Jan 2022 06:26), Max: 98.1 (10 Freeman 2022 20:14)  HR: 79 (11 Jan 2022 06:26) (79 - 90)  BP: 144/68 (11 Jan 2022 06:26) (134/86 - 150/74)  BP(mean): --  RR: 18 (11 Jan 2022 06:26) (18 - 19)  SpO2: 96% (11 Jan 2022 06:26) (96% - 99%)      MEDICATIONS:  MEDICATIONS  (STANDING):  amLODIPine   Tablet 10 milliGRAM(s) Oral daily  amoxicillin 500 milliGRAM(s) Oral three times a day  ascorbic acid 500 milliGRAM(s) Oral daily  aspirin enteric coated 81 milliGRAM(s) Oral daily  dextrose 40% Gel 15 Gram(s) Oral once  dextrose 5%. 1000 milliLiter(s) (50 mL/Hr) IV Continuous <Continuous>  dextrose 5%. 1000 milliLiter(s) (100 mL/Hr) IV Continuous <Continuous>  dextrose 50% Injectable 25 Gram(s) IV Push once  dextrose 50% Injectable 12.5 Gram(s) IV Push once  dextrose 50% Injectable 25 Gram(s) IV Push once  enoxaparin Injectable 40 milliGRAM(s) SubCutaneous every 24 hours  glucagon  Injectable 1 milliGRAM(s) IntraMuscular once  influenza   Vaccine 0.5 milliLiter(s) IntraMuscular once  insulin glargine Injectable (LANTUS) 15 Unit(s) SubCutaneous at bedtime  insulin lispro (ADMELOG) corrective regimen sliding scale   SubCutaneous three times a day before meals  insulin lispro (ADMELOG) corrective regimen sliding scale   SubCutaneous at bedtime  insulin lispro Injectable (ADMELOG) 5 Unit(s) SubCutaneous three times a day before meals  meropenem  IVPB 1000 milliGRAM(s) IV Intermittent every 8 hours  multivitamin 1 Tablet(s) Oral daily  potassium chloride   Powder 40 milliEquivalent(s) Oral once  zinc sulfate 220 milliGRAM(s) Oral daily    MEDICATIONS  (PRN):  melatonin 3 milliGRAM(s) Oral at bedtime PRN Insomnia      ALLERGIES:  Allergies    No Known Drug Allergies  pork (Unknown)    Intolerances        LABS:                        10.4   9.84  )-----------( 220      ( 11 Jan 2022 06:55 )             33.2     01-11    135  |  102  |  26<H>  ----------------------------<  249<H>  3.3<L>   |  24  |  1.22    Ca    9.1      11 Jan 2022 06:55  Phos  3.2     01-11  Mg     1.9     01-11    TPro  8.2  /  Alb  3.7  /  TBili  0.3  /  DBili  x   /  AST  42<H>  /  ALT  23  /  AlkPhos  106  01-10        CAPILLARY BLOOD GLUCOSE      POCT Blood Glucose.: 259 mg/dL (11 Jan 2022 08:25)      RADIOLOGY & ADDITIONAL TESTS: Reviewed. OVERNIGHT EVENTS: NAEO    SUBJECTIVE / INTERVAL HPI: Patient seen and examined at bedside. Patient denying chest pain, SOB, palpitations, cough. Patient denies fever, chills, HA, Dizziness, change in vision/hearing, N/V, abdominal pain, diarrhea, constipation, hematochezia/melena, dysuria, hematuria, new onset weakness/numbness, LE pain and/or swelling.    Remaining ROS negative     PHYSICAL EXAM:  GENERAL: Awake, alert and interactive, no acute distress  NEURO: A&Ox4, no focal deficits  HEENT: Normocephalic, atraumatic, oral mucosa moist, no oral lesions noted  CARDIAC: RRR, +S1/S2, no murmurs/rubs/gallops  PULM: Breathing comfortably on RA, clear to auscultation bilaterally, no wheezes/rales/rhonchi  ABDOMEN: Soft, nontender, nondistended, no masses and hepatosplenomegaly  SKIN: +R gluteal wound dressed with pink dressing, serosanguinous drainage. No drainage or erythema around dressing noted. Not ttp. Skin warm and dry  VASC: 1+ peripheral pulses, no edema, no LE tenderness    VITAL SIGNS:  Vital Signs Last 24 Hrs  T(C): 36.7 (11 Jan 2022 06:26), Max: 36.7 (10 [place] 2022 13:30)  T(F): 98.1 (11 Jan 2022 06:26), Max: 98.1 (10 Freeman 2022 20:14)  HR: 79 (11 Jan 2022 06:26) (79 - 90)  BP: 144/68 (11 Jan 2022 06:26) (134/86 - 150/74)  BP(mean): --  RR: 18 (11 Jan 2022 06:26) (18 - 19)  SpO2: 96% (11 Jan 2022 06:26) (96% - 99%)    MEDICATIONS:  MEDICATIONS  (STANDING):  amLODIPine   Tablet 10 milliGRAM(s) Oral daily  amoxicillin 500 milliGRAM(s) Oral three times a day  ascorbic acid 500 milliGRAM(s) Oral daily  aspirin enteric coated 81 milliGRAM(s) Oral daily  dextrose 40% Gel 15 Gram(s) Oral once  dextrose 5%. 1000 milliLiter(s) (50 mL/Hr) IV Continuous <Continuous>  dextrose 5%. 1000 milliLiter(s) (100 mL/Hr) IV Continuous <Continuous>  dextrose 50% Injectable 25 Gram(s) IV Push once  dextrose 50% Injectable 12.5 Gram(s) IV Push once  dextrose 50% Injectable 25 Gram(s) IV Push once  enoxaparin Injectable 40 milliGRAM(s) SubCutaneous every 24 hours  glucagon  Injectable 1 milliGRAM(s) IntraMuscular once  influenza   Vaccine 0.5 milliLiter(s) IntraMuscular once  insulin glargine Injectable (LANTUS) 15 Unit(s) SubCutaneous at bedtime  insulin lispro (ADMELOG) corrective regimen sliding scale   SubCutaneous three times a day before meals  insulin lispro (ADMELOG) corrective regimen sliding scale   SubCutaneous at bedtime  insulin lispro Injectable (ADMELOG) 5 Unit(s) SubCutaneous three times a day before meals  meropenem  IVPB 1000 milliGRAM(s) IV Intermittent every 8 hours  multivitamin 1 Tablet(s) Oral daily  potassium chloride   Powder 40 milliEquivalent(s) Oral once  zinc sulfate 220 milliGRAM(s) Oral daily    MEDICATIONS  (PRN):  melatonin 3 milliGRAM(s) Oral at bedtime PRN Insomnia    ALLERGIES:  Allergies    No Known Drug Allergies  pork (Unknown)    Intolerances    LABS:                        10.4   9.84  )-----------( 220      ( 11 Jan 2022 06:55 )             33.2     01-11    135  |  102  |  26<H>  ----------------------------<  249<H>  3.3<L>   |  24  |  1.22    Ca    9.1      11 Jan 2022 06:55  Phos  3.2     01-11  Mg     1.9     01-11    TPro  8.2  /  Alb  3.7  /  TBili  0.3  /  DBili  x   /  AST  42<H>  /  ALT  23  /  AlkPhos  106  01-10        CAPILLARY BLOOD GLUCOSE      POCT Blood Glucose.: 259 mg/dL (11 Jan 2022 08:25)      RADIOLOGY & ADDITIONAL TESTS: Reviewed.

## 2022-01-11 NOTE — CONSULT NOTE ADULT - ATTENDING COMMENTS
See my note 1/12/22
Agree with above.  Wound cultures are polymicrobial.  Unfortunately there are no oral options for the ESBL E. Coli.  Can treat with Meropenem 1 gram IV q8hrs (to cover pseudomonas, e. coli and anaerobes) + Amoxicillin 500 mg PO q8hrs (to cover Enterococcus) x 2 weeks

## 2022-01-11 NOTE — ADVANCED PRACTICE NURSE CONSULT - ASSESSMENT
Right ischial tuberosity with red, non-granulating tissue measuring 2 cm x 3 cm x 5.5 cm with circumferential undermining, deepest undermining at 7 o'clock of 3.5 cm. Wound edges and periwound moist and hyperkeratotic; wound draining moderate amount of serosanguinous exudate. GRIFFIN also informed the patient that the hyperkeratotic edges and periwound may require debridement to promote wound to remove the devitalized tissue and promote wound healing. The patient stated he does not want the wound to get bigger and he knows what is best for his wound. The patient refused dressings other than an Allevyn dressing. GRIFFIN explained to patient that a hydrofiber, Aquacel Extra, would be appropriate for the wound. The patient responded the wound should be dry and the protein supplements he is taking are what will help the wound heal. The patient also stated he had been scheduled to see a plastic surgeon before leaving the rehab center [place].   GRIFFIN informed the patient that a plastics consult would be recommended as a second opinion. GRIFFIN applied an Allevyn dressing to the wound.  Right ischial tuberosity with red, non-granulating tissue measuring 2 cm x 3 cm x 5.5 cm with circumferential undermining, deepest undermining at 7 o'clock of 3.5 cm. Wound edges and periwound moist and hyperkeratotic; wound draining moderate amount of serosanguinous exudate. GRIFFIN discussed with the patient that the hyperkeratotic edges and periwound may require debridement to to remove the devitalized tissue and promote wound healing. The patient stated he does not want the wound to get bigger and he knows what is best for his wound. The patient refused dressings other than an Allevyn dressing. PAMCN explained to patient that a hydrofiber, Aquacel Extra, would be appropriate for the wound. The patient responded the wound should be dry and the protein supplements he is taking are what will help the wound heal. The patient also stated he had been scheduled to see a plastic surgeon before leaving the rehab center [place].   GRIFFIN informed the patient that a plastics consult would be recommended as a second opinion. GRIFFIN applied an Allevyn dressing to the wound.  Right ischial tuberosity with red, non-granulating tissue measuring 2 cm x 3 cm x 5.5 cm with circumferential undermining, deepest undermining at 7 o'clock of 3.5 cm. Wound edges and periwound moist and hyperkeratotic; wound draining moderate amount of serosanguinous exudate. GRIFFIN discussed with the patient that the hyperkeratotic edges and periwound may require debridement  to remove the devitalized tissue and promote wound healing. The patient stated he does not want the wound to get bigger and he knows what is best for his wound. The patient refused dressings other than an Allevyn dressing. WOCN explained to patient that a hydrofiber, Aquacel Extra, would be appropriate for the wound. The patient responded the wound should be dry and the protein supplements he is taking are what will help the wound heal. The patient also stated he had been scheduled to see a plastic surgeon before leaving the rehab center [place].   GRIFFIN informed the patient that a plastics consult would be recommended as a second opinion. GRIFFIN applied an Allevyn dressing to the wound.

## 2022-01-11 NOTE — PROGRESS NOTE ADULT - PROBLEM SELECTOR PLAN 2
Takes 8-10 U premeal and 20U lantus at home. A1c 10  - inpatient, 5U premeal and 15U lantus with mISS for now  - Endo consult Takes 8-10 U premeal and 20U lantus at home. A1c 10. Pt refusing lantus    Plan:  - inpatient, 5U premeal and 15U lantus with mISS for now
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
## 2022-01-11 NOTE — PROGRESS NOTE ADULT - PROBLEM SELECTOR PLAN 1
CT Pelvis w/ IV contrast - Right gluteal wound with associated phlegmonous changes extending into the hamstring tendon. No associated abscess/collection. Recent tx at [facility] and d/c on doxycyline for 7d. Wound growing pseudomonas, ESBL   - Cont zosyn and vanc, pending vanc trough  - f/u susceptibilites  - f/u wound care consult  -hold off on surgical consult at this time as low suspicion for abscess/nec fasc base on CT findings CT Pelvis w/ IV contrast - Right gluteal wound with associated phlegmonous changes extending into the hamstring tendon. No associated abscess/collection. Recent tx at [facility] and d/c on doxycyline for 7d. Wound growing pseudomonas, ESBL   - Transitioned to [facility] amoxicillin and meropenem  - Wound care recommending plastics consult

## 2022-01-11 NOTE — CHART NOTE - NSCHARTNOTEFT_GEN_A_CORE
Admitting Diagnosis:   Patient is a 61y old  Male who presents with a chief complaint of     PAST MEDICAL & SURGICAL HISTORY:  IDDM (insulin dependent diabetes mellitus)    Hypertension    Left toe amputee  Great toe bone removal    Current Nutrition Order:  DASH/TLC Consistent Carbohydrate Diet + LPS 2x/day (100 kcal, 15 g protein per serving) + Glucerna 2x/day (220 kcal,10 g protein per serving)     PO Intake: Good (%) [ x  ]  Fair (50-75%) [   ] Poor (<25%) [   ]    GI Issues: Denies nausea/vomiting at time of RD interview. Last documented bowel movement 1/10.     Pain: Denies pain at time of RD interview.    Skin Integrity: Right buttocks wound per chart. No edema documented at this time. No pressure injury documented at this time. Mitchell score: 16.    Labs:       135  |  102  |  26<H>  ----------------------------<  249<H>  3.3<L>   |  24  |  1.22    Ca    9.1      2022 06:55  Phos  3.2       Mg     1.9         TPro  8.2  /  Alb  3.7  /  TBili  0.3  /  DBili  x   /  AST  42<H>  /  ALT  23  /  AlkPhos  106  01-10    CAPILLARY BLOOD GLUCOSE      POCT Blood Glucose.: 259 mg/dL (2022 08:25)  POCT Blood Glucose.: 197 mg/dL (10 Freeman 2022 22:13)  POCT Blood Glucose.: 144 mg/dL (10 Freeman 2022 17:50)  POCT Blood Glucose.: 285 mg/dL (10 Freeman 2022 12:06)      Medications:  MEDICATIONS  (STANDING):  amLODIPine   Tablet 10 milliGRAM(s) Oral daily  amoxicillin 500 milliGRAM(s) Oral three times a day  ascorbic acid 500 milliGRAM(s) Oral daily  aspirin enteric coated 81 milliGRAM(s) Oral daily  dextrose 40% Gel 15 Gram(s) Oral once  dextrose 5%. 1000 milliLiter(s) (50 mL/Hr) IV Continuous <Continuous>  dextrose 5%. 1000 milliLiter(s) (100 mL/Hr) IV Continuous <Continuous>  dextrose 50% Injectable 25 Gram(s) IV Push once  dextrose 50% Injectable 12.5 Gram(s) IV Push once  dextrose 50% Injectable 25 Gram(s) IV Push once  enoxaparin Injectable 40 milliGRAM(s) SubCutaneous every 24 hours  glucagon  Injectable 1 milliGRAM(s) IntraMuscular once  influenza   Vaccine 0.5 milliLiter(s) IntraMuscular once  insulin glargine Injectable (LANTUS) 15 Unit(s) SubCutaneous at bedtime  insulin lispro (ADMELOG) corrective regimen sliding scale   SubCutaneous three times a day before meals  insulin lispro (ADMELOG) corrective regimen sliding scale   SubCutaneous at bedtime  insulin lispro Injectable (ADMELOG) 5 Unit(s) SubCutaneous three times a day before meals  meropenem  IVPB 1000 milliGRAM(s) IV Intermittent every 8 hours  multivitamin 1 Tablet(s) Oral daily  potassium chloride   Powder 40 milliEquivalent(s) Oral once  zinc sulfate 220 milliGRAM(s) Oral daily    MEDICATIONS  (PRN):  melatonin 3 milliGRAM(s) Oral at bedtime PRN Insomnia    Dosing Anthropometrics:  · Height for BMI (INCHES)	75 Inch(s)  · Weight for BMI (lbs)	181 lb  · Weight for BMI (kg)	82.1 kg  · Body Mass Index	22.6  · Ideal Body Weight (lbs)	196  · Ideal Body Weight (kg)	88.9    Weight Change: Per initial RD assessment "pt was 73kg in Dec 2019, admit wt 82kg reflects 9kg/12% gain." No new weights since admission. Obtain biweekly weights to assess changes/trends.    Estimated energy needs: ABW used for calculations as pt between % of IBW. (92%), adjusted for wound healing, COVID, age  27-33 kcal/[id number] kcal   1.2-1.4 g/k.5-114.9 g   30-35 mL/[id number] mL    Subjective: 61M, vaccined with J&J for COVID-19, undomicilied, current smoker with PMH T2DM (insulin-dependent) c/b neuropathy and HTN as well as hx of multiple skin/soft tissue infections presenting with wound on R buttock and incidentally found to be COVID+    Pt seen at bedside for follow up assessment-on room air. Labs reviewed ; noted w/ hypokalemia; potassium chloride ordered. Fingersticks 1/10-: 144-285 mg/dL; Insulin regimen ordered. Pt reports good PO intake since last RD assessment, able to complete 100% of meals. Pt reports last meal dinner 1/10; salmon for dinner. Of note, pt reports only receiving Glucerna 1x 1/10; RD discussed likelihood of diet advancement after lunch 1/10. Pt additionally requesting double portions of salmon with his dinner (RD to talk to kitchen staff). Encouraged pt to continue to meet nutritional needs as able; amenable to education. Made aware RD remains available. RD to follow up per protocol.     Previous Nutrition Diagnosis: Increased Nutrient Needs RT hypermetabolic 2/2 COVID infection and wound healing AEB COVID+, wound to buttock.     Active [ x ]  Resolved [   ]    If resolved, new PES:     Goal/Expected Outcome Pt to meet >75% EER with good tolerance    Recommendations:  1. Continue consistent carbohydrate, DASH diet + ONS   2. Continue micronutrient supplementation   3. Encourage pt to meet nutritional needs as able   4. Trend wts   5. Monitor lytes, glucose, skin   6. Will continue to assess/honor preferences as able     Education: Reinforced adequate PO intake to meet nutritional needs    Risk Level: High [   ] Moderate [ x  ] Low [   ]

## 2022-01-12 LAB
ANION GAP SERPL CALC-SCNC: 10 MMOL/L — SIGNIFICANT CHANGE UP (ref 5–17)
BUN SERPL-MCNC: 25 MG/DL — HIGH (ref 7–23)
CALCIUM SERPL-MCNC: 9.4 MG/DL — SIGNIFICANT CHANGE UP (ref 8.4–10.5)
CHLORIDE SERPL-SCNC: 102 MMOL/L — SIGNIFICANT CHANGE UP (ref 96–108)
CO2 SERPL-SCNC: 25 MMOL/L — SIGNIFICANT CHANGE UP (ref 22–31)
CREAT SERPL-MCNC: 1.06 MG/DL — SIGNIFICANT CHANGE UP (ref 0.5–1.3)
CRP SERPL-MCNC: 4.8 MG/L — HIGH (ref 0–4)
D DIMER BLD IA.RAPID-MCNC: 242 NG/ML DDU — HIGH
FERRITIN SERPL-MCNC: 71 NG/ML — SIGNIFICANT CHANGE UP (ref 30–400)
GLUCOSE BLDC GLUCOMTR-MCNC: 102 MG/DL — HIGH (ref 70–99)
GLUCOSE BLDC GLUCOMTR-MCNC: 117 MG/DL — HIGH (ref 70–99)
GLUCOSE BLDC GLUCOMTR-MCNC: 188 MG/DL — HIGH (ref 70–99)
GLUCOSE BLDC GLUCOMTR-MCNC: 240 MG/DL — HIGH (ref 70–99)
GLUCOSE SERPL-MCNC: 174 MG/DL — HIGH (ref 70–99)
HCT VFR BLD CALC: 36.5 % — LOW (ref 39–50)
HGB BLD-MCNC: 10.9 G/DL — LOW (ref 13–17)
MAGNESIUM SERPL-MCNC: 2.1 MG/DL — SIGNIFICANT CHANGE UP (ref 1.6–2.6)
MCHC RBC-ENTMCNC: 23.3 PG — LOW (ref 27–34)
MCHC RBC-ENTMCNC: 29.9 GM/DL — LOW (ref 32–36)
MCV RBC AUTO: 78.2 FL — LOW (ref 80–100)
NRBC # BLD: 0 /100 WBCS — SIGNIFICANT CHANGE UP (ref 0–0)
PHOSPHATE SERPL-MCNC: 3.1 MG/DL — SIGNIFICANT CHANGE UP (ref 2.5–4.5)
PLATELET # BLD AUTO: 231 K/UL — SIGNIFICANT CHANGE UP (ref 150–400)
POTASSIUM SERPL-MCNC: 3.5 MMOL/L — SIGNIFICANT CHANGE UP (ref 3.5–5.3)
POTASSIUM SERPL-SCNC: 3.5 MMOL/L — SIGNIFICANT CHANGE UP (ref 3.5–5.3)
RBC # BLD: 4.67 M/UL — SIGNIFICANT CHANGE UP (ref 4.2–5.8)
RBC # FLD: 15.5 % — HIGH (ref 10.3–14.5)
SARS-COV-2 RNA SPEC QL NAA+PROBE: DETECTED
SODIUM SERPL-SCNC: 137 MMOL/L — SIGNIFICANT CHANGE UP (ref 135–145)
WBC # BLD: 9.98 K/UL — SIGNIFICANT CHANGE UP (ref 3.8–10.5)
WBC # FLD AUTO: 9.98 K/UL — SIGNIFICANT CHANGE UP (ref 3.8–10.5)

## 2022-01-12 PROCEDURE — 99233 SBSQ HOSP IP/OBS HIGH 50: CPT | Mod: GC

## 2022-01-12 RX ORDER — LISINOPRIL 2.5 MG/1
20 TABLET ORAL DAILY
Refills: 0 | Status: DISCONTINUED | OUTPATIENT
Start: 2022-01-12 | End: 2022-01-19

## 2022-01-12 RX ADMIN — Medication 500 MILLIGRAM(S): at 06:15

## 2022-01-12 RX ADMIN — LISINOPRIL 20 MILLIGRAM(S): 2.5 TABLET ORAL at 11:13

## 2022-01-12 RX ADMIN — Medication 500 MILLIGRAM(S): at 11:13

## 2022-01-12 RX ADMIN — Medication 500 MILLIGRAM(S): at 13:03

## 2022-01-12 RX ADMIN — MEROPENEM 100 MILLIGRAM(S): 1 INJECTION INTRAVENOUS at 09:13

## 2022-01-12 RX ADMIN — Medication 4: at 12:10

## 2022-01-12 RX ADMIN — ZINC SULFATE TAB 220 MG (50 MG ZINC EQUIVALENT) 220 MILLIGRAM(S): 220 (50 ZN) TAB at 11:13

## 2022-01-12 RX ADMIN — Medication 81 MILLIGRAM(S): at 11:13

## 2022-01-12 RX ADMIN — Medication 5 UNIT(S): at 09:12

## 2022-01-12 RX ADMIN — Medication 1 TABLET(S): at 11:13

## 2022-01-12 RX ADMIN — Medication 5 UNIT(S): at 12:11

## 2022-01-12 RX ADMIN — Medication 500 MILLIGRAM(S): at 22:17

## 2022-01-12 RX ADMIN — INSULIN GLARGINE 15 UNIT(S): 100 INJECTION, SOLUTION SUBCUTANEOUS at 22:18

## 2022-01-12 RX ADMIN — Medication 5 UNIT(S): at 17:49

## 2022-01-12 RX ADMIN — AMLODIPINE BESYLATE 10 MILLIGRAM(S): 2.5 TABLET ORAL at 06:15

## 2022-01-12 RX ADMIN — CHLORHEXIDINE GLUCONATE 1 APPLICATION(S): 213 SOLUTION TOPICAL at 06:15

## 2022-01-12 RX ADMIN — Medication 2: at 09:12

## 2022-01-12 RX ADMIN — MEROPENEM 100 MILLIGRAM(S): 1 INJECTION INTRAVENOUS at 17:32

## 2022-01-12 RX ADMIN — MEROPENEM 100 MILLIGRAM(S): 1 INJECTION INTRAVENOUS at 01:18

## 2022-01-12 NOTE — PROGRESS NOTE ADULT - SUBJECTIVE AND OBJECTIVE BOX
*INCOMPLETE*    62 yo M smoker, vaccinated, undomicilied with PMHx of HTN, IDDM c/b neuropathy as well as hx of multiple skin/soft tissue infections admitted for right gluteal SSTI - followed by wound care s/p midline placement for IV Abx, found with DEBBY - improving, and COVID positive - ASx on RA; pending PT eval    OVERNIGHT EVENTS: Midline placed. Gen surg recs no indication for urgent debridement of the wound. continue off loading, nutritional optimization (albumin on arrival 3.6 is not malnourished), and local wound care. continue discussion with wound care nurse and plastics on how to promote healing of the wound.      SUBJECTIVE / INTERVAL HPI: Patient seen and examined at bedside.   F/C  Breathing, SOB, cough  Pain in right gluteal area      MEDICATIONS  (STANDING):  amLODIPine   Tablet 10 milliGRAM(s) Oral daily  amoxicillin 500 milliGRAM(s) Oral three times a day  ascorbic acid 500 milliGRAM(s) Oral daily  aspirin enteric coated 81 milliGRAM(s) Oral daily  chlorhexidine 2% Cloths 1 Application(s) Topical <User Schedule>  dextrose 40% Gel 15 Gram(s) Oral once  dextrose 5%. 1000 milliLiter(s) (50 mL/Hr) IV Continuous <Continuous>  dextrose 5%. 1000 milliLiter(s) (100 mL/Hr) IV Continuous <Continuous>  dextrose 50% Injectable 12.5 Gram(s) IV Push once  dextrose 50% Injectable 25 Gram(s) IV Push once  dextrose 50% Injectable 25 Gram(s) IV Push once  enoxaparin Injectable 40 milliGRAM(s) SubCutaneous every 24 hours  glucagon  Injectable 1 milliGRAM(s) IntraMuscular once  influenza   Vaccine 0.5 milliLiter(s) IntraMuscular once  insulin glargine Injectable (LANTUS) 15 Unit(s) SubCutaneous at bedtime  insulin lispro (ADMELOG) corrective regimen sliding scale   SubCutaneous three times a day before meals  insulin lispro (ADMELOG) corrective regimen sliding scale   SubCutaneous at bedtime  insulin lispro Injectable (ADMELOG) 5 Unit(s) SubCutaneous three times a day before meals  meropenem  IVPB 1000 milliGRAM(s) IV Intermittent every 8 hours  multivitamin 1 Tablet(s) Oral daily  zinc sulfate 220 milliGRAM(s) Oral daily    MEDICATIONS  (PRN):  melatonin 3 milliGRAM(s) Oral at bedtime PRN Insomnia  sodium chloride 0.9% lock flush 10 milliLiter(s) IV Push every 1 hour PRN Pre/post blood products, medications, blood draw, and to maintain line patency    Allergies    No Known Drug Allergies  pork (Unknown)    Intolerances        VITAL SIGNS:  Vital Signs Last 24 Hrs  T(C): 37.1 (11 Jan 2022 20:13), Max: 37.1 (11 Jan 2022 20:13)  T(F): 98.8 (11 Jan 2022 20:13), Max: 98.8 (11 Jan 2022 20:13)  HR: 91 (11 Jan 2022 20:13) (79 - 91)  BP: 135/87 (11 Jan 2022 20:13) (135/87 - 157/88)  BP(mean): --  RR: 18 (11 Jan 2022 20:13) (18 - 19)  SpO2: 96% (11 Jan 2022 20:13) (96% - 98%)        PHYSICAL EXAM:  GENERAL: Awake, alert and interactive, no acute distress  NEURO: A&Ox4, no focal deficits  HEENT: Normocephalic, atraumatic, oral mucosa moist, no oral lesions noted  CARDIAC: RRR, +S1/S2, no murmurs/rubs/gallops  PULM: Breathing comfortably on RA, clear to auscultation bilaterally, no wheezes/rales/rhonchi  ABDOMEN: Soft, nontender, nondistended, no masses and hepatosplenomegaly  SKIN: +R gluteal wound dressed with pink dressing, serosanguinous drainage. No drainage or erythema around dressing noted. Not ttp. Skin warm and dry  VASC: 1+ peripheral pulses, no edema, no LE tenderness      LABS:                        10.4   9.84  )-----------( 220      ( 11 Jan 2022 06:55 )             33.2     01-11    135  |  102  |  26<H>  ----------------------------<  249<H>  3.3<L>   |  24  |  1.22    Ca    9.1      11 Jan 2022 06:55  Phos  3.2     01-11  Mg     1.9     01-11    TPro  8.2  /  Alb  3.7  /  TBili  0.3  /  DBili  x   /  AST  42<H>  /  ALT  23  /  AlkPhos  106  01-10        CAPILLARY BLOOD GLUCOSE      POCT Blood Glucose.: 307 mg/dL (11 Jan 2022 21:18)  POCT Blood Glucose.: 155 mg/dL (11 Jan 2022 16:53)  POCT Blood Glucose.: 164 mg/dL (11 Jan 2022 11:59)  POCT Blood Glucose.: 259 mg/dL (11 Jan 2022 08:25)          RADIOLOGY & ADDITIONAL TESTS: Reviewed. OVERNIGHT EVENTS: Midline placed. Gen surg recs no indication for urgent debridement of the wound. continue off loading, nutritional optimization (albumin on arrival 3.6 is not malnourished), and local wound care. continue discussion with wound care nurse and plastics on how to promote healing of the wound.    SUBJECTIVE / INTERVAL HPI: Patient seen and examined at bedside. Pt denies F/C, SOB/cough, leg pain/swelling. Pt reports continued pain at the wound site in the right gluteal area, but overall improvement in the level of pain. Pt reports mild nausea since starting Abx. Pt denies any other new symptoms.     MEDICATIONS  (STANDING):  amLODIPine   Tablet 10 milliGRAM(s) Oral daily  amoxicillin 500 milliGRAM(s) Oral three times a day  ascorbic acid 500 milliGRAM(s) Oral daily  aspirin enteric coated 81 milliGRAM(s) Oral daily  chlorhexidine 2% Cloths 1 Application(s) Topical <User Schedule>  dextrose 40% Gel 15 Gram(s) Oral once  dextrose 5%. 1000 milliLiter(s) (50 mL/Hr) IV Continuous <Continuous>  dextrose 5%. 1000 milliLiter(s) (100 mL/Hr) IV Continuous <Continuous>  dextrose 50% Injectable 12.5 Gram(s) IV Push once  dextrose 50% Injectable 25 Gram(s) IV Push once  dextrose 50% Injectable 25 Gram(s) IV Push once  enoxaparin Injectable 40 milliGRAM(s) SubCutaneous every 24 hours  glucagon  Injectable 1 milliGRAM(s) IntraMuscular once  influenza   Vaccine 0.5 milliLiter(s) IntraMuscular once  insulin glargine Injectable (LANTUS) 15 Unit(s) SubCutaneous at bedtime  insulin lispro (ADMELOG) corrective regimen sliding scale   SubCutaneous three times a day before meals  insulin lispro (ADMELOG) corrective regimen sliding scale   SubCutaneous at bedtime  insulin lispro Injectable (ADMELOG) 5 Unit(s) SubCutaneous three times a day before meals  meropenem  IVPB 1000 milliGRAM(s) IV Intermittent every 8 hours  multivitamin 1 Tablet(s) Oral daily  zinc sulfate 220 milliGRAM(s) Oral daily    MEDICATIONS  (PRN):  melatonin 3 milliGRAM(s) Oral at bedtime PRN Insomnia  sodium chloride 0.9% lock flush 10 milliLiter(s) IV Push every 1 hour PRN Pre/post blood products, medications, blood draw, and to maintain line patency    Allergies    No Known Drug Allergies  pork (Unknown)    Intolerances        VITAL SIGNS:  Vital Signs Last 24 Hrs  T(C): 37.1 (11 Jan 2022 20:13), Max: 37.1 (11 Jan 2022 20:13)  T(F): 98.8 (11 Jan 2022 20:13), Max: 98.8 (11 Jan 2022 20:13)  HR: 91 (11 Jan 2022 20:13) (79 - 91)  BP: 135/87 (11 Jan 2022 20:13) (135/87 - 157/88)  BP(mean): --  RR: 18 (11 Jan 2022 20:13) (18 - 19)  SpO2: 96% (11 Jan 2022 20:13) (96% - 98%)        PHYSICAL EXAM:  GENERAL: Awake, alert and interactive, no acute distress  NEURO: A&Ox4, no focal deficits  HEENT: Normocephalic, atraumatic, oral mucosa moist, no oral lesions noted  CARDIAC: RRR, +S1/S2, no murmurs/rubs/gallops  PULM: Breathing comfortably on RA, clear to auscultation bilaterally, no wheezes/rales/rhonchi  ABDOMEN: Soft, nontender, nondistended, no masses and hepatosplenomegaly  SKIN: +R gluteal wound dressed with pink dressing, no drainage. Open wound with pale/white appearing wound edge w/o surrounding erythema/crepitus.   VASC: 1+ peripheral pulses, no edema, no LE tenderness      LABS:                        10.4   9.84  )-----------( 220      ( 11 Jan 2022 06:55 )             33.2     01-11    135  |  102  |  26<H>  ----------------------------<  249<H>  3.3<L>   |  24  |  1.22    Ca    9.1      11 Jan 2022 06:55  Phos  3.2     01-11  Mg     1.9     01-11    TPro  8.2  /  Alb  3.7  /  TBili  0.3  /  DBili  x   /  AST  42<H>  /  ALT  23  /  AlkPhos  106  01-10        CAPILLARY BLOOD GLUCOSE      POCT Blood Glucose.: 307 mg/dL (11 Jan 2022 21:18)  POCT Blood Glucose.: 155 mg/dL (11 Jan 2022 16:53)  POCT Blood Glucose.: 164 mg/dL (11 Jan 2022 11:59)  POCT Blood Glucose.: 259 mg/dL (11 Jan 2022 08:25)          RADIOLOGY & ADDITIONAL TESTS: Reviewed.

## 2022-01-12 NOTE — PROGRESS NOTE ADULT - ASSESSMENT
61M, vaccined with J&J for COVID-19, undomicilied, current smoker with PMH T2DM (insulin-dependent) c/b neuropathy and HTN as well as hx of multiple skin/soft tissue infections presenting with wound on R buttock and incidentally found to be COVID+ 60 yo M smoker, vaccinated, undomicilied with PMHx of HTN, IDDM c/b neuropathy as well as hx of multiple skin/soft tissue infections admitted for right gluteal SSTI - followed by wound care s/p wound culture growing polymicrobial and s/p midline placement for IV Abx, found with DEBBY - improving, and COVID positive - ASx on RA

## 2022-01-12 NOTE — PROGRESS NOTE ADULT - SUBJECTIVE AND OBJECTIVE BOX
SUBJECTIVE: Patient seen and examined at bedside; doing well; patient states he does not want any surgical debridement as soon as I introduced myself from surgery stating " he does not want the wound opened further", wants plastic elevation for closure. no other concerns or complaints     amLODIPine   Tablet 10 milliGRAM(s) Oral daily  amoxicillin 500 milliGRAM(s) Oral three times a day  aspirin enteric coated 81 milliGRAM(s) Oral daily  enoxaparin Injectable 40 milliGRAM(s) SubCutaneous every 24 hours  meropenem  IVPB 1000 milliGRAM(s) IV Intermittent every 8 hours    MEDICATIONS  (PRN):  melatonin 3 milliGRAM(s) Oral at bedtime PRN Insomnia  sodium chloride 0.9% lock flush 10 milliLiter(s) IV Push every 1 hour PRN Pre/post blood products, medications, blood draw, and to maintain line patency      I&O's Detail      T(C): 36.6 (01-12-22 @ 06:28), Max: 37.1 (01-11-22 @ 20:13)  HR: 74 (01-12-22 @ 06:28) (74 - 91)  BP: 147/83 (01-12-22 @ 06:28) (135/87 - 157/88)  RR: 18 (01-12-22 @ 06:28) (18 - 19)  SpO2: 99% (01-12-22 @ 06:28) (96% - 99%)    General: NAD, resting comfortably  HEENT: NC/AT, EOMI, normal hearing,   Pulmonary: nonlabored breathing, normal resp effort on RA  Cardiovascular: NSR, normal peripheral perfusion   Abdominal: soft, ND, NT  Extremities: WWP, normal strength. Right gluteal region: open wound with pale/white appearing wound edge without surrounding erthyma or crepitus, minimal seropurulent drainage; minimal fibrous exudates at wound base ;     LABS:                        10.4   9.84  )-----------( 220      ( 11 Jan 2022 06:55 )             33.2     01-11    135  |  102  |  26<H>  ----------------------------<  249<H>  3.3<L>   |  24  |  1.22    Ca    9.1      11 Jan 2022 06:55  Phos  3.2     01-11  Mg     1.9     01-11    TPro  8.2  /  Alb  3.7  /  TBili  0.3  /  DBili  x   /  AST  42<H>  /  ALT  23  /  AlkPhos  106  01-10          RADIOLOGY & ADDITIONAL STUDIES:      Culture - Urine (collected 01-08-22 @ 14:14)  Source: Clean Catch Clean Catch (Midstream)  Final Report (01-09-22 @ 16:13):    <10,000 CFU/mL Normal Urogenital Nisreen    Culture - Abscess with Gram Stain (collected 01-08-22 @ 06:28)  Source: .Abscess Leg - Right  Final Report (01-10-22 @ 13:25):    Moderate Pseudomonas aeruginosa    Moderate Escherichia coli ESBL    Numerous Enterococcus faecalis    Few Anaerobic Gram Negative Justino Most closely resembling Prevotella    species "Susceptibilities not performed"  Organism: Pseudomonas aeruginosa  Escherichia coli ESBL  Enterococcus faecalis (01-10-22 @ 13:25)  Organism: Enterococcus faecalis (01-10-22 @ 13:25)      -  Ampicillin: S <=2 Predicts results to ampicillin/sulbactam, amoxacillin-clavulanate and  piperacillin-tazobactam.      -  Tetra/Doxy: R >8      -  Vancomycin: S 2      Method Type: JOHNNY  Organism: Escherichia coli ESBL (01-10-22 @ 13:25)      -  Amikacin: S <=16      -  Amoxicillin/Clavulanic Acid: S <=8/4      -  Ampicillin: R >16 These ampicillin results predict results for amoxicillin      -  Ampicillin/Sulbactam: R 16/8 Enterobacter, Klebsiella aerogenes, Citrobacter, and Serratia may develop resistance during prolonged therapy (3-4 days)      -  Aztreonam: R >16      -  Cefazolin: R >16 Enterobacter, Klebsiella aerogenes, Citrobacter, and Serratia may develop resistance during prolonged therapy (3-4 days)      -  Cefepime: R >16      -  Cefoxitin: S <=8      -  Ceftriaxone: R >32 Enterobacter, Klebsiella aerogenes, Citrobacter, and Serratia may develop resistance during prolonged therapy      -  Ciprofloxacin: R >2      -  Ertapenem: S <=0.5      -  Gentamicin: S <=2      -  Imipenem: S <=1      -  Levofloxacin: R >4      -  Meropenem: S <=1      -  Piperacillin/Tazobactam: R <=8      -  Tobramycin: S 4      -  Trimethoprim/Sulfamethoxazole: R >2/38      Method Type: JOHNNY  Organism: Pseudomonas aeruginosa (01-10-22 @ 13:25)      -  Amikacin: S <=16      -  Aztreonam: R >16      -  Cefepime: S 8      -  Ceftazidime: S 4      -  Ciprofloxacin: S 0.5      -  Gentamicin: S <=2      -  Imipenem: S <=1      -  Levofloxacin: I 2      -  Meropenem: S <=1      -  Piperacillin/Tazobactam: S <=8      -  Tobramycin: S <=2      Method Type: JOHNNY    Culture - Blood (collected 01-08-22 @ 02:01)  Source: .Blood Blood  Preliminary Report (01-09-22 @ 03:01):    No growth to date.    Culture - Blood (collected 01-08-22 @ 02:01)  Source: .Blood Blood  Preliminary Report (01-09-22 @ 03:01):    No growth to date.

## 2022-01-12 NOTE — PROGRESS NOTE ADULT - ASSESSMENT
60 yo M, smoker, PMH of diabetes with neuropathy, HTN, multiple soft tissue infections who is currentl here with R gluteal wound; CT revealed no fluid collection or abscess. Gen Surg consulted for possible debridement    plan:   no acute surgical intervention at this time  no sign of infection, no indication for urgent debridement of the wound and patient refusing debridement even is indicated   continue off loading, nutritional optimization, and local wound care  Recommend Plastic surgery consulted For closure evaluation  rest of care per primary

## 2022-01-12 NOTE — PROGRESS NOTE ADULT - PROBLEM SELECTOR PLAN 6
Incidentally COVID+. Vaccinated with J&J. On RA. CXR clear.  - s/p MAB Incidentally COVID+. Vaccinated with J&J. On RA. CXR clear.  D-dimer 164>157>242  Ferritin 63>71  CRP 7.7>4.8  s/p MAB  Oxygen sat 96-99% on RA

## 2022-01-12 NOTE — PROGRESS NOTE ADULT - SUBJECTIVE AND OBJECTIVE BOX
Patient is a 61y old  Male who presents with a chief complaint of     INTERVAL HPI/OVERNIGHT EVENTS:    Pt. seen and examined earlier today  Pt. c/o mild intermittent nausea, but no vomiting  Pt. denies F/C, CP, SOB, cough    Review of Systems: 12 point review of systems otherwise negative    MEDICATIONS  (STANDING):  amLODIPine   Tablet 10 milliGRAM(s) Oral daily  amoxicillin 500 milliGRAM(s) Oral three times a day  ascorbic acid 500 milliGRAM(s) Oral daily  aspirin enteric coated 81 milliGRAM(s) Oral daily  chlorhexidine 2% Cloths 1 Application(s) Topical <User Schedule>  dextrose 40% Gel 15 Gram(s) Oral once  dextrose 5%. 1000 milliLiter(s) (50 mL/Hr) IV Continuous <Continuous>  dextrose 5%. 1000 milliLiter(s) (100 mL/Hr) IV Continuous <Continuous>  dextrose 50% Injectable 25 Gram(s) IV Push once  dextrose 50% Injectable 12.5 Gram(s) IV Push once  dextrose 50% Injectable 25 Gram(s) IV Push once  enoxaparin Injectable 40 milliGRAM(s) SubCutaneous every 24 hours  glucagon  Injectable 1 milliGRAM(s) IntraMuscular once  influenza   Vaccine 0.5 milliLiter(s) IntraMuscular once  insulin glargine Injectable (LANTUS) 15 Unit(s) SubCutaneous at bedtime  insulin lispro (ADMELOG) corrective regimen sliding scale   SubCutaneous three times a day before meals  insulin lispro (ADMELOG) corrective regimen sliding scale   SubCutaneous at bedtime  insulin lispro Injectable (ADMELOG) 5 Unit(s) SubCutaneous three times a day before meals  lisinopril 20 milliGRAM(s) Oral daily  meropenem  IVPB 1000 milliGRAM(s) IV Intermittent every 8 hours  multivitamin 1 Tablet(s) Oral daily  zinc sulfate 220 milliGRAM(s) Oral daily    MEDICATIONS  (PRN):  melatonin 3 milliGRAM(s) Oral at bedtime PRN Insomnia  sodium chloride 0.9% lock flush 10 milliLiter(s) IV Push every 1 hour PRN Pre/post blood products, medications, blood draw, and to maintain line patency      Allergies    No Known Drug Allergies  pork (Unknown)    Intolerances          Vital Signs Last 24 Hrs  T(C): 36.4 (12 Jan 2022 12:50), Max: 37.1 (11 Jan 2022 20:13)  T(F): 97.5 (12 Jan 2022 12:50), Max: 98.8 (11 Jan 2022 20:13)  HR: 102 (12 Jan 2022 12:50) (74 - 102)  BP: 147/82 (12 Jan 2022 12:50) (135/87 - 157/88)  BP(mean): --  RR: 17 (12 Jan 2022 12:50) (17 - 19)  SpO2: 96% (12 Jan 2022 12:50) (96% - 99%)  CAPILLARY BLOOD GLUCOSE      POCT Blood Glucose.: 240 mg/dL (12 Jan 2022 11:52)  POCT Blood Glucose.: 188 mg/dL (12 Jan 2022 08:39)  POCT Blood Glucose.: 307 mg/dL (11 Jan 2022 21:18)  POCT Blood Glucose.: 155 mg/dL (11 Jan 2022 16:53)        Physical Exam:  (earlier today)  Daily     Daily   General:  comfortable-appearing in NAD  HEENT:  MMM  Lungs:  normal WOB on RA  Extremities:  +Charcot foot +LUE midline  Skin:  R gluteal wound dressing C/D/I  Neuro:  AAOx3  [facility]    LABS:                        10.9   9.98  )-----------( 231      ( 12 Jan 2022 08:09 )             36.5     01-12    137  |  102  |  25<H>  ----------------------------<  174<H>  3.5   |  25  |  1.06    Ca    9.4      12 Jan 2022 08:09  Phos  3.1     01-12  Mg     2.1     01-12

## 2022-01-12 NOTE — PROGRESS NOTE ADULT - PROBLEM SELECTOR PLAN 3
Takes lisinopril 20mg and norvasc 10mg at home  -130s / 80s on 1/11/21    Plan:  -hold lisinopril i/s/o DEBBY  -continue norvasc Takes lisinopril 20mg and norvasc 10mg at home  -130s / 80s on 1/11/21    Plan:  -c/w lisinopril 20 mg qd  -c/w norvasc 10 mg qd

## 2022-01-12 NOTE — PROGRESS NOTE ADULT - PROBLEM SELECTOR PLAN 5
Likely pre-renal, Cr has improved  Cr 1.22 on 1/11/21    Plan:  - Monitor BMP Likely pre-renal, Cr has improved  Cr 1.22 on 1/11/21    - Trend BUN/Cr

## 2022-01-12 NOTE — PROGRESS NOTE ADULT - PROBLEM SELECTOR PLAN 7
F: s/p 1L NS  E: replete prn  N: Consistent carb/DASH diet, no snacks  GI: None  A/c: Heparin SQ  Activity: As tolerated  Code: FC  Dispo: Pending PT alvin F: s/p 1L NS  E: replete prn  N: Consistent carb/DASH diet, no snacks  GI: None  A/c: Heparin SQ  Activity: As tolerated  Code: FC  Dispo: Pending final PT recs

## 2022-01-12 NOTE — PROGRESS NOTE ADULT - PROBLEM SELECTOR PLAN 1
CT Pelvis w/ IV contrast - Right gluteal wound with associated phlegmonous changes extending into the hamstring tendon. No associated abscess/collection. Recent tx at [facility] and d/c on doxycyline for 7d. Wound growing pseudomonas, ESBL   s/p midline placement on 1/11/21  Gen surg recs no indication for urgent debridement of the wound. continue off loading, nutritional optimization (albumin on arrival 3.6 is not malnourished), and local wound care. continue discussion with wound care nurse and plastics on how to promote healing of the wound.    - Transitioned to [place] amoxicillin and meropenem for 2w course  - c/w wound care CT Pelvis w/ IV contrast - Right gluteal wound with associated phlegmonous changes extending into the hamstring tendon. No associated abscess/collection. Recent tx at [facility] and d/c on doxycyline for 7d. Wound growing pseudomonas, ESBL   s/p midline placement on 1/11/21  Gen surg recs no indication for urgent debridement of the wound. continue off loading, nutritional optimization (albumin on arrival 3.6 is not malnourished), and local wound care. continue discussion with wound care nurse and plastics on how to promote healing of the wound.    - s/p midline placement; c/w PO amox 500 mg q8h and IV meropenem 1g q8h till 1/25/21  - c/w wound care

## 2022-01-12 NOTE — PROGRESS NOTE ADULT - PROBLEM SELECTOR PLAN 1
R gluteal wound; cx growing ESBL E. Coli, Pseudomonas, and Enterococcus faecalis; cont. IV meropenem and PO amoxicillin, to complete a 2-week course, per ID recs; cont. wound care per Plastics and GenSurg recs

## 2022-01-12 NOTE — PROGRESS NOTE ADULT - PROBLEM SELECTOR PLAN 2
Takes 8-10 U premeal and 20U lantus at home. A1c 10. Pt refusing lantus    Plan:  - inpatient, 5U premeal and 15U lantus with mISS for now Takes 8-10 U premeal and 20U lantus at home. A1c 10. Pt refusing lantus some nights.   1/11/21 FSG:  premeal -300  fasting -250  required 6/2/2/4 ISS on 1/11/21    - inpatient, 5U premeal and 15U lantus with mISS for now

## 2022-01-13 DIAGNOSIS — E87.6 HYPOKALEMIA: ICD-10-CM

## 2022-01-13 LAB
ALBUMIN SERPL ELPH-MCNC: 3.5 G/DL — SIGNIFICANT CHANGE UP (ref 3.3–5)
ALP SERPL-CCNC: 98 U/L — SIGNIFICANT CHANGE UP (ref 40–120)
ALT FLD-CCNC: 30 U/L — SIGNIFICANT CHANGE UP (ref 10–45)
ANION GAP SERPL CALC-SCNC: 9 MMOL/L — SIGNIFICANT CHANGE UP (ref 5–17)
AST SERPL-CCNC: 56 U/L — HIGH (ref 10–40)
BILIRUB SERPL-MCNC: 0.2 MG/DL — SIGNIFICANT CHANGE UP (ref 0.2–1.2)
BUN SERPL-MCNC: 33 MG/DL — HIGH (ref 7–23)
CALCIUM SERPL-MCNC: 9.3 MG/DL — SIGNIFICANT CHANGE UP (ref 8.4–10.5)
CHLORIDE SERPL-SCNC: 101 MMOL/L — SIGNIFICANT CHANGE UP (ref 96–108)
CO2 SERPL-SCNC: 26 MMOL/L — SIGNIFICANT CHANGE UP (ref 22–31)
CREAT SERPL-MCNC: 1.22 MG/DL — SIGNIFICANT CHANGE UP (ref 0.5–1.3)
CRP SERPL-MCNC: <3 MG/L — SIGNIFICANT CHANGE UP (ref 0–4)
CULTURE RESULTS: SIGNIFICANT CHANGE UP
CULTURE RESULTS: SIGNIFICANT CHANGE UP
D DIMER BLD IA.RAPID-MCNC: 228 NG/ML DDU — SIGNIFICANT CHANGE UP
FERRITIN SERPL-MCNC: 71 NG/ML — SIGNIFICANT CHANGE UP (ref 30–400)
GLUCOSE BLDC GLUCOMTR-MCNC: 233 MG/DL — HIGH (ref 70–99)
GLUCOSE BLDC GLUCOMTR-MCNC: 267 MG/DL — HIGH (ref 70–99)
GLUCOSE BLDC GLUCOMTR-MCNC: 84 MG/DL — SIGNIFICANT CHANGE UP (ref 70–99)
GLUCOSE SERPL-MCNC: 138 MG/DL — HIGH (ref 70–99)
HCT VFR BLD CALC: 32.8 % — LOW (ref 39–50)
HGB BLD-MCNC: 10.2 G/DL — LOW (ref 13–17)
MAGNESIUM SERPL-MCNC: 2.1 MG/DL — SIGNIFICANT CHANGE UP (ref 1.6–2.6)
MCHC RBC-ENTMCNC: 24.2 PG — LOW (ref 27–34)
MCHC RBC-ENTMCNC: 31.1 GM/DL — LOW (ref 32–36)
MCV RBC AUTO: 77.9 FL — LOW (ref 80–100)
NRBC # BLD: 0 /100 WBCS — SIGNIFICANT CHANGE UP (ref 0–0)
PHOSPHATE SERPL-MCNC: 3.1 MG/DL — SIGNIFICANT CHANGE UP (ref 2.5–4.5)
PLATELET # BLD AUTO: 236 K/UL — SIGNIFICANT CHANGE UP (ref 150–400)
POTASSIUM SERPL-MCNC: 3.4 MMOL/L — LOW (ref 3.5–5.3)
POTASSIUM SERPL-SCNC: 3.4 MMOL/L — LOW (ref 3.5–5.3)
PROT SERPL-MCNC: 7.5 G/DL — SIGNIFICANT CHANGE UP (ref 6–8.3)
RBC # BLD: 4.21 M/UL — SIGNIFICANT CHANGE UP (ref 4.2–5.8)
RBC # FLD: 15.4 % — HIGH (ref 10.3–14.5)
SODIUM SERPL-SCNC: 136 MMOL/L — SIGNIFICANT CHANGE UP (ref 135–145)
SPECIMEN SOURCE: SIGNIFICANT CHANGE UP
SPECIMEN SOURCE: SIGNIFICANT CHANGE UP
WBC # BLD: 10.5 K/UL — SIGNIFICANT CHANGE UP (ref 3.8–10.5)
WBC # FLD AUTO: 10.5 K/UL — SIGNIFICANT CHANGE UP (ref 3.8–10.5)

## 2022-01-13 PROCEDURE — 99233 SBSQ HOSP IP/OBS HIGH 50: CPT | Mod: GC

## 2022-01-13 RX ORDER — POTASSIUM CHLORIDE 20 MEQ
40 PACKET (EA) ORAL ONCE
Refills: 0 | Status: COMPLETED | OUTPATIENT
Start: 2022-01-13 | End: 2022-01-13

## 2022-01-13 RX ADMIN — Medication 3 MILLIGRAM(S): at 22:12

## 2022-01-13 RX ADMIN — Medication 4: at 12:36

## 2022-01-13 RX ADMIN — Medication 1 TABLET(S): at 11:31

## 2022-01-13 RX ADMIN — ENOXAPARIN SODIUM 40 MILLIGRAM(S): 100 INJECTION SUBCUTANEOUS at 09:22

## 2022-01-13 RX ADMIN — ZINC SULFATE TAB 220 MG (50 MG ZINC EQUIVALENT) 220 MILLIGRAM(S): 220 (50 ZN) TAB at 11:31

## 2022-01-13 RX ADMIN — Medication 5 UNIT(S): at 12:37

## 2022-01-13 RX ADMIN — Medication 500 MILLIGRAM(S): at 06:15

## 2022-01-13 RX ADMIN — MEROPENEM 100 MILLIGRAM(S): 1 INJECTION INTRAVENOUS at 01:17

## 2022-01-13 RX ADMIN — AMLODIPINE BESYLATE 10 MILLIGRAM(S): 2.5 TABLET ORAL at 06:15

## 2022-01-13 RX ADMIN — Medication 500 MILLIGRAM(S): at 11:34

## 2022-01-13 RX ADMIN — Medication 40 MILLIEQUIVALENT(S): at 09:22

## 2022-01-13 RX ADMIN — Medication 2: at 22:11

## 2022-01-13 RX ADMIN — Medication 500 MILLIGRAM(S): at 22:13

## 2022-01-13 RX ADMIN — CHLORHEXIDINE GLUCONATE 1 APPLICATION(S): 213 SOLUTION TOPICAL at 06:15

## 2022-01-13 RX ADMIN — MEROPENEM 100 MILLIGRAM(S): 1 INJECTION INTRAVENOUS at 09:23

## 2022-01-13 RX ADMIN — MEROPENEM 100 MILLIGRAM(S): 1 INJECTION INTRAVENOUS at 22:13

## 2022-01-13 RX ADMIN — MEROPENEM 100 MILLIGRAM(S): 1 INJECTION INTRAVENOUS at 14:44

## 2022-01-13 RX ADMIN — LISINOPRIL 20 MILLIGRAM(S): 2.5 TABLET ORAL at 06:15

## 2022-01-13 RX ADMIN — INSULIN GLARGINE 15 UNIT(S): 100 INJECTION, SOLUTION SUBCUTANEOUS at 22:12

## 2022-01-13 RX ADMIN — Medication 81 MILLIGRAM(S): at 11:31

## 2022-01-13 NOTE — PROGRESS NOTE ADULT - SUBJECTIVE AND OBJECTIVE BOX
*INCOMPLETE*    OVERNIGHT EVENTS: NAOE    SUBJECTIVE / INTERVAL HPI: Patient seen and examined at bedside.    F/C  SOB, cough  Pain in gluteal area  N/V    MEDICATIONS  (STANDING):  amLODIPine   Tablet 10 milliGRAM(s) Oral daily  amoxicillin 500 milliGRAM(s) Oral three times a day  ascorbic acid 500 milliGRAM(s) Oral daily  aspirin enteric coated 81 milliGRAM(s) Oral daily  chlorhexidine 2% Cloths 1 Application(s) Topical <User Schedule>  dextrose 40% Gel 15 Gram(s) Oral once  dextrose 5%. 1000 milliLiter(s) (50 mL/Hr) IV Continuous <Continuous>  dextrose 5%. 1000 milliLiter(s) (100 mL/Hr) IV Continuous <Continuous>  dextrose 50% Injectable 25 Gram(s) IV Push once  dextrose 50% Injectable 12.5 Gram(s) IV Push once  dextrose 50% Injectable 25 Gram(s) IV Push once  enoxaparin Injectable 40 milliGRAM(s) SubCutaneous every 24 hours  glucagon  Injectable 1 milliGRAM(s) IntraMuscular once  influenza   Vaccine 0.5 milliLiter(s) IntraMuscular once  insulin glargine Injectable (LANTUS) 15 Unit(s) SubCutaneous at bedtime  insulin lispro (ADMELOG) corrective regimen sliding scale   SubCutaneous three times a day before meals  insulin lispro (ADMELOG) corrective regimen sliding scale   SubCutaneous at bedtime  insulin lispro Injectable (ADMELOG) 5 Unit(s) SubCutaneous three times a day before meals  lisinopril 20 milliGRAM(s) Oral daily  meropenem  IVPB 1000 milliGRAM(s) IV Intermittent every 8 hours  multivitamin 1 Tablet(s) Oral daily  zinc sulfate 220 milliGRAM(s) Oral daily    MEDICATIONS  (PRN):  melatonin 3 milliGRAM(s) Oral at bedtime PRN Insomnia  sodium chloride 0.9% lock flush 10 milliLiter(s) IV Push every 1 hour PRN Pre/post blood products, medications, blood draw, and to maintain line patency    Allergies    No Known Drug Allergies  pork (Unknown)    Intolerances        VITAL SIGNS:  Vital Signs Last 24 Hrs  T(C): 36.7 (12 Jan 2022 20:54), Max: 36.7 (12 Jan 2022 20:54)  T(F): 98 (12 Jan 2022 20:54), Max: 98 (12 Jan 2022 20:54)  HR: 81 (12 Jan 2022 20:54) (74 - 102)  BP: 138/71 (12 Jan 2022 20:54) (138/71 - 147/83)  BP(mean): --  RR: 18 (12 Jan 2022 20:54) (17 - 18)  SpO2: 98% (12 Jan 2022 20:54) (96% - 99%)        PHYSICAL EXAM:  GENERAL: Awake, alert and interactive, no acute distress  NEURO: A&Ox4, no focal deficits  HEENT: Normocephalic, atraumatic, oral mucosa moist, no oral lesions noted  CARDIAC: RRR, +S1/S2, no murmurs/rubs/gallops  PULM: Breathing comfortably on RA, clear to auscultation bilaterally, no wheezes/rales/rhonchi  ABDOMEN: Soft, nontender, nondistended, no masses and hepatosplenomegaly  SKIN: +R gluteal wound dressed with pink dressing, no drainage. Open wound with pale/white appearing wound edge w/o surrounding erythema/crepitus.   VASC: 1+ peripheral pulses, no edema, no LE tenderness      LABS:                        10.9   9.98  )-----------( 231      ( 12 Jan 2022 08:09 )             36.5     01-12    137  |  102  |  25<H>  ----------------------------<  174<H>  3.5   |  25  |  1.06    Ca    9.4      12 Jan 2022 08:09  Phos  3.1     01-12  Mg     2.1     01-12          CAPILLARY BLOOD GLUCOSE      POCT Blood Glucose.: 117 mg/dL (12 Jan 2022 21:18)  POCT Blood Glucose.: 102 mg/dL (12 Jan 2022 17:23)  POCT Blood Glucose.: 240 mg/dL (12 Jan 2022 11:52)  POCT Blood Glucose.: 188 mg/dL (12 Jan 2022 08:39)          RADIOLOGY & ADDITIONAL TESTS: Reviewed. OVERNIGHT EVENTS: NAOE    SUBJECTIVE / INTERVAL HPI: Patient seen and examined at bedside. Pt denies any F/C, SOB/cough. Pt reports improvement in his right gluteal pain. Pt reports improvement of his nausea. Pt denies any other new symptoms.     MEDICATIONS  (STANDING):  amLODIPine   Tablet 10 milliGRAM(s) Oral daily  amoxicillin 500 milliGRAM(s) Oral three times a day  ascorbic acid 500 milliGRAM(s) Oral daily  aspirin enteric coated 81 milliGRAM(s) Oral daily  chlorhexidine 2% Cloths 1 Application(s) Topical <User Schedule>  dextrose 40% Gel 15 Gram(s) Oral once  dextrose 5%. 1000 milliLiter(s) (50 mL/Hr) IV Continuous <Continuous>  dextrose 5%. 1000 milliLiter(s) (100 mL/Hr) IV Continuous <Continuous>  dextrose 50% Injectable 25 Gram(s) IV Push once  dextrose 50% Injectable 12.5 Gram(s) IV Push once  dextrose 50% Injectable 25 Gram(s) IV Push once  enoxaparin Injectable 40 milliGRAM(s) SubCutaneous every 24 hours  glucagon  Injectable 1 milliGRAM(s) IntraMuscular once  influenza   Vaccine 0.5 milliLiter(s) IntraMuscular once  insulin glargine Injectable (LANTUS) 15 Unit(s) SubCutaneous at bedtime  insulin lispro (ADMELOG) corrective regimen sliding scale   SubCutaneous three times a day before meals  insulin lispro (ADMELOG) corrective regimen sliding scale   SubCutaneous at bedtime  insulin lispro Injectable (ADMELOG) 5 Unit(s) SubCutaneous three times a day before meals  lisinopril 20 milliGRAM(s) Oral daily  meropenem  IVPB 1000 milliGRAM(s) IV Intermittent every 8 hours  multivitamin 1 Tablet(s) Oral daily  zinc sulfate 220 milliGRAM(s) Oral daily    MEDICATIONS  (PRN):  melatonin 3 milliGRAM(s) Oral at bedtime PRN Insomnia  sodium chloride 0.9% lock flush 10 milliLiter(s) IV Push every 1 hour PRN Pre/post blood products, medications, blood draw, and to maintain line patency    Allergies    No Known Drug Allergies  pork (Unknown)    Intolerances        VITAL SIGNS:  Vital Signs Last 24 Hrs  T(C): 36.7 (12 Jan 2022 20:54), Max: 36.7 (12 Jan 2022 20:54)  T(F): 98 (12 Jan 2022 20:54), Max: 98 (12 Jan 2022 20:54)  HR: 81 (12 Jan 2022 20:54) (74 - 102)  BP: 138/71 (12 Jan 2022 20:54) (138/71 - 147/83)  BP(mean): --  RR: 18 (12 Jan 2022 20:54) (17 - 18)  SpO2: 98% (12 Jan 2022 20:54) (96% - 99%)        PHYSICAL EXAM:  GENERAL: Awake, alert and interactive, no acute distress  NEURO: A&Ox4, no focal deficits  HEENT: Normocephalic, atraumatic, oral mucosa moist, no oral lesions noted  CARDIAC: RRR, +S1/S2, no murmurs/rubs/gallops  PULM: Breathing comfortably on RA, clear to auscultation bilaterally, no wheezes/rales/rhonchi  ABDOMEN: Soft, nontender, nondistended, no masses and hepatosplenomegaly  SKIN: +R gluteal wound dressed with pink dressing, no drainage. Open wound with pale/white appearing wound edge w/o surrounding erythema/crepitus.   VASC: 1+ peripheral pulses, no edema, no LE tenderness      LABS:                        10.9   9.98  )-----------( 231      ( 12 Jan 2022 08:09 )             36.5     01-12    137  |  102  |  25<H>  ----------------------------<  174<H>  3.5   |  25  |  1.06    Ca    9.4      12 Jan 2022 08:09  Phos  3.1     01-12  Mg     2.1     01-12          CAPILLARY BLOOD GLUCOSE      POCT Blood Glucose.: 117 mg/dL (12 Jan 2022 21:18)  POCT Blood Glucose.: 102 mg/dL (12 Jan 2022 17:23)  POCT Blood Glucose.: 240 mg/dL (12 Jan 2022 11:52)  POCT Blood Glucose.: 188 mg/dL (12 Jan 2022 08:39)          RADIOLOGY & ADDITIONAL TESTS: Reviewed.

## 2022-01-13 NOTE — PROGRESS NOTE ADULT - ASSESSMENT
62 yo M smoker, vaccinated, undomicilied with PMHx of HTN, IDDM c/b neuropathy as well as hx of multiple skin/soft tissue infections admitted for right gluteal SSTI - followed by wound care s/p wound culture growing polymicrobial and s/p midline placement for IV Abx, found with DEBBY - improving, and COVID positive - ASx on RA

## 2022-01-13 NOTE — PROGRESS NOTE ADULT - PROBLEM SELECTOR PLAN 6
Incidentally COVID+. Vaccinated with J&J. On RA. CXR clear.  D-dimer 164>157>242  Ferritin 63>71  CRP 7.7>4.8  s/p MAB  Oxygen sat 96-99% on RA

## 2022-01-13 NOTE — PROGRESS NOTE ADULT - TIME BILLING
Dispo: [facility] pending clinical progress, cx data, ID recs
Dispo: medically-clear for d/c to [facility]
Dispo: pending clinical progress
Dispo: medically-clear for d/c to [facility]

## 2022-01-13 NOTE — PROGRESS NOTE ADULT - PROBLEM SELECTOR PLAN 1
CT Pelvis w/ IV contrast - Right gluteal wound with associated phlegmonous changes extending into the hamstring tendon. No associated abscess/collection. Recent tx at [facility] and d/c on doxycyline for 7d. Wound growing pseudomonas, ESBL   s/p midline placement on 1/11/21  Gen surg recs no indication for urgent debridement of the wound. continue off loading, nutritional optimization (albumin on arrival 3.6 is not malnourished), and local wound care. continue discussion with wound care nurse and plastics on how to promote healing of the wound.    - s/p midline placement; c/w PO amox 500 mg q8h and IV meropenem 1g q8h till 1/25/21  - c/w wound care

## 2022-01-13 NOTE — PROGRESS NOTE ADULT - PROBLEM SELECTOR PLAN 3
Takes lisinopril 20mg and norvasc 10mg at home  -130s / 80s on 1/11/21    Plan:  -c/w lisinopril 20 mg qd  -c/w norvasc 10 mg qd

## 2022-01-13 NOTE — PROGRESS NOTE ADULT - PROBLEM SELECTOR PLAN 7
F: s/p 1L NS  E: replete prn  N: Consistent carb/DASH diet, no snacks  GI: None  A/c: Heparin SQ  Activity: As tolerated  Code: FC  Dispo: Pending final PT recs

## 2022-01-13 NOTE — PROGRESS NOTE ADULT - SUBJECTIVE AND OBJECTIVE BOX
Patient is a 61y old  Male who presents with a chief complaint of     INTERVAL HPI/OVERNIGHT EVENTS:    Pt. seen and examined earlier today  Pt. feels well, has no new complaints  Pt. denies F/C, CP, SOB, cough    Review of Systems: 12 point review of systems otherwise negative    MEDICATIONS  (STANDING):  amLODIPine   Tablet 10 milliGRAM(s) Oral daily  amoxicillin 500 milliGRAM(s) Oral three times a day  ascorbic acid 500 milliGRAM(s) Oral daily  aspirin enteric coated 81 milliGRAM(s) Oral daily  chlorhexidine 2% Cloths 1 Application(s) Topical <User Schedule>  dextrose 40% Gel 15 Gram(s) Oral once  dextrose 5%. 1000 milliLiter(s) (50 mL/Hr) IV Continuous <Continuous>  dextrose 5%. 1000 milliLiter(s) (100 mL/Hr) IV Continuous <Continuous>  dextrose 50% Injectable 25 Gram(s) IV Push once  dextrose 50% Injectable 12.5 Gram(s) IV Push once  dextrose 50% Injectable 25 Gram(s) IV Push once  enoxaparin Injectable 40 milliGRAM(s) SubCutaneous every 24 hours  glucagon  Injectable 1 milliGRAM(s) IntraMuscular once  influenza   Vaccine 0.5 milliLiter(s) IntraMuscular once  insulin glargine Injectable (LANTUS) 15 Unit(s) SubCutaneous at bedtime  insulin lispro (ADMELOG) corrective regimen sliding scale   SubCutaneous three times a day before meals  insulin lispro (ADMELOG) corrective regimen sliding scale   SubCutaneous at bedtime  insulin lispro Injectable (ADMELOG) 5 Unit(s) SubCutaneous three times a day before meals  lisinopril 20 milliGRAM(s) Oral daily  meropenem  IVPB 1000 milliGRAM(s) IV Intermittent every 8 hours  multivitamin 1 Tablet(s) Oral daily  zinc sulfate 220 milliGRAM(s) Oral daily    MEDICATIONS  (PRN):  melatonin 3 milliGRAM(s) Oral at bedtime PRN Insomnia  sodium chloride 0.9% lock flush 10 milliLiter(s) IV Push every 1 hour PRN Pre/post blood products, medications, blood draw, and to maintain line patency      Allergies    No Known Drug Allergies  pork (Unknown)    Intolerances          Vital Signs Last 24 Hrs  T(C): 36.7 (13 Jan 2022 13:40), Max: 36.9 (13 Jan 2022 05:52)  T(F): 98 (13 Jan 2022 13:40), Max: 98.4 (13 Jan 2022 05:52)  HR: 82 (13 Jan 2022 13:40) (80 - 82)  BP: 138/81 (13 Jan 2022 13:40) (138/71 - 168/93)  BP(mean): --  RR: 18 (13 Jan 2022 13:40) (18 - 18)  SpO2: 97% (13 Jan 2022 13:40) (97% - 98%)  CAPILLARY BLOOD GLUCOSE      POCT Blood Glucose.: 233 mg/dL (13 Jan 2022 12:31)  POCT Blood Glucose.: 117 mg/dL (12 Jan 2022 21:18)  POCT Blood Glucose.: 102 mg/dL (12 Jan 2022 17:23)        Physical Exam:  (earlier today)  Daily     Daily   General:  comfortable-appearing in NAD  HEENT:  MMM  Lungs:  normal WOB on RA  Extremities:  +Charcot foot +LUE midline  Skin:  R gluteal wound dressing C/D/I  Neuro:  AAOx3  [facility]    LABS:                        10.2   10.50 )-----------( 236      ( 13 Jan 2022 07:19 )             32.8     01-13    136  |  101  |  33<H>  ----------------------------<  138<H>  3.4<L>   |  26  |  1.22    Ca    9.3      13 Jan 2022 07:19  Phos  3.1     01-13  Mg     2.1     01-13    TPro  7.5  /  Alb  3.5  /  TBili  0.2  /  DBili  x   /  AST  56<H>  /  ALT  30  /  AlkPhos  98  01-13

## 2022-01-13 NOTE — PROGRESS NOTE ADULT - PROBLEM SELECTOR PLAN 2
Takes 8-10 U premeal and 20U lantus at home. A1c 10. Pt refusing lantus some nights.   1/11/21 FSG:  premeal -300  fasting -250  required 6/2/2/4 ISS on 1/11/21    - inpatient, 5U premeal and 15U lantus with mISS for now

## 2022-01-14 ENCOUNTER — TRANSCRIPTION ENCOUNTER (OUTPATIENT)
Age: 62
End: 2022-01-14

## 2022-01-14 LAB
ALBUMIN SERPL ELPH-MCNC: 3.8 G/DL — SIGNIFICANT CHANGE UP (ref 3.3–5)
ALP SERPL-CCNC: 104 U/L — SIGNIFICANT CHANGE UP (ref 40–120)
ALT FLD-CCNC: 30 U/L — SIGNIFICANT CHANGE UP (ref 10–45)
ANION GAP SERPL CALC-SCNC: 10 MMOL/L — SIGNIFICANT CHANGE UP (ref 5–17)
AST SERPL-CCNC: 54 U/L — HIGH (ref 10–40)
BILIRUB SERPL-MCNC: 0.3 MG/DL — SIGNIFICANT CHANGE UP (ref 0.2–1.2)
BUN SERPL-MCNC: 31 MG/DL — HIGH (ref 7–23)
CALCIUM SERPL-MCNC: 9.1 MG/DL — SIGNIFICANT CHANGE UP (ref 8.4–10.5)
CHLORIDE SERPL-SCNC: 104 MMOL/L — SIGNIFICANT CHANGE UP (ref 96–108)
CO2 SERPL-SCNC: 25 MMOL/L — SIGNIFICANT CHANGE UP (ref 22–31)
CREAT SERPL-MCNC: 1.15 MG/DL — SIGNIFICANT CHANGE UP (ref 0.5–1.3)
CRP SERPL-MCNC: <3 MG/L — SIGNIFICANT CHANGE UP (ref 0–4)
D DIMER BLD IA.RAPID-MCNC: 219 NG/ML DDU — SIGNIFICANT CHANGE UP
FERRITIN SERPL-MCNC: 72 NG/ML — SIGNIFICANT CHANGE UP (ref 30–400)
GLUCOSE BLDC GLUCOMTR-MCNC: 126 MG/DL — HIGH (ref 70–99)
GLUCOSE BLDC GLUCOMTR-MCNC: 177 MG/DL — HIGH (ref 70–99)
GLUCOSE BLDC GLUCOMTR-MCNC: 280 MG/DL — HIGH (ref 70–99)
GLUCOSE BLDC GLUCOMTR-MCNC: 79 MG/DL — SIGNIFICANT CHANGE UP (ref 70–99)
GLUCOSE SERPL-MCNC: 166 MG/DL — HIGH (ref 70–99)
HCT VFR BLD CALC: 34.2 % — LOW (ref 39–50)
HGB BLD-MCNC: 10.3 G/DL — LOW (ref 13–17)
MAGNESIUM SERPL-MCNC: 2.2 MG/DL — SIGNIFICANT CHANGE UP (ref 1.6–2.6)
MCHC RBC-ENTMCNC: 23.5 PG — LOW (ref 27–34)
MCHC RBC-ENTMCNC: 30.1 GM/DL — LOW (ref 32–36)
MCV RBC AUTO: 78.1 FL — LOW (ref 80–100)
NRBC # BLD: 0 /100 WBCS — SIGNIFICANT CHANGE UP (ref 0–0)
PHOSPHATE SERPL-MCNC: 2.9 MG/DL — SIGNIFICANT CHANGE UP (ref 2.5–4.5)
PLATELET # BLD AUTO: 225 K/UL — SIGNIFICANT CHANGE UP (ref 150–400)
POTASSIUM SERPL-MCNC: 3.9 MMOL/L — SIGNIFICANT CHANGE UP (ref 3.5–5.3)
POTASSIUM SERPL-SCNC: 3.9 MMOL/L — SIGNIFICANT CHANGE UP (ref 3.5–5.3)
PROT SERPL-MCNC: 7.6 G/DL — SIGNIFICANT CHANGE UP (ref 6–8.3)
RBC # BLD: 4.38 M/UL — SIGNIFICANT CHANGE UP (ref 4.2–5.8)
RBC # FLD: 15.4 % — HIGH (ref 10.3–14.5)
SODIUM SERPL-SCNC: 139 MMOL/L — SIGNIFICANT CHANGE UP (ref 135–145)
WBC # BLD: 10.77 K/UL — HIGH (ref 3.8–10.5)
WBC # FLD AUTO: 10.77 K/UL — HIGH (ref 3.8–10.5)

## 2022-01-14 RX ADMIN — Medication 500 MILLIGRAM(S): at 21:53

## 2022-01-14 RX ADMIN — Medication 500 MILLIGRAM(S): at 05:54

## 2022-01-14 RX ADMIN — Medication 5 UNIT(S): at 17:21

## 2022-01-14 RX ADMIN — Medication 500 MILLIGRAM(S): at 11:14

## 2022-01-14 RX ADMIN — Medication 500 MILLIGRAM(S): at 13:06

## 2022-01-14 RX ADMIN — MEROPENEM 100 MILLIGRAM(S): 1 INJECTION INTRAVENOUS at 13:07

## 2022-01-14 RX ADMIN — Medication 81 MILLIGRAM(S): at 11:14

## 2022-01-14 RX ADMIN — ZINC SULFATE TAB 220 MG (50 MG ZINC EQUIVALENT) 220 MILLIGRAM(S): 220 (50 ZN) TAB at 11:14

## 2022-01-14 RX ADMIN — Medication 3 MILLIGRAM(S): at 22:23

## 2022-01-14 RX ADMIN — MEROPENEM 100 MILLIGRAM(S): 1 INJECTION INTRAVENOUS at 21:58

## 2022-01-14 RX ADMIN — Medication 2: at 09:13

## 2022-01-14 RX ADMIN — Medication 5 UNIT(S): at 12:35

## 2022-01-14 RX ADMIN — Medication 5 UNIT(S): at 09:14

## 2022-01-14 RX ADMIN — CHLORHEXIDINE GLUCONATE 1 APPLICATION(S): 213 SOLUTION TOPICAL at 05:53

## 2022-01-14 RX ADMIN — MEROPENEM 100 MILLIGRAM(S): 1 INJECTION INTRAVENOUS at 05:53

## 2022-01-14 RX ADMIN — Medication 1 TABLET(S): at 11:14

## 2022-01-14 RX ADMIN — LISINOPRIL 20 MILLIGRAM(S): 2.5 TABLET ORAL at 05:54

## 2022-01-14 RX ADMIN — AMLODIPINE BESYLATE 10 MILLIGRAM(S): 2.5 TABLET ORAL at 05:54

## 2022-01-14 RX ADMIN — Medication 6: at 12:35

## 2022-01-14 NOTE — DISCHARGE NOTE PROVIDER - CARE PROVIDERS DIRECT ADDRESSES
[email or web address]

## 2022-01-14 NOTE — DISCHARGE NOTE PROVIDER - NSDCCPCAREPLAN_GEN_ALL_CORE_FT
PRINCIPAL DISCHARGE DIAGNOSIS  Diagnosis: COVID-19  Assessment and Plan of Treatment: COVID-19 is the disease caused by a coronavirus first discovered in December 2019. Coronaviruses generally cause upper respiratory (nose, throat, and lung) infections, such as a cold. The 2019 virus spreads quickly and easily. It can be spread starting 2 to 3 days before symptoms even begin.  DISCHARGE INSTRUCTIONS:  Call your local emergency number (911 in the ) if:  - You have trouble breathing or shortness of breath at rest.  - You have chest pain or pressure that lasts longer than 5 minutes.  - You become confused or hard to wake.  - Your lips or face are blue.  Seek care immediately if:  - You have a fever of 104°F (40°C) or higher.  Medicines you may need:  - Decongestants help reduce nasal congestion and help you breathe more easily. If you take decongestant pills, they may make you feel restless or cause problems with your sleep. Do not use decongestant sprays for more than a few days.  - Cough suppressants help reduce coughing. Ask your healthcare provider which type of cough medicine is best for you.  - Acetaminophen decreases pain and fever. It is available without a doctor's order. Ask how much to take and how often to take it. Follow directions. Read the labels of all other medicines you are using to see if they also contain acetaminophen, or ask your doctor or pharmacist. Acetaminophen can cause liver damage if not taken correctly. Do not use more than 4 grams (4,000 milligrams) total of acetaminophen in one day.  - NSAIDs, such as ibuprofen, help decrease swelling, pain, and fever. This medicine is available with or without a doctor's order. NSAIDs can cause stomach bleeding or kidney problems in certain people. If you take blood thinner medicine, always ask your healthcare provider if NSAIDs are safe for you. Always read the medicine label and follow directions.        SECONDARY DISCHARGE DIAGNOSES  Diagnosis: Wound of skin  Assessment and Plan of Treatment: Regarding your wound on your right gluteal region, please follow the instructions provided by wound care: irrigate the wound with normal saline, fill wound lightly with quacel extra, and cover with foam dressing every other day,  You skin wound is infected and you will need antibiotics which include oral amoxicillin 500 mg every 8 hours until January 25th and IV meropenem 1g every 8 hours until January 25th, You need weekly labs (CBC, CMP) faxed to the office of Dr. Doe's at [phone].

## 2022-01-14 NOTE — DISCHARGE NOTE PROVIDER - CARE PROVIDER_API CALL
Sergio Salinas)  Internal Medicine  [address] [place] [address]  Phone: [phone]  Fax: [phone]  Scheduled Appointment: 01/20/2022 12:00 PM   Sergio Salinas)  Internal Medicine  [address] [place] [address]  Phone: [phone]  Fax: [phone]  Scheduled Appointment: 01/20/2022 12:00 PM    Bandar Donovan)  Vascular Surgery  [address] [place] [address]  Phone: [phone]  Fax: [phone]  Follow Up Time: 1 month

## 2022-01-14 NOTE — DISCHARGE NOTE PROVIDER - HOSPITAL COURSE
#Discharge: do not delete  60 yo M smoker, vaccinated, undomicilied with PMHx of HTN, IDDM c/b neuropathy as well as hx of multiple skin/soft tissue infections admitted for right gluteal SSTI - followed by wound care s/p wound culture growing polymicrobial and s/p midline placement for IV Abx, found with DEBBY - improving, and COVID positive - ASx on RA    Problem List/Main Diagnoses (system-based):   #Soft tissue infection  CT Pelvis w/ IV contrast - Right gluteal wound with associated phlegmonous changes extending into the hamstring tendon. No associated abscess/collection. Recent tx at [facility] and d/c on doxycyline for 7d. Wound growing pseudomonas, ESBL   s/p midline placement on 1/11/21  Gen surg recs no indication for urgent debridement of the wound. continue off loading, nutritional optimization (albumin on arrival 3.6 is not malnourished), and local wound care. continue discussion with wound care nurse and plastics on how to promote healing of the wound.  s/p midline placement; c/w PO amox 500 mg q8h and IV meropenem 1g q8h till 1/25/21  Cont with wound care.     #IDDM (insulin dependent diabetes mellitus)  Takes 8-10 U premeal and 20U lantus at home. A1c 10. Pt refusing lantus some nights.   Inpatient received 5U premeal and 15U lantus with mISS for now.    #Hypertension  Takes lisinopril 20mg and norvasc 10mg at home  -130s / 80s on 1/11/21  Cont with lisinopril 20 mg qd and norvasc 10 mg qd.    #Microcytic anemia  Hgb 10.3, mcv 78.3  Likely AOCD, TSH .408.    #DEBBY (acute kidney injury)  Likely pre-renal, Cr has improved  Cr 1.22 on 1/11/21    #2019 novel coronavirus disease (COVID-19)  Incidentally COVID+. Vaccinated with J&J. On RA. CXR clear.  D-dimer 164>157>242  Ferritin 63>71  CRP 7.7>4.8  s/p MAB  Oxygen sat 96-99% on RA.    Inpatient treatment course: MAB, lisinopril, norvasc, lispro/lantus, ISS, amox, IV meropenem    New medications: PO amox, IV meropenem    Labs to be followed outpatient: weekly CBC, CMP faxed to Dr Doe's office [phone]    Exam to be followed outpatient: None   #Discharge: do not delete  62 yo M smoker, vaccinated, undomicilied with PMHx of HTN, IDDM c/b neuropathy as well as hx of multiple skin/soft tissue infections admitted for right gluteal SSTI - followed by wound care s/p wound culture growing polymicrobial and s/p midline placement for IV Abx, found with DEBBY - improving, and COVID positive - ASx on RA    Problem List/Main Diagnoses (system-based):   #Soft tissue infection  CT Pelvis w/ IV contrast - Right gluteal wound with associated phlegmonous changes extending into the hamstring tendon. No associated abscess/collection. Recent tx at [facility] and d/c on doxycyline for 7d. Wound growing pseudomonas, ESBL   s/p midline placement on 1/11/21  Gen surg recs no indication for urgent debridement of the wound. continue off loading, nutritional optimization (albumin on arrival 3.6 is not malnourished), and local wound care. continue discussion with wound care nurse and plastics on how to promote healing of the wound.  - Continue PO Amoxicillin 500 mg q8h and IV Meropenem 1g q8h for a total of 14 days, last day 1/25/21  - Wound care instructions: irrigate the wound with normal saline, fill wound lightly with Aquacel Extra, cover with foam dressing every other day    #IDDM (insulin dependent diabetes mellitus)  Takes 8-10 U premeal and 20U lantus at home. A1c 10. Pt refusing lantus some nights.   Inpatient received 5U premeal and 15U lantus with mISS for now.    #Hypertension  Takes lisinopril 20mg and norvasc 10mg at home  -130s / 80s on 1/11/21  Cont with lisinopril 20 mg qd and norvasc 10 mg qd.    #Microcytic anemia  Hgb 10.3, mcv 78.3  Likely AOCD, TSH .408.    #DEBBY (acute kidney injury)  Likely pre-renal, Cr has improved  Cr 1.22 on 1/11/21    #2019 novel coronavirus disease (COVID-19)  Incidentally COVID+. Vaccinated with J&J. On RA. CXR clear.  D-dimer 164>157>242  Ferritin 63>71  CRP 7.7>4.8  s/p MAB  Oxygen sat 96-99% on RA.    Inpatient treatment course: MAB, lisinopril, norvasc, lispro/lantus, ISS, amox, IV meropenem    New medications: PO amox, IV meropenem for a total of 14 days, last day 1/25/2021    Labs to be followed outpatient: weekly CBC, CMP faxed to Dr oDe's office [phone]    Exam to be followed outpatient: None   #Discharge: do not delete  62 yo M smoker, vaccinated, undomicilied with PMHx of HTN, IDDM c/b neuropathy as well as hx of multiple skin/soft tissue infections admitted for right gluteal SSTI - followed by wound care s/p wound culture growing polymicrobial and s/p midline placement for IV Abx, found with DEBBY - improving, and COVID positive - ASx on RA    Problem List/Main Diagnoses (system-based):   #Soft tissue infection  CT Pelvis w/ IV contrast - Right gluteal wound with associated phlegmonous changes extending into the hamstring tendon. No associated abscess/collection. Recent tx at [facility] and d/c on doxycyline for 7d. Wound growing pseudomonas, ESBL   s/p midline placement on 1/11/21  Gen surg recs no indication for urgent debridement of the wound. continue off loading, nutritional optimization (albumin on arrival 3.6 is not malnourished), and local wound care. continue discussion with wound care nurse and plastics on how to promote healing of the wound.  Continue PO Amoxicillin 500 mg q8h and IV Meropenem 1g q8h for a total of 14 days, last day 1/25/21  Wound care instructions: irrigate the wound with normal saline, fill wound lightly with Aquacel Extra, cover with foam dressing every other day    #IDDM (insulin dependent diabetes mellitus)  Takes 8-10 U premeal and 20U lantus at home. A1c 10. Pt refusing lantus some nights.   Inpatient received 5U premeal and 15U lantus with mISS for now.    #Hypertension  Takes lisinopril 20mg and norvasc 10mg at home  -130s / 80s on 1/11/21  Cont with lisinopril 20 mg qd and norvasc 10 mg qd.    #Microcytic anemia  Hgb 10.3, mcv 78.3  Likely AOCD, TSH .408.    #DEBBY (acute kidney injury)  Likely pre-renal, Cr has improved  Cr 1.22 on 1/11/21    #2019 novel coronavirus disease (COVID-19)  Incidentally COVID+. Vaccinated with J&J. On RA. CXR clear.  D-dimer 164>157>242  Ferritin 63>71  CRP 7.7>4.8  s/p MAB  Oxygen sat 96-99% on RA.    Inpatient treatment course: MAB, lisinopril, norvasc, lispro/lantus, ISS, amox, IV meropenem    New medications: PO amox, IV meropenem for a total of 14 days, last day 1/25/2021    Labs to be followed outpatient: weekly CBC, CMP faxed to Dr Doe's office [phone]    Exam to be followed outpatient: None

## 2022-01-14 NOTE — DISCHARGE NOTE PROVIDER - NSDCFUADDINST_GEN_ALL_CORE_FT
Wound care: irrigate the wound with normal saline, fill wound lightly with Aquacel Extra, cover with foam dressing every other day

## 2022-01-14 NOTE — DISCHARGE NOTE PROVIDER - NSDCFUADDAPPT_GEN_ALL_CORE_FT
Please follow up with your scheduled appointment at [address] [place] [address], [id number]. Please call [phone] if you have any questions.  Please follow up on January 20th at 12 PM at [address] [place] [address], [address] [place] [address], [id number]. Please call [phone] if you have any questions.

## 2022-01-14 NOTE — PROGRESS NOTE ADULT - PROBLEM SELECTOR PROBLEM 7
Initiate Treatment: TAC 0.1 ointment BID Detail Level: Detailed Otc Regimen: Moisturize daily, use unscented products, keep moist while healing with Vaseline or barrier cream, take warm not hot showers Healthcare maintenance

## 2022-01-14 NOTE — PROGRESS NOTE ADULT - PROBLEM SELECTOR PLAN 1
CT Pelvis w/ IV contrast - Right gluteal wound with associated phlegmonous changes extending into the hamstring tendon. No associated abscess/collection. Recent tx at [facility] and d/c on doxycyline for 7d. Wound growing pseudomonas, ESBL   s/p midline placement on 1/11/21  Gen surg recs no indication for urgent debridement of the wound. continue off loading, nutritional optimization (albumin on arrival 3.6 is not malnourished), and local wound care. continue discussion with wound care nurse and plastics on how to promote healing of the wound.    - s/p midline placement; c/w PO amox 500 mg q8h and IV meropenem 1g q8h till 1/25/21  - c/w wound care

## 2022-01-14 NOTE — DISCHARGE NOTE PROVIDER - NSDCMRMEDTOKEN_GEN_ALL_CORE_FT
Admelog 100 units/mL injectable solution: 10 unit(s) injectable 3 times a day (before meals)   amLODIPine 10 mg oral tablet: 1 tab(s) orally once a day  amoxicillin 500 mg oral capsule: 1 cap(s) orally every 8 hours   Aspirin Enteric Coated 81 mg oral delayed release tablet: 1 tab(s) orally once a day  Lantus Solostar Pen 100 units/mL subcutaneous solution: 20 unit(s) subcutaneous once a day (at bedtime)  lisinopril 20 mg oral tablet: 1 tab(s) orally once a day  meropenem 1000 mg intravenous injection: 1000 milligram(s) intravenously every 8 hours   Weekly CBC and CMP: 1 each intravenous once a week    Admelog 100 units/mL injectable solution: 10 unit(s) injectable 3 times a day (before meals)   amLODIPine 10 mg oral tablet: 1 tab(s) orally once a day  amoxicillin 500 mg oral capsule: 1 cap(s) orally every 8 hours   Aspirin Enteric Coated 81 mg oral delayed release tablet: 1 tab(s) orally once a day  Lantus Solostar Pen 100 units/mL subcutaneous solution: 20 unit(s) subcutaneous once a day (at bedtime)  lisinopril 20 mg oral tablet: 1 tab(s) orally once a day  meropenem 1000 mg intravenous injection: 1000 milligram(s) intravenously every 8 hours    Admelog 100 units/mL injectable solution: 10 unit(s) injectable 3 times a day (before meals)   amLODIPine 10 mg oral tablet: 1 tab(s) orally once a day  amoxicillin 500 mg oral capsule: 1 cap(s) orally every 8 hours   Aspirin Enteric Coated 81 mg oral delayed release tablet: 1 tab(s) orally once a day  folic acid 1 mg oral tablet: 1 tab(s) orally once a day  Lantus Solostar Pen 100 units/mL subcutaneous solution: 20 unit(s) subcutaneous once a day (at bedtime)  lisinopril 20 mg oral tablet: 1 tab(s) orally once a day  meropenem 1000 mg intravenous injection: 1000 milligram(s) intravenously every 8 hours

## 2022-01-14 NOTE — PROGRESS NOTE ADULT - SUBJECTIVE AND OBJECTIVE BOX
*INCOMPLETE*    OVERNIGHT EVENTS: NAOE    SUBJECTIVE / INTERVAL HPI: Patient seen and examined at bedside. Pt denies any F/C, SOB/cough. Pt reports improvement in his right gluteal pain. Pt reports improvement of his nausea. Pt denies any other new symptoms.       MEDICATIONS  (STANDING):  amLODIPine   Tablet 10 milliGRAM(s) Oral daily  amoxicillin 500 milliGRAM(s) Oral three times a day  ascorbic acid 500 milliGRAM(s) Oral daily  aspirin enteric coated 81 milliGRAM(s) Oral daily  chlorhexidine 2% Cloths 1 Application(s) Topical <User Schedule>  dextrose 40% Gel 15 Gram(s) Oral once  dextrose 5%. 1000 milliLiter(s) (50 mL/Hr) IV Continuous <Continuous>  dextrose 5%. 1000 milliLiter(s) (100 mL/Hr) IV Continuous <Continuous>  dextrose 50% Injectable 25 Gram(s) IV Push once  dextrose 50% Injectable 12.5 Gram(s) IV Push once  dextrose 50% Injectable 25 Gram(s) IV Push once  enoxaparin Injectable 40 milliGRAM(s) SubCutaneous every 24 hours  glucagon  Injectable 1 milliGRAM(s) IntraMuscular once  influenza   Vaccine 0.5 milliLiter(s) IntraMuscular once  insulin glargine Injectable (LANTUS) 15 Unit(s) SubCutaneous at bedtime  insulin lispro (ADMELOG) corrective regimen sliding scale   SubCutaneous three times a day before meals  insulin lispro (ADMELOG) corrective regimen sliding scale   SubCutaneous at bedtime  insulin lispro Injectable (ADMELOG) 5 Unit(s) SubCutaneous three times a day before meals  lisinopril 20 milliGRAM(s) Oral daily  meropenem  IVPB 1000 milliGRAM(s) IV Intermittent every 8 hours  multivitamin 1 Tablet(s) Oral daily  zinc sulfate 220 milliGRAM(s) Oral daily    MEDICATIONS  (PRN):  melatonin 3 milliGRAM(s) Oral at bedtime PRN Insomnia  sodium chloride 0.9% lock flush 10 milliLiter(s) IV Push every 1 hour PRN Pre/post blood products, medications, blood draw, and to maintain line patency    Allergies    No Known Drug Allergies  pork (Unknown)    Intolerances        VITAL SIGNS:  Vital Signs Last 24 Hrs  T(C): 36.7 (14 Jan 2022 05:57), Max: 36.7 (13 Jan 2022 13:40)  T(F): 98.1 (14 Jan 2022 05:57), Max: 98.1 (14 Jan 2022 05:57)  HR: 80 (14 Jan 2022 05:57) (80 - 85)  BP: 167/84 (14 Jan 2022 05:57) (129/73 - 167/84)  BP(mean): --  RR: 18 (14 Jan 2022 05:57) (18 - 18)  SpO2: 100% (14 Jan 2022 05:57) (97% - 100%)        PHYSICAL EXAM:  GENERAL: Awake, alert and interactive, no acute distress  NEURO: A&Ox4, no focal deficits  HEENT: Normocephalic, atraumatic, oral mucosa moist, no oral lesions noted  CARDIAC: RRR, +S1/S2, no murmurs/rubs/gallops  PULM: Breathing comfortably on RA, clear to auscultation bilaterally, no wheezes/rales/rhonchi  ABDOMEN: Soft, nontender, nondistended, no masses and hepatosplenomegaly  SKIN: +R gluteal wound dressed with pink dressing, no drainage. Open wound with pale/white appearing wound edge w/o surrounding erythema/crepitus.   VASC: 1+ peripheral pulses, no edema, no LE tenderness      LABS:                        10.2   10.50 )-----------( 236      ( 13 Jan 2022 07:19 )             32.8     01-13    136  |  101  |  33<H>  ----------------------------<  138<H>  3.4<L>   |  26  |  1.22    Ca    9.3      13 Jan 2022 07:19  Phos  3.1     01-13  Mg     2.1     01-13    TPro  7.5  /  Alb  3.5  /  TBili  0.2  /  DBili  x   /  AST  56<H>  /  ALT  30  /  AlkPhos  98  01-13        CAPILLARY BLOOD GLUCOSE      POCT Blood Glucose.: 267 mg/dL (13 Jan 2022 21:58)  POCT Blood Glucose.: 84 mg/dL (13 Jan 2022 16:49)  POCT Blood Glucose.: 233 mg/dL (13 Jan 2022 12:31)          RADIOLOGY & ADDITIONAL TESTS: Reviewed. OVERNIGHT EVENTS: NAOE    SUBJECTIVE / INTERVAL HPI: Patient seen and examined at bedside. Pt denies any F/C, SOB/cough. Pt reports improvement in his right gluteal pain. Pt reports improvement of his nausea. Pt denies any other new symptoms.       MEDICATIONS  (STANDING):  amLODIPine   Tablet 10 milliGRAM(s) Oral daily  amoxicillin 500 milliGRAM(s) Oral three times a day  ascorbic acid 500 milliGRAM(s) Oral daily  aspirin enteric coated 81 milliGRAM(s) Oral daily  chlorhexidine 2% Cloths 1 Application(s) Topical <User Schedule>  dextrose 40% Gel 15 Gram(s) Oral once  dextrose 5%. 1000 milliLiter(s) (50 mL/Hr) IV Continuous <Continuous>  dextrose 5%. 1000 milliLiter(s) (100 mL/Hr) IV Continuous <Continuous>  dextrose 50% Injectable 25 Gram(s) IV Push once  dextrose 50% Injectable 12.5 Gram(s) IV Push once  dextrose 50% Injectable 25 Gram(s) IV Push once  enoxaparin Injectable 40 milliGRAM(s) SubCutaneous every 24 hours  glucagon  Injectable 1 milliGRAM(s) IntraMuscular once  influenza   Vaccine 0.5 milliLiter(s) IntraMuscular once  insulin glargine Injectable (LANTUS) 15 Unit(s) SubCutaneous at bedtime  insulin lispro (ADMELOG) corrective regimen sliding scale   SubCutaneous three times a day before meals  insulin lispro (ADMELOG) corrective regimen sliding scale   SubCutaneous at bedtime  insulin lispro Injectable (ADMELOG) 5 Unit(s) SubCutaneous three times a day before meals  lisinopril 20 milliGRAM(s) Oral daily  meropenem  IVPB 1000 milliGRAM(s) IV Intermittent every 8 hours  multivitamin 1 Tablet(s) Oral daily  zinc sulfate 220 milliGRAM(s) Oral daily    MEDICATIONS  (PRN):  melatonin 3 milliGRAM(s) Oral at bedtime PRN Insomnia  sodium chloride 0.9% lock flush 10 milliLiter(s) IV Push every 1 hour PRN Pre/post blood products, medications, blood draw, and to maintain line patency    Allergies    No Known Drug Allergies  pork (Unknown)    Intolerances        VITAL SIGNS:  Vital Signs Last 24 Hrs  T(C): 36.7 (14 Jan 2022 05:57), Max: 36.7 (13 Jan 2022 13:40)  T(F): 98.1 (14 Jan 2022 05:57), Max: 98.1 (14 Jan 2022 05:57)  HR: 80 (14 Jan 2022 05:57) (80 - 85)  BP: 167/84 (14 Jan 2022 05:57) (129/73 - 167/84)  BP(mean): --  RR: 18 (14 Jan 2022 05:57) (18 - 18)  SpO2: 100% (14 Jan 2022 05:57) (97% - 100%)        PHYSICAL EXAM:  GENERAL: Awake, alert and interactive, no acute distress  NEURO: A&Ox4, no focal deficits  HEENT: Normocephalic, atraumatic, oral mucosa moist, no oral lesions noted  CARDIAC: RRR, +S1/S2, no murmurs/rubs/gallops  PULM: Breathing comfortably on RA, clear to auscultation bilaterally, no wheezes/rales/rhonchi  ABDOMEN: Soft, nontender, nondistended, no masses and hepatosplenomegaly  SKIN: +R gluteal wound dressed with pink dressing, no drainage. Open wound with pale/white appearing wound edge w/o surrounding erythema/crepitus.   VASC: 1+ peripheral pulses, no edema, no LE tenderness      LABS:                        10.2   10.50 )-----------( 236      ( 13 Jan 2022 07:19 )             32.8     01-13    136  |  101  |  33<H>  ----------------------------<  138<H>  3.4<L>   |  26  |  1.22    Ca    9.3      13 Jan 2022 07:19  Phos  3.1     01-13  Mg     2.1     01-13    TPro  7.5  /  Alb  3.5  /  TBili  0.2  /  DBili  x   /  AST  56<H>  /  ALT  30  /  AlkPhos  98  01-13        CAPILLARY BLOOD GLUCOSE      POCT Blood Glucose.: 267 mg/dL (13 Jan 2022 21:58)  POCT Blood Glucose.: 84 mg/dL (13 Jan 2022 16:49)  POCT Blood Glucose.: 233 mg/dL (13 Jan 2022 12:31)          RADIOLOGY & ADDITIONAL TESTS: Reviewed.

## 2022-01-14 NOTE — DISCHARGE NOTE PROVIDER - PROVIDER TOKENS
PROVIDER:[[id number]],SCHEDULEDAPPT:[01/20/2022],SCHEDULEDAPPTTIME:[12:00 PM]] PROVIDER:[[id number]],SCHEDULEDAPPT:[01/20/2022],SCHEDULEDAPPTTIME:[12:00 PM]],PROVIDER:[[id number]],FOLLOWUP:[1 month]]

## 2022-01-15 LAB
ALBUMIN SERPL ELPH-MCNC: 3.6 G/DL — SIGNIFICANT CHANGE UP (ref 3.3–5)
ALP SERPL-CCNC: 103 U/L — SIGNIFICANT CHANGE UP (ref 40–120)
ALT FLD-CCNC: 30 U/L — SIGNIFICANT CHANGE UP (ref 10–45)
ANION GAP SERPL CALC-SCNC: 12 MMOL/L — SIGNIFICANT CHANGE UP (ref 5–17)
AST SERPL-CCNC: 57 U/L — HIGH (ref 10–40)
BASOPHILS # BLD AUTO: 0.06 K/UL — SIGNIFICANT CHANGE UP (ref 0–0.2)
BASOPHILS NFR BLD AUTO: 0.6 % — SIGNIFICANT CHANGE UP (ref 0–2)
BILIRUB SERPL-MCNC: 0.3 MG/DL — SIGNIFICANT CHANGE UP (ref 0.2–1.2)
BUN SERPL-MCNC: 24 MG/DL — HIGH (ref 7–23)
CALCIUM SERPL-MCNC: 9.1 MG/DL — SIGNIFICANT CHANGE UP (ref 8.4–10.5)
CHLORIDE SERPL-SCNC: 102 MMOL/L — SIGNIFICANT CHANGE UP (ref 96–108)
CO2 SERPL-SCNC: 24 MMOL/L — SIGNIFICANT CHANGE UP (ref 22–31)
CREAT SERPL-MCNC: 1 MG/DL — SIGNIFICANT CHANGE UP (ref 0.5–1.3)
CRP SERPL-MCNC: <3 MG/L — SIGNIFICANT CHANGE UP (ref 0–4)
D DIMER BLD IA.RAPID-MCNC: 222 NG/ML DDU — SIGNIFICANT CHANGE UP
EOSINOPHIL # BLD AUTO: 0.32 K/UL — SIGNIFICANT CHANGE UP (ref 0–0.5)
EOSINOPHIL NFR BLD AUTO: 3 % — SIGNIFICANT CHANGE UP (ref 0–6)
FERRITIN SERPL-MCNC: 84 NG/ML — SIGNIFICANT CHANGE UP (ref 30–400)
GLUCOSE BLDC GLUCOMTR-MCNC: 136 MG/DL — HIGH (ref 70–99)
GLUCOSE BLDC GLUCOMTR-MCNC: 163 MG/DL — HIGH (ref 70–99)
GLUCOSE BLDC GLUCOMTR-MCNC: 169 MG/DL — HIGH (ref 70–99)
GLUCOSE BLDC GLUCOMTR-MCNC: 211 MG/DL — HIGH (ref 70–99)
GLUCOSE SERPL-MCNC: 180 MG/DL — HIGH (ref 70–99)
HCT VFR BLD CALC: 34.2 % — LOW (ref 39–50)
HGB BLD-MCNC: 10.4 G/DL — LOW (ref 13–17)
IMM GRANULOCYTES NFR BLD AUTO: 0.5 % — SIGNIFICANT CHANGE UP (ref 0–1.5)
LYMPHOCYTES # BLD AUTO: 2.93 K/UL — SIGNIFICANT CHANGE UP (ref 1–3.3)
LYMPHOCYTES # BLD AUTO: 27.4 % — SIGNIFICANT CHANGE UP (ref 13–44)
MAGNESIUM SERPL-MCNC: 2.3 MG/DL — SIGNIFICANT CHANGE UP (ref 1.6–2.6)
MCHC RBC-ENTMCNC: 24 PG — LOW (ref 27–34)
MCHC RBC-ENTMCNC: 30.4 GM/DL — LOW (ref 32–36)
MCV RBC AUTO: 78.8 FL — LOW (ref 80–100)
MONOCYTES # BLD AUTO: 0.8 K/UL — SIGNIFICANT CHANGE UP (ref 0–0.9)
MONOCYTES NFR BLD AUTO: 7.5 % — SIGNIFICANT CHANGE UP (ref 2–14)
NEUTROPHILS # BLD AUTO: 6.52 K/UL — SIGNIFICANT CHANGE UP (ref 1.8–7.4)
NEUTROPHILS NFR BLD AUTO: 61 % — SIGNIFICANT CHANGE UP (ref 43–77)
NRBC # BLD: 0 /100 WBCS — SIGNIFICANT CHANGE UP (ref 0–0)
PHOSPHATE SERPL-MCNC: 2.4 MG/DL — LOW (ref 2.5–4.5)
PLATELET # BLD AUTO: 209 K/UL — SIGNIFICANT CHANGE UP (ref 150–400)
POTASSIUM SERPL-MCNC: 3.8 MMOL/L — SIGNIFICANT CHANGE UP (ref 3.5–5.3)
POTASSIUM SERPL-SCNC: 3.8 MMOL/L — SIGNIFICANT CHANGE UP (ref 3.5–5.3)
PROT SERPL-MCNC: 7.4 G/DL — SIGNIFICANT CHANGE UP (ref 6–8.3)
RBC # BLD: 4.34 M/UL — SIGNIFICANT CHANGE UP (ref 4.2–5.8)
RBC # FLD: 15.6 % — HIGH (ref 10.3–14.5)
SODIUM SERPL-SCNC: 138 MMOL/L — SIGNIFICANT CHANGE UP (ref 135–145)
WBC # BLD: 10.68 K/UL — HIGH (ref 3.8–10.5)
WBC # FLD AUTO: 10.68 K/UL — HIGH (ref 3.8–10.5)

## 2022-01-15 PROCEDURE — 99232 SBSQ HOSP IP/OBS MODERATE 35: CPT

## 2022-01-15 RX ADMIN — ZINC SULFATE TAB 220 MG (50 MG ZINC EQUIVALENT) 220 MILLIGRAM(S): 220 (50 ZN) TAB at 11:04

## 2022-01-15 RX ADMIN — Medication 1 TABLET(S): at 11:05

## 2022-01-15 RX ADMIN — Medication 500 MILLIGRAM(S): at 13:21

## 2022-01-15 RX ADMIN — Medication 500 MILLIGRAM(S): at 05:34

## 2022-01-15 RX ADMIN — Medication 500 MILLIGRAM(S): at 11:05

## 2022-01-15 RX ADMIN — Medication 2: at 12:51

## 2022-01-15 RX ADMIN — LISINOPRIL 20 MILLIGRAM(S): 2.5 TABLET ORAL at 05:34

## 2022-01-15 RX ADMIN — MEROPENEM 100 MILLIGRAM(S): 1 INJECTION INTRAVENOUS at 14:10

## 2022-01-15 RX ADMIN — CHLORHEXIDINE GLUCONATE 1 APPLICATION(S): 213 SOLUTION TOPICAL at 05:33

## 2022-01-15 RX ADMIN — MEROPENEM 100 MILLIGRAM(S): 1 INJECTION INTRAVENOUS at 23:00

## 2022-01-15 RX ADMIN — Medication 5 UNIT(S): at 12:52

## 2022-01-15 RX ADMIN — Medication 500 MILLIGRAM(S): at 22:49

## 2022-01-15 RX ADMIN — AMLODIPINE BESYLATE 10 MILLIGRAM(S): 2.5 TABLET ORAL at 05:34

## 2022-01-15 RX ADMIN — Medication 5 UNIT(S): at 08:56

## 2022-01-15 RX ADMIN — Medication 4: at 18:34

## 2022-01-15 RX ADMIN — MEROPENEM 100 MILLIGRAM(S): 1 INJECTION INTRAVENOUS at 05:35

## 2022-01-15 RX ADMIN — Medication 3 MILLIGRAM(S): at 23:58

## 2022-01-15 RX ADMIN — Medication 2: at 08:55

## 2022-01-15 RX ADMIN — Medication 81 MILLIGRAM(S): at 11:05

## 2022-01-15 RX ADMIN — Medication 5 UNIT(S): at 18:35

## 2022-01-15 NOTE — PROGRESS NOTE ADULT - PROBLEM SELECTOR PLAN 1
CT Pelvis w/ IV contrast - Right gluteal wound with associated phlegmonous changes extending into the hamstring tendon. No associated abscess/collection. Recent tx at [facility] and d/c on doxycyline for 7d. Wound growing pseudomonas, ESBL   s/p midline placement on 1/11/21  Gen surg recs no indication for urgent debridement of the wound. continue off loading, nutritional optimization (albumin on arrival 3.6 is not malnourished), and local wound care. continue discussion with wound care nurse and plastics on how to promote healing of the wound.    - s/p midline placement; c/w PO amox 500 mg q8h and IV meropenem 1g q8h till 1/25/21  - c/w wound care

## 2022-01-15 NOTE — PROGRESS NOTE ADULT - SUBJECTIVE AND OBJECTIVE BOX
OVERNIGHT EVENTS: NAOE    SUBJECTIVE / INTERVAL HPI: Patient seen and examined at bedside. Pt denies any F/C, SOB/cough. Pt reports improvement in his right gluteal pain. Pt reports improvement of his nausea. Pt denies any other new symptoms.       MEDICATIONS  (STANDING):  amLODIPine   Tablet 10 milliGRAM(s) Oral daily  amoxicillin 500 milliGRAM(s) Oral three times a day  ascorbic acid 500 milliGRAM(s) Oral daily  aspirin enteric coated 81 milliGRAM(s) Oral daily  chlorhexidine 2% Cloths 1 Application(s) Topical <User Schedule>  dextrose 40% Gel 15 Gram(s) Oral once  dextrose 5%. 1000 milliLiter(s) (50 mL/Hr) IV Continuous <Continuous>  dextrose 5%. 1000 milliLiter(s) (100 mL/Hr) IV Continuous <Continuous>  dextrose 50% Injectable 25 Gram(s) IV Push once  dextrose 50% Injectable 12.5 Gram(s) IV Push once  dextrose 50% Injectable 25 Gram(s) IV Push once  enoxaparin Injectable 40 milliGRAM(s) SubCutaneous every 24 hours  glucagon  Injectable 1 milliGRAM(s) IntraMuscular once  influenza   Vaccine 0.5 milliLiter(s) IntraMuscular once  insulin glargine Injectable (LANTUS) 15 Unit(s) SubCutaneous at bedtime  insulin lispro (ADMELOG) corrective regimen sliding scale   SubCutaneous three times a day before meals  insulin lispro (ADMELOG) corrective regimen sliding scale   SubCutaneous at bedtime  insulin lispro Injectable (ADMELOG) 5 Unit(s) SubCutaneous three times a day before meals  lisinopril 20 milliGRAM(s) Oral daily  meropenem  IVPB 1000 milliGRAM(s) IV Intermittent every 8 hours  multivitamin 1 Tablet(s) Oral daily  zinc sulfate 220 milliGRAM(s) Oral daily    MEDICATIONS  (PRN):  melatonin 3 milliGRAM(s) Oral at bedtime PRN Insomnia  sodium chloride 0.9% lock flush 10 milliLiter(s) IV Push every 1 hour PRN Pre/post blood products, medications, blood draw, and to maintain line patency    Allergies    No Known Drug Allergies  pork (Unknown)    Intolerances        VITAL SIGNS:  Vital Signs Last 24 Hrs  T(C): 36.7 (14 Jan 2022 05:57), Max: 36.7 (13 Jan 2022 13:40)  T(F): 98.1 (14 Jan 2022 05:57), Max: 98.1 (14 Jan 2022 05:57)  HR: 80 (14 Jan 2022 05:57) (80 - 85)  BP: 167/84 (14 Jan 2022 05:57) (129/73 - 167/84)  BP(mean): --  RR: 18 (14 Jan 2022 05:57) (18 - 18)  SpO2: 100% (14 Jan 2022 05:57) (97% - 100%)        PHYSICAL EXAM:  GENERAL: Awake, alert and interactive, no acute distress  NEURO: A&Ox4, no focal deficits  HEENT: Normocephalic, atraumatic, oral mucosa moist, no oral lesions noted  CARDIAC: RRR, +S1/S2, no murmurs/rubs/gallops  PULM: Breathing comfortably on RA, clear to auscultation bilaterally, no wheezes/rales/rhonchi  ABDOMEN: Soft, nontender, nondistended, no masses and hepatosplenomegaly  SKIN: +R gluteal wound dressed with pink dressing, no drainage. Open wound with pale/white appearing wound edge w/o surrounding erythema/crepitus.   VASC: 1+ peripheral pulses, no edema, no LE tenderness      LABS:                        10.2   10.50 )-----------( 236      ( 13 Jan 2022 07:19 )             32.8     01-13    136  |  101  |  33<H>  ----------------------------<  138<H>  3.4<L>   |  26  |  1.22    Ca    9.3      13 Jan 2022 07:19  Phos  3.1     01-13  Mg     2.1     01-13    TPro  7.5  /  Alb  3.5  /  TBili  0.2  /  DBili  x   /  AST  56<H>  /  ALT  30  /  AlkPhos  98  01-13        CAPILLARY BLOOD GLUCOSE      POCT Blood Glucose.: 267 mg/dL (13 Jan 2022 21:58)  POCT Blood Glucose.: 84 mg/dL (13 Jan 2022 16:49)  POCT Blood Glucose.: 233 mg/dL (13 Jan 2022 12:31)          RADIOLOGY & ADDITIONAL TESTS: Reviewed.

## 2022-01-16 LAB
GLUCOSE BLDC GLUCOMTR-MCNC: 130 MG/DL — HIGH (ref 70–99)
GLUCOSE BLDC GLUCOMTR-MCNC: 195 MG/DL — HIGH (ref 70–99)
GLUCOSE BLDC GLUCOMTR-MCNC: 212 MG/DL — HIGH (ref 70–99)
GLUCOSE BLDC GLUCOMTR-MCNC: 289 MG/DL — HIGH (ref 70–99)

## 2022-01-16 PROCEDURE — 99232 SBSQ HOSP IP/OBS MODERATE 35: CPT

## 2022-01-16 RX ADMIN — Medication 1 TABLET(S): at 12:30

## 2022-01-16 RX ADMIN — ZINC SULFATE TAB 220 MG (50 MG ZINC EQUIVALENT) 220 MILLIGRAM(S): 220 (50 ZN) TAB at 12:30

## 2022-01-16 RX ADMIN — LISINOPRIL 20 MILLIGRAM(S): 2.5 TABLET ORAL at 06:27

## 2022-01-16 RX ADMIN — Medication 6: at 12:33

## 2022-01-16 RX ADMIN — Medication 3 MILLIGRAM(S): at 21:55

## 2022-01-16 RX ADMIN — AMLODIPINE BESYLATE 10 MILLIGRAM(S): 2.5 TABLET ORAL at 06:27

## 2022-01-16 RX ADMIN — MEROPENEM 100 MILLIGRAM(S): 1 INJECTION INTRAVENOUS at 06:00

## 2022-01-16 RX ADMIN — Medication 5 UNIT(S): at 12:34

## 2022-01-16 RX ADMIN — CHLORHEXIDINE GLUCONATE 1 APPLICATION(S): 213 SOLUTION TOPICAL at 07:39

## 2022-01-16 RX ADMIN — Medication 81 MILLIGRAM(S): at 12:29

## 2022-01-16 RX ADMIN — MEROPENEM 100 MILLIGRAM(S): 1 INJECTION INTRAVENOUS at 14:16

## 2022-01-16 RX ADMIN — Medication 500 MILLIGRAM(S): at 21:56

## 2022-01-16 RX ADMIN — Medication 500 MILLIGRAM(S): at 14:16

## 2022-01-16 RX ADMIN — Medication 500 MILLIGRAM(S): at 06:27

## 2022-01-16 RX ADMIN — Medication 2: at 09:33

## 2022-01-16 RX ADMIN — Medication 5 UNIT(S): at 18:21

## 2022-01-16 RX ADMIN — Medication 5 UNIT(S): at 09:33

## 2022-01-16 RX ADMIN — Medication 500 MILLIGRAM(S): at 12:29

## 2022-01-16 RX ADMIN — MEROPENEM 100 MILLIGRAM(S): 1 INJECTION INTRAVENOUS at 21:56

## 2022-01-16 RX ADMIN — INSULIN GLARGINE 15 UNIT(S): 100 INJECTION, SOLUTION SUBCUTANEOUS at 21:56

## 2022-01-16 NOTE — PROGRESS NOTE ADULT - NSPROGADDITIONALINFOA_GEN_ALL_CORE
DVT ppx: HSQ
DVT ppx: LMWH
DVT ppx: HSQ
Dispo: pending placement. No beds currently available. C/w abx.  Patient intermittently refusing several meds. Refused lovenox this AM. Explained risks of not having DVT ppx. He expressed understanding/awareness.
Dispo: pending placement. No beds currently available. C/w abx.
DVT ppx: LMWH

## 2022-01-16 NOTE — PROGRESS NOTE ADULT - SUBJECTIVE AND OBJECTIVE BOX
OVERNIGHT EVENTS: NAOE. Pending transfer to [facility].    SUBJECTIVE / INTERVAL HPI: Patient seen and examined at bedside. Pt denies any F/C, SOB/cough. Pt reports improvement in his right gluteal pain. Pt reports improvement of his nausea. Pt denies any other new symptoms.       MEDICATIONS  (STANDING):  amLODIPine   Tablet 10 milliGRAM(s) Oral daily  amoxicillin 500 milliGRAM(s) Oral three times a day  ascorbic acid 500 milliGRAM(s) Oral daily  aspirin enteric coated 81 milliGRAM(s) Oral daily  chlorhexidine 2% Cloths 1 Application(s) Topical <User Schedule>  dextrose 40% Gel 15 Gram(s) Oral once  dextrose 5%. 1000 milliLiter(s) (50 mL/Hr) IV Continuous <Continuous>  dextrose 5%. 1000 milliLiter(s) (100 mL/Hr) IV Continuous <Continuous>  dextrose 50% Injectable 25 Gram(s) IV Push once  dextrose 50% Injectable 12.5 Gram(s) IV Push once  dextrose 50% Injectable 25 Gram(s) IV Push once  enoxaparin Injectable 40 milliGRAM(s) SubCutaneous every 24 hours  glucagon  Injectable 1 milliGRAM(s) IntraMuscular once  influenza   Vaccine 0.5 milliLiter(s) IntraMuscular once  insulin glargine Injectable (LANTUS) 15 Unit(s) SubCutaneous at bedtime  insulin lispro (ADMELOG) corrective regimen sliding scale   SubCutaneous three times a day before meals  insulin lispro (ADMELOG) corrective regimen sliding scale   SubCutaneous at bedtime  insulin lispro Injectable (ADMELOG) 5 Unit(s) SubCutaneous three times a day before meals  lisinopril 20 milliGRAM(s) Oral daily  meropenem  IVPB 1000 milliGRAM(s) IV Intermittent every 8 hours  multivitamin 1 Tablet(s) Oral daily  zinc sulfate 220 milliGRAM(s) Oral daily    MEDICATIONS  (PRN):  melatonin 3 milliGRAM(s) Oral at bedtime PRN Insomnia  sodium chloride 0.9% lock flush 10 milliLiter(s) IV Push every 1 hour PRN Pre/post blood products, medications, blood draw, and to maintain line patency    Allergies    No Known Drug Allergies  pork (Unknown)    Intolerances        VITAL SIGNS:  Vital Signs Last 24 Hrs  Reviewed, wnl        PHYSICAL EXAM:  GENERAL: Awake, alert and interactive, no acute distress  NEURO: A&Ox4, no focal deficits  HEENT: Normocephalic, atraumatic, oral mucosa moist, no oral lesions noted  CARDIAC: RRR,  PULM: Breathing comfortably on RA, clear to auscultation bilaterally, no wheezes/rales/rhonchi  ABDOMEN: Soft, nontender, nondistended, no masses and hepatosplenomegaly  SKIN: +R gluteal wound dressed with pink dressing, no drainage. Open wound with pale/white appearing wound edge w/o surrounding erythema/crepitus.   VASC: 1+ peripheral pulses, no edema, no LE tenderness      LABS:           Reviewed, acceptable          RADIOLOGY & ADDITIONAL TESTS: Reviewed.

## 2022-01-16 NOTE — PROVIDER CONTACT NOTE (OTHER) - BACKGROUND
Pt refused to cover midline dsg prior shower this am and tells nurse "you can't stop me, im going in there", pt requested midline to be covered with gauze only. Pt disregards teaching provided.

## 2022-01-16 NOTE — PROVIDER CONTACT NOTE (OTHER) - SITUATION
During dressing change pt despite education removed facial mask while talking on the phone, and touched midline site with non sterile hands. Pt stated "it's mine I can touch it".

## 2022-01-16 NOTE — PROGRESS NOTE ADULT - PROBLEM SELECTOR PLAN 1
CT Pelvis w/ IV contrast - Right gluteal wound with associated phlegmonous changes extending into the hamstring tendon. No associated abscess/collection. Recent tx at [facility] and d/c on doxycyline for 7d. Wound growing pseudomonas, ESBL   s/p midline placement on 1/11/21  Gen surg recs no indication for urgent debridement of the wound. continue off loading, nutritional optimization (albumin on arrival 3.6 is not malnourished), and local wound care. continue discussion with wound care nurse and plastics on how to promote healing of the wound.    - s/p midline placement; c/w PO amox 500 mg q8h and IV meropenem 1g q8h till 1/25/21  - c/w wound care

## 2022-01-16 NOTE — PROGRESS NOTE ADULT - PROBLEM SELECTOR PLAN 7
F: s/p 1L NS  E: replete prn  N: Consistent carb/DASH diet, no snacks  GI: None  A/c: Heparin SQ  Activity: As tolerated  Code: FC  Dispo: Pending [facility]

## 2022-01-17 LAB
ANION GAP SERPL CALC-SCNC: 10 MMOL/L — SIGNIFICANT CHANGE UP (ref 5–17)
BUN SERPL-MCNC: 33 MG/DL — HIGH (ref 7–23)
CALCIUM SERPL-MCNC: 9 MG/DL — SIGNIFICANT CHANGE UP (ref 8.4–10.5)
CHLORIDE SERPL-SCNC: 100 MMOL/L — SIGNIFICANT CHANGE UP (ref 96–108)
CO2 SERPL-SCNC: 25 MMOL/L — SIGNIFICANT CHANGE UP (ref 22–31)
CREAT SERPL-MCNC: 1.22 MG/DL — SIGNIFICANT CHANGE UP (ref 0.5–1.3)
GLUCOSE BLDC GLUCOMTR-MCNC: 148 MG/DL — HIGH (ref 70–99)
GLUCOSE BLDC GLUCOMTR-MCNC: 158 MG/DL — HIGH (ref 70–99)
GLUCOSE BLDC GLUCOMTR-MCNC: 164 MG/DL — HIGH (ref 70–99)
GLUCOSE BLDC GLUCOMTR-MCNC: 177 MG/DL — HIGH (ref 70–99)
GLUCOSE SERPL-MCNC: 162 MG/DL — HIGH (ref 70–99)
HCT VFR BLD CALC: 32.6 % — LOW (ref 39–50)
HGB BLD-MCNC: 10 G/DL — LOW (ref 13–17)
MAGNESIUM SERPL-MCNC: 2.3 MG/DL — SIGNIFICANT CHANGE UP (ref 1.6–2.6)
MCHC RBC-ENTMCNC: 23.9 PG — LOW (ref 27–34)
MCHC RBC-ENTMCNC: 30.7 GM/DL — LOW (ref 32–36)
MCV RBC AUTO: 78 FL — LOW (ref 80–100)
NRBC # BLD: 0 /100 WBCS — SIGNIFICANT CHANGE UP (ref 0–0)
PHOSPHATE SERPL-MCNC: 2.7 MG/DL — SIGNIFICANT CHANGE UP (ref 2.5–4.5)
PLATELET # BLD AUTO: 205 K/UL — SIGNIFICANT CHANGE UP (ref 150–400)
POTASSIUM SERPL-MCNC: 3.6 MMOL/L — SIGNIFICANT CHANGE UP (ref 3.5–5.3)
POTASSIUM SERPL-SCNC: 3.6 MMOL/L — SIGNIFICANT CHANGE UP (ref 3.5–5.3)
RBC # BLD: 4.18 M/UL — LOW (ref 4.2–5.8)
RBC # FLD: 15.4 % — HIGH (ref 10.3–14.5)
SARS-COV-2 RNA SPEC QL NAA+PROBE: NEGATIVE — SIGNIFICANT CHANGE UP
SODIUM SERPL-SCNC: 135 MMOL/L — SIGNIFICANT CHANGE UP (ref 135–145)
WBC # BLD: 11.48 K/UL — HIGH (ref 3.8–10.5)
WBC # FLD AUTO: 11.48 K/UL — HIGH (ref 3.8–10.5)

## 2022-01-17 PROCEDURE — 99232 SBSQ HOSP IP/OBS MODERATE 35: CPT

## 2022-01-17 RX ORDER — FOLIC ACID 0.8 MG
1 TABLET ORAL DAILY
Refills: 0 | Status: DISCONTINUED | OUTPATIENT
Start: 2022-01-17 | End: 2022-01-19

## 2022-01-17 RX ADMIN — Medication 5 UNIT(S): at 17:25

## 2022-01-17 RX ADMIN — AMLODIPINE BESYLATE 10 MILLIGRAM(S): 2.5 TABLET ORAL at 06:37

## 2022-01-17 RX ADMIN — MEROPENEM 100 MILLIGRAM(S): 1 INJECTION INTRAVENOUS at 14:25

## 2022-01-17 RX ADMIN — Medication 5 UNIT(S): at 12:43

## 2022-01-17 RX ADMIN — Medication 500 MILLIGRAM(S): at 12:03

## 2022-01-17 RX ADMIN — ZINC SULFATE TAB 220 MG (50 MG ZINC EQUIVALENT) 220 MILLIGRAM(S): 220 (50 ZN) TAB at 12:03

## 2022-01-17 RX ADMIN — Medication 500 MILLIGRAM(S): at 06:37

## 2022-01-17 RX ADMIN — MEROPENEM 100 MILLIGRAM(S): 1 INJECTION INTRAVENOUS at 06:37

## 2022-01-17 RX ADMIN — MEROPENEM 100 MILLIGRAM(S): 1 INJECTION INTRAVENOUS at 22:32

## 2022-01-17 RX ADMIN — LISINOPRIL 20 MILLIGRAM(S): 2.5 TABLET ORAL at 06:37

## 2022-01-17 RX ADMIN — Medication 81 MILLIGRAM(S): at 12:03

## 2022-01-17 RX ADMIN — Medication 500 MILLIGRAM(S): at 22:31

## 2022-01-17 RX ADMIN — Medication 500 MILLIGRAM(S): at 14:12

## 2022-01-17 RX ADMIN — Medication 1 TABLET(S): at 12:03

## 2022-01-17 RX ADMIN — Medication 5 UNIT(S): at 09:08

## 2022-01-17 RX ADMIN — Medication 2: at 09:08

## 2022-01-17 RX ADMIN — INSULIN GLARGINE 15 UNIT(S): 100 INJECTION, SOLUTION SUBCUTANEOUS at 22:32

## 2022-01-17 RX ADMIN — Medication 2: at 12:41

## 2022-01-17 RX ADMIN — Medication 1 MILLIGRAM(S): at 12:52

## 2022-01-17 NOTE — PROGRESS NOTE ADULT - SUBJECTIVE AND OBJECTIVE BOX
*INCOMPLETE*    60 yo M smoker with PMHx of HTN, IDDM c/b neuropathy as well as hx of multiple skin/soft tissue infections admitted for right gluteal SSTI - followed by wound care s/p midline placement for IV Abx, found with DEBBY - improving, and COVID positive - ASx on RA; pending placement    OVERNIGHT EVENTS: NAOE    SUBJECTIVE / INTERVAL HPI: Patient seen and examined at bedside.     MEDICATIONS  (STANDING):  amLODIPine   Tablet 10 milliGRAM(s) Oral daily  amoxicillin 500 milliGRAM(s) Oral three times a day  ascorbic acid 500 milliGRAM(s) Oral daily  aspirin enteric coated 81 milliGRAM(s) Oral daily  chlorhexidine 2% Cloths 1 Application(s) Topical <User Schedule>  dextrose 40% Gel 15 Gram(s) Oral once  dextrose 5%. 1000 milliLiter(s) (50 mL/Hr) IV Continuous <Continuous>  dextrose 5%. 1000 milliLiter(s) (100 mL/Hr) IV Continuous <Continuous>  dextrose 50% Injectable 25 Gram(s) IV Push once  dextrose 50% Injectable 12.5 Gram(s) IV Push once  dextrose 50% Injectable 25 Gram(s) IV Push once  enoxaparin Injectable 40 milliGRAM(s) SubCutaneous every 24 hours  glucagon  Injectable 1 milliGRAM(s) IntraMuscular once  influenza   Vaccine 0.5 milliLiter(s) IntraMuscular once  insulin glargine Injectable (LANTUS) 15 Unit(s) SubCutaneous at bedtime  insulin lispro (ADMELOG) corrective regimen sliding scale   SubCutaneous at bedtime  insulin lispro (ADMELOG) corrective regimen sliding scale   SubCutaneous three times a day before meals  insulin lispro Injectable (ADMELOG) 5 Unit(s) SubCutaneous three times a day before meals  lisinopril 20 milliGRAM(s) Oral daily  meropenem  IVPB 1000 milliGRAM(s) IV Intermittent every 8 hours  multivitamin 1 Tablet(s) Oral daily  zinc sulfate 220 milliGRAM(s) Oral daily    MEDICATIONS  (PRN):  melatonin 3 milliGRAM(s) Oral at bedtime PRN Insomnia  sodium chloride 0.9% lock flush 10 milliLiter(s) IV Push every 1 hour PRN Pre/post blood products, medications, blood draw, and to maintain line patency    Allergies    No Known Drug Allergies  pork (Unknown)    Intolerances        VITAL SIGNS:  Vital Signs Last 24 Hrs  T(C): 36.9 (17 Jan 2022 05:52), Max: 36.9 (17 Jan 2022 05:52)  T(F): 98.5 (17 Jan 2022 05:52), Max: 98.5 (17 Jan 2022 05:52)  HR: 78 (17 Jan 2022 05:52) (78 - 94)  BP: 152/79 (17 Jan 2022 05:52) (136/78 - 154/94)  BP(mean): --  RR: 18 (17 Jan 2022 05:52) (17 - 18)  SpO2: 99% (17 Jan 2022 05:52) (93% - 99%)      01-15-22 @ 07:01  -  01-16-22 @ 07:00  --------------------------------------------------------  IN: 0 mL / OUT: 500 mL / NET: -500 mL        PHYSICAL EXAM:  GENERAL: Awake, alert and interactive, no acute distress  NEURO: A&Ox4, no focal deficits  HEENT: Normocephalic, atraumatic, oral mucosa moist, no oral lesions noted  CARDIAC: RRR, +S1/S2, no murmurs/rubs/gallops  PULM: Breathing comfortably on RA, clear to auscultation bilaterally, no wheezes/rales/rhonchi  ABDOMEN: Soft, nontender, nondistended, no masses and hepatosplenomegaly  SKIN: +R gluteal wound dressed with pink dressing, no drainage. Open wound with pale/white appearing wound edge w/o surrounding erythema/crepitus.   VASC: 1+ peripheral pulses, no edema, no LE tenderness      LABS:                        10.4   10.68 )-----------( 209      ( 15 Freeman 2022 08:49 )             34.2     01-15    138  |  102  |  24<H>  ----------------------------<  180<H>  3.8   |  24  |  1.00    Ca    9.1      15 Freeman 2022 08:49  Phos  2.4     01-15  Mg     2.3     01-15    TPro  7.4  /  Alb  3.6  /  TBili  0.3  /  DBili  x   /  AST  57<H>  /  ALT  30  /  AlkPhos  103  01-15        CAPILLARY BLOOD GLUCOSE      POCT Blood Glucose.: 212 mg/dL (16 Jan 2022 21:27)  POCT Blood Glucose.: 130 mg/dL (16 Jan 2022 17:22)  POCT Blood Glucose.: 289 mg/dL (16 Jan 2022 12:14)  POCT Blood Glucose.: 195 mg/dL (16 Jan 2022 08:53)          RADIOLOGY & ADDITIONAL TESTS: Reviewed. OVERNIGHT EVENTS: NAOE    SUBJECTIVE / INTERVAL HPI: Patient seen and examined at bedside. Pt denies any F/C, SOB/cough, denies any other new symptoms.    MEDICATIONS  (STANDING):  amLODIPine   Tablet 10 milliGRAM(s) Oral daily  amoxicillin 500 milliGRAM(s) Oral three times a day  ascorbic acid 500 milliGRAM(s) Oral daily  aspirin enteric coated 81 milliGRAM(s) Oral daily  chlorhexidine 2% Cloths 1 Application(s) Topical <User Schedule>  dextrose 40% Gel 15 Gram(s) Oral once  dextrose 5%. 1000 milliLiter(s) (50 mL/Hr) IV Continuous <Continuous>  dextrose 5%. 1000 milliLiter(s) (100 mL/Hr) IV Continuous <Continuous>  dextrose 50% Injectable 25 Gram(s) IV Push once  dextrose 50% Injectable 12.5 Gram(s) IV Push once  dextrose 50% Injectable 25 Gram(s) IV Push once  enoxaparin Injectable 40 milliGRAM(s) SubCutaneous every 24 hours  glucagon  Injectable 1 milliGRAM(s) IntraMuscular once  influenza   Vaccine 0.5 milliLiter(s) IntraMuscular once  insulin glargine Injectable (LANTUS) 15 Unit(s) SubCutaneous at bedtime  insulin lispro (ADMELOG) corrective regimen sliding scale   SubCutaneous at bedtime  insulin lispro (ADMELOG) corrective regimen sliding scale   SubCutaneous three times a day before meals  insulin lispro Injectable (ADMELOG) 5 Unit(s) SubCutaneous three times a day before meals  lisinopril 20 milliGRAM(s) Oral daily  meropenem  IVPB 1000 milliGRAM(s) IV Intermittent every 8 hours  multivitamin 1 Tablet(s) Oral daily  zinc sulfate 220 milliGRAM(s) Oral daily    MEDICATIONS  (PRN):  melatonin 3 milliGRAM(s) Oral at bedtime PRN Insomnia  sodium chloride 0.9% lock flush 10 milliLiter(s) IV Push every 1 hour PRN Pre/post blood products, medications, blood draw, and to maintain line patency    Allergies    No Known Drug Allergies  pork (Unknown)    Intolerances        VITAL SIGNS:  Vital Signs Last 24 Hrs  T(C): 36.9 (17 Jan 2022 05:52), Max: 36.9 (17 Jan 2022 05:52)  T(F): 98.5 (17 Jan 2022 05:52), Max: 98.5 (17 Jan 2022 05:52)  HR: 78 (17 Jan 2022 05:52) (78 - 94)  BP: 152/79 (17 Jan 2022 05:52) (136/78 - 154/94)  BP(mean): --  RR: 18 (17 Jan 2022 05:52) (17 - 18)  SpO2: 99% (17 Jan 2022 05:52) (93% - 99%)      01-15-22 @ 07:01  -  01-16-22 @ 07:00  --------------------------------------------------------  IN: 0 mL / OUT: 500 mL / NET: -500 mL        PHYSICAL EXAM:  GENERAL: Awake, alert and interactive, no acute distress  NEURO: A&Ox4, no focal deficits  HEENT: Normocephalic, atraumatic, oral mucosa moist, no oral lesions noted  CARDIAC: RRR, +S1/S2, no murmurs/rubs/gallops  PULM: Breathing comfortably on RA, clear to auscultation bilaterally, no wheezes/rales/rhonchi  ABDOMEN: Soft, nontender, nondistended, no masses and hepatosplenomegaly  SKIN: +R gluteal wound dressed with pink dressing, no drainage. Open wound with pale/white appearing wound edge w/o surrounding erythema/crepitus.   VASC: 1+ peripheral pulses, no edema, no LE tenderness      LABS:                        10.4   10.68 )-----------( 209      ( 15 Freeman 2022 08:49 )             34.2     01-15    138  |  102  |  24<H>  ----------------------------<  180<H>  3.8   |  24  |  1.00    Ca    9.1      15 Freeman 2022 08:49  Phos  2.4     01-15  Mg     2.3     01-15    TPro  7.4  /  Alb  3.6  /  TBili  0.3  /  DBili  x   /  AST  57<H>  /  ALT  30  /  AlkPhos  103  01-15        CAPILLARY BLOOD GLUCOSE      POCT Blood Glucose.: 212 mg/dL (16 Jan 2022 21:27)  POCT Blood Glucose.: 130 mg/dL (16 Jan 2022 17:22)  POCT Blood Glucose.: 289 mg/dL (16 Jan 2022 12:14)  POCT Blood Glucose.: 195 mg/dL (16 Jan 2022 08:53)          RADIOLOGY & ADDITIONAL TESTS: Reviewed.

## 2022-01-17 NOTE — PROGRESS NOTE ADULT - PROBLEM SELECTOR PLAN 1
CT Pelvis w/ IV contrast - Right gluteal wound with associated phlegmonous changes extending into the hamstring tendon. No associated abscess/collection. Recent tx at [facility] and d/c on doxycyline for 7d. Wound growing pseudomonas, ESBL   s/p midline placement on 1/11/21  Gen surg recs no indication for urgent debridement of the wound. continue off loading, nutritional optimization (albumin on arrival 3.6 is not malnourished), and local wound care. continue discussion with wound care nurse and plastics on how to promote healing of the wound.    - s/p midline placement; c/w PO amox 500 mg q8h and IV meropenem 1g q8h till 1/25/21  - c/w wound care

## 2022-01-18 LAB
ANION GAP SERPL CALC-SCNC: 10 MMOL/L — SIGNIFICANT CHANGE UP (ref 5–17)
BUN SERPL-MCNC: 36 MG/DL — HIGH (ref 7–23)
CALCIUM SERPL-MCNC: 9.4 MG/DL — SIGNIFICANT CHANGE UP (ref 8.4–10.5)
CHLORIDE SERPL-SCNC: 102 MMOL/L — SIGNIFICANT CHANGE UP (ref 96–108)
CO2 SERPL-SCNC: 26 MMOL/L — SIGNIFICANT CHANGE UP (ref 22–31)
CREAT SERPL-MCNC: 1.15 MG/DL — SIGNIFICANT CHANGE UP (ref 0.5–1.3)
GLUCOSE BLDC GLUCOMTR-MCNC: 166 MG/DL — HIGH (ref 70–99)
GLUCOSE BLDC GLUCOMTR-MCNC: 257 MG/DL — HIGH (ref 70–99)
GLUCOSE BLDC GLUCOMTR-MCNC: 264 MG/DL — HIGH (ref 70–99)
GLUCOSE BLDC GLUCOMTR-MCNC: 79 MG/DL — SIGNIFICANT CHANGE UP (ref 70–99)
GLUCOSE SERPL-MCNC: 132 MG/DL — HIGH (ref 70–99)
HCT VFR BLD CALC: 34.1 % — LOW (ref 39–50)
HGB BLD-MCNC: 10.3 G/DL — LOW (ref 13–17)
MCHC RBC-ENTMCNC: 23.5 PG — LOW (ref 27–34)
MCHC RBC-ENTMCNC: 30.2 GM/DL — LOW (ref 32–36)
MCV RBC AUTO: 77.9 FL — LOW (ref 80–100)
NRBC # BLD: 0 /100 WBCS — SIGNIFICANT CHANGE UP (ref 0–0)
PLATELET # BLD AUTO: 207 K/UL — SIGNIFICANT CHANGE UP (ref 150–400)
POTASSIUM SERPL-MCNC: 3.6 MMOL/L — SIGNIFICANT CHANGE UP (ref 3.5–5.3)
POTASSIUM SERPL-SCNC: 3.6 MMOL/L — SIGNIFICANT CHANGE UP (ref 3.5–5.3)
RBC # BLD: 4.38 M/UL — SIGNIFICANT CHANGE UP (ref 4.2–5.8)
RBC # FLD: 15.6 % — HIGH (ref 10.3–14.5)
SODIUM SERPL-SCNC: 138 MMOL/L — SIGNIFICANT CHANGE UP (ref 135–145)
WBC # BLD: 10.96 K/UL — HIGH (ref 3.8–10.5)
WBC # FLD AUTO: 10.96 K/UL — HIGH (ref 3.8–10.5)

## 2022-01-18 PROCEDURE — 99232 SBSQ HOSP IP/OBS MODERATE 35: CPT

## 2022-01-18 RX ORDER — FOLIC ACID 0.8 MG
1 TABLET ORAL
Qty: 0 | Refills: 0 | DISCHARGE
Start: 2022-01-18

## 2022-01-18 RX ADMIN — MEROPENEM 100 MILLIGRAM(S): 1 INJECTION INTRAVENOUS at 06:33

## 2022-01-18 RX ADMIN — Medication 2: at 22:43

## 2022-01-18 RX ADMIN — Medication 500 MILLIGRAM(S): at 22:08

## 2022-01-18 RX ADMIN — Medication 5 UNIT(S): at 09:13

## 2022-01-18 RX ADMIN — Medication 81 MILLIGRAM(S): at 12:50

## 2022-01-18 RX ADMIN — LISINOPRIL 20 MILLIGRAM(S): 2.5 TABLET ORAL at 06:34

## 2022-01-18 RX ADMIN — INSULIN GLARGINE 15 UNIT(S): 100 INJECTION, SOLUTION SUBCUTANEOUS at 22:42

## 2022-01-18 RX ADMIN — ZINC SULFATE TAB 220 MG (50 MG ZINC EQUIVALENT) 220 MILLIGRAM(S): 220 (50 ZN) TAB at 12:51

## 2022-01-18 RX ADMIN — Medication 5 UNIT(S): at 13:00

## 2022-01-18 RX ADMIN — Medication 2: at 09:12

## 2022-01-18 RX ADMIN — AMLODIPINE BESYLATE 10 MILLIGRAM(S): 2.5 TABLET ORAL at 06:34

## 2022-01-18 RX ADMIN — Medication 500 MILLIGRAM(S): at 13:02

## 2022-01-18 RX ADMIN — Medication 500 MILLIGRAM(S): at 12:50

## 2022-01-18 RX ADMIN — Medication 500 MILLIGRAM(S): at 06:33

## 2022-01-18 RX ADMIN — Medication 1 TABLET(S): at 12:50

## 2022-01-18 RX ADMIN — MEROPENEM 100 MILLIGRAM(S): 1 INJECTION INTRAVENOUS at 13:58

## 2022-01-18 RX ADMIN — Medication 1 MILLIGRAM(S): at 12:51

## 2022-01-18 RX ADMIN — CHLORHEXIDINE GLUCONATE 1 APPLICATION(S): 213 SOLUTION TOPICAL at 06:35

## 2022-01-18 RX ADMIN — MEROPENEM 100 MILLIGRAM(S): 1 INJECTION INTRAVENOUS at 22:07

## 2022-01-18 RX ADMIN — Medication 6: at 12:59

## 2022-01-18 NOTE — PROGRESS NOTE ADULT - SUBJECTIVE AND OBJECTIVE BOX
*INCOMPLETE*    60 yo M smoker with PMHx of HTN, IDDM c/b neuropathy as well as hx of multiple skin/soft tissue infections admitted for right gluteal SSTI - followed by wound care s/p midline placement for IV Abx, found with DEBBY - improving, and COVID positive - ASx on RA; pending placement    OVERNIGHT EVENTS: NAOE    SUBJECTIVE / INTERVAL HPI: Patient seen and examined at bedside. Pt reports frustration being held in the hospital. Pt reports pain in his right gluteal site. Pt denies any F/C, SOB/cough, denies any other new symptoms.    MEDICATIONS  (STANDING):  amLODIPine   Tablet 10 milliGRAM(s) Oral daily  amoxicillin 500 milliGRAM(s) Oral three times a day  ascorbic acid 500 milliGRAM(s) Oral daily  aspirin enteric coated 81 milliGRAM(s) Oral daily  chlorhexidine 2% Cloths 1 Application(s) Topical <User Schedule>  dextrose 40% Gel 15 Gram(s) Oral once  dextrose 5%. 1000 milliLiter(s) (100 mL/Hr) IV Continuous <Continuous>  dextrose 5%. 1000 milliLiter(s) (50 mL/Hr) IV Continuous <Continuous>  dextrose 50% Injectable 12.5 Gram(s) IV Push once  dextrose 50% Injectable 25 Gram(s) IV Push once  dextrose 50% Injectable 25 Gram(s) IV Push once  enoxaparin Injectable 40 milliGRAM(s) SubCutaneous every 24 hours  folic acid 1 milliGRAM(s) Oral daily  glucagon  Injectable 1 milliGRAM(s) IntraMuscular once  influenza   Vaccine 0.5 milliLiter(s) IntraMuscular once  insulin glargine Injectable (LANTUS) 15 Unit(s) SubCutaneous at bedtime  insulin lispro (ADMELOG) corrective regimen sliding scale   SubCutaneous three times a day before meals  insulin lispro (ADMELOG) corrective regimen sliding scale   SubCutaneous at bedtime  insulin lispro Injectable (ADMELOG) 5 Unit(s) SubCutaneous three times a day before meals  lisinopril 20 milliGRAM(s) Oral daily  meropenem  IVPB 1000 milliGRAM(s) IV Intermittent every 8 hours  multivitamin 1 Tablet(s) Oral daily  zinc sulfate 220 milliGRAM(s) Oral daily    MEDICATIONS  (PRN):  melatonin 3 milliGRAM(s) Oral at bedtime PRN Insomnia  sodium chloride 0.9% lock flush 10 milliLiter(s) IV Push every 1 hour PRN Pre/post blood products, medications, blood draw, and to maintain line patency    Allergies    No Known Drug Allergies  pork (Unknown)    Intolerances        VITAL SIGNS:  Vital Signs Last 24 Hrs  T(C): 36.3 (17 Jan 2022 21:01), Max: 36.4 (17 Jan 2022 14:31)  T(F): 97.4 (17 Jan 2022 21:01), Max: 97.6 (17 Jan 2022 14:31)  HR: 92 (17 Jan 2022 21:01) (89 - 92)  BP: 148/86 (17 Jan 2022 21:01) (111/72 - 148/86)  BP(mean): --  RR: 18 (17 Jan 2022 21:01) (17 - 18)  SpO2: 97% (17 Jan 2022 21:01) (97% - 99%)        PHYSICAL EXAM:  GENERAL: Awake, alert and interactive, no acute distress  NEURO: A&Ox4, no focal deficits  HEENT: Normocephalic, atraumatic, oral mucosa moist, no oral lesions noted  CARDIAC: RRR, +S1/S2, no murmurs/rubs/gallops  PULM: Breathing comfortably on RA, clear to auscultation bilaterally, no wheezes/rales/rhonchi  ABDOMEN: Soft, nontender, nondistended, no masses and hepatosplenomegaly  SKIN: +R gluteal wound dressed with pink dressing, no drainage. Open wound with pale/white appearing wound edge w/o surrounding erythema/crepitus.   VASC: 1+ peripheral pulses, no edema, no LE tenderness    LABS:                        10.0   11.48 )-----------( 205      ( 17 Jan 2022 08:27 )             32.6     01-17    135  |  100  |  33<H>  ----------------------------<  162<H>  3.6   |  25  |  1.22    Ca    9.0      17 Jan 2022 08:27  Phos  2.7     01-17  Mg     2.3     01-17          CAPILLARY BLOOD GLUCOSE      POCT Blood Glucose.: 177 mg/dL (17 Jan 2022 21:38)  POCT Blood Glucose.: 148 mg/dL (17 Jan 2022 17:03)  POCT Blood Glucose.: 164 mg/dL (17 Jan 2022 12:31)  POCT Blood Glucose.: 158 mg/dL (17 Jan 2022 08:57)          RADIOLOGY & ADDITIONAL TESTS: Reviewed. OVERNIGHT EVENTS: NAOE    SUBJECTIVE / INTERVAL HPI: Patient seen and examined at bedside. Pt reports frustration being held in the hospital. Pt reports pain in his right gluteal site. Pt denies any F/C, SOB/cough, denies any other new symptoms.    MEDICATIONS  (STANDING):  amLODIPine   Tablet 10 milliGRAM(s) Oral daily  amoxicillin 500 milliGRAM(s) Oral three times a day  ascorbic acid 500 milliGRAM(s) Oral daily  aspirin enteric coated 81 milliGRAM(s) Oral daily  chlorhexidine 2% Cloths 1 Application(s) Topical <User Schedule>  dextrose 40% Gel 15 Gram(s) Oral once  dextrose 5%. 1000 milliLiter(s) (100 mL/Hr) IV Continuous <Continuous>  dextrose 5%. 1000 milliLiter(s) (50 mL/Hr) IV Continuous <Continuous>  dextrose 50% Injectable 12.5 Gram(s) IV Push once  dextrose 50% Injectable 25 Gram(s) IV Push once  dextrose 50% Injectable 25 Gram(s) IV Push once  enoxaparin Injectable 40 milliGRAM(s) SubCutaneous every 24 hours  folic acid 1 milliGRAM(s) Oral daily  glucagon  Injectable 1 milliGRAM(s) IntraMuscular once  influenza   Vaccine 0.5 milliLiter(s) IntraMuscular once  insulin glargine Injectable (LANTUS) 15 Unit(s) SubCutaneous at bedtime  insulin lispro (ADMELOG) corrective regimen sliding scale   SubCutaneous three times a day before meals  insulin lispro (ADMELOG) corrective regimen sliding scale   SubCutaneous at bedtime  insulin lispro Injectable (ADMELOG) 5 Unit(s) SubCutaneous three times a day before meals  lisinopril 20 milliGRAM(s) Oral daily  meropenem  IVPB 1000 milliGRAM(s) IV Intermittent every 8 hours  multivitamin 1 Tablet(s) Oral daily  zinc sulfate 220 milliGRAM(s) Oral daily    MEDICATIONS  (PRN):  melatonin 3 milliGRAM(s) Oral at bedtime PRN Insomnia  sodium chloride 0.9% lock flush 10 milliLiter(s) IV Push every 1 hour PRN Pre/post blood products, medications, blood draw, and to maintain line patency    Allergies    No Known Drug Allergies  pork (Unknown)    Intolerances        VITAL SIGNS:  Vital Signs Last 24 Hrs  T(C): 36.3 (17 Jan 2022 21:01), Max: 36.4 (17 Jan 2022 14:31)  T(F): 97.4 (17 Jan 2022 21:01), Max: 97.6 (17 Jan 2022 14:31)  HR: 92 (17 Jan 2022 21:01) (89 - 92)  BP: 148/86 (17 Jan 2022 21:01) (111/72 - 148/86)  BP(mean): --  RR: 18 (17 Jan 2022 21:01) (17 - 18)  SpO2: 97% (17 Jan 2022 21:01) (97% - 99%)        PHYSICAL EXAM:  GENERAL: Awake, alert and interactive, no acute distress  NEURO: A&Ox4, no focal deficits  HEENT: Normocephalic, atraumatic, oral mucosa moist, no oral lesions noted  CARDIAC: RRR, +S1/S2, no murmurs/rubs/gallops  PULM: Breathing comfortably on RA, clear to auscultation bilaterally, no wheezes/rales/rhonchi  ABDOMEN: Soft, nontender, nondistended, no masses and hepatosplenomegaly  SKIN: +R gluteal wound dressed with pink dressing, no drainage. Open wound with pale/white appearing wound edge w/o surrounding erythema/crepitus.   VASC: 1+ peripheral pulses, no edema, no LE tenderness    LABS:                        10.0   11.48 )-----------( 205      ( 17 Jan 2022 08:27 )             32.6     01-17    135  |  100  |  33<H>  ----------------------------<  162<H>  3.6   |  25  |  1.22    Ca    9.0      17 Jan 2022 08:27  Phos  2.7     01-17  Mg     2.3     01-17          CAPILLARY BLOOD GLUCOSE      POCT Blood Glucose.: 177 mg/dL (17 Jan 2022 21:38)  POCT Blood Glucose.: 148 mg/dL (17 Jan 2022 17:03)  POCT Blood Glucose.: 164 mg/dL (17 Jan 2022 12:31)  POCT Blood Glucose.: 158 mg/dL (17 Jan 2022 08:57)          RADIOLOGY & ADDITIONAL TESTS: Reviewed.

## 2022-01-18 NOTE — PROGRESS NOTE ADULT - PROBLEM SELECTOR PLAN 1
CT Pelvis w/ IV contrast - Right gluteal wound with associated phlegmonous changes extending into the hamstring tendon. No associated abscess/collection. Recent tx at [facility] and d/c on doxycyline for 7d. Wound growing pseudomonas, ESBL   s/p midline placement on 1/11/21  Gen surg recs no indication for urgent debridement of the wound. continue off loading, nutritional optimization (albumin on arrival 3.6 is not malnourished), and local wound care. continue discussion with wound care nurse and plastics on how to promote healing of the wound.    - s/p midline placement; c/w PO amox 500 mg q8h and IV meropenem 1g q8h till 1/25/21  - c/w wound care

## 2022-01-18 NOTE — PROGRESS NOTE ADULT - PROBLEM SELECTOR PLAN 7
F: s/p 1L NS  E: replete prn  N: Consistent carb/DASH diet, no snacks  GI: None  A/c: Heparin SQ  Activity: As tolerated  Code: FC  Dispo: Pending final PT recs F: None  E: replete prn  N: Consistent carb/DASH diet, no snacks  GI: None  DVT PPx: Lovenox 40 subQ qd  Code: FULL  Dispo: [facility]

## 2022-01-18 NOTE — CHART NOTE - NSCHARTNOTEFT_GEN_A_CORE
Admitting Diagnosis:   Patient is a 61y old  Male who presents with a chief complaint of     PAST MEDICAL & SURGICAL HISTORY:  IDDM (insulin dependent diabetes mellitus)    Hypertension    Left toe amputee  Great toe bone removal    Current Nutrition Order: DASH/TLC Consistent Carbohydrate Diet + LPS 2x/day (100 kcal, 15 g protein per serving) + Glucerna 2x/day (220 kcal,10 g protein per serving)     PO Intake: Good (%) [ x  ]  Fair (50-75%) [   ] Poor (<25%) [   ]    GI Issues: No noted nausea/vomiting at this time. Last documented bowel movement .    Pain: No noted pain at this time.     Skin Integrity: No edema documented at this time. No pressure injuries documented at this time. Mitchell score: 21.    Labs:       138  |  102  |  36<H>  ----------------------------<  132<H>  3.6   |  26  |  1.15    Ca    9.4      2022 07:16  Phos  2.7       Mg     2.3           CAPILLARY BLOOD GLUCOSE      POCT Blood Glucose.: 257 mg/dL (2022 12:55)  POCT Blood Glucose.: 166 mg/dL (2022 09:06)  POCT Blood Glucose.: 177 mg/dL (2022 21:38)  POCT Blood Glucose.: 148 mg/dL (2022 17:03)      Medications:  MEDICATIONS  (STANDING):  amLODIPine   Tablet 10 milliGRAM(s) Oral daily  amoxicillin 500 milliGRAM(s) Oral three times a day  ascorbic acid 500 milliGRAM(s) Oral daily  aspirin enteric coated 81 milliGRAM(s) Oral daily  chlorhexidine 2% Cloths 1 Application(s) Topical <User Schedule>  dextrose 40% Gel 15 Gram(s) Oral once  dextrose 5%. 1000 milliLiter(s) (50 mL/Hr) IV Continuous <Continuous>  dextrose 5%. 1000 milliLiter(s) (100 mL/Hr) IV Continuous <Continuous>  dextrose 50% Injectable 25 Gram(s) IV Push once  dextrose 50% Injectable 12.5 Gram(s) IV Push once  dextrose 50% Injectable 25 Gram(s) IV Push once  enoxaparin Injectable 40 milliGRAM(s) SubCutaneous every 24 hours  folic acid 1 milliGRAM(s) Oral daily  glucagon  Injectable 1 milliGRAM(s) IntraMuscular once  influenza   Vaccine 0.5 milliLiter(s) IntraMuscular once  insulin glargine Injectable (LANTUS) 15 Unit(s) SubCutaneous at bedtime  insulin lispro (ADMELOG) corrective regimen sliding scale   SubCutaneous three times a day before meals  insulin lispro (ADMELOG) corrective regimen sliding scale   SubCutaneous at bedtime  insulin lispro Injectable (ADMELOG) 5 Unit(s) SubCutaneous three times a day before meals  lisinopril 20 milliGRAM(s) Oral daily  meropenem  IVPB 1000 milliGRAM(s) IV Intermittent every 8 hours  multivitamin 1 Tablet(s) Oral daily  zinc sulfate 220 milliGRAM(s) Oral daily    MEDICATIONS  (PRN):  melatonin 3 milliGRAM(s) Oral at bedtime PRN Insomnia  sodium chloride 0.9% lock flush 10 milliLiter(s) IV Push every 1 hour PRN Pre/post blood products, medications, blood draw, and to maintain line patency    Dosing Anthropometrics:  · Height for BMI (INCHES)	75 Inch(s)  · Weight for BMI (lbs)	181 lb  · Weight for BMI (kg)	82.1 kg  · Body Mass Index	22.6  · Ideal Body Weight (lbs)	196  · Ideal Body Weight (kg)	88.9    Weight Change: Per initial RD assessment "pt was 73kg in Dec 2019, admit wt 82kg reflects 9kg/12% gain." No new weights since admission. Obtain biweekly weights to assess changes/trends.    Estimated energy needs: ABW used for calculations as pt between % of IBW. (92%), adjusted for wound healing, COVID, age  27-33 kcal/k3308-1618 kcal   1.2-1.4 g/k.5-114.9 g   30-35 mL/k1875-1053 mL    Subjective: 62 yo M smoker, vaccinated, undomicilied with PMHx of HTN, IDDM c/b neuropathy as well as hx of multiple skin/soft tissue infections admitted for right gluteal SSTI - followed by wound care s/p wound culture growing polymicrobial and s/p midline placement for IV Abx, found with DEBBY - improving, and COVID positive - ASx on RA.    Pt seen at bedside for follow up assessment- on room air. Labs reviewed ; electrolytes within normal limits at this time. Fingersticks -: 148-257 mg/dL; Insulin regimen ordered. Pt extremely agitated at time of RD interview, unable to obtain subjective information as pt only interested in discussing preferences and difficulty ordering preferences w/ current diet restrictions. RD emphasized importance of therapeutic diet and encouraged pt to order per preferences, within current diet order. After multiple attempts to explain current diet order, pt finally amenable to diet education stating "I understand". Made aware RD remains available. RD to follow up per protocol.    Previous Nutrition Diagnosis:    Active [   ]  Resolved [   ]    If resolved, new PES:     Goal:    Recommendations:    Education:     Risk Level: High [   ] Moderate [   ] Low [   ] Admitting Diagnosis:   Patient is a 61y old  Male who presents with a chief complaint of     PAST MEDICAL & SURGICAL HISTORY:  IDDM (insulin dependent diabetes mellitus)    Hypertension    Left toe amputee  Great toe bone removal    Current Nutrition Order: DASH/TLC Consistent Carbohydrate Diet + LPS 2x/day (100 kcal, 15 g protein per serving) + Glucerna 2x/day (220 kcal,10 g protein per serving)     PO Intake: Good (%) [ x  ]  Fair (50-75%) [   ] Poor (<25%) [   ]    GI Issues: No noted nausea/vomiting at this time. Last documented bowel movement .    Pain: No noted pain at this time.     Skin Integrity: No edema documented at this time. No pressure injuries documented at this time. Mitchell score: 21.    Labs:       138  |  102  |  36<H>  ----------------------------<  132<H>  3.6   |  26  |  1.15    Ca    9.4      2022 07:16  Phos  2.7       Mg     2.3           CAPILLARY BLOOD GLUCOSE      POCT Blood Glucose.: 257 mg/dL (2022 12:55)  POCT Blood Glucose.: 166 mg/dL (2022 09:06)  POCT Blood Glucose.: 177 mg/dL (2022 21:38)  POCT Blood Glucose.: 148 mg/dL (2022 17:03)      Medications:  MEDICATIONS  (STANDING):  amLODIPine   Tablet 10 milliGRAM(s) Oral daily  amoxicillin 500 milliGRAM(s) Oral three times a day  ascorbic acid 500 milliGRAM(s) Oral daily  aspirin enteric coated 81 milliGRAM(s) Oral daily  chlorhexidine 2% Cloths 1 Application(s) Topical <User Schedule>  dextrose 40% Gel 15 Gram(s) Oral once  dextrose 5%. 1000 milliLiter(s) (50 mL/Hr) IV Continuous <Continuous>  dextrose 5%. 1000 milliLiter(s) (100 mL/Hr) IV Continuous <Continuous>  dextrose 50% Injectable 25 Gram(s) IV Push once  dextrose 50% Injectable 12.5 Gram(s) IV Push once  dextrose 50% Injectable 25 Gram(s) IV Push once  enoxaparin Injectable 40 milliGRAM(s) SubCutaneous every 24 hours  folic acid 1 milliGRAM(s) Oral daily  glucagon  Injectable 1 milliGRAM(s) IntraMuscular once  influenza   Vaccine 0.5 milliLiter(s) IntraMuscular once  insulin glargine Injectable (LANTUS) 15 Unit(s) SubCutaneous at bedtime  insulin lispro (ADMELOG) corrective regimen sliding scale   SubCutaneous three times a day before meals  insulin lispro (ADMELOG) corrective regimen sliding scale   SubCutaneous at bedtime  insulin lispro Injectable (ADMELOG) 5 Unit(s) SubCutaneous three times a day before meals  lisinopril 20 milliGRAM(s) Oral daily  meropenem  IVPB 1000 milliGRAM(s) IV Intermittent every 8 hours  multivitamin 1 Tablet(s) Oral daily  zinc sulfate 220 milliGRAM(s) Oral daily    MEDICATIONS  (PRN):  melatonin 3 milliGRAM(s) Oral at bedtime PRN Insomnia  sodium chloride 0.9% lock flush 10 milliLiter(s) IV Push every 1 hour PRN Pre/post blood products, medications, blood draw, and to maintain line patency    Dosing Anthropometrics:  · Height for BMI (INCHES)	75 Inch(s)  · Weight for BMI (lbs)	181 lb  · Weight for BMI (kg)	82.1 kg  · Body Mass Index	22.6  · Ideal Body Weight (lbs)	196  · Ideal Body Weight (kg)	88.9    Weight Change: Per initial RD assessment "pt was 73kg in Dec 2019, admit wt 82kg reflects 9kg/12% gain." No new weights since admission. Obtain biweekly weights to assess changes/trends.    Estimated energy needs: ABW used for calculations as pt between % of IBW. (92%), adjusted for wound healing, COVID, age  27-33 kcal/k0547-3930 kcal   1.2-1.4 g/k.5-114.9 g   30-35 mL/k5573-0806 mL    Subjective: 60 yo M smoker, vaccinated, undomicilied with PMHx of HTN, IDDM c/b neuropathy as well as hx of multiple skin/soft tissue infections admitted for right gluteal SSTI - followed by wound care s/p wound culture growing polymicrobial and s/p midline placement for IV Abx, found with DEBBY - improving, and COVID positive - ASx on RA.    Pt seen at bedside for follow up assessment- on room air. Labs reviewed ; electrolytes within normal limits at this time. Fingersticks -: 148-257 mg/dL; Insulin regimen ordered. Pt extremely agitated at time of RD interview, unable to obtain subjective information as pt only interested in discussing preferences and difficulty ordering preferences w/ current diet restrictions. RD emphasized importance of therapeutic diet and encouraged pt to order per preferences, within current diet order. After multiple attempts to explain current diet order, pt finally amenable to diet education stating "I understand". Made aware RD remains available. RD to follow up per protocol.    Previous Nutrition Diagnosis: Increased Nutrient Needs RT hypermetabolic 2/2 COVID infection and wound healing AEB COVID+, wound to buttock.     Active [ x ]  Resolved [   ]    If resolved, new PES:     Goal/Expected Outcome Pt to meet >75% EER with good tolerance    Recommendations:  1. Continue consistent carbohydrate, DASH diet + ONS   2. Continue micronutrient supplementation   3. Encourage pt to meet nutritional needs as able   4. Trend wts   5. Monitor lytes, glucose, skin   6. Will continue to assess/honor preferences as able     Education: Reinforced adequate PO intake to meet nutritional needs    Risk Level: High [   ] Moderate [ x  ] Low [   ].

## 2022-01-18 NOTE — PROGRESS NOTE ADULT - ASSESSMENT
60 yo M smoker, vaccinated, undomicilied with PMHx of HTN, IDDM c/b neuropathy as well as hx of multiple skin/soft tissue infections admitted for right gluteal SSTI - followed by wound care s/p wound culture growing polymicrobial and s/p midline placement for IV Abx, found with DEBBY - improving, and COVID positive - ASx on RA

## 2022-01-19 ENCOUNTER — TRANSCRIPTION ENCOUNTER (OUTPATIENT)
Age: 62
End: 2022-01-19

## 2022-01-19 VITALS
HEART RATE: 92 BPM | OXYGEN SATURATION: 99 % | TEMPERATURE: 98 F | RESPIRATION RATE: 18 BRPM | DIASTOLIC BLOOD PRESSURE: 77 MMHG | SYSTOLIC BLOOD PRESSURE: 148 MMHG

## 2022-01-19 LAB
ALBUMIN SERPL ELPH-MCNC: 3.6 G/DL — SIGNIFICANT CHANGE UP (ref 3.3–5)
ALP SERPL-CCNC: 105 U/L — SIGNIFICANT CHANGE UP (ref 40–120)
ALT FLD-CCNC: 33 U/L — SIGNIFICANT CHANGE UP (ref 10–45)
ANION GAP SERPL CALC-SCNC: 10 MMOL/L — SIGNIFICANT CHANGE UP (ref 5–17)
AST SERPL-CCNC: 58 U/L — HIGH (ref 10–40)
BILIRUB SERPL-MCNC: 0.3 MG/DL — SIGNIFICANT CHANGE UP (ref 0.2–1.2)
BUN SERPL-MCNC: 35 MG/DL — HIGH (ref 7–23)
CALCIUM SERPL-MCNC: 9.4 MG/DL — SIGNIFICANT CHANGE UP (ref 8.4–10.5)
CHLORIDE SERPL-SCNC: 102 MMOL/L — SIGNIFICANT CHANGE UP (ref 96–108)
CO2 SERPL-SCNC: 26 MMOL/L — SIGNIFICANT CHANGE UP (ref 22–31)
CREAT SERPL-MCNC: 1.11 MG/DL — SIGNIFICANT CHANGE UP (ref 0.5–1.3)
GLUCOSE BLDC GLUCOMTR-MCNC: 168 MG/DL — HIGH (ref 70–99)
GLUCOSE BLDC GLUCOMTR-MCNC: 174 MG/DL — HIGH (ref 70–99)
GLUCOSE BLDC GLUCOMTR-MCNC: 240 MG/DL — HIGH (ref 70–99)
GLUCOSE SERPL-MCNC: 159 MG/DL — HIGH (ref 70–99)
HCT VFR BLD CALC: 33.7 % — LOW (ref 39–50)
HGB BLD-MCNC: 10.2 G/DL — LOW (ref 13–17)
MAGNESIUM SERPL-MCNC: 2.2 MG/DL — SIGNIFICANT CHANGE UP (ref 1.6–2.6)
MCHC RBC-ENTMCNC: 23.8 PG — LOW (ref 27–34)
MCHC RBC-ENTMCNC: 30.3 GM/DL — LOW (ref 32–36)
MCV RBC AUTO: 78.7 FL — LOW (ref 80–100)
NRBC # BLD: 0 /100 WBCS — SIGNIFICANT CHANGE UP (ref 0–0)
PHOSPHATE SERPL-MCNC: 2.6 MG/DL — SIGNIFICANT CHANGE UP (ref 2.5–4.5)
PLATELET # BLD AUTO: 208 K/UL — SIGNIFICANT CHANGE UP (ref 150–400)
POTASSIUM SERPL-MCNC: 3.6 MMOL/L — SIGNIFICANT CHANGE UP (ref 3.5–5.3)
POTASSIUM SERPL-SCNC: 3.6 MMOL/L — SIGNIFICANT CHANGE UP (ref 3.5–5.3)
PROT SERPL-MCNC: 7.6 G/DL — SIGNIFICANT CHANGE UP (ref 6–8.3)
RBC # BLD: 4.28 M/UL — SIGNIFICANT CHANGE UP (ref 4.2–5.8)
RBC # FLD: 15.4 % — HIGH (ref 10.3–14.5)
SODIUM SERPL-SCNC: 138 MMOL/L — SIGNIFICANT CHANGE UP (ref 135–145)
WBC # BLD: 10.67 K/UL — HIGH (ref 3.8–10.5)
WBC # FLD AUTO: 10.67 K/UL — HIGH (ref 3.8–10.5)

## 2022-01-19 PROCEDURE — 72193 CT PELVIS W/DYE: CPT

## 2022-01-19 PROCEDURE — 85730 THROMBOPLASTIN TIME PARTIAL: CPT

## 2022-01-19 PROCEDURE — 87205 SMEAR GRAM STAIN: CPT

## 2022-01-19 PROCEDURE — 83036 HEMOGLOBIN GLYCOSYLATED A1C: CPT

## 2022-01-19 PROCEDURE — 82728 ASSAY OF FERRITIN: CPT

## 2022-01-19 PROCEDURE — U0005: CPT

## 2022-01-19 PROCEDURE — 80053 COMPREHEN METABOLIC PANEL: CPT

## 2022-01-19 PROCEDURE — U0003: CPT

## 2022-01-19 PROCEDURE — 36415 COLL VENOUS BLD VENIPUNCTURE: CPT

## 2022-01-19 PROCEDURE — 71045 X-RAY EXAM CHEST 1 VIEW: CPT

## 2022-01-19 PROCEDURE — 84100 ASSAY OF PHOSPHORUS: CPT

## 2022-01-19 PROCEDURE — 80048 BASIC METABOLIC PNL TOTAL CA: CPT

## 2022-01-19 PROCEDURE — 84145 PROCALCITONIN (PCT): CPT

## 2022-01-19 PROCEDURE — 84540 ASSAY OF URINE/UREA-N: CPT

## 2022-01-19 PROCEDURE — 83605 ASSAY OF LACTIC ACID: CPT

## 2022-01-19 PROCEDURE — 86140 C-REACTIVE PROTEIN: CPT

## 2022-01-19 PROCEDURE — 83615 LACTATE (LD) (LDH) ENZYME: CPT

## 2022-01-19 PROCEDURE — 80202 ASSAY OF VANCOMYCIN: CPT

## 2022-01-19 PROCEDURE — 99239 HOSP IP/OBS DSCHRG MGMT >30: CPT | Mod: GC

## 2022-01-19 PROCEDURE — 97116 GAIT TRAINING THERAPY: CPT

## 2022-01-19 PROCEDURE — 87077 CULTURE AEROBIC IDENTIFY: CPT

## 2022-01-19 PROCEDURE — 84484 ASSAY OF TROPONIN QUANT: CPT

## 2022-01-19 PROCEDURE — 87040 BLOOD CULTURE FOR BACTERIA: CPT

## 2022-01-19 PROCEDURE — 76937 US GUIDE VASCULAR ACCESS: CPT

## 2022-01-19 PROCEDURE — 84443 ASSAY THYROID STIM HORMONE: CPT

## 2022-01-19 PROCEDURE — 83550 IRON BINDING TEST: CPT

## 2022-01-19 PROCEDURE — 83540 ASSAY OF IRON: CPT

## 2022-01-19 PROCEDURE — 85025 COMPLETE CBC W/AUTO DIFF WBC: CPT

## 2022-01-19 PROCEDURE — 87070 CULTURE OTHR SPECIMN AEROBIC: CPT

## 2022-01-19 PROCEDURE — 82803 BLOOD GASES ANY COMBINATION: CPT

## 2022-01-19 PROCEDURE — 87086 URINE CULTURE/COLONY COUNT: CPT

## 2022-01-19 PROCEDURE — 87186 SC STD MICRODIL/AGAR DIL: CPT

## 2022-01-19 PROCEDURE — 85379 FIBRIN DEGRADATION QUANT: CPT

## 2022-01-19 PROCEDURE — 97161 PT EVAL LOW COMPLEX 20 MIN: CPT

## 2022-01-19 PROCEDURE — 36410 VNPNXR 3YR/> PHY/QHP DX/THER: CPT

## 2022-01-19 PROCEDURE — 83735 ASSAY OF MAGNESIUM: CPT

## 2022-01-19 PROCEDURE — 84300 ASSAY OF URINE SODIUM: CPT

## 2022-01-19 PROCEDURE — 93005 ELECTROCARDIOGRAM TRACING: CPT

## 2022-01-19 PROCEDURE — 85610 PROTHROMBIN TIME: CPT

## 2022-01-19 PROCEDURE — 82550 ASSAY OF CK (CPK): CPT

## 2022-01-19 PROCEDURE — 87635 SARS-COV-2 COVID-19 AMP PRB: CPT

## 2022-01-19 PROCEDURE — 84466 ASSAY OF TRANSFERRIN: CPT

## 2022-01-19 PROCEDURE — 82962 GLUCOSE BLOOD TEST: CPT

## 2022-01-19 PROCEDURE — 82570 ASSAY OF URINE CREATININE: CPT

## 2022-01-19 PROCEDURE — 99285 EMERGENCY DEPT VISIT HI MDM: CPT

## 2022-01-19 PROCEDURE — 81001 URINALYSIS AUTO W/SCOPE: CPT

## 2022-01-19 PROCEDURE — 85027 COMPLETE CBC AUTOMATED: CPT

## 2022-01-19 RX ORDER — JNJ-78436735 50000000000 [PFU]/.5ML
0.5 SUSPENSION INTRAMUSCULAR ONCE
Refills: 0 | Status: DISCONTINUED | OUTPATIENT
Start: 2022-01-19 | End: 2022-01-19

## 2022-01-19 RX ADMIN — Medication 4: at 13:02

## 2022-01-19 RX ADMIN — Medication 5 UNIT(S): at 09:02

## 2022-01-19 RX ADMIN — AMLODIPINE BESYLATE 10 MILLIGRAM(S): 2.5 TABLET ORAL at 06:41

## 2022-01-19 RX ADMIN — Medication 1 TABLET(S): at 12:25

## 2022-01-19 RX ADMIN — MEROPENEM 100 MILLIGRAM(S): 1 INJECTION INTRAVENOUS at 15:45

## 2022-01-19 RX ADMIN — Medication 2: at 09:02

## 2022-01-19 RX ADMIN — LISINOPRIL 20 MILLIGRAM(S): 2.5 TABLET ORAL at 06:41

## 2022-01-19 RX ADMIN — Medication 500 MILLIGRAM(S): at 12:25

## 2022-01-19 RX ADMIN — Medication 81 MILLIGRAM(S): at 12:25

## 2022-01-19 RX ADMIN — CHLORHEXIDINE GLUCONATE 1 APPLICATION(S): 213 SOLUTION TOPICAL at 07:03

## 2022-01-19 RX ADMIN — MEROPENEM 100 MILLIGRAM(S): 1 INJECTION INTRAVENOUS at 06:40

## 2022-01-19 RX ADMIN — Medication 5 UNIT(S): at 13:02

## 2022-01-19 RX ADMIN — ZINC SULFATE TAB 220 MG (50 MG ZINC EQUIVALENT) 220 MILLIGRAM(S): 220 (50 ZN) TAB at 12:25

## 2022-01-19 RX ADMIN — Medication 2: at 18:06

## 2022-01-19 RX ADMIN — Medication 5 UNIT(S): at 18:06

## 2022-01-19 RX ADMIN — Medication 500 MILLIGRAM(S): at 06:40

## 2022-01-19 RX ADMIN — Medication 1 MILLIGRAM(S): at 12:25

## 2022-01-19 RX ADMIN — Medication 500 MILLIGRAM(S): at 13:06

## 2022-01-19 NOTE — PROGRESS NOTE ADULT - PROVIDER SPECIALTY LIST ADULT
Surgery
Internal Medicine
Hospitalist
Internal Medicine
Internal Medicine
Hospitalist
Internal Medicine
Hospitalist
Internal Medicine
Internal Medicine
Hospitalist

## 2022-01-19 NOTE — DISCHARGE NOTE NURSING/CASE MANAGEMENT/SOCIAL WORK - NSDCFUADDAPPT_GEN_ALL_CORE_FT
Please follow up on January 20th at 12 PM at [address] [place] [address], [address] [place] [address], [id number]. Please call [phone] if you have any questions.

## 2022-01-19 NOTE — PROGRESS NOTE ADULT - ATTENDING COMMENTS
Patient seen and examined.   Right gluteal wound is 3x3cm, with deeper extension/undermining about 4 cm.  The edges of the wound are pale white with epithelialization, and there is no purulence or much fibrinous exudate.    No role for debridement.  Keep pressure off wound  Optimize nutrition.  Will defer wound care to WOCN; the patient has firmly-held views on what his wound care should be.
cont wound care  discharge planning with iv antibiotics
Optimized for disposition today  See dc summary for attending attestation
61M, undomiciled, HTN, IDDM c/b neuorpathy and multiple SSTIs here for R gluteal SSTI, DEBBY, COVID19 (s/p MAB) s/p midline for PsA, ESBL - PO amoxicillin 500 q8h and IV Meropenem 1g q8h til 12/25 -- for [facility]    Feels well - eager to be dc. Requesting Booster - received J&J.    Plan: Optimized for disposition  DISPO: [facility]

## 2022-01-19 NOTE — DISCHARGE NOTE NURSING/CASE MANAGEMENT/SOCIAL WORK - NSDCPEFALRISK_GEN_ALL_CORE
For information on Fall & Injury Prevention, visit: [email or web address] OR  [email or web address] OR  https://www.cdc.gov/steadi/patient.html

## 2022-01-19 NOTE — PROGRESS NOTE ADULT - PROBLEM SELECTOR PLAN 1
CT Pelvis w/ IV contrast - Right gluteal wound with associated phlegmonous changes extending into the hamstring tendon. No associated abscess/collection. Recent tx at [facility] and d/c on doxycyline for 7d. Wound growing pseudomonas, ESBL   s/p midline placement on 1/11/21  Gen surg recs no indication for urgent debridement of the wound. continue off loading, nutritional optimization (albumin on arrival 3.6 is not malnourished), and local wound care. continue discussion with wound care nurse and plastics on how to promote healing of the wound.    - s/p midline placement; c/w PO amox 500 mg q8h and IV meropenem 1g q8h till 1/25/21  - c/w wound care

## 2022-01-19 NOTE — PROGRESS NOTE ADULT - PROBLEM SELECTOR PLAN 7
F: None  E: replete prn  N: Consistent carb/DASH diet, no snacks  GI: None  DVT PPx: Lovenox 40 subQ qd  Code: FULL  Dispo: [facility]

## 2022-01-19 NOTE — DISCHARGE NOTE NURSING/CASE MANAGEMENT/SOCIAL WORK - PATIENT PORTAL LINK FT
You can access the FollowMyHealth Patient Portal offered by [facility] by registering at the following website: [email or web address]. By joining [facility] FollowMyHealth portal, you will also be able to view your health information using other applications (apps) compatible with our system.

## 2022-01-19 NOTE — PROGRESS NOTE ADULT - SUBJECTIVE AND OBJECTIVE BOX
*INCOMPLETE*    60 yo M smoker with PMHx of HTN, IDDM c/b neuropathy as well as hx of multiple skin/soft tissue infections admitted for right gluteal SSTI - followed by wound care s/p midline placement for IV Abx for PsA/ESBL, found with DEBBY - improving, and COVID positive - ASx on RA; pending placement    OVERNIGHT EVENTS: NAOE    SUBJECTIVE / INTERVAL HPI: Patient seen and examined at bedside.     MEDICATIONS  (STANDING):  amLODIPine   Tablet 10 milliGRAM(s) Oral daily  amoxicillin 500 milliGRAM(s) Oral three times a day  ascorbic acid 500 milliGRAM(s) Oral daily  aspirin enteric coated 81 milliGRAM(s) Oral daily  chlorhexidine 2% Cloths 1 Application(s) Topical <User Schedule>  dextrose 40% Gel 15 Gram(s) Oral once  dextrose 5%. 1000 milliLiter(s) (100 mL/Hr) IV Continuous <Continuous>  dextrose 5%. 1000 milliLiter(s) (50 mL/Hr) IV Continuous <Continuous>  dextrose 50% Injectable 12.5 Gram(s) IV Push once  dextrose 50% Injectable 25 Gram(s) IV Push once  dextrose 50% Injectable 25 Gram(s) IV Push once  enoxaparin Injectable 40 milliGRAM(s) SubCutaneous every 24 hours  folic acid 1 milliGRAM(s) Oral daily  glucagon  Injectable 1 milliGRAM(s) IntraMuscular once  influenza   Vaccine 0.5 milliLiter(s) IntraMuscular once  insulin glargine Injectable (LANTUS) 15 Unit(s) SubCutaneous at bedtime  insulin lispro (ADMELOG) corrective regimen sliding scale   SubCutaneous three times a day before meals  insulin lispro (ADMELOG) corrective regimen sliding scale   SubCutaneous at bedtime  insulin lispro Injectable (ADMELOG) 5 Unit(s) SubCutaneous three times a day before meals  lisinopril 20 milliGRAM(s) Oral daily  meropenem  IVPB 1000 milliGRAM(s) IV Intermittent every 8 hours  multivitamin 1 Tablet(s) Oral daily  zinc sulfate 220 milliGRAM(s) Oral daily    MEDICATIONS  (PRN):  melatonin 3 milliGRAM(s) Oral at bedtime PRN Insomnia  sodium chloride 0.9% lock flush 10 milliLiter(s) IV Push every 1 hour PRN Pre/post blood products, medications, blood draw, and to maintain line patency    Allergies    No Known Drug Allergies  pork (Unknown)    Intolerances        VITAL SIGNS:  Vital Signs Last 24 Hrs  T(C): 37.1 (18 Jan 2022 20:43), Max: 37.1 (18 Jan 2022 20:43)  T(F): 98.8 (18 Jan 2022 20:43), Max: 98.8 (18 Jan 2022 20:43)  HR: 94 (18 Jan 2022 20:43) (93 - 94)  BP: 149/83 (18 Jan 2022 20:43) (143/79 - 149/83)  BP(mean): --  RR: 19 (18 Jan 2022 20:43) (19 - 20)  SpO2: 97% (18 Jan 2022 20:43) (97% - 97%)        PHYSICAL EXAM:  GENERAL: Awake, alert and interactive, no acute distress  NEURO: A&Ox4, no focal deficits  HEENT: Normocephalic, atraumatic, oral mucosa moist, no oral lesions noted  CARDIAC: RRR, +S1/S2, no murmurs/rubs/gallops  PULM: Breathing comfortably on RA, clear to auscultation bilaterally, no wheezes/rales/rhonchi  ABDOMEN: Soft, nontender, nondistended, no masses and hepatosplenomegaly  SKIN: +R gluteal wound dressed with pink dressing, no drainage. Open wound with pale/white appearing wound edge w/o surrounding erythema/crepitus.   VASC: 1+ peripheral pulses, no edema, no LE tenderness      LABS:                        10.3   10.96 )-----------( 207      ( 18 Jan 2022 07:16 )             34.1     01-18    138  |  102  |  36<H>  ----------------------------<  132<H>  3.6   |  26  |  1.15    Ca    9.4      18 Jan 2022 07:16  Phos  2.7     01-17  Mg     2.3     01-17          CAPILLARY BLOOD GLUCOSE      POCT Blood Glucose.: 264 mg/dL (18 Jan 2022 22:37)  POCT Blood Glucose.: 79 mg/dL (18 Jan 2022 17:43)  POCT Blood Glucose.: 257 mg/dL (18 Jan 2022 12:55)  POCT Blood Glucose.: 166 mg/dL (18 Jan 2022 09:06)          RADIOLOGY & ADDITIONAL TESTS: Reviewed. OVERNIGHT EVENTS: NAOE    SUBJECTIVE / INTERVAL HPI: Patient seen and examined at bedside. Pt denies any F/C, SOB/cough, any other new symptoms.     MEDICATIONS  (STANDING):  amLODIPine   Tablet 10 milliGRAM(s) Oral daily  amoxicillin 500 milliGRAM(s) Oral three times a day  ascorbic acid 500 milliGRAM(s) Oral daily  aspirin enteric coated 81 milliGRAM(s) Oral daily  chlorhexidine 2% Cloths 1 Application(s) Topical <User Schedule>  dextrose 40% Gel 15 Gram(s) Oral once  dextrose 5%. 1000 milliLiter(s) (100 mL/Hr) IV Continuous <Continuous>  dextrose 5%. 1000 milliLiter(s) (50 mL/Hr) IV Continuous <Continuous>  dextrose 50% Injectable 12.5 Gram(s) IV Push once  dextrose 50% Injectable 25 Gram(s) IV Push once  dextrose 50% Injectable 25 Gram(s) IV Push once  enoxaparin Injectable 40 milliGRAM(s) SubCutaneous every 24 hours  folic acid 1 milliGRAM(s) Oral daily  glucagon  Injectable 1 milliGRAM(s) IntraMuscular once  influenza   Vaccine 0.5 milliLiter(s) IntraMuscular once  insulin glargine Injectable (LANTUS) 15 Unit(s) SubCutaneous at bedtime  insulin lispro (ADMELOG) corrective regimen sliding scale   SubCutaneous three times a day before meals  insulin lispro (ADMELOG) corrective regimen sliding scale   SubCutaneous at bedtime  insulin lispro Injectable (ADMELOG) 5 Unit(s) SubCutaneous three times a day before meals  lisinopril 20 milliGRAM(s) Oral daily  meropenem  IVPB 1000 milliGRAM(s) IV Intermittent every 8 hours  multivitamin 1 Tablet(s) Oral daily  zinc sulfate 220 milliGRAM(s) Oral daily    MEDICATIONS  (PRN):  melatonin 3 milliGRAM(s) Oral at bedtime PRN Insomnia  sodium chloride 0.9% lock flush 10 milliLiter(s) IV Push every 1 hour PRN Pre/post blood products, medications, blood draw, and to maintain line patency    Allergies    No Known Drug Allergies  pork (Unknown)    Intolerances        VITAL SIGNS:  Vital Signs Last 24 Hrs  T(C): 37.1 (18 Jan 2022 20:43), Max: 37.1 (18 Jan 2022 20:43)  T(F): 98.8 (18 Jan 2022 20:43), Max: 98.8 (18 Jan 2022 20:43)  HR: 94 (18 Jan 2022 20:43) (93 - 94)  BP: 149/83 (18 Jan 2022 20:43) (143/79 - 149/83)  BP(mean): --  RR: 19 (18 Jan 2022 20:43) (19 - 20)  SpO2: 97% (18 Jan 2022 20:43) (97% - 97%)        PHYSICAL EXAM:  GENERAL: Awake, alert and interactive, no acute distress  NEURO: A&Ox4, no focal deficits  HEENT: Normocephalic, atraumatic, oral mucosa moist, no oral lesions noted  CARDIAC: RRR, +S1/S2, no murmurs/rubs/gallops  PULM: Breathing comfortably on RA, clear to auscultation bilaterally, no wheezes/rales/rhonchi  ABDOMEN: Soft, nontender, nondistended, no masses and hepatosplenomegaly  SKIN: +R gluteal wound dressed with pink dressing, no drainage. Open wound with pale/white appearing wound edge w/o surrounding erythema/crepitus.   VASC: 1+ peripheral pulses, no edema, no LE tenderness      LABS:                        10.3   10.96 )-----------( 207      ( 18 Jan 2022 07:16 )             34.1     01-18    138  |  102  |  36<H>  ----------------------------<  132<H>  3.6   |  26  |  1.15    Ca    9.4      18 Jan 2022 07:16  Phos  2.7     01-17  Mg     2.3     01-17          CAPILLARY BLOOD GLUCOSE      POCT Blood Glucose.: 264 mg/dL (18 Jan 2022 22:37)  POCT Blood Glucose.: 79 mg/dL (18 Jan 2022 17:43)  POCT Blood Glucose.: 257 mg/dL (18 Jan 2022 12:55)  POCT Blood Glucose.: 166 mg/dL (18 Jan 2022 09:06)          RADIOLOGY & ADDITIONAL TESTS: Reviewed.

## 2022-01-19 NOTE — PROGRESS NOTE ADULT - PROBLEM SELECTOR PLAN 6
Incidentally COVID+. Vaccinated with J&J. On RA. CXR clear.  D-dimer 164>157>242  Ferritin 63>71  CRP 7.7>4.8  s/p MAB  Oxygen sat 96-99% on RA I have personally performed a face to face diagnostic evaluation on this patient. I have reviewed the ACP note and agree with the history, exam and plan of care, except as noted.

## 2022-01-20 ENCOUNTER — APPOINTMENT (OUTPATIENT)
Dept: INTERNAL MEDICINE | Facility: CLINIC | Age: 62
End: 2022-01-20

## 2022-01-20 PROBLEM — Z00.00 ENCOUNTER FOR PREVENTIVE HEALTH EXAMINATION: Status: ACTIVE | Noted: 2022-01-20

## 2022-01-31 DIAGNOSIS — Z59.02 UNSHELTERED HOMELESSNESS: ICD-10-CM

## 2022-01-31 DIAGNOSIS — N17.9 ACUTE KIDNEY FAILURE, UNSPECIFIED: ICD-10-CM

## 2022-01-31 DIAGNOSIS — Z91.14 PATIENT'S OTHER NONCOMPLIANCE WITH MEDICATION REGIMEN: ICD-10-CM

## 2022-01-31 DIAGNOSIS — I10 ESSENTIAL (PRIMARY) HYPERTENSION: ICD-10-CM

## 2022-01-31 DIAGNOSIS — Z79.4 LONG TERM (CURRENT) USE OF INSULIN: ICD-10-CM

## 2022-01-31 DIAGNOSIS — Z79.82 LONG TERM (CURRENT) USE OF ASPIRIN: ICD-10-CM

## 2022-01-31 DIAGNOSIS — Z16.12 EXTENDED SPECTRUM BETA LACTAMASE (ESBL) RESISTANCE: ICD-10-CM

## 2022-01-31 DIAGNOSIS — Z89.412 ACQUIRED ABSENCE OF LEFT GREAT TOE: ICD-10-CM

## 2022-01-31 DIAGNOSIS — R53.83 OTHER FATIGUE: ICD-10-CM

## 2022-01-31 DIAGNOSIS — D63.8 ANEMIA IN OTHER CHRONIC DISEASES CLASSIFIED ELSEWHERE: ICD-10-CM

## 2022-01-31 DIAGNOSIS — A41.9 SEPSIS, UNSPECIFIED ORGANISM: ICD-10-CM

## 2022-01-31 DIAGNOSIS — E11.40 TYPE 2 DIABETES MELLITUS WITH DIABETIC NEUROPATHY, UNSPECIFIED: ICD-10-CM

## 2022-01-31 DIAGNOSIS — F17.210 NICOTINE DEPENDENCE, CIGARETTES, UNCOMPLICATED: ICD-10-CM

## 2022-01-31 DIAGNOSIS — L08.9 LOCAL INFECTION OF THE SKIN AND SUBCUTANEOUS TISSUE, UNSPECIFIED: ICD-10-CM

## 2022-01-31 DIAGNOSIS — U07.1 COVID-19: ICD-10-CM

## 2022-01-31 DIAGNOSIS — B96.20 UNSPECIFIED ESCHERICHIA COLI [E. COLI] AS THE CAUSE OF DISEASES CLASSIFIED ELSEWHERE: ICD-10-CM

## 2022-01-31 DIAGNOSIS — B96.5 PSEUDOMONAS (AERUGINOSA) (MALLEI) (PSEUDOMALLEI) AS THE CAUSE OF DISEASES CLASSIFIED ELSEWHERE: ICD-10-CM

## 2022-01-31 SDOH — ECONOMIC STABILITY - HOUSING INSECURITY: UNSHELTERED HOMELESSNESS: Z59.02

## 2022-04-17 NOTE — PROGRESS NOTE ADULT - PROBLEM/PLAN-2
Care Due:                  Date            Visit Type   Department     Provider  --------------------------------------------------------------------------------                                VIRTUAL      [facility]  Last Visit: 08-      AUDIO ONLY   MEDICINE       Lit Guzmán  Next Visit: None Scheduled  None         None Found                                                            Last  Test          Frequency    Reason                     Performed    Due Date  --------------------------------------------------------------------------------    HBA1C.......  6 months...  glimepiride, metFORMIN...  08- 02-    Powered by [email or web address]. Reference number: [id number].   4/17/2022 5:29:09 AM CDT  
DISPLAY PLAN FREE TEXT

## 2022-11-04 NOTE — PROGRESS NOTE ADULT - PROBLEM SELECTOR PROBLEM 2
Called [facility] a couple of times this week but have yet been unable to connect to discuss US results.   Soft tissue infection IDDM (insulin dependent diabetes mellitus)

## 2022-12-27 ENCOUNTER — EMERGENCY (EMERGENCY)
Facility: HOSPITAL | Age: 62
LOS: 1 days | Discharge: HOME | End: 2022-12-27
Attending: EMERGENCY MEDICINE | Admitting: EMERGENCY MEDICINE

## 2022-12-27 VITALS
HEART RATE: 98 BPM | RESPIRATION RATE: 16 BRPM | TEMPERATURE: 98 F | OXYGEN SATURATION: 98 % | WEIGHT: 179.9 LBS | SYSTOLIC BLOOD PRESSURE: 146 MMHG | DIASTOLIC BLOOD PRESSURE: 71 MMHG | HEIGHT: 78 IN

## 2022-12-27 VITALS
RESPIRATION RATE: 16 BRPM | OXYGEN SATURATION: 96 % | HEART RATE: 85 BPM | TEMPERATURE: 99 F | SYSTOLIC BLOOD PRESSURE: 125 MMHG | DIASTOLIC BLOOD PRESSURE: 79 MMHG

## 2022-12-27 DIAGNOSIS — Z89.422 ACQUIRED ABSENCE OF OTHER LEFT TOE(S): Chronic | ICD-10-CM

## 2022-12-27 PROCEDURE — 99284 EMERGENCY DEPT VISIT MOD MDM: CPT

## 2022-12-27 NOTE — ED PROVIDER NOTE - CLINICAL SUMMARY MEDICAL DECISION MAKING FREE TEXT BOX
62-year-old homeless man presenting with apparent chronic back and leg pain.  Was sleeping in chairs open 1 eye but refused to answer any questions.  Likely is here at least in part for a warm place to sleep.  Will reassess when more awake.  Previous admission for buttocks wound in January 2022 appreciated.  Vital signs stable today

## 2022-12-27 NOTE — ED ADULT TRIAGE NOTE - NSWEIGHTCALCTOOLDRUG_GEN_A_CORE
[facility] Hospitalist Daily Progress Note       SUBJECTIVE  Yovana is doing very well today. She feels some incisional site pain but otherwise denies any pain or discomfort. Her pre-surgical numbness has improved slightly from yesterday.     OBJECTIVE    I/O's    Intake/Output Summary (Last 24 hours) at 8/12/2021 0809  Last data filed at 8/12/2021 0600  Gross per 24 hour   Intake 1670 ml   Output 1770 ml   Net -100 ml       Last Recorded Vitals  Temp:  [97.7 °F (36.5 °C)-98.2 °F (36.8 °C)] 97.7 °F (36.5 °C)  Heart Rate:  [78-95] 78  Resp:  [16-20] 16  BP: (128-165)/(74-83) 165/77   Body mass index is 36.69 kg/m².    Review of Systems     Review of Systems   Constitutional: Positive for fatigue. Negative for chills and fever.   HENT: Negative.    Eyes: Negative.    Respiratory: Negative for cough, chest tightness and shortness of breath.    Cardiovascular: Negative for chest pain and palpitations.   Gastrointestinal: Negative for abdominal pain, constipation, diarrhea, nausea and vomiting.   Genitourinary: Negative.    Musculoskeletal: Positive for back pain.   Neurological: Positive for numbness. Negative for dizziness, weakness, light-headedness and headaches.   Psychiatric/Behavioral: Negative.         Physical Exam     Physical Exam  Constitutional:       Appearance: Normal appearance.   HENT:      Head: Normocephalic and atraumatic.      Right Ear: External ear normal.      Left Ear: External ear normal.      Nose: Nose normal.      Mouth/Throat:      Mouth: Mucous membranes are moist.      Pharynx: Oropharynx is clear.   Eyes:      Extraocular Movements: Extraocular movements intact.      Conjunctiva/sclera: Conjunctivae normal.      Pupils: Pupils are equal, round, and reactive to light.   Cardiovascular:      Rate and Rhythm: Normal rate and regular rhythm.      Heart sounds: Normal heart sounds.   Pulmonary:      Effort: Pulmonary effort is normal.      Breath sounds: Normal breath sounds.    Abdominal:      General: Bowel sounds are normal.      Palpations: Abdomen is soft.      Tenderness: There is no abdominal tenderness.   Musculoskeletal:         General: Tenderness present. Normal range of motion.      Cervical back: Normal range of motion and neck supple.   Skin:     General: Skin is warm and dry.   Neurological:      General: No focal deficit present.      Mental Status: She is alert and oriented to person, place, and time.   Psychiatric:         Mood and Affect: Mood normal.         Behavior: Behavior normal.         Labs     Recent Results (from the past 24 hour(s))   CBC with Automated Differential (performable only)    Collection Time: 08/12/21  3:14 AM   Result Value Ref Range    WBC 11.8 (H) 4.2 - 11.0 K/mcL    RBC 3.36 (L) 4.00 - 5.20 mil/mcL    HGB 9.6 (L) 12.0 - 15.5 g/dL    HCT 30.0 (L) 36.0 - 46.5 %    MCV 89.3 78.0 - 100.0 fl    MCH 28.6 26.0 - 34.0 pg    MCHC 32.0 32.0 - 36.5 g/dL    RDW-CV 15.7 (H) 11.0 - 15.0 %    RDW-SD 51.2 (H) 39.0 - 50.0 fL     140 - 450 K/mcL    NRBC 0 <=0 /100 WBC    Neutrophil, Percent 79 %    Lymphocytes, Percent 9 %    Mono, Percent 11 %    Eosinophils, Percent 1 %    Basophils, Percent 0 %    Immature Granulocytes 0 %    Absolute Neutrophils 9.3 (H) 1.8 - 7.7 K/mcL    Absolute Lymphocytes 1.0 1.0 - 4.0 K/mcL    Absolute Monocytes 1.3 (H) 0.3 - 0.9 K/mcL    Absolute Eosinophils  0.1 0.0 - 0.5 K/mcL    Absolute Basophils 0.1 0.0 - 0.3 K/mcL    Absolute Immmature Granulocytes 0.1 0.0 - 0.2 K/mcL       Imaging     No results found.    Assessment and Plan        Lumbar spondylosis and spinal stenosis  -S/p L3/4 fusion and L4/5 fusion revision by Dr. Celis   -Pain management with morphine drip, Norco PRN  -Zofran PRN for nausea  -Incentive spirometer  -continue ADRIENNE drain for today  -Ancef abx prophylaxis  -PT/OT  -Neurosurg following     Thrombocytosis  -Plt 432 today  -Pt states her platelets are baseline high  -Continue to monitor with daily  CBC     Hypertension  -Continue amlodipine, torsemide     Hyperlipidemia  -Continue pravastatin     Reactive airway disease  -Albuterol PRN      Fibromyalgia  -Continue pregabalin, duloxetine     GERD  -Continue pantoprazole, Tums  -Elevate head of bed after meals     RA  -Continue hydroxychloroquine    DVT Prophylaxis  SCDs    Smoking Cessation  Counseling given: Not Answered       Time spent on diagnosing, evaluating, discussing and examining at bedside of multiple medical conditions, nutrition status, and skin integrity that I am monitoring, managing, evaluating, supervising and treating, in addition to reviewing and adjusting  medications and interpreting diagnostic data, and direct communication with RN and consulting physicians, patients and family as well as PCP as well as discussing treatment options, meds and plan of care with patient and poa with more then half the time in counseling and coordination of care exceeded 32   min.      Tomasa Bautista NP BC  Pancho Valencia MD FACP  Internal Medicine Hospitalist  [facility]  24 H Hospitalist Service with [facility]  [email or web address]  [phone] Answering Service  [phone] Cell Phone  Accepting HIPPA Compliant Txt via [employer]      used

## 2022-12-27 NOTE — ED ADULT NURSE NOTE - OBJECTIVE STATEMENT
BIBEMS for c/o back pain and B leg pain, denies trauma/falls, denies change to bms/urination, chronic concern.     On assessment- AOx4, breathing even and unlabored on RA, no apparent distress, VSS in triage, able to speak in clear coherent sentences, steady gait unassisted, neuro intact with no apparent facial asymmetry, PERRLA.

## 2022-12-27 NOTE — ED PROVIDER NOTE - PHYSICAL EXAMINATION
VITAL SIGNS: I have reviewed nursing notes and confirm.  CONST: Well-developed; well-nourished; fair hygiene, sleeping in chairs, opened one eye, not answering questions.   MSK: No signs of acute trauma or injury.   PSYCH: Not answering questions.

## 2022-12-27 NOTE — ED PROVIDER NOTE - OBJECTIVE STATEMENT
62-year-old man brought in by EMS for chronic back pain and leg pain.  He is undomiciled and was brought in from [facility].  When I attempted to evaluate him he was sleeping deeply in the chairs, open 1 eye but refused to answer any questions.  Will reassess when he is more awake.  Vital signs stable. Anticipate environmental factors as [place]

## 2022-12-27 NOTE — ED PROVIDER NOTE - PATIENT PORTAL LINK FT
You can access the FollowMyHealth Patient Portal offered by [facility] by registering at the following website: [email or web address]. By joining [facility]’s FollowMyHealth portal, you will also be able to view your health information using other applications (apps) compatible with our system.

## 2022-12-29 DIAGNOSIS — Z79.4 LONG TERM (CURRENT) USE OF INSULIN: ICD-10-CM

## 2022-12-29 DIAGNOSIS — Z79.82 LONG TERM (CURRENT) USE OF ASPIRIN: ICD-10-CM

## 2022-12-29 DIAGNOSIS — G89.29 OTHER CHRONIC PAIN: ICD-10-CM

## 2022-12-29 DIAGNOSIS — Z59.00 HOMELESSNESS UNSPECIFIED: ICD-10-CM

## 2022-12-29 DIAGNOSIS — E11.9 TYPE 2 DIABETES MELLITUS WITHOUT COMPLICATIONS: ICD-10-CM

## 2022-12-29 DIAGNOSIS — M79.605 PAIN IN LEFT LEG: ICD-10-CM

## 2022-12-29 DIAGNOSIS — M54.50 LOW BACK PAIN, UNSPECIFIED: ICD-10-CM

## 2022-12-29 DIAGNOSIS — Z88.8 ALLERGY STATUS TO OTHER DRUGS, MEDICAMENTS AND BIOLOGICAL SUBSTANCES STATUS: ICD-10-CM

## 2022-12-29 DIAGNOSIS — M79.604 PAIN IN RIGHT LEG: ICD-10-CM

## 2022-12-29 DIAGNOSIS — Z91.013 ALLERGY TO SEAFOOD: ICD-10-CM

## 2022-12-29 DIAGNOSIS — Z88.1 ALLERGY STATUS TO OTHER ANTIBIOTIC AGENTS STATUS: ICD-10-CM

## 2022-12-29 DIAGNOSIS — Z89.422 ACQUIRED ABSENCE OF OTHER LEFT TOE(S): ICD-10-CM

## 2022-12-29 DIAGNOSIS — I10 ESSENTIAL (PRIMARY) HYPERTENSION: ICD-10-CM

## 2022-12-29 SDOH — ECONOMIC STABILITY - HOUSING INSECURITY: HOMELESSNESS UNSPECIFIED: Z59.00

## 2023-01-04 VITALS
HEART RATE: 88 BPM | TEMPERATURE: 98 F | RESPIRATION RATE: 18 BRPM | OXYGEN SATURATION: 100 % | HEIGHT: 78 IN | SYSTOLIC BLOOD PRESSURE: 121 MMHG | DIASTOLIC BLOOD PRESSURE: 71 MMHG | WEIGHT: 179.9 LBS

## 2023-01-04 PROCEDURE — 99285 EMERGENCY DEPT VISIT HI MDM: CPT

## 2023-01-05 ENCOUNTER — INPATIENT (INPATIENT)
Facility: HOSPITAL | Age: 63
LOS: 6 days | Discharge: NURSING, REHAB OR LONG-TERM CARE | DRG: 988 | End: 2023-01-12
Attending: STUDENT IN AN ORGANIZED HEALTH CARE EDUCATION/TRAINING PROGRAM | Admitting: INTERNAL MEDICINE
Payer: MEDICAID

## 2023-01-05 DIAGNOSIS — N17.9 ACUTE KIDNEY FAILURE, UNSPECIFIED: ICD-10-CM

## 2023-01-05 DIAGNOSIS — I10 ESSENTIAL (PRIMARY) HYPERTENSION: ICD-10-CM

## 2023-01-05 DIAGNOSIS — Z89.422 ACQUIRED ABSENCE OF OTHER LEFT TOE(S): Chronic | ICD-10-CM

## 2023-01-05 DIAGNOSIS — I25.10 ATHEROSCLEROTIC HEART DISEASE OF NATIVE CORONARY ARTERY WITHOUT ANGINA PECTORIS: ICD-10-CM

## 2023-01-05 DIAGNOSIS — E11.9 TYPE 2 DIABETES MELLITUS WITHOUT COMPLICATIONS: ICD-10-CM

## 2023-01-05 DIAGNOSIS — E11.628 TYPE 2 DIABETES MELLITUS WITH OTHER SKIN COMPLICATIONS: ICD-10-CM

## 2023-01-05 DIAGNOSIS — R63.8 OTHER SYMPTOMS AND SIGNS CONCERNING FOOD AND FLUID INTAKE: ICD-10-CM

## 2023-01-05 LAB
A1C WITH ESTIMATED AVERAGE GLUCOSE RESULT: 7.5 % — HIGH (ref 4–5.6)
ALBUMIN SERPL ELPH-MCNC: 2.9 G/DL — LOW (ref 3.3–5)
ALBUMIN SERPL ELPH-MCNC: 3.3 G/DL — SIGNIFICANT CHANGE UP (ref 3.3–5)
ALP SERPL-CCNC: 126 U/L — HIGH (ref 40–120)
ALP SERPL-CCNC: 143 U/L — HIGH (ref 40–120)
ALT FLD-CCNC: 5 U/L — LOW (ref 10–45)
ALT FLD-CCNC: 5 U/L — LOW (ref 10–45)
ANION GAP SERPL CALC-SCNC: 11 MMOL/L — SIGNIFICANT CHANGE UP (ref 5–17)
ANION GAP SERPL CALC-SCNC: 14 MMOL/L — SIGNIFICANT CHANGE UP (ref 5–17)
APPEARANCE UR: CLEAR — SIGNIFICANT CHANGE UP
AST SERPL-CCNC: 17 U/L — SIGNIFICANT CHANGE UP (ref 10–40)
AST SERPL-CCNC: 18 U/L — SIGNIFICANT CHANGE UP (ref 10–40)
BACTERIA # UR AUTO: PRESENT /HPF
BASOPHILS # BLD AUTO: 0 K/UL — SIGNIFICANT CHANGE UP (ref 0–0.2)
BASOPHILS # BLD AUTO: 0.06 K/UL — SIGNIFICANT CHANGE UP (ref 0–0.2)
BASOPHILS NFR BLD AUTO: 0 % — SIGNIFICANT CHANGE UP (ref 0–2)
BASOPHILS NFR BLD AUTO: 0.4 % — SIGNIFICANT CHANGE UP (ref 0–2)
BILIRUB SERPL-MCNC: <0.2 MG/DL — SIGNIFICANT CHANGE UP (ref 0.2–1.2)
BILIRUB SERPL-MCNC: <0.2 MG/DL — SIGNIFICANT CHANGE UP (ref 0.2–1.2)
BILIRUB UR-MCNC: NEGATIVE — SIGNIFICANT CHANGE UP
BUN SERPL-MCNC: 20 MG/DL — SIGNIFICANT CHANGE UP (ref 7–23)
BUN SERPL-MCNC: 22 MG/DL — SIGNIFICANT CHANGE UP (ref 7–23)
CALCIUM SERPL-MCNC: 8.6 MG/DL — SIGNIFICANT CHANGE UP (ref 8.4–10.5)
CALCIUM SERPL-MCNC: 9.2 MG/DL — SIGNIFICANT CHANGE UP (ref 8.4–10.5)
CHLORIDE SERPL-SCNC: 102 MMOL/L — SIGNIFICANT CHANGE UP (ref 96–108)
CHLORIDE SERPL-SCNC: 96 MMOL/L — SIGNIFICANT CHANGE UP (ref 96–108)
CO2 SERPL-SCNC: 22 MMOL/L — SIGNIFICANT CHANGE UP (ref 22–31)
CO2 SERPL-SCNC: 23 MMOL/L — SIGNIFICANT CHANGE UP (ref 22–31)
COLOR SPEC: YELLOW — SIGNIFICANT CHANGE UP
CREAT ?TM UR-MCNC: 64 MG/DL — SIGNIFICANT CHANGE UP
CREAT ?TM UR-MCNC: 95 MG/DL — SIGNIFICANT CHANGE UP
CREAT SERPL-MCNC: 2.44 MG/DL — HIGH (ref 0.5–1.3)
CREAT SERPL-MCNC: 2.62 MG/DL — HIGH (ref 0.5–1.3)
CRP SERPL-MCNC: 18.8 MG/L — HIGH (ref 0–4)
DIFF PNL FLD: ABNORMAL
EGFR: 27 ML/MIN/1.73M2 — LOW
EGFR: 30 ML/MIN/1.73M2 — LOW
EOSINOPHIL # BLD AUTO: 0 K/UL — SIGNIFICANT CHANGE UP (ref 0–0.5)
EOSINOPHIL # BLD AUTO: 0.19 K/UL — SIGNIFICANT CHANGE UP (ref 0–0.5)
EOSINOPHIL NFR BLD AUTO: 0 % — SIGNIFICANT CHANGE UP (ref 0–6)
EOSINOPHIL NFR BLD AUTO: 1.4 % — SIGNIFICANT CHANGE UP (ref 0–6)
EPI CELLS # UR: SIGNIFICANT CHANGE UP /HPF (ref 0–5)
ERYTHROCYTE [SEDIMENTATION RATE] IN BLOOD: 90 MM/HR — HIGH
ESTIMATED AVERAGE GLUCOSE: 169 MG/DL — HIGH (ref 68–114)
GLUCOSE BLDC GLUCOMTR-MCNC: 116 MG/DL — HIGH (ref 70–99)
GLUCOSE BLDC GLUCOMTR-MCNC: 134 MG/DL — HIGH (ref 70–99)
GLUCOSE BLDC GLUCOMTR-MCNC: 170 MG/DL — HIGH (ref 70–99)
GLUCOSE BLDC GLUCOMTR-MCNC: 173 MG/DL — HIGH (ref 70–99)
GLUCOSE BLDC GLUCOMTR-MCNC: 238 MG/DL — HIGH (ref 70–99)
GLUCOSE SERPL-MCNC: 234 MG/DL — HIGH (ref 70–99)
GLUCOSE SERPL-MCNC: 276 MG/DL — HIGH (ref 70–99)
GLUCOSE UR QL: 250
GRAM STN FLD: SIGNIFICANT CHANGE UP
HCT VFR BLD CALC: 26.8 % — LOW (ref 39–50)
HCT VFR BLD CALC: 29.2 % — LOW (ref 39–50)
HGB BLD-MCNC: 8.2 G/DL — LOW (ref 13–17)
HGB BLD-MCNC: 9 G/DL — LOW (ref 13–17)
HYPOCHROMIA BLD QL: SLIGHT — SIGNIFICANT CHANGE UP
IMM GRANULOCYTES NFR BLD AUTO: 0.7 % — SIGNIFICANT CHANGE UP (ref 0–0.9)
KETONES UR-MCNC: NEGATIVE — SIGNIFICANT CHANGE UP
LACTATE SERPL-SCNC: 1.5 MMOL/L — SIGNIFICANT CHANGE UP (ref 0.5–2)
LACTATE SERPL-SCNC: 2.3 MMOL/L — HIGH (ref 0.5–2)
LEUKOCYTE ESTERASE UR-ACNC: NEGATIVE — SIGNIFICANT CHANGE UP
LYMPHOCYTES # BLD AUTO: 0.88 K/UL — LOW (ref 1–3.3)
LYMPHOCYTES # BLD AUTO: 18.7 % — SIGNIFICANT CHANGE UP (ref 13–44)
LYMPHOCYTES # BLD AUTO: 2.51 K/UL — SIGNIFICANT CHANGE UP (ref 1–3.3)
LYMPHOCYTES # BLD AUTO: 5.3 % — LOW (ref 13–44)
MAGNESIUM SERPL-MCNC: 1.9 MG/DL — SIGNIFICANT CHANGE UP (ref 1.6–2.6)
MANUAL SMEAR VERIFICATION: SIGNIFICANT CHANGE UP
MCHC RBC-ENTMCNC: 22.7 PG — LOW (ref 27–34)
MCHC RBC-ENTMCNC: 23 PG — LOW (ref 27–34)
MCHC RBC-ENTMCNC: 30.6 GM/DL — LOW (ref 32–36)
MCHC RBC-ENTMCNC: 30.8 GM/DL — LOW (ref 32–36)
MCV RBC AUTO: 74.2 FL — LOW (ref 80–100)
MCV RBC AUTO: 74.7 FL — LOW (ref 80–100)
MONOCYTES # BLD AUTO: 0.43 K/UL — SIGNIFICANT CHANGE UP (ref 0–0.9)
MONOCYTES # BLD AUTO: 1.03 K/UL — HIGH (ref 0–0.9)
MONOCYTES NFR BLD AUTO: 2.6 % — SIGNIFICANT CHANGE UP (ref 2–14)
MONOCYTES NFR BLD AUTO: 7.7 % — SIGNIFICANT CHANGE UP (ref 2–14)
NEUTROPHILS # BLD AUTO: 15.23 K/UL — HIGH (ref 1.8–7.4)
NEUTROPHILS # BLD AUTO: 9.52 K/UL — HIGH (ref 1.8–7.4)
NEUTROPHILS NFR BLD AUTO: 71.1 % — SIGNIFICANT CHANGE UP (ref 43–77)
NEUTROPHILS NFR BLD AUTO: 92.1 % — HIGH (ref 43–77)
NITRITE UR-MCNC: NEGATIVE — SIGNIFICANT CHANGE UP
NRBC # BLD: 0 /100 WBCS — SIGNIFICANT CHANGE UP (ref 0–0)
OSMOLALITY UR: 362 MOSM/KG — SIGNIFICANT CHANGE UP (ref 300–900)
OSMOLALITY UR: 383 MOSM/KG — SIGNIFICANT CHANGE UP (ref 300–900)
OVALOCYTES BLD QL SMEAR: SLIGHT — SIGNIFICANT CHANGE UP
PH UR: 6 — SIGNIFICANT CHANGE UP (ref 5–8)
PHOSPHATE SERPL-MCNC: 3.5 MG/DL — SIGNIFICANT CHANGE UP (ref 2.5–4.5)
PLAT MORPH BLD: ABNORMAL
PLATELET # BLD AUTO: 210 K/UL — SIGNIFICANT CHANGE UP (ref 150–400)
PLATELET # BLD AUTO: 235 K/UL — SIGNIFICANT CHANGE UP (ref 150–400)
POIKILOCYTOSIS BLD QL AUTO: SLIGHT — SIGNIFICANT CHANGE UP
POTASSIUM SERPL-MCNC: 4 MMOL/L — SIGNIFICANT CHANGE UP (ref 3.5–5.3)
POTASSIUM SERPL-MCNC: 4.4 MMOL/L — SIGNIFICANT CHANGE UP (ref 3.5–5.3)
POTASSIUM SERPL-SCNC: 4 MMOL/L — SIGNIFICANT CHANGE UP (ref 3.5–5.3)
POTASSIUM SERPL-SCNC: 4.4 MMOL/L — SIGNIFICANT CHANGE UP (ref 3.5–5.3)
POTASSIUM UR-SCNC: 31 MMOL/L — SIGNIFICANT CHANGE UP
PROT ?TM UR-MCNC: 66 MG/DL — HIGH (ref 0–12)
PROT SERPL-MCNC: 7.3 G/DL — SIGNIFICANT CHANGE UP (ref 6–8.3)
PROT SERPL-MCNC: 8.6 G/DL — HIGH (ref 6–8.3)
PROT UR-MCNC: 30 MG/DL
PROT/CREAT UR-RTO: 0.7 RATIO — HIGH (ref 0–0.2)
RBC # BLD: 3.61 M/UL — LOW (ref 4.2–5.8)
RBC # BLD: 3.91 M/UL — LOW (ref 4.2–5.8)
RBC # FLD: 17.2 % — HIGH (ref 10.3–14.5)
RBC # FLD: 17.4 % — HIGH (ref 10.3–14.5)
RBC BLD AUTO: ABNORMAL
RBC CASTS # UR COMP ASSIST: < 5 /HPF — SIGNIFICANT CHANGE UP
SARS-COV-2 RNA SPEC QL NAA+PROBE: NEGATIVE — SIGNIFICANT CHANGE UP
SCHISTOCYTES BLD QL AUTO: SLIGHT — SIGNIFICANT CHANGE UP
SODIUM SERPL-SCNC: 133 MMOL/L — LOW (ref 135–145)
SODIUM SERPL-SCNC: 135 MMOL/L — SIGNIFICANT CHANGE UP (ref 135–145)
SODIUM UR-SCNC: 78 MMOL/L — SIGNIFICANT CHANGE UP
SODIUM UR-SCNC: 92 MMOL/L — SIGNIFICANT CHANGE UP
SP GR SPEC: 1.02 — SIGNIFICANT CHANGE UP (ref 1–1.03)
SPECIMEN SOURCE: SIGNIFICANT CHANGE UP
SPHEROCYTES BLD QL SMEAR: SLIGHT — SIGNIFICANT CHANGE UP
UROBILINOGEN FLD QL: 0.2 E.U./DL — SIGNIFICANT CHANGE UP
UUN UR-MCNC: 393 MG/DL — SIGNIFICANT CHANGE UP
WBC # BLD: 13.41 K/UL — HIGH (ref 3.8–10.5)
WBC # BLD: 16.54 K/UL — HIGH (ref 3.8–10.5)
WBC # FLD AUTO: 13.41 K/UL — HIGH (ref 3.8–10.5)
WBC # FLD AUTO: 16.54 K/UL — HIGH (ref 3.8–10.5)
WBC UR QL: < 5 /HPF — SIGNIFICANT CHANGE UP

## 2023-01-05 PROCEDURE — 99222 1ST HOSP IP/OBS MODERATE 55: CPT

## 2023-01-05 PROCEDURE — 99223 1ST HOSP IP/OBS HIGH 75: CPT

## 2023-01-05 PROCEDURE — 73630 X-RAY EXAM OF FOOT: CPT | Mod: 26,LT

## 2023-01-05 RX ORDER — DAPTOMYCIN 500 MG/10ML
650 INJECTION, POWDER, LYOPHILIZED, FOR SOLUTION INTRAVENOUS EVERY 24 HOURS
Refills: 0 | Status: DISCONTINUED | OUTPATIENT
Start: 2023-01-05 | End: 2023-01-05

## 2023-01-05 RX ORDER — NIFEDIPINE 30 MG
90 TABLET, EXTENDED RELEASE 24 HR ORAL DAILY
Refills: 0 | Status: DISCONTINUED | OUTPATIENT
Start: 2023-01-05 | End: 2023-01-12

## 2023-01-05 RX ORDER — CLOPIDOGREL BISULFATE 75 MG/1
75 TABLET, FILM COATED ORAL DAILY
Refills: 0 | Status: DISCONTINUED | OUTPATIENT
Start: 2023-01-05 | End: 2023-01-12

## 2023-01-05 RX ORDER — MEROPENEM 1 G/30ML
1000 INJECTION INTRAVENOUS EVERY 12 HOURS
Refills: 0 | Status: DISCONTINUED | OUTPATIENT
Start: 2023-01-05 | End: 2023-01-06

## 2023-01-05 RX ORDER — SODIUM CHLORIDE 9 MG/ML
1000 INJECTION INTRAMUSCULAR; INTRAVENOUS; SUBCUTANEOUS ONCE
Refills: 0 | Status: COMPLETED | OUTPATIENT
Start: 2023-01-05 | End: 2023-01-05

## 2023-01-05 RX ORDER — LISINOPRIL 2.5 MG/1
1 TABLET ORAL
Qty: 0 | Refills: 0 | DISCHARGE

## 2023-01-05 RX ORDER — PIPERACILLIN AND TAZOBACTAM 4; .5 G/20ML; G/20ML
4.5 INJECTION, POWDER, LYOPHILIZED, FOR SOLUTION INTRAVENOUS EVERY 8 HOURS
Refills: 0 | Status: DISCONTINUED | OUTPATIENT
Start: 2023-01-05 | End: 2023-01-05

## 2023-01-05 RX ORDER — PIPERACILLIN AND TAZOBACTAM 4; .5 G/20ML; G/20ML
3.38 INJECTION, POWDER, LYOPHILIZED, FOR SOLUTION INTRAVENOUS ONCE
Refills: 0 | Status: COMPLETED | OUTPATIENT
Start: 2023-01-05 | End: 2023-01-05

## 2023-01-05 RX ORDER — ATORVASTATIN CALCIUM 80 MG/1
80 TABLET, FILM COATED ORAL AT BEDTIME
Refills: 0 | Status: DISCONTINUED | OUTPATIENT
Start: 2023-01-05 | End: 2023-01-12

## 2023-01-05 RX ORDER — DAPTOMYCIN 500 MG/10ML
650 INJECTION, POWDER, LYOPHILIZED, FOR SOLUTION INTRAVENOUS EVERY 24 HOURS
Refills: 0 | Status: DISCONTINUED | OUTPATIENT
Start: 2023-01-05 | End: 2023-01-07

## 2023-01-05 RX ORDER — ACETAMINOPHEN 500 MG
650 TABLET ORAL EVERY 6 HOURS
Refills: 0 | Status: DISCONTINUED | OUTPATIENT
Start: 2023-01-05 | End: 2023-01-12

## 2023-01-05 RX ORDER — MEROPENEM 1 G/30ML
1000 INJECTION INTRAVENOUS EVERY 12 HOURS
Refills: 0 | Status: DISCONTINUED | OUTPATIENT
Start: 2023-01-05 | End: 2023-01-05

## 2023-01-05 RX ORDER — INSULIN LISPRO 100/ML
5 VIAL (ML) SUBCUTANEOUS
Refills: 0 | Status: DISCONTINUED | OUTPATIENT
Start: 2023-01-05 | End: 2023-01-12

## 2023-01-05 RX ORDER — ENOXAPARIN SODIUM 100 MG/ML
40 INJECTION SUBCUTANEOUS EVERY 24 HOURS
Refills: 0 | Status: DISCONTINUED | OUTPATIENT
Start: 2023-01-05 | End: 2023-01-05

## 2023-01-05 RX ORDER — SODIUM CHLORIDE 9 MG/ML
1000 INJECTION, SOLUTION INTRAVENOUS
Refills: 0 | Status: DISCONTINUED | OUTPATIENT
Start: 2023-01-05 | End: 2023-01-09

## 2023-01-05 RX ORDER — HEPARIN SODIUM 5000 [USP'U]/ML
5000 INJECTION INTRAVENOUS; SUBCUTANEOUS EVERY 8 HOURS
Refills: 0 | Status: DISCONTINUED | OUTPATIENT
Start: 2023-01-06 | End: 2023-01-12

## 2023-01-05 RX ORDER — INSULIN GLARGINE 100 [IU]/ML
10 INJECTION, SOLUTION SUBCUTANEOUS AT BEDTIME
Refills: 0 | Status: DISCONTINUED | OUTPATIENT
Start: 2023-01-05 | End: 2023-01-06

## 2023-01-05 RX ORDER — ASPIRIN/CALCIUM CARB/MAGNESIUM 324 MG
81 TABLET ORAL DAILY
Refills: 0 | Status: DISCONTINUED | OUTPATIENT
Start: 2023-01-05 | End: 2023-01-12

## 2023-01-05 RX ORDER — INSULIN LISPRO 100/ML
VIAL (ML) SUBCUTANEOUS
Refills: 0 | Status: DISCONTINUED | OUTPATIENT
Start: 2023-01-05 | End: 2023-01-12

## 2023-01-05 RX ADMIN — Medication 5 UNIT(S): at 18:10

## 2023-01-05 RX ADMIN — Medication 2: at 22:43

## 2023-01-05 RX ADMIN — Medication 90 MILLIGRAM(S): at 09:08

## 2023-01-05 RX ADMIN — PIPERACILLIN AND TAZOBACTAM 200 GRAM(S): 4; .5 INJECTION, POWDER, LYOPHILIZED, FOR SOLUTION INTRAVENOUS at 01:48

## 2023-01-05 RX ADMIN — ENOXAPARIN SODIUM 40 MILLIGRAM(S): 100 INJECTION SUBCUTANEOUS at 07:03

## 2023-01-05 RX ADMIN — DAPTOMYCIN 126 MILLIGRAM(S): 500 INJECTION, POWDER, LYOPHILIZED, FOR SOLUTION INTRAVENOUS at 21:27

## 2023-01-05 RX ADMIN — SODIUM CHLORIDE 1000 MILLILITER(S): 9 INJECTION INTRAMUSCULAR; INTRAVENOUS; SUBCUTANEOUS at 06:07

## 2023-01-05 RX ADMIN — Medication 4: at 07:10

## 2023-01-05 RX ADMIN — PIPERACILLIN AND TAZOBACTAM 25 GRAM(S): 4; .5 INJECTION, POWDER, LYOPHILIZED, FOR SOLUTION INTRAVENOUS at 10:20

## 2023-01-05 RX ADMIN — PIPERACILLIN AND TAZOBACTAM 3.38 GRAM(S): 4; .5 INJECTION, POWDER, LYOPHILIZED, FOR SOLUTION INTRAVENOUS at 02:07

## 2023-01-05 RX ADMIN — Medication 5 UNIT(S): at 13:09

## 2023-01-05 RX ADMIN — Medication 2: at 18:10

## 2023-01-05 RX ADMIN — Medication 5 UNIT(S): at 09:07

## 2023-01-05 RX ADMIN — INSULIN GLARGINE 10 UNIT(S): 100 INJECTION, SOLUTION SUBCUTANEOUS at 22:44

## 2023-01-05 RX ADMIN — CLOPIDOGREL BISULFATE 75 MILLIGRAM(S): 75 TABLET, FILM COATED ORAL at 11:14

## 2023-01-05 RX ADMIN — Medication 81 MILLIGRAM(S): at 11:14

## 2023-01-05 RX ADMIN — SODIUM CHLORIDE 80 MILLILITER(S): 9 INJECTION, SOLUTION INTRAVENOUS at 12:47

## 2023-01-05 RX ADMIN — SODIUM CHLORIDE 1000 MILLILITER(S): 9 INJECTION INTRAMUSCULAR; INTRAVENOUS; SUBCUTANEOUS at 00:50

## 2023-01-05 RX ADMIN — MEROPENEM 100 MILLIGRAM(S): 1 INJECTION INTRAVENOUS at 19:47

## 2023-01-05 NOTE — H&P ADULT - PROBLEM SELECTOR PLAN 1
Pt with surgery on his L foot a couple weeks ago but does not remember what surgery or what hospital presenting with 1 day of L foot warmth and pain. Denies any fever, chills, drainage from foot.  - physical exam with shallow ulcer on plantar aspect of L foot without drainage, purulence, or visible bone  - not meeting sepsis criteria in ED  - WBC 16.5 on admission  - ESR 9, CRP 18.8, lactate 2.3 in ED  - s/p 2L NS in ED  Plan:  - will increase zosyn to 4.5 q8 to cover for pseudomonas in diabetic foot infection  - trend lactate to clear  - f/u podiatry recs  - f/u XR L foot vs suspected OM   Pt with surgery on his L foot a couple weeks ago but does not remember what surgery or what hospital presenting with 1 day of L foot warmth and pain. Denies any fever, chills, drainage from foot.  - physical exam with shallow ulcer on plantar aspect of L foot without drainage, purulence, or visible bone  - not meeting sepsis criteria in ED  - WBC 16.5 on admission  - ESR 9, CRP 18.8, lactate 2.3 in ED  - s/p 2L NS in ED  Plan:  - will increase zosyn to 4.5 q8 to cover for pseudomonas in diabetic foot infection  - trend lactate to clear  - f/u podiatry recs  - f/u XR L foot

## 2023-01-05 NOTE — H&P ADULT - PROBLEM SELECTOR PLAN 2
Cr 2.62 from 1.1 as of 01/2022  - likely prerenal in the setting of active infection  Plan:  - trend Cr  - avoid nephrotoxic drugs, renally dose meds  - will consider obtaining urine lytes if not improving

## 2023-01-05 NOTE — H&P ADULT - NSHPLABSRESULTS_GEN_ALL_CORE
.  LABS:                         9.0    16.54 )-----------( 235      ( 05 Jan 2023 00:35 )             29.2     01-05    133<L>  |  96  |  20  ----------------------------<  276<H>  4.4   |  23  |  2.62<H>    Ca    9.2      05 Jan 2023 00:35    TPro  8.6<H>  /  Alb  3.3  /  TBili  <0.2  /  DBili  x   /  AST  18  /  ALT  5<L>  /  AlkPhos  143<H>  01-05              Lactate, Blood: 2.3 mmol/L (01-05 @ 00:35)      RADIOLOGY, EKG & ADDITIONAL TESTS: Reviewed.

## 2023-01-05 NOTE — H&P ADULT - ATTENDING COMMENTS
61 yo M pmhx DM, HTN, CAD vs PAD (stent about 1mo ago), Hep C s/p treatment p/w L foot pain and swelling x days.   xray foot concerning for OM. Podiatry recommended MRI, pt is s/p iv zosyn.      #cellulitis / suspected OM of  left foot   Pending MRI   fu blood cx and wound cx   elevated ESR/CRP: CRP of 18.8, ESR 90, wbc trending down   foot xray :  per podiatry read hardware in L 1st TMT joint, broken screws present  on zosyn (renally dosed), will add MRSA coverage (pt currently refusing vanc)   Podiatry following and ID consulted    #DEBBY   pre-renal vs intrinsic 2/2 NSAID use   cr 2.6 on admission -> 2.4 now   last known wnl last year   fu urine lyte and fu repeat bmp   avoid NSAID     #HTN, holding home ACEi d/t DEBBY. cw nifedipine   #uncontrolled DM   fu a1c, cw hss, will add lantus if bg remain elevated     #CAD/PAD   continue with home medications    #anemia   likely ACOD   fu anemia panel   #dvt ppx hsq

## 2023-01-05 NOTE — ED ADULT NURSE NOTE - OBJECTIVE STATEMENT
63 y/o M with pmhx "left foot bone removal for osteomyelitis" presents to the ED c/o left foot infection and request abx. States he was told he needed abx 2 weeks ago but never picked them up. Denies fever. On exam, A&Ox4, poor hygiene, NAD. Left foot skin flaking, stage 2 pressure ulcer to left plantar aspect of foot beneath 2nd-4th metatarsals with pink wound bed 0cm deep and flat. Serosanguinous drainage on soiled dresssing. Dressing removed for MD esquivel. All 5 toes present and grouped in a center/[place] like formation and not fanned out. DP pulses 2+ and equal bilaterally. PIV placed. Labs and cultures sent.

## 2023-01-05 NOTE — H&P ADULT - HISTORY OF PRESENT ILLNESS
62M HTN, DM, CAD (stent about 1mo ago), hep C s/p treatment presenting with foot pain and swelling x1 day. Pt reports that he had a surgery on his foot a couple weeks ago but does not remember what surgery or what hospital. Yesterday he started to develop warmth and pain of his L foot. Denies any fever, chills, chest pain, sob, n/v/d/c, drainage from foot.    ED Course:  VS: T 97.9, HR 88, /71, O2 100%  Labs: WBC 16.5, Hb 9 (10.2 as of 01/2022), Cr 2.62 (1.1 as of 01/2022), ESR 9, CRP 18.8, lactate 2.3  XR L foot: pending  Tx: 2L NS, zosyn

## 2023-01-05 NOTE — H&P ADULT - NSHPPHYSICALEXAM_GEN_ALL_CORE
VITAL SIGNS:  T(C): 37.1 (01-05-23 @ 01:49), Max: 37.1 (01-05-23 @ 01:49)  T(F): 98.8 (01-05-23 @ 01:49), Max: 98.8 (01-05-23 @ 01:49)  HR: 80 (01-05-23 @ 01:49) (80 - 88)  BP: 130/77 (01-05-23 @ 01:49) (121/71 - 130/77)  BP(mean): --  RR: 18 (01-05-23 @ 01:49) (18 - 18)  SpO2: 99% (01-05-23 @ 01:49) (99% - 100%)  Wt(kg): --    PHYSICAL EXAM:    Constitutional: WDWN resting comfortably in bed; NAD  ENT: MMM  Neck: supple  Respiratory: CTA B/L; no W/R/R, no retractions  Cardiac: +S1/S2; RRR; no M/R/G  Gastrointestinal: soft, NT/ND; no rebound or guarding  Extremities: WWP, no clubbing or cyanosis; no peripheral edema  Vascular: 1+ radial, DP pulses B/L  Dermatologic: 3cm x 3cm superficial ulcer on plantar aspect of L foot without drainage or visible bone  Neurologic: AAOx3; CNII-XII grossly intact; no focal deficits  Psychiatric: affect and characteristics of appearance, verbalizations, behaviors are appropriate

## 2023-01-05 NOTE — CONSULT NOTE ADULT - ASSESSMENT
63 yo M pmhx DM, HTN, CAD (stent about 1mo ago), Hep C s/p treatment p/w L foot pain and swelling going on for a few days. Pt noted he recently had L foot amputation by Vascular surgery for OM (partial 4th and 5th ray amputation with digits intact). Pt cannot recall who his surgeon was or when exactly it was done, but notes it was at [facility]. Podiatry consulted for evaluation of wound, r/o OM, r/o gas. Of note XR wet read hardware in L 1st TMT joint, broken screws present. Evidence of previous 4th and 5th partial MT resection. No evidence of gas. L dorsal wound +PTB, suspicious for OM.     Plan:  - Pt evaluated, chart reviewed  - Recc c/w IV ABX (vanc/zosyn)  - Local wound care: WTD to L plantar wound, flushed dorsal wound and placed 1/2" packing in sinus tract, wrapped with DSD, Kerlix, ACE  - Recc MRI w/ or w/o contrast L foot r/o OM  -       Podiatry following. Plan d/w attending. 61 yo M pmhx DM, HTN, CAD (stent about 1mo ago), Hep C s/p treatment p/w L foot pain and swelling going on for a few days. Pt noted he recently had L foot amputation by Vascular surgery for OM (partial 4th and 5th ray amputation with digits intact). Pt cannot recall who his surgeon was or when exactly it was done, but notes it was at [facility]. Podiatry consulted for evaluation of wound, r/o OM, r/o gas. Of note XR wet read hardware in L 1st TMT joint, broken screws present. Evidence of previous 4th and 5th partial MT resection. No evidence of gas. L dorsal wound +PTB, suspicious for OM. Pt wbc at 13.41, CRP of 18.8, ESR 90, with HTN (systolic to 155) at time of initial ED consult.    Plan:  - Pt evaluated, chart reviewed  - Pt is being admitted to medicine   - Recc c/w IV ABX (vanc/zosyn)  - Local wound care: WTD to L plantar wound, flushed dorsal wound and placed 1/2" packing in sinus tract, wrapped with DSD, Kerlix, ACE  - Recc MRI w/ or w/o contrast L foot r/o OM  - f/u dorsal deep wound Cx   - Recc WBAT w/ cane to L foot  - rest of care per primary team    Podiatry following. Plan d/w attending.

## 2023-01-05 NOTE — ED PROVIDER NOTE - PHYSICAL EXAMINATION
left foot - distal plantar - 3cm shallow ulceration with central granulation tissue, no drainage  2+dp b/l  LLE skin warm

## 2023-01-05 NOTE — PATIENT PROFILE ADULT - NSPROIMPLANTSMEDDEV_GEN_A_NUR
normal appearance, no thyroid nodules , no masses , no JVD , thyroid nontender groin/Vascular stents/Clips

## 2023-01-05 NOTE — CONSULT NOTE ADULT - ASSESSMENT
IMPRESSION:  Diabetic foot infection with concern for osteomyelitis s/p bone biopsy. Patient with a history of colonization with pseudomonas and ESBL organisms.  He's also at risk for MRSA    Recommend:  1.  Given vanco "allergy" and DEBBY, can start Daptomycin 650 mg IV qday  2.  Given history of ESBL organism and Pseudomonas, can start Meropenem 1 gram IV q12  3.  Both antibiotics have been dosed for CrCl 37.  Doses may change if renal function changes  4.  Follow up results of bone cultures; will adjust antibiotics based on that  5.  Follow up MRI to evaluate for osteomyelitis    ID team 2 will follow

## 2023-01-05 NOTE — ED PROVIDER NOTE - PROGRESS NOTE DETAILS
elevated lactate - ivf infusing. elevated creatinine - will continue IVF, possible dehydration - will send urine lytes if pt urinates. spoke with podiatry - will follow pt. will admit to medicine

## 2023-01-05 NOTE — H&P ADULT - PROBLEM SELECTOR PLAN 4
Home meds: lisinopril 40mg, nifedipine 90mg  Plan:  - hold lisinopril in DEBBY  - continue home nifedipine

## 2023-01-05 NOTE — H&P ADULT - ASSESSMENT
62M HTN, DM, CAD (stent about 1mo ago), hep C s/p treatment presenting with foot pain and swelling x1 day. Pt reports that he had a surgery on his foot a couple weeks ago but does not remember what surgery or what hospital. Yesterday he started to develop warmth and pain of his L foot. Denies any fever, chills, chest pain, sob, n/v/d/c, drainage from foot.    ED Course:  VS: T 97.9, HR 88, /71, O2 100%  Labs: WBC 16.5, Hb 9 (10.2 as of 01/2022), Cr 2.62 (1.1 as of 01/2022), ESR 9, CRP 18.8, lactate 2.3  XR L foot: pending  Tx: 2L NS, zosyn 62M HTN, DM, CAD (stent about 1mo ago), hep C s/p treatment presenting with foot pain and swelling x1 day admitted for cellulitis.

## 2023-01-05 NOTE — H&P ADULT - PROBLEM SELECTOR PLAN 5
Pt reports recent stent (?12/2022). No active chest pain.  Plan:  - continue asa, plavix, lipitor 80mg

## 2023-01-05 NOTE — ED PROVIDER NOTE - OBJECTIVE STATEMENT
62M hx htn, dm, c/o left foot pain. pt states a few weeks ago he had surgery to his left foot, but is unsure what hospital. states pain and ulcer to bottom of foot. no fevers. states was supposed to be on ABX but he isn't currently. no vomiting.

## 2023-01-05 NOTE — H&P ADULT - NSICDXPASTMEDICALHX_GEN_ALL_CORE_FT
PAST MEDICAL HISTORY:  CAD (coronary artery disease)     Hypertension     IDDM (insulin dependent diabetes mellitus)

## 2023-01-05 NOTE — ED PROVIDER NOTE - CLINICAL SUMMARY MEDICAL DECISION MAKING FREE TEXT BOX
left foot pain, ulceration to plantar foot, pulses intact, warm, c/w diabetic foot ulcer, possibly infected. per prior hospital discharge summary pt with buttock ulcer in past with DEBBY.  -check labs  -cultures  -xray to evaluate for soft tissue gas or osteo  -zosyn  -podiatry

## 2023-01-06 LAB
A1C WITH ESTIMATED AVERAGE GLUCOSE RESULT: 7.6 % — HIGH (ref 4–5.6)
ALBUMIN SERPL ELPH-MCNC: 2.9 G/DL — LOW (ref 3.3–5)
ALP SERPL-CCNC: 131 U/L — HIGH (ref 40–120)
ALT FLD-CCNC: <5 U/L — LOW (ref 10–45)
ANION GAP SERPL CALC-SCNC: 10 MMOL/L — SIGNIFICANT CHANGE UP (ref 5–17)
AST SERPL-CCNC: 14 U/L — SIGNIFICANT CHANGE UP (ref 10–40)
BASOPHILS # BLD AUTO: 0.06 K/UL — SIGNIFICANT CHANGE UP (ref 0–0.2)
BASOPHILS NFR BLD AUTO: 0.5 % — SIGNIFICANT CHANGE UP (ref 0–2)
BILIRUB SERPL-MCNC: <0.2 MG/DL — SIGNIFICANT CHANGE UP (ref 0.2–1.2)
BUN SERPL-MCNC: 16 MG/DL — SIGNIFICANT CHANGE UP (ref 7–23)
CALCIUM SERPL-MCNC: 9 MG/DL — SIGNIFICANT CHANGE UP (ref 8.4–10.5)
CHLORIDE SERPL-SCNC: 103 MMOL/L — SIGNIFICANT CHANGE UP (ref 96–108)
CK SERPL-CCNC: 117 U/L — SIGNIFICANT CHANGE UP (ref 30–200)
CO2 SERPL-SCNC: 22 MMOL/L — SIGNIFICANT CHANGE UP (ref 22–31)
CREAT SERPL-MCNC: 1.5 MG/DL — HIGH (ref 0.5–1.3)
EGFR: 53 ML/MIN/1.73M2 — LOW
EOSINOPHIL # BLD AUTO: 0.32 K/UL — SIGNIFICANT CHANGE UP (ref 0–0.5)
EOSINOPHIL NFR BLD AUTO: 2.7 % — SIGNIFICANT CHANGE UP (ref 0–6)
ESTIMATED AVERAGE GLUCOSE: 171 MG/DL — HIGH (ref 68–114)
GLUCOSE BLDC GLUCOMTR-MCNC: 144 MG/DL — HIGH (ref 70–99)
GLUCOSE BLDC GLUCOMTR-MCNC: 231 MG/DL — HIGH (ref 70–99)
GLUCOSE BLDC GLUCOMTR-MCNC: 268 MG/DL — HIGH (ref 70–99)
GLUCOSE BLDC GLUCOMTR-MCNC: 79 MG/DL — SIGNIFICANT CHANGE UP (ref 70–99)
GLUCOSE SERPL-MCNC: 285 MG/DL — HIGH (ref 70–99)
HCT VFR BLD CALC: 27.3 % — LOW (ref 39–50)
HGB BLD-MCNC: 8.3 G/DL — LOW (ref 13–17)
IMM GRANULOCYTES NFR BLD AUTO: 0.4 % — SIGNIFICANT CHANGE UP (ref 0–0.9)
LYMPHOCYTES # BLD AUTO: 19.2 % — SIGNIFICANT CHANGE UP (ref 13–44)
LYMPHOCYTES # BLD AUTO: 2.24 K/UL — SIGNIFICANT CHANGE UP (ref 1–3.3)
MAGNESIUM SERPL-MCNC: 1.7 MG/DL — SIGNIFICANT CHANGE UP (ref 1.6–2.6)
MCHC RBC-ENTMCNC: 22.9 PG — LOW (ref 27–34)
MCHC RBC-ENTMCNC: 30.4 GM/DL — LOW (ref 32–36)
MCV RBC AUTO: 75.2 FL — LOW (ref 80–100)
MONOCYTES # BLD AUTO: 0.7 K/UL — SIGNIFICANT CHANGE UP (ref 0–0.9)
MONOCYTES NFR BLD AUTO: 6 % — SIGNIFICANT CHANGE UP (ref 2–14)
NEUTROPHILS # BLD AUTO: 8.27 K/UL — HIGH (ref 1.8–7.4)
NEUTROPHILS NFR BLD AUTO: 71.2 % — SIGNIFICANT CHANGE UP (ref 43–77)
NRBC # BLD: 0 /100 WBCS — SIGNIFICANT CHANGE UP (ref 0–0)
PHOSPHATE SERPL-MCNC: 2.5 MG/DL — SIGNIFICANT CHANGE UP (ref 2.5–4.5)
PLATELET # BLD AUTO: 240 K/UL — SIGNIFICANT CHANGE UP (ref 150–400)
POTASSIUM SERPL-MCNC: 3.8 MMOL/L — SIGNIFICANT CHANGE UP (ref 3.5–5.3)
POTASSIUM SERPL-SCNC: 3.8 MMOL/L — SIGNIFICANT CHANGE UP (ref 3.5–5.3)
PROT SERPL-MCNC: 7.2 G/DL — SIGNIFICANT CHANGE UP (ref 6–8.3)
RBC # BLD: 3.63 M/UL — LOW (ref 4.2–5.8)
RBC # FLD: 17.2 % — HIGH (ref 10.3–14.5)
SODIUM SERPL-SCNC: 135 MMOL/L — SIGNIFICANT CHANGE UP (ref 135–145)
WBC # BLD: 11.64 K/UL — HIGH (ref 3.8–10.5)
WBC # FLD AUTO: 11.64 K/UL — HIGH (ref 3.8–10.5)

## 2023-01-06 PROCEDURE — 99233 SBSQ HOSP IP/OBS HIGH 50: CPT | Mod: GC

## 2023-01-06 PROCEDURE — 99232 SBSQ HOSP IP/OBS MODERATE 35: CPT

## 2023-01-06 PROCEDURE — 73718 MRI LOWER EXTREMITY W/O DYE: CPT | Mod: 26,LT

## 2023-01-06 RX ORDER — MAGNESIUM SULFATE 500 MG/ML
2 VIAL (ML) INJECTION ONCE
Refills: 0 | Status: COMPLETED | OUTPATIENT
Start: 2023-01-06 | End: 2023-01-06

## 2023-01-06 RX ORDER — MEROPENEM 1 G/30ML
1000 INJECTION INTRAVENOUS EVERY 8 HOURS
Refills: 0 | Status: DISCONTINUED | OUTPATIENT
Start: 2023-01-06 | End: 2023-01-09

## 2023-01-06 RX ORDER — INSULIN GLARGINE 100 [IU]/ML
14 INJECTION, SOLUTION SUBCUTANEOUS AT BEDTIME
Refills: 0 | Status: DISCONTINUED | OUTPATIENT
Start: 2023-01-06 | End: 2023-01-09

## 2023-01-06 RX ADMIN — Medication 90 MILLIGRAM(S): at 06:52

## 2023-01-06 RX ADMIN — Medication 81 MILLIGRAM(S): at 12:02

## 2023-01-06 RX ADMIN — MEROPENEM 100 MILLIGRAM(S): 1 INJECTION INTRAVENOUS at 19:20

## 2023-01-06 RX ADMIN — Medication 6: at 08:53

## 2023-01-06 RX ADMIN — CLOPIDOGREL BISULFATE 75 MILLIGRAM(S): 75 TABLET, FILM COATED ORAL at 12:02

## 2023-01-06 RX ADMIN — HEPARIN SODIUM 5000 UNIT(S): 5000 INJECTION INTRAVENOUS; SUBCUTANEOUS at 06:52

## 2023-01-06 RX ADMIN — Medication 4: at 17:44

## 2023-01-06 RX ADMIN — Medication 5 UNIT(S): at 08:52

## 2023-01-06 RX ADMIN — Medication 5 UNIT(S): at 17:44

## 2023-01-06 RX ADMIN — MEROPENEM 100 MILLIGRAM(S): 1 INJECTION INTRAVENOUS at 06:52

## 2023-01-06 RX ADMIN — Medication 25 GRAM(S): at 12:01

## 2023-01-06 NOTE — PROGRESS NOTE ADULT - ASSESSMENT
IMPRESSION:  Diabetic foot infection with concern for osteomyelitis s/p bone biopsy.  Culture with coagulase negative staph and corynebacterium, both skin bacteria.  Given that these were cultured from a biopsy will treat them as pathogens      Recommend:  1.  Continue Daptomycin 650 mg IV qday  2.  Continue Meropenem 1 gram IV but change dose to 1 gram IV q8hrs given improved renal function.  If there is no growth of gram negatives or anaerobes by 1/9, will likely stop Meropenem  3.  Follow up results of bone cultures; will adjust antibiotics based on that  4.  Follow up MRI to evaluate for osteomyelitis    ID team 2 will follow. Dr. Rodriguez will cover tonight and this weekend. I will return on 1/9/23

## 2023-01-06 NOTE — DIETITIAN INITIAL EVALUATION ADULT - OTHER INFO
62M HTN, DM, CAD (stent about 1mo ago), hep C s/p treatment presenting with foot pain and swelling x1 day admitted for diabetic foot infection with r/o osteomyelitis.     Pt seen on 4UR at bedside. Appetite currently good per pt; PTA, appetite was fair to poor per pt. As per diet/24h recall, PTA pt consumed several meals per day, which consisted of some carbohydrates such as brown rice, hot cereals, pasta, protein sources. During current admission, consumption of ~% meals on average as noted per pt and pt's RN. Pt stated his appetite has increased since admission. Preferences: pt stated he would like double protein portions and Ronald (he has used this in the past) for his wound healing; pt appeared quite aware/knowledgeable in regards to his wounds. GI: s/s within normal limits at this time per chart. No c/o N/V/D/C. Most recent BM 2 days ago. Mitchell: 15. Skin integrity: L foot sx incision, R foot diabetic foot ulcer. Eduardo foot edema 1+ noted. Denies pain/discomfort. Pork allergy. No issues chewing/swallowing noted. Pertinent labs: low H&H (8/3/27.3), elevated POCT BG (260), A1c (7.6), Cr (1.5), serum Glucose (285), ALP (131), low ALT (<5), low eGFR (53); will monitor trends. Pt is receiving a Consistent Carbohydrate with evening snack diet. Pertinent nutrition-related medications/supplements: pt is receiving IVF (LR), insulin. Pt stated UBW ~195 pounds, which is/is not consistent with wt upon admission (~180 pounds). Pt endorsed he lost ~15# in the past 1-2 months; this 7.7% wt loss is clinically significant. Pt's IBW is 184#, pt is 98% of IBW. However previous admissions show different height for pt (213.4cm or 84 inches, 7 feet ht); RD to follow up with nursing to obtain pt's ht. Dietitian conducted nutrition focused physical exam: during NFPE, pt exhibited some potential age-related muscle loss, no s/s of malnutrition at this time. Despite wt loss per pt, per ASPEN guidelines, pt does not meet criteria for malnutrition at this time. Pt amenable to education; RD provided education in regards to the importance of adequate macro and micronutrients, as well as hydration to support ADLs, maintain energy levels and overall functional/nutritional status. Diabetes education briefly reviewed, as pt appeared quite knowledgeable in regards to his diet, nutrition, carbohydrate and added sugar intakes. Pt was overall receptive and verbalized understanding. No additional nutrition-related concerns. RD will remain available per protocol. Additional nutrition recommendations listed below to follow. 62M HTN, DM, CAD (stent about 1mo ago), hep C s/p treatment presenting with foot pain and swelling x1 day admitted for diabetic foot infection with r/o osteomyelitis.     Pt seen on 4UR at bedside. Appetite currently good per pt; PTA, appetite was fair to poor per pt. As per diet/24h recall, PTA pt consumed several meals per day, which consisted of some carbohydrates such as brown rice, hot cereals, pasta, protein sources. During current admission, consumption of ~% meals on average as noted per pt and pt's RN. Pt stated his appetite has increased since admission. Preferences: pt stated he would like double protein portions and Ronald (he has used this in the past) for his wound healing; pt appeared quite aware/knowledgeable in regards to his wounds. GI: s/s within normal limits at this time per chart. No c/o N/V/D/C. Most recent BM 2 days ago. Mitchell: 15. Skin integrity: L foot sx incision, R foot diabetic foot ulcer. Eduardo foot edema 1+ noted. Denies pain/discomfort. Pork allergy. No issues chewing/swallowing noted. Pertinent labs: low H&H (8/3/27.3), elevated POCT BG (260), A1c (7.6), Cr (1.5), serum Glucose (285), ALP (131), low ALT (<5), low eGFR (53); will monitor trends. Pt is receiving a Consistent Carbohydrate with evening snack diet. Pertinent nutrition-related medications/supplements: pt is receiving IVF (LR), insulin. Pt stated UBW ~195 pounds, which is/is not consistent with wt upon admission (~180 pounds). Pt endorsed he lost ~15# in the past 1-2 months; this 7.7% wt loss is clinically significant. Pt's IBW is 184#, pt is 98% of IBW. However previous admissions show different height for pt (213.4cm or 84 inches, 7 feet ht); RD notified nursing to obtain updated pt ht. Dietitian conducted nutrition focused physical exam: during NFPE, pt exhibited some potential age-related muscle loss, no s/s of malnutrition at this time. Despite wt loss per pt, per ASPEN guidelines, pt does not meet criteria for malnutrition at this time. Pt amenable to education; RD provided education in regards to the importance of adequate macro and micronutrients, as well as hydration to support ADLs, maintain energy levels and overall functional/nutritional status. Diabetes education briefly reviewed, as pt appeared quite knowledgeable in regards to his diet, nutrition, carbohydrate and added sugar intakes. Pt was overall receptive and verbalized understanding. No additional nutrition-related concerns. RD will remain available per protocol. Additional nutrition recommendations listed below to follow.

## 2023-01-06 NOTE — PHYSICAL THERAPY INITIAL EVALUATION ADULT - PERTINENT HX OF CURRENT PROBLEM, REHAB EVAL
62M HTN, DM, CAD (stent about 1mo ago), hep C s/p treatment presenting with foot pain and swelling x1 day admitted for cellulitis, found to have likely osteomyelitis started on empiric antibiotics now pending further imaging.

## 2023-01-06 NOTE — DIETITIAN INITIAL EVALUATION ADULT - PERTINENT MEDS FT
MEDICATIONS  (STANDING):  aspirin enteric coated 81 milliGRAM(s) Oral daily  atorvastatin 80 milliGRAM(s) Oral at bedtime  clopidogrel Tablet 75 milliGRAM(s) Oral daily  DAPTOmycin IVPB 650 milliGRAM(s) IV Intermittent every 24 hours  heparin   Injectable 5000 Unit(s) SubCutaneous every 8 hours  insulin glargine Injectable (LANTUS) 14 Unit(s) SubCutaneous at bedtime  insulin lispro (ADMELOG) corrective regimen sliding scale   SubCutaneous Before meals and at bedtime  insulin lispro Injectable (ADMELOG) 5 Unit(s) SubCutaneous three times a day before meals  lactated ringers. 1000 milliLiter(s) (80 mL/Hr) IV Continuous <Continuous>  meropenem  IVPB 1000 milliGRAM(s) IV Intermittent every 12 hours  NIFEdipine XL 90 milliGRAM(s) Oral daily    MEDICATIONS  (PRN):  acetaminophen     Tablet .. 650 milliGRAM(s) Oral every 6 hours PRN Temp greater or equal to 38C (100.4F), Mild Pain (1 - 3)

## 2023-01-06 NOTE — DIETITIAN INITIAL EVALUATION ADULT - PERTINENT LABORATORY DATA
01-06    135  |  103  |  16  ----------------------------<  285<H>  3.8   |  22  |  1.50<H>    Ca    9.0      06 Jan 2023 07:08  Phos  2.5     01-06  Mg     1.7     01-06    TPro  7.2  /  Alb  2.9<L>  /  TBili  <0.2  /  DBili  x   /  AST  14  /  ALT  <5<L>  /  AlkPhos  131<H>  01-06  POCT Blood Glucose.: 79 mg/dL (01-06-23 @ 12:00)  A1C with Estimated Average Glucose Result: 7.6 % (01-06-23 @ 07:08)  A1C with Estimated Average Glucose Result: 7.5 % (01-05-23 @ 06:52)  A1C with Estimated Average Glucose Result: 10.0 % (01-09-22 @ 08:10)

## 2023-01-06 NOTE — PHYSICAL THERAPY INITIAL EVALUATION ADULT - ADDITIONAL COMMENTS
pt is undomiciled. ambulates w/ use of SC and had a fall 3 days ago. states that ambulation has gotten progressively more difficult since his last hospital admission

## 2023-01-06 NOTE — PROGRESS NOTE ADULT - SUBJECTIVE AND OBJECTIVE BOX
OVERNIGHT EVENTS: None    SUBJECTIVE:  Patient seen and examined at bedside, saying he was "right" about needing meropenem. Comfortable, leg wrapped.     Vital Signs Last 12 Hrs  T(F): 98.7 (23 @ 06:19), Max: 98.7 (23 @ 06:19)  HR: 88 (23 @ 06:19) (88 - 88)  BP: 156/78 (23 @ 06:19) (156/78 - 156/78)  BP(mean): --  RR: 18 (23 @ 06:19) (18 - 18)  SpO2: 98% (23 @ 06:19) (98% - 98%)  I&O's Summary      PHYSICAL EXAM:  Constitutional: Resting comfortably in bed; NAD  ENT: MMM  Neck: supple  Respiratory: CTA B/L; no W/R/R, no retractions  Cardiac: +S1/S2; RRR; no M/R/G  Gastrointestinal: soft, NT/ND; no rebound or guarding  Extremities: WWP, no clubbing or cyanosis; no peripheral edema  Vascular: 1+ radial, DP pulses B/L  Dermatologic: 3cm x 3cm superficial ulcer on plantar aspect of L foot without drainage or visible bone, now wrapped.   Neurologic: AAOx3; CNII-XII grossly intact; no focal deficits  Psychiatric: affect and characteristics of appearance, verbalizations, behaviors are appropriate        LABS:                        8.3    11.64 )-----------( 240      ( 2023 07:08 )             27.3         135  |  103  |  16  ----------------------------<  285<H>  3.8   |  22  |  1.50<H>    Ca    9.0      2023 07:08  Phos  2.5       Mg     1.7         TPro  7.2  /  Alb  2.9<L>  /  TBili  <0.2  /  DBili  x   /  AST  14  /  ALT  <5<L>  /  AlkPhos  131<H>        Urinalysis Basic - ( 2023 01:55 )    Color: Yellow / Appearance: Clear / S.020 / pH: x  Gluc: x / Ketone: NEGATIVE  / Bili: Negative / Urobili: 0.2 E.U./dL   Blood: x / Protein: 30 mg/dL / Nitrite: NEGATIVE   Leuk Esterase: NEGATIVE / RBC: < 5 /HPF / WBC < 5 /HPF   Sq Epi: x / Non Sq Epi: 0-5 /HPF / Bacteria: Present /HPF          RADIOLOGY & ADDITIONAL TESTS:    MEDICATIONS  (STANDING):  aspirin enteric coated 81 milliGRAM(s) Oral daily  atorvastatin 80 milliGRAM(s) Oral at bedtime  clopidogrel Tablet 75 milliGRAM(s) Oral daily  DAPTOmycin IVPB 650 milliGRAM(s) IV Intermittent every 24 hours  heparin   Injectable 5000 Unit(s) SubCutaneous every 8 hours  insulin glargine Injectable (LANTUS) 14 Unit(s) SubCutaneous at bedtime  insulin lispro (ADMELOG) corrective regimen sliding scale   SubCutaneous Before meals and at bedtime  insulin lispro Injectable (ADMELOG) 5 Unit(s) SubCutaneous three times a day before meals  lactated ringers. 1000 milliLiter(s) (80 mL/Hr) IV Continuous <Continuous>  meropenem  IVPB 1000 milliGRAM(s) IV Intermittent every 12 hours  NIFEdipine XL 90 milliGRAM(s) Oral daily    MEDICATIONS  (PRN):  acetaminophen     Tablet .. 650 milliGRAM(s) Oral every 6 hours PRN Temp greater or equal to 38C (100.4F), Mild Pain (1 - 3)

## 2023-01-06 NOTE — DIETITIAN INITIAL EVALUATION ADULT - NUTRITIONGOAL OUTCOME1
Pt to consistently meet at least 75% of EEE via tolerated route that is consistent with GOC and will exhibit healthy wound healing during hospital stay

## 2023-01-06 NOTE — DIETITIAN INITIAL EVALUATION ADULT - OTHER CALCULATIONS
Based on Standards of Care pt within % IBW thus actual body weight used for all calculations. Needs adjusted according to age, wound healing.

## 2023-01-06 NOTE — DIETITIAN INITIAL EVALUATION ADULT - ADD RECOMMEND
1. Continue with current diet order  >>Ronald 2x/day to assist in wound healing  >>Glucerna 1x/day for additional nutrients r/t recent wt loss   2. Encourage pt to meet nutritional needs as able  3. Monitor PO intakes, trend weights (weekly), monitor skin integrity, monitor labs (electrolytes, CMP), monitor GI fxn   4. Encourage adherence to diet education (reinforce as able)   5. Consider MVI supplementation   6. Pain and bowel regimen per team   7. Will continue to assess/honor preferences as able   8. Align nutrition interventions with GOC at all times

## 2023-01-06 NOTE — CONSULT NOTE ADULT - SUBJECTIVE AND OBJECTIVE BOX
Attending: Dr. Tacos Nix DPM    Patient is a 62y old  Male who presents with a chief complaint of L foot pain/infection.    HPI:  62M HTN, DM, CAD (stent about 1mo ago), hep C s/p treatment presenting with foot pain and swelling x1 day. Pt reports that he had a surgery on his foot a couple weeks ago but does not remember what surgery or what hospital. Yesterday he started to develop warmth and pain of his L foot. Denies any fever, chills, chest pain, sob, n/v/d/c, drainage from foot.    ED Course:  VS: T 97.9, HR 88, /71, O2 100%  Labs: WBC 16.5, Hb 9 (10.2 as of 01/2022), Cr 2.62 (1.1 as of 01/2022), ESR 9, CRP 18.8, lactate 2.3  XR L foot: pending  Tx: 2L NS, zosyn (05 Jan 2023 05:12)    Podiatry  61 yo M pmhx DM, HTN, CAD (stent about 1mo ago), Hep C s/p treatment p/w L foot pain and swelling going on for a few days. Pt noted he recently had L foot amputation by Vascular surgery for OM (partial 4th and 5th ray amputation with digits intact). Pt cannot recall who his surgeon was or when exactly it was done, but notes it was at [facility]. Pt ambulates with cane, and notes he is having pain throughout his whole left foot and leg. Denies trying to treat this himself. Pt denies N/V/F/SOB/CP at this time. No other pedal complaints at this time. Of note, pt used to live in [place], but is now undomiciled.     Review of systems negative except per HPI and as stated below  General:	 no weakness; no fevers, no chills  Skin/Breast: no rash  Respiratory and Thorax: no SOB, no cough  Cardiovascular:	No chest pain  Gastrointestinal:	 no nausea, vomiting , diarrhea  Genitourinary:	no dysuria, no difficulty urinating, no hematuria  Musculoskeletal:	no weakness, no joint swelling/pain  Neurological:	no focal weakness/numbness  Endocrine:	no polyuria, no polydipsia    PAST MEDICAL & SURGICAL HISTORY:  IDDM (insulin dependent diabetes mellitus)      Hypertension      CAD (coronary artery disease)      Left toe amputee  Great toe bone removal        Home Medications:  Admelog 100 units/mL injectable solution: 8 unit(s) injectable 3 times a day (before meals) (05 Jan 2023 05:26)  Aspirin Enteric Coated 81 mg oral delayed release tablet: 1 tab(s) orally once a day (05 Jan 2023 05:26)  Lantus Solostar Pen 100 units/mL subcutaneous solution: 16 unit(s) subcutaneous once a day (at bedtime) (05 Jan 2023 05:26)  Lipitor 80 mg oral tablet: 1 tab(s) orally once a day (05 Jan 2023 05:26)  lisinopril 40 mg oral tablet: 1 tab(s) orally once a day (05 Jan 2023 05:26)  NIFEdipine 90 mg oral tablet, extended release: 1 tab(s) orally once a day (05 Jan 2023 05:26)  Plavix 75 mg oral tablet: 1 tab(s) orally once a day (05 Jan 2023 05:26)    Allergies    labetalol (Stomach Upset)  pork (Unknown)  vancomycin (Stomach Upset)    Intolerances      FAMILY HISTORY:  FH: type 2 diabetes mellitus      Social History: Undomiciled      LABS                        8.2    13.41 )-----------( 210      ( 05 Jan 2023 06:52 )             26.8     01-05    135  |  102  |  22  ----------------------------<  234<H>  4.0   |  22  |  2.44<H>    Ca    8.6      05 Jan 2023 06:52  Phos  3.5     01-05  Mg     1.9     01-05    TPro  7.3  /  Alb  2.9<L>  /  TBili  <0.2  /  DBili  x   /  AST  17  /  ALT  5<L>  /  AlkPhos  126<H>  01-05      ESR: 90  CRP: --  01-05 @ 00:35    Vital Signs Last 24 Hrs  T(C): 36.8 (05 Jan 2023 08:52), Max: 37.1 (05 Jan 2023 01:49)  T(F): 98.3 (05 Jan 2023 08:52), Max: 98.8 (05 Jan 2023 01:49)  HR: 87 (05 Jan 2023 08:52) (78 - 88)  BP: 155/87 (05 Jan 2023 08:52) (121/71 - 155/87)  BP(mean): --  RR: 17 (05 Jan 2023 08:52) (16 - 18)  SpO2: 99% (05 Jan 2023 08:52) (97% - 100%)    Parameters below as of 05 Jan 2023 08:52  Patient On (Oxygen Delivery Method): room air        PHYSICAL EXAM  General: NAD, AA0x3    Lower Extremity Focused:  Vasc: 1/4 DP/PT b/l. L foot edema.   Derm: L foot subMT 2-3 superficial wound, granular base, negative for undermining, malodor, purulent drainage or signs of infection. Dorsal 4th interspace L foot linear opening probing proximally 6cm, is +PTB towards 4th and 5th MT bases. Serosanguionus drainage from this wound, negative for malodor, proximal streaking, or erythema.   Neuro: Protective sensation absent  MSK: 5/5 MMT b/l. -TTP of palpation to L foot.     RADIOLOGY  XR L foot wet read: hardware in L 1st TMT joint, broken screws present. Evidence of previous 4th and 5th partial MT resection. No evidence of gas.                    
HPI:  62M HTN, DM, CAD (stent about 1mo ago), hep C s/p treatment presenting with foot pain and swelling x1 day. Pt reports that he had a surgery on his foot a couple weeks ago but does not remember what surgery or what hospital. Yesterday he started to develop warmth and pain of his L foot. Denies any fever, chills, chest pain, sob, n/v/d/c, drainage from foot.    ED Course:  VS: T 97.9, HR 88, /71, O2 100%  Labs: WBC 16.5, Hb 9 (10.2 as of 2022), Cr 2.62 (1.1 as of 2022), ESR 9, CRP 18.8, lactate 2.3  XR L foot: pending  Tx: 2L NS, zosyn (2023 05:12)      He is s/p bedside bone biopsy on 23    PAST MEDICAL & SURGICAL HISTORY:  IDDM (insulin dependent diabetes mellitus)      Hypertension      CAD (coronary artery disease)      Left toe amputee  Great toe bone removal            REVIEW OF SYSTEMS:    General:	 no weakness; no fevers, no chills  Skin/Breast: no rash  Respiratory and Thorax: no SOB, no cough  Cardiovascular:	No chest pain  Gastrointestinal:	 no nausea, vomiting , diarrhea  Genitourinary:	no dysuria, no difficulty urinating, no hematuria  Musculoskeletal:	no weakness, no joint swelling/pain  Neurological:	no focal weakness/numbness  Endocrine:	no polyuria, no polydipsia      ANTIBIOTICS:  MEDICATIONS  (STANDING):  aspirin enteric coated 81 milliGRAM(s) Oral daily  atorvastatin 80 milliGRAM(s) Oral at bedtime  clopidogrel Tablet 75 milliGRAM(s) Oral daily  heparin   Injectable 5000 Unit(s) SubCutaneous every 8 hours  insulin glargine Injectable (LANTUS) 10 Unit(s) SubCutaneous at bedtime  insulin lispro (ADMELOG) corrective regimen sliding scale   SubCutaneous Before meals and at bedtime  insulin lispro Injectable (ADMELOG) 5 Unit(s) SubCutaneous three times a day before meals  lactated ringers. 1000 milliLiter(s) (80 mL/Hr) IV Continuous <Continuous>  NIFEdipine XL 90 milliGRAM(s) Oral daily  piperacillin/tazobactam IVPB.. 4.5 Gram(s) IV Intermittent every 8 hours    MEDICATIONS  (PRN):  acetaminophen     Tablet .. 650 milliGRAM(s) Oral every 6 hours PRN Temp greater or equal to 38C (100.4F), Mild Pain (1 - 3)      Allergies    labetalol (Stomach Upset)  pork (Unknown)  vancomycin (Stomach Upset)    Intolerances        SOCIAL HISTORY:    FAMILY HISTORY:  FH: type 2 diabetes mellitus        Vital Signs Last 24 Hrs  T(C): 36.8 (2023 08:52), Max: 37.1 (2023 01:49)  T(F): 98.3 (2023 08:52), Max: 98.8 (2023 01:49)  HR: 87 (2023 08:52) (78 - 88)  BP: 155/87 (2023 08:52) (121/71 - 155/87)  BP(mean): --  RR: 17 (2023 08:52) (16 - 18)  SpO2: 99% (2023 08:52) (97% - 100%)    Parameters below as of 2023 08:52  Patient On (Oxygen Delivery Method): room air        PHYSICAL EXAM:  Constitutional:  non-toxic, no distress  Eyes:LUCY, EOMI  Ear/Nose/Throat: no oral lesion   Neck:  supple  Respiratory: CTA [place]  Cardiovascular: S1S2 RRR, no murmurs  Gastrointestinal:soft, (+) BS, no HSM  Extremities:  left foot with dressing in place   Vascular: DP Pulse:	right normal; left normal            LABS:                        8.2    13.41 )-----------( 210      ( 2023 06:52 )             26.8     -    135  |  102  |  22  ----------------------------<  234<H>  4.0   |  22  |  2.44<H>    Ca    8.6      2023 06:52  Phos  3.5     -  Mg     1.9     -    TPro  7.3  /  Alb  2.9<L>  /  TBili  <0.2  /  DBili  x   /  AST  17  /  ALT  5<L>  /  AlkPhos  126<H>  01-05      Urinalysis Basic - ( 2023 01:55 )    Color: Yellow / Appearance: Clear / S.020 / pH: x  Gluc: x / Ketone: NEGATIVE  / Bili: Negative / Urobili: 0.2 E.U./dL   Blood: x / Protein: 30 mg/dL / Nitrite: NEGATIVE   Leuk Esterase: NEGATIVE / RBC: < 5 /HPF / WBC < 5 /HPF   Sq Epi: x / Non Sq Epi: 0-5 /HPF / Bacteria: Present /HPF        MICROBIOLOGY:    Culture - Blood (23 @ 02:33)    Specimen Source: .Blood Blood-Venous    Culture Results:   No growth at 12 hours      RADIOLOGY & ADDITIONAL STUDIES:    < from: Xray Chest 1 View AP/PA (22 @ 00:13) >  FINDINGS: The lungs are clear. There are no pleural effusions. No   pneumothorax. The cardiomediastinal silhouette, bones and soft tissues   are unremarkable.      IMPRESSION: No radiographic evidence of active cardiopulmonary disease.   No acute infiltrates    < end of copied text >      
    Patient is a 62y old  Male who presents with a chief complaint of Foot infection (2023 15:48)        HPI:  62M HTN, DM, CAD (stent about 1mo ago), hep C s/p treatment presenting with foot pain and swelling x1 day. Pt reports that he had a surgery on his foot a couple weeks ago but does not remember what surgery or what hospital. Yesterday he started to develop warmth and pain of his L foot. Denies any fever, chills, chest pain, sob, n/v/d/c, drainage from foot.    ED Course:  VS: T 97.9, HR 88, /71, O2 100%  Labs: WBC 16.5, Hb 9 (10.2 as of 2022), Cr 2.62 (1.1 as of 2022), ESR 9, CRP 18.8, lactate 2.3  XR L foot: pending  Tx: 2L NS, zosyn (2023 05:12)      PAST MEDICAL & SURGICAL HISTORY:  IDDM (insulin dependent diabetes mellitus)      Hypertension      CAD (coronary artery disease)      Left toe amputee  Great toe bone removal          MEDICATIONS  (STANDING):  aspirin enteric coated 81 milliGRAM(s) Oral daily  atorvastatin 80 milliGRAM(s) Oral at bedtime  clopidogrel Tablet 75 milliGRAM(s) Oral daily  DAPTOmycin IVPB 650 milliGRAM(s) IV Intermittent every 24 hours  heparin   Injectable 5000 Unit(s) SubCutaneous every 8 hours  insulin glargine Injectable (LANTUS) 10 Unit(s) SubCutaneous at bedtime  insulin lispro (ADMELOG) corrective regimen sliding scale   SubCutaneous Before meals and at bedtime  insulin lispro Injectable (ADMELOG) 5 Unit(s) SubCutaneous three times a day before meals  lactated ringers. 1000 milliLiter(s) (80 mL/Hr) IV Continuous <Continuous>  meropenem  IVPB 1000 milliGRAM(s) IV Intermittent every 12 hours  NIFEdipine XL 90 milliGRAM(s) Oral daily    MEDICATIONS  (PRN):  acetaminophen     Tablet .. 650 milliGRAM(s) Oral every 6 hours PRN Temp greater or equal to 38C (100.4F), Mild Pain (1 - 3)           FAMILY HISTORY:  FH: type 2 diabetes mellitus      CBC Full  -  ( 2023 07:08 )  WBC Count : 11.64 K/uL  RBC Count : 3.63 M/uL  Hemoglobin : 8.3 g/dL  Hematocrit : 27.3 %  Platelet Count - Automated : 240 K/uL  Mean Cell Volume : 75.2 fl  Mean Cell Hemoglobin : 22.9 pg  Mean Cell Hemoglobin Concentration : 30.4 gm/dL  Auto Neutrophil # : 8.27 K/uL  Auto Lymphocyte # : 2.24 K/uL  Auto Monocyte # : 0.70 K/uL  Auto Eosinophil # : 0.32 K/uL  Auto Basophil # : 0.06 K/uL  Auto Neutrophil % : 71.2 %  Auto Lymphocyte % : 19.2 %  Auto Monocyte % : 6.0 %  Auto Eosinophil % : 2.7 %  Auto Basophil % : 0.5 %          135  |  103  |  16  ----------------------------<  285<H>  3.8   |  22  |  1.50<H>    Ca    9.0      2023 07:08  Phos  2.5       Mg     1.7         TPro  7.2  /  Alb  2.9<L>  /  TBili  <0.2  /  DBili  x   /  AST  14  /  ALT  <5<L>  /  AlkPhos  131<H>        Urinalysis Basic - ( 2023 01:55 )    Color: Yellow / Appearance: Clear / S.020 / pH: x  Gluc: x / Ketone: NEGATIVE  / Bili: Negative / Urobili: 0.2 E.U./dL   Blood: x / Protein: 30 mg/dL / Nitrite: NEGATIVE   Leuk Esterase: NEGATIVE / RBC: < 5 /HPF / WBC < 5 /HPF   Sq Epi: x / Non Sq Epi: 0-5 /HPF / Bacteria: Present /HPF          Radiology :                     Vital Signs Last 24 Hrs  T(C): 37.1 (2023 06:19), Max: 37.3 (2023 15:30)  T(F): 98.7 (2023 06:19), Max: 99.1 (2023 15:30)  HR: 88 (2023 06:19) (79 - 90)  BP: 156/78 (2023 06:19) (120/72 - 175/91)  BP(mean): --  RR: 18 (2023 06:19) (18 - 18)  SpO2: 98% (2023 06:19) (97% - 98%)    Parameters below as of 2023 06:19  Patient On (Oxygen Delivery Method): room air            REVIEW OF SYSTEMS:      CONSTITUTIONAL: No fever, weight loss, or fatigue  EYES: No eye pain, visual disturbances, or discharge  ENMT:  No difficulty hearing, tinnitus, vertigo; No sinus or throat pain  NECK: No pain or stiffness  BREASTS: No pain, masses, or nipple discharge  RESPIRATORY: No cough, wheezing, chills or hemoptysis; No shortness of breath  CARDIOVASCULAR: No chest pain, palpitations, dizziness, or leg swelling  GASTROINTESTINAL: No abdominal or epigastric pain. No nausea, vomiting, or hematemesis; No diarrhea or constipation. No melena or hematochezia.  GENITOURINARY: No dysuria, frequency, hematuria, or incontinence  NEUROLOGICAL: No headaches, memory loss, loss of strength, numbness, or tremors  SKIN: No itching, burning, rashes, or lesions   LYMPH NODES: No enlarged glands  ENDOCRINE: No heat or cold intolerance; No hair loss  MUSCULOSKELETAL: No joint pain or swelling; No muscle, back, or extremity pain  PSYCHIATRIC: No depression, anxiety, mood swings, or difficulty sleeping  HEME/LYMPH: No easy bruising, or bleeding gums  ALLERGY AND IMMUNOLOGIC: No hives or eczema  Vascular :  per hpi         Physical Exam:  on CONTACT isolation ,62 y o man lying comfortably in semi Rodriguez's position , awake , alert , no new complaints     Head : normocephalic , atraumatic    Eyes : PERRLA , EOMI , no nystagmus , sclera anicteric    ENT : nasal discharge , uvula midline , no oropharyngeal erythema / exudate    Neck : supple , negative JVD , negative carotid bruits , no thyromegaly    Chest : CTA bilaterally , neg wheeze / rhonchi / rales / crackles / egophany    Cardiovascular: regular rate and rhythm , neg murmurs / rubs / gallops    Abdomen : soft , non distended , non tender to palpation in all 4 quadrants , negative rebound / guarding , normal bowel sounds    Extremities :   L foot DSD    Neurologic Exam:    Alert and oriented  x 3     Motor Exam:          Right UE:               no focal weakness ,  > 3+/5 throughout                                Left UE:                 no focal weakness,  > 3+/5 throughout       Right LE:                no focal weakness,  > 3+/5 throughout        Left LE:                  no focal weakness,  > 3+/5 throughout             Sensation:         intact to light touch x 4 extremities                        DTR :                     biceps/brachioradialis : equal                                              patella/ankle : equal      Gait :  not tested        PM&R Impression :     1) admitted for L foot cellulitis / WBAT LLE with DME       Recommendations / Plan :     1) Physical / Occupational therapy focusing on therapeutic exercises , equipment evaluation , bed mobility/transfer out of bed evaluation , progressive ambulation with assistive devices prn .    2) Current disposition plan recommendation  :    pending functional progress

## 2023-01-06 NOTE — PROGRESS NOTE ADULT - SUBJECTIVE AND OBJECTIVE BOX
INTERVAL HPI/OVERNIGHT EVENTS:    Patient was seen and examined at bedside.  Declines amputation     CONSTITUTIONAL:  Negative fever or chills, feels well, good appetite  EYES:  Negative  blurry vision or double vision  CARDIOVASCULAR:  Negative for chest pain or palpitations  RESPIRATORY:  Negative for cough, wheezing, or SOB   GASTROINTESTINAL:  Negative for nausea, vomiting, diarrhea, constipation, or abdominal pain  GENITOURINARY:  Negative frequency, urgency or dysuria  NEUROLOGIC:  No headache, confusion, dizziness, lightheadedness      ANTIBIOTICS/RELEVANT:    MEDICATIONS  (STANDING):  aspirin enteric coated 81 milliGRAM(s) Oral daily  atorvastatin 80 milliGRAM(s) Oral at bedtime  clopidogrel Tablet 75 milliGRAM(s) Oral daily  DAPTOmycin IVPB 650 milliGRAM(s) IV Intermittent every 24 hours  heparin   Injectable 5000 Unit(s) SubCutaneous every 8 hours  insulin glargine Injectable (LANTUS) 14 Unit(s) SubCutaneous at bedtime  insulin lispro (ADMELOG) corrective regimen sliding scale   SubCutaneous Before meals and at bedtime  insulin lispro Injectable (ADMELOG) 5 Unit(s) SubCutaneous three times a day before meals  lactated ringers. 1000 milliLiter(s) (80 mL/Hr) IV Continuous <Continuous>  meropenem  IVPB 1000 milliGRAM(s) IV Intermittent every 12 hours  NIFEdipine XL 90 milliGRAM(s) Oral daily    MEDICATIONS  (PRN):  acetaminophen     Tablet .. 650 milliGRAM(s) Oral every 6 hours PRN Temp greater or equal to 38C (100.4F), Mild Pain (1 - 3)        Vital Signs Last 24 Hrs  T(C): 37.1 (2023 06:19), Max: 37.3 (2023 15:30)  T(F): 98.7 (2023 06:19), Max: 99.1 (2023 15:30)  HR: 88 (2023 06:19) (79 - 90)  BP: 156/78 (2023 06:19) (120/72 - 175/91)  BP(mean): --  RR: 18 (2023 06:19) (18 - 18)  SpO2: 98% (2023 06:19) (97% - 98%)    Parameters below as of 2023 06:19  Patient On (Oxygen Delivery Method): room air        PHYSICAL EXAM:  Constitutional:  non-toxic, no distress  Eyes:LUCY, EOMI  Ear/Nose/Throat: no oral lesion, no sinus tenderness on percussion	  Neck:  supple  Respiratory: CTA [place]  Cardiovascular: S1S2 RRR, no murmurs  Gastrointestinal:soft, (+) BS, no HSM  Extremities:  left foot with dressing in place   Vascular: DP Pulse:	right normal; left normal      LABS:                        8.3    11.64 )-----------( 240      ( 2023 07:08 )             27.3         135  |  103  |  16  ----------------------------<  285<H>  3.8   |  22  |  1.50<H>    Ca    9.0      2023 07:08  Phos  2.5       Mg     1.7         TPro  7.2  /  Alb  2.9<L>  /  TBili  <0.2  /  DBili  x   /  AST  14  /  ALT  <5<L>  /  AlkPhos  131<H>        Urinalysis Basic - ( 2023 01:55 )    Color: Yellow / Appearance: Clear / S.020 / pH: x  Gluc: x / Ketone: NEGATIVE  / Bili: Negative / Urobili: 0.2 E.U./dL   Blood: x / Protein: 30 mg/dL / Nitrite: NEGATIVE   Leuk Esterase: NEGATIVE / RBC: < 5 /HPF / WBC < 5 /HPF   Sq Epi: x / Non Sq Epi: 0-5 /HPF / Bacteria: Present /HPF        MICROBIOLOGY:    Culture - Other (23 @ 08:36)    Gram Stain:   Rare Gram Positive Rods  Rare Gram positive cocci in pairs  Few WBC's    Specimen Source: .Other Left Incision site    Culture Results:   Moderate Staphylococcus simulans  Numerous Corynebacterium striatum group  Culture in progress      Culture - Blood (23 @ 02:33)    Specimen Source: .Blood Blood-Peripheral    Culture Results:   No growth at 1 day.      RADIOLOGY & ADDITIONAL STUDIES:

## 2023-01-06 NOTE — PHYSICAL THERAPY INITIAL EVALUATION ADULT - GAIT DEVIATIONS NOTED, PT EVAL
decreased martha/increased time in double stance/decreased step length/decreased weight-shifting ability

## 2023-01-06 NOTE — PROGRESS NOTE ADULT - SUBJECTIVE AND OBJECTIVE BOX
Patient is a 62y old  Male who presents with a chief complaint of Foot infection (05 Jan 2023 15:48)      INTERVAL HPI/ OVERNIGHT EVENTS: Pt seen and examined bedside with attending. Pt states pain is controlled at this time. Refusing surgical intervention.       LABS                        8.3    11.64 )-----------( 240      ( 06 Jan 2023 07:08 )             27.3     01-06    135  |  103  |  16  ----------------------------<  285<H>  3.8   |  22  |  1.50<H>    Ca    9.0      06 Jan 2023 07:08  Phos  2.5     01-06  Mg     1.7     01-06    TPro  7.2  /  Alb  2.9<L>  /  TBili  <0.2  /  DBili  x   /  AST  14  /  ALT  <5<L>  /  AlkPhos  131<H>  01-06        ICU Vital Signs Last 24 Hrs  T(C): 37.1 (06 Jan 2023 06:19), Max: 37.3 (05 Jan 2023 15:30)  T(F): 98.7 (06 Jan 2023 06:19), Max: 99.1 (05 Jan 2023 15:30)  HR: 88 (06 Jan 2023 06:19) (79 - 90)  BP: 156/78 (06 Jan 2023 06:19) (120/72 - 175/91)  BP(mean): --  ABP: --  ABP(mean): --  RR: 18 (06 Jan 2023 06:19) (18 - 18)  SpO2: 98% (06 Jan 2023 06:19) (97% - 98%)    O2 Parameters below as of 06 Jan 2023 06:19  Patient On (Oxygen Delivery Method): room air        RADIOLOGY  XR L foot wet read: hardware in L 1st TMT joint, broken screws present. Evidence of previous 4th and 5th partial MT resection. No evidence of gas.    MICROBIOLOGY  Culture - Other (collected 05 Jan 2023 08:36)  Source: .Other Left Incision site  Gram Stain (05 Jan 2023 16:33):    Rare Gram Positive Rods    Rare Gram positive cocci in pairs    Few WBC's  Preliminary Report (06 Jan 2023 09:57):    Culture in progress  Culture - Blood (collected 05 Jan 2023 02:33)  Source: .Blood Blood-Peripheral  Preliminary Report (06 Jan 2023 03:01):    No growth at 1 day.  Culture - Blood (collected 05 Jan 2023 02:33)  Source: .Blood Blood-Venous  Preliminary Report (06 Jan 2023 03:01):    No growth at 1 day.            PHYSICAL EXAM  Lower Extremity Focused  Vasc: 1/4 DP/PT b/l. L foot edema.   Derm: L foot subMT 2-3 superficial wound, granular base, negative for undermining, malodor, purulent drainage or signs of infection. Dorsal 4th interspace L foot linear opening probing proximally 6cm, is +PTB towards 4th and 5th MT bases. Serosanguionus drainage from this wound, negative for malodor, proximal streaking, or erythema.   Neuro: Protective sensation absent  MSK: 5/5 MMT b/l. -TTP of palpation to L foot.

## 2023-01-06 NOTE — CHART NOTE - NSCHARTNOTEFT_GEN_A_CORE
Made aware that patient refusing care.    At bedside, patient refusing nightly lantus for diabetes. Patient reports they know their body best and do not think they need insulin because of their current glucose level.    Attempted to  patient on different types of insulin and that lantus is primarily to help make sure their glucose levels tomorrow are under control and that without it they can get very sick.   Patient unwilling to further discuss this topic and still not accepting insulin. Made aware that patient refusing care.    At bedside, patient refusing nightly lantus for diabetes and antibiotics for diabetic foot infection. Patient reports they know their body best and do not think they need insulin because of their current glucose level. They also stated that they feel well and do not think they need antibiotics right now.    Attempted to  patient on different types of insulin and that lantus is primarily to help make sure their glucose levels tomorrow are under control and that without it they can get very sick. Also attempted to  patient on reason they continue to require antibiotics and that there is a very good chance they become more sick without receiving antibiotics. Explained to patient that refusing these important medications can lead to life threatening situations.    Patient unwilling to further discuss this topic and still not accepting care.

## 2023-01-06 NOTE — PROGRESS NOTE ADULT - ASSESSMENT
63 yo M pmhx DM, HTN, CAD (stent about 1mo ago), Hep C s/p treatment p/w L foot pain and swelling going on for a few days. Pt noted he recently had L foot amputation by Vascular surgery for OM (partial 4th and 5th ray amputation with digits intact). Pt cannot recall who his surgeon was or when exactly it was done, but notes it was at [facility]. Podiatry consulted for evaluation of wound, r/o OM, r/o gas. Of note XR wet read hardware in L 1st TMT joint, broken screws present. Evidence of previous 4th and 5th partial MT resection. No evidence of gas. L dorsal wound +PTB, suspicious for OM.     Plan:  - Pt is refusing surgical intervention at this team, despite the fact that it was explained to him that this condition can lead to loss of limb or loss of life without intervention.   - F/u MRI w/ or w/o contrast L foot r/o OM  - F/u culture results  -  Recc c/w IV ABX (vanc/zosyn)  - Local wound care: WTD to L plantar wound, flushed dorsal wound and placed 1/2" packing in sinus tract, wrapped with DSD, Kerlix, ACE  - Recc WBAT w/ cane to L foot  - rest of care per primary team    Podiatry following. Plan d/w attending.

## 2023-01-06 NOTE — CONSULT NOTE ADULT - ASSESSMENT
per Internal Medicine    62 y o M HTN, DM, CAD (stent about 1mo ago), hep C s/p treatment presenting with foot pain and swelling x1 day admitted for cellulitis.     Problem/Plan - 1:  ·  Problem: Cellulitis in diabetic foot.   ·  Plan: vs suspected OM   Pt with surgery on his L foot a couple weeks ago but does not remember what surgery or what hospital presenting with 1 day of L foot warmth and pain. Denies any fever, chills, drainage from foot.  - physical exam with shallow ulcer on plantar aspect of L foot without drainage, purulence, or visible bone  - not meeting sepsis criteria in ED  - WBC 16.5 on admission  - ESR 9, CRP 18.8, lactate 2.3 in ED  - s/p 2L NS in ED  Plan:  - will increase zosyn to 4.5 q8 to cover for pseudomonas in diabetic foot infection  - trend lactate to clear  - f/u podiatry recs  - f/u XR L foot.    Problem/Plan - 2:  ·  Problem: DEBBY (acute kidney injury).   ·  Plan: Cr 2.62 from 1.1 as of 01/2022  - likely prerenal in the setting of active infection  Plan:  - trend Cr  - avoid nephrotoxic drugs, renally dose meds  - will consider obtaining urine lytes if not improving.    Problem/Plan - 3:  ·  Problem: IDDM (insulin dependent diabetes mellitus).   ·  Plan: Home meds: lantus 16, lispro 8  Plan:   - f/u a1c  - will give lantus 10u, lispro 5 for now  - ISS.    Problem/Plan - 4:  ·  Problem: Hypertension.   ·  Plan: Home meds: lisinopril 40mg, nifedipine 90mg  Plan:  - hold lisinopril in DEBBY  - continue home nifedipine.    Problem/Plan - 5:  ·  Problem: CAD (coronary artery disease).   ·  Plan: Pt reports recent stent (?12/2022). No active chest pain.  Plan:  - continue asa, plavix, lipitor 80mg.    Problem/Plan - 6:  ·  Problem: Nutrition, metabolism, and development symptoms.   ·  Plan: F: none  E: caution in DEBBY  N: consistent carb  DVT ppx: lovenox  Code status: full code.

## 2023-01-07 DIAGNOSIS — M86.9 OSTEOMYELITIS, UNSPECIFIED: ICD-10-CM

## 2023-01-07 LAB
-  CLINDAMYCIN: SIGNIFICANT CHANGE UP
-  ERYTHROMYCIN: SIGNIFICANT CHANGE UP
-  LINEZOLID: SIGNIFICANT CHANGE UP
-  OXACILLIN: SIGNIFICANT CHANGE UP
-  RIFAMPIN: SIGNIFICANT CHANGE UP
-  TRIMETHOPRIM/SULFAMETHOXAZOLE: SIGNIFICANT CHANGE UP
-  VANCOMYCIN: SIGNIFICANT CHANGE UP
CULTURE RESULTS: SIGNIFICANT CHANGE UP
GLUCOSE BLDC GLUCOMTR-MCNC: 165 MG/DL — HIGH (ref 70–99)
GLUCOSE BLDC GLUCOMTR-MCNC: 188 MG/DL — HIGH (ref 70–99)
GLUCOSE BLDC GLUCOMTR-MCNC: 211 MG/DL — HIGH (ref 70–99)
GLUCOSE BLDC GLUCOMTR-MCNC: 78 MG/DL — SIGNIFICANT CHANGE UP (ref 70–99)
METHOD TYPE: SIGNIFICANT CHANGE UP
ORGANISM # SPEC MICROSCOPIC CNT: SIGNIFICANT CHANGE UP
ORGANISM # SPEC MICROSCOPIC CNT: SIGNIFICANT CHANGE UP
SPECIMEN SOURCE: SIGNIFICANT CHANGE UP

## 2023-01-07 PROCEDURE — 99232 SBSQ HOSP IP/OBS MODERATE 35: CPT

## 2023-01-07 RX ADMIN — Medication 100 MILLIGRAM(S): at 16:17

## 2023-01-07 RX ADMIN — MEROPENEM 100 MILLIGRAM(S): 1 INJECTION INTRAVENOUS at 06:15

## 2023-01-07 RX ADMIN — Medication 5 UNIT(S): at 12:59

## 2023-01-07 RX ADMIN — Medication 1 TABLET(S): at 12:59

## 2023-01-07 RX ADMIN — Medication 5 UNIT(S): at 09:17

## 2023-01-07 RX ADMIN — Medication 5 UNIT(S): at 17:32

## 2023-01-07 RX ADMIN — Medication 2: at 09:18

## 2023-01-07 RX ADMIN — CLOPIDOGREL BISULFATE 75 MILLIGRAM(S): 75 TABLET, FILM COATED ORAL at 11:56

## 2023-01-07 RX ADMIN — Medication 90 MILLIGRAM(S): at 11:58

## 2023-01-07 RX ADMIN — Medication 4: at 12:59

## 2023-01-07 RX ADMIN — Medication 81 MILLIGRAM(S): at 11:56

## 2023-01-07 NOTE — PROGRESS NOTE ADULT - PROBLEM SELECTOR PLAN 5
Pt reports recent stent (?12/2022). No active chest pain.  Plan:  - continue asa, plavix, lipitor 80mg Pt reports recent stent (?12/2022). Unclear anatomy    - continue asa, plavix, lipitor 80mg

## 2023-01-07 NOTE — PROGRESS NOTE ADULT - SUBJECTIVE AND OBJECTIVE BOX
INTERVAL EVENTS: Having multiple episodes of diarrhea and refusing abx overnight.     PAST MEDICAL & SURGICAL HISTORY:  IDDM (insulin dependent diabetes mellitus)    Hypertension    CAD (coronary artery disease)    No significant past surgical history    History of open reduction and internal fixation (ORIF) procedure  LEFT FOOT    Left toe amputee  Great toe bone removal        MEDICATIONS  (STANDING):  aspirin enteric coated 81 milliGRAM(s) Oral daily  atorvastatin 80 milliGRAM(s) Oral at bedtime  clopidogrel Tablet 75 milliGRAM(s) Oral daily  DAPTOmycin IVPB 650 milliGRAM(s) IV Intermittent every 24 hours  heparin   Injectable 5000 Unit(s) SubCutaneous every 8 hours  insulin glargine Injectable (LANTUS) 14 Unit(s) SubCutaneous at bedtime  insulin lispro (ADMELOG) corrective regimen sliding scale   SubCutaneous Before meals and at bedtime  insulin lispro Injectable (ADMELOG) 5 Unit(s) SubCutaneous three times a day before meals  lactated ringers. 1000 milliLiter(s) (80 mL/Hr) IV Continuous <Continuous>  meropenem  IVPB 1000 milliGRAM(s) IV Intermittent every 8 hours  NIFEdipine XL 90 milliGRAM(s) Oral daily    MEDICATIONS  (PRN):  acetaminophen     Tablet .. 650 milliGRAM(s) Oral every 6 hours PRN Temp greater or equal to 38C (100.4F), Mild Pain (1 - 3)    Vital Signs Last 24 Hrs  T(C): --  T(F): --  HR: --  BP: --  BP(mean): --  RR: --  SpO2: --        PHYSICAL EXAM:  GEN: Awake, alert. NAD.   HEENT: NCAT, PERRL, EOMI. Mucosa moist. No JVD.  RESP: CTA b/l  CV: RRR. Normal S1/S2. No m/r/g.  ABD: Soft. NT/ND. BS+  EXT: Warm. Left foot w/ dressing in place     LABS:                        8.3    11.64 )-----------( 240      ( 06 Jan 2023 07:08 )             27.3     01-06    135  |  103  |  16  ----------------------------<  285<H>  3.8   |  22  |  1.50<H>    Ca    9.0      06 Jan 2023 07:08  Phos  2.5     01-06  Mg     1.7     01-06    TPro  7.2  /  Alb  2.9<L>  /  TBili  <0.2  /  DBili  x   /  AST  14  /  ALT  <5<L>  /  AlkPhos  131<H>  01-06    CARDIAC MARKERS ( 06 Jan 2023 07:08 )  x     / x     / 117 U/L / x     / x              I&O's Summary    MPRESSION:    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 It is unclear if a   portion portion of this absence of these bones is related to prior   surgery. Correlation with surgical history is recommended.    Osteomyelitis at the tips of what remains of the bases of these fourth   and fifth metatarsals.    Osseous erosion/resorption at the base of the fourth digit proximal   phalanx with osteomyelitis throughout the proximal phalanx.    Osteomyelitis of the fifth digit proximal phalanx.

## 2023-01-07 NOTE — PROGRESS NOTE ADULT - SUBJECTIVE AND OBJECTIVE BOX
Patient is a 62y old  Male who presents with a chief complaint of DIABETIC FOOT ULCER; DEBBY(ACUTE KIDNEY INJURY)     (06 Jan 2023 14:26)      INTERVAL HPI/ OVERNIGHT EVENTS. Pt seen and examined bedside. Pt's dressing was wet. Pt states antibiotics made him defecate himself so he was unable to cover his foot before taking a shower. Pt not currently complaining of pain.     LABS                        8.3    11.64 )-----------( 240      ( 06 Jan 2023 07:08 )             27.3     01-06    135  |  103  |  16  ----------------------------<  285<H>  3.8   |  22  |  1.50<H>    Ca    9.0      06 Jan 2023 07:08  Phos  2.5     01-06  Mg     1.7     01-06    TPro  7.2  /  Alb  2.9<L>  /  TBili  <0.2  /  DBili  x   /  AST  14  /  ALT  <5<L>  /  AlkPhos  131<H>  01-06        ICU Vital Signs Last 24 Hrs  T(C): --  T(F): --  HR: --  BP: --  BP(mean): --  ABP: --  ABP(mean): --  RR: --  SpO2: --      RADIOLOGY  < from: MR Foot No Cont, Left (01.06.23 @ 19:10) >  IMPRESSION:  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 It is unclear if a   portion portion of this absence of these bones is related to prior   surgery. Correlation with surgical history is recommended.  Osteomyelitis at the tips of what remains of the bases of these fourth   and fifth metatarsals.  Osseous erosion/resorption at the base of the fourth digit proximal   phalanx with osteomyelitis throughout the proximal phalanx.  Osteomyelitis of the fifth digit proximal phalanx.  < end of copied text >      MICROBIOLOGY  Culture - Other (collected 05 Jan 2023 08:36)  Source: .Other Left Incision site  Gram Stain (05 Jan 2023 16:33):    Rare Gram Positive Rods    Rare Gram positive cocci in pairs    Few WBC's  Preliminary Report (06 Jan 2023 09:57):    Culture in progress  Culture - Blood (collected 05 Jan 2023 02:33)  Source: .Blood Blood-Peripheral  Preliminary Report (06 Jan 2023 03:01):    No growth at 1 day.  Culture - Blood (collected 05 Jan 2023 02:33)  Source: .Blood Blood-Venous  Preliminary Report (06 Jan 2023 03:01):    No growth at 1 day.            PHYSICAL EXAM  Lower Extremity Focused  Vasc: 1/4 DP/PT b/l. L foot edema. No erythema   Derm: L foot subMT 2-3 superficial wound, granular base with sanginous drainage, negative for undermining, malodor, purulent drainage or signs of infection. Dorsal 4th interspace L foot linear opening probing proximally 6cm, is +PTB towards 4th and 5th MT bases. Serosanguionus drainage from this wound, negative for malodor, proximal streaking, or erythema. Maceration to left 5th digit  Neuro: Protective sensation absent  MSK: 5/5 MMT b/l. -TTP of palpation to L foot.

## 2023-01-07 NOTE — PROGRESS NOTE ADULT - PROBLEM SELECTOR PLAN 4
Home meds: lisinopril 40mg, nifedipine 90mg  Plan:  - Lisinopril held in the setting of DEBBY. Will likely start   - continue home nifedipine 90mg Home meds: lisinopril 40mg, nifedipine 90mg  Plan:  - Lisinopril held in the setting of DEBBY. Will likely start tomorrow   - continue home nifedipine 90mg

## 2023-01-07 NOTE — PROGRESS NOTE ADULT - ASSESSMENT
62M HTN, DM, CAD (stent about 1mo ago?), hep C s/p treatment presenting with foot pain and swelling x1 day found to have osteomyelitis of 4th & 5th digit and remaining 4th & 5th metatarsal bases.

## 2023-01-07 NOTE — PROGRESS NOTE ADULT - ASSESSMENT
61 yo M pmhx DM, HTN, CAD (stent about 1mo ago), Hep C s/p treatment p/w L foot pain and swelling going on for a few days. Pt noted he recently had L foot amputation by Vascular surgery for OM (partial 4th and 5th ray amputation with digits intact). Pt cannot recall who his surgeon was or when exactly it was done, but notes it was at [facility]. Podiatry consulted for evaluation of wound, r/o OM, r/o gas. Of note XR wet read hardware in L 1st TMT joint, broken screws present. Evidence of previous 4th and 5th partial MT resection. No evidence of gas. L dorsal wound +PTB, suspicious for OM. MRI shows OM of 4th & 5th digit and remaining 4th & 5th metatarsal bases. Deep wound culture growing staph simulans.    Plan:  - Reviewed and discussed MRI results with patient.  - Pt is refusing surgical intervention at this team, despite the fact that it was explained to him that this condition can lead to loss of limb or loss of life without intervention. Pt states he rather choose 6 weeks antibiotics. Pt states there is a third option that needs to be researched that he found, but states if the team thinks it is not a feasible option he would prefer tx with antibiotics as he refuses amputation of his toes.   -  Recc c/w IV ABX per ID (dapto/meropenem)  - Local wound care: WTD to L plantar wound, flushed dorsal wound and placed 1/2" packing in sinus tract, wrapped with DSD, Kerlix, ACE  - Recc WBAT w/ cane to L foot  - rest of care per primary team    Podiatry following. Plan d/w attending. 63 yo M pmhx DM, HTN, CAD (stent about 1mo ago), Hep C s/p treatment p/w L foot pain and swelling going on for a few days. Pt noted he recently had L foot amputation by Vascular surgery for OM (partial 4th and 5th ray amputation with digits intact). Pt cannot recall who his surgeon was or when exactly it was done, but notes it was at [facility]. Podiatry consulted for evaluation of wound, r/o OM, r/o gas. Of note XR wet read hardware in L 1st TMT joint, broken screws present. Evidence of previous 4th and 5th partial MT resection. No evidence of gas. L dorsal wound +PTB, suspicious for OM. MRI shows OM of 4th & 5th digit and remaining 4th & 5th metatarsal bases. Deep wound culture growing staph simulans.    Plan:  - Reviewed and discussed MRI results with patient.  - Pt is refusing surgical intervention at this team, despite the fact that it was explained to him that this condition can lead to loss of limb or loss of life without intervention. Pt states he rather choose 6 weeks antibiotics. Pt states there is a third option that needs to be researched that he found that treats osteoporosis.  but states if the team thinks it is not a feasible option he would prefer tx with antibiotics as he refuses amputation of his toes despite risks being explained to him. Explained to pt in depth that tx of osteoporosis does not treat osteomyelitis. Pt states he would prefer long-term antibiotic course.   -  Recc c/w IV ABX per ID (dapto/meropenem)  - Local wound care: WTD to L plantar wound, flushed dorsal wound and placed 1/2" packing in sinus tract, wrapped with DSD, Kerlix, ACE  - Recc WBAT w/ cane to L foot  - rest of care per primary team    Podiatry following. Plan d/w attending. 63 yo M pmhx DM, HTN, CAD (stent about 1mo ago), Hep C s/p treatment p/w L foot pain and swelling going on for a few days. Pt noted he recently had L foot amputation by Vascular surgery for OM (partial 4th and 5th ray amputation with digits intact). Pt cannot recall who his surgeon was or when exactly it was done, but notes it was at [facility]. Podiatry consulted for evaluation of wound, r/o OM, r/o gas. Of note XR wet read hardware in L 1st TMT joint, broken screws present. Evidence of previous 4th and 5th partial MT resection. No evidence of gas. L dorsal wound +PTB, suspicious for OM. MRI shows OM of 4th & 5th digit and remaining 4th & 5th metatarsal bases. Deep wound culture growing staph simulans.    Plan:  - Reviewed and discussed MRI results with patient.  - Pt is refusing surgical intervention at this team, despite the fact that it was explained to him that this condition can lead to loss of limb or loss of life without intervention. Pt states there is a third option that needs to be researched that he found that treats osteoporosis.  but states if the team thinks it is not a feasible option he would prefer tx with antibiotics as he refuses amputation of his toes despite risks being explained to him. Explained to pt in depth that tx of osteoporosis does not treat osteomyelitis. Pt states he would prefer long-term antibiotic course.   -  Recc c/w IV ABX per ID (dapto/meropenem)  - Local wound care: WTD to L plantar wound, flushed dorsal wound and placed 1/2" packing in sinus tract, wrapped with DSD, Kerlix, ACE  - Recc WBAT w/ cane to L foot  - rest of care per primary team    Podiatry following. Plan d/w attending.

## 2023-01-07 NOTE — CHART NOTE - NSCHARTNOTEFT_GEN_A_CORE
Patient is refusing IV antibiotics, insulin, and morning lab draws. He is also refusing vitals checks.    At bedside, patient refusing insulin for diabetes and antibiotics (daptomycin) for diabetic foot infection. Patient reports they know their body best and do not think they need insulin because of their current glucose level. He also is stating that he does not want to take the antibiotics because it gives him diarrhea. I explained that this is a common side effect of antibiotics and I will speak to ID about adjusting his regimen however for his foot infection he needs antibiotics regardless. Still refusing.     Attempted to  patient on different types of insulin and that lantus is primarily to help make sure their glucose levels tomorrow are under control and that without it they can get very sick. Also attempted to  patient on reason they continue to require antibiotics and that there is a very good chance they become more sick without receiving antibiotics. Explained to patient that refusing these important medications can lead to life threatening situations. Verbalized understanding.    Also refusing lab draws and vitals check. Explained that these are ways the medical team manages infections and how well the patient is doing clinically and that it is important we know his temperature, WBC count, etc for infection. Still refusing.     Patient unwilling to further discuss this topic and still not accepting care.

## 2023-01-08 LAB
ALBUMIN SERPL ELPH-MCNC: 3.3 G/DL — SIGNIFICANT CHANGE UP (ref 3.3–5)
ALP SERPL-CCNC: 147 U/L — HIGH (ref 40–120)
ALT FLD-CCNC: 6 U/L — LOW (ref 10–45)
ANION GAP SERPL CALC-SCNC: 8 MMOL/L — SIGNIFICANT CHANGE UP (ref 5–17)
AST SERPL-CCNC: 16 U/L — SIGNIFICANT CHANGE UP (ref 10–40)
BILIRUB SERPL-MCNC: <0.2 MG/DL — SIGNIFICANT CHANGE UP (ref 0.2–1.2)
BUN SERPL-MCNC: 30 MG/DL — HIGH (ref 7–23)
CALCIUM SERPL-MCNC: 9.1 MG/DL — SIGNIFICANT CHANGE UP (ref 8.4–10.5)
CHLORIDE SERPL-SCNC: 103 MMOL/L — SIGNIFICANT CHANGE UP (ref 96–108)
CO2 SERPL-SCNC: 26 MMOL/L — SIGNIFICANT CHANGE UP (ref 22–31)
CREAT SERPL-MCNC: 1.41 MG/DL — HIGH (ref 0.5–1.3)
EGFR: 57 ML/MIN/1.73M2 — LOW
GLUCOSE BLDC GLUCOMTR-MCNC: 129 MG/DL — HIGH (ref 70–99)
GLUCOSE BLDC GLUCOMTR-MCNC: 181 MG/DL — HIGH (ref 70–99)
GLUCOSE BLDC GLUCOMTR-MCNC: 189 MG/DL — HIGH (ref 70–99)
GLUCOSE BLDC GLUCOMTR-MCNC: 229 MG/DL — HIGH (ref 70–99)
GLUCOSE SERPL-MCNC: 212 MG/DL — HIGH (ref 70–99)
HCT VFR BLD CALC: 28.4 % — LOW (ref 39–50)
HGB BLD-MCNC: 8.7 G/DL — LOW (ref 13–17)
MAGNESIUM SERPL-MCNC: 1.8 MG/DL — SIGNIFICANT CHANGE UP (ref 1.6–2.6)
MCHC RBC-ENTMCNC: 22.9 PG — LOW (ref 27–34)
MCHC RBC-ENTMCNC: 30.6 GM/DL — LOW (ref 32–36)
MCV RBC AUTO: 74.7 FL — LOW (ref 80–100)
NRBC # BLD: 0 /100 WBCS — SIGNIFICANT CHANGE UP (ref 0–0)
PHOSPHATE SERPL-MCNC: 3 MG/DL — SIGNIFICANT CHANGE UP (ref 2.5–4.5)
PLATELET # BLD AUTO: 306 K/UL — SIGNIFICANT CHANGE UP (ref 150–400)
POTASSIUM SERPL-MCNC: 4 MMOL/L — SIGNIFICANT CHANGE UP (ref 3.5–5.3)
POTASSIUM SERPL-SCNC: 4 MMOL/L — SIGNIFICANT CHANGE UP (ref 3.5–5.3)
PROT SERPL-MCNC: 7.5 G/DL — SIGNIFICANT CHANGE UP (ref 6–8.3)
RBC # BLD: 3.8 M/UL — LOW (ref 4.2–5.8)
RBC # FLD: 17 % — HIGH (ref 10.3–14.5)
SODIUM SERPL-SCNC: 137 MMOL/L — SIGNIFICANT CHANGE UP (ref 135–145)
WBC # BLD: 10.41 K/UL — SIGNIFICANT CHANGE UP (ref 3.8–10.5)
WBC # FLD AUTO: 10.41 K/UL — SIGNIFICANT CHANGE UP (ref 3.8–10.5)

## 2023-01-08 PROCEDURE — 99232 SBSQ HOSP IP/OBS MODERATE 35: CPT

## 2023-01-08 RX ADMIN — INSULIN GLARGINE 14 UNIT(S): 100 INJECTION, SOLUTION SUBCUTANEOUS at 21:58

## 2023-01-08 RX ADMIN — Medication 2: at 21:59

## 2023-01-08 RX ADMIN — Medication 5 UNIT(S): at 12:13

## 2023-01-08 RX ADMIN — Medication 1 TABLET(S): at 11:29

## 2023-01-08 RX ADMIN — CLOPIDOGREL BISULFATE 75 MILLIGRAM(S): 75 TABLET, FILM COATED ORAL at 11:29

## 2023-01-08 RX ADMIN — Medication 5 UNIT(S): at 17:48

## 2023-01-08 RX ADMIN — Medication 90 MILLIGRAM(S): at 12:14

## 2023-01-08 RX ADMIN — Medication 81 MILLIGRAM(S): at 11:29

## 2023-01-08 RX ADMIN — Medication 5 UNIT(S): at 08:00

## 2023-01-08 RX ADMIN — Medication 100 MILLIGRAM(S): at 18:21

## 2023-01-08 RX ADMIN — Medication 100 MILLIGRAM(S): at 06:56

## 2023-01-08 RX ADMIN — Medication 4: at 07:59

## 2023-01-08 RX ADMIN — Medication 2: at 12:13

## 2023-01-08 NOTE — PROGRESS NOTE ADULT - SUBJECTIVE AND OBJECTIVE BOX
Patient is a 62y old  Male who presents with a chief complaint of DIABETIC FOOT ULCER; DEBBY(ACUTE KIDNEY INJURY)     (06 Jan 2023 14:26)      INTERVAL HPI/ OVERNIGHT EVENTS. Pt seen and examined bedside. Pt denies any pain. Currently on po doxycycline without complaints. Denies n/v/f/c/sob.       LABS                        8.7    10.41 )-----------( 306      ( 08 Jan 2023 07:10 )             28.4     01-08    137  |  103  |  30<H>  ----------------------------<  212<H>  4.0   |  26  |  1.41<H>    Ca    9.1      08 Jan 2023 07:10  Phos  3.0     01-08  Mg     1.8     01-08    TPro  7.5  /  Alb  3.3  /  TBili  <0.2  /  DBili  x   /  AST  16  /  ALT  6<L>  /  AlkPhos  147<H>  01-08        ICU Vital Signs Last 24 Hrs  T(C): 36.8 (07 Jan 2023 16:18), Max: 36.8 (07 Jan 2023 16:18)  T(F): 98.2 (07 Jan 2023 16:18), Max: 98.2 (07 Jan 2023 16:18)  HR: 86 (07 Jan 2023 16:18) (85 - 86)  BP: 158/87 (07 Jan 2023 16:18) (158/87 - 181/112)  BP(mean): --  ABP: --  ABP(mean): --  RR: 17 (07 Jan 2023 16:18) (17 - 17)  SpO2: 98% (07 Jan 2023 16:18) (98% - 98%)    O2 Parameters below as of 07 Jan 2023 16:18  Patient On (Oxygen Delivery Method): room air        RADIOLOGY  < from: MR Foot No Cont, Left (01.06.23 @ 19:10) >  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 It is unclear if a   portion portion of this absence of these bones is related to prior   surgery. Correlation with surgical history is recommended.  Osteomyelitis at the tips of what remains of the bases of these fourth   and fifth metatarsals.  Osseous erosion/resorption at the base of the fourth digit proximal   phalanx with osteomyelitis throughout the proximal phalanx.  Osteomyelitis of the fifth digit proximal phalanx.  < end of copied text >    MICROBIOLOGY  Culture - Other (01.05.23 @ 08:36)    Gram Stain:   Rare Gram Positive Rods  Rare Gram positive cocci in pairs  Few WBC's    -  Clindamycin: R >4    -  Erythromycin: R >4    -  Linezolid: S 1    -  Oxacillin: R >2    -  Rifampin: S <=1 Should not be used as monotherapy    -  Trimethoprim/Sulfamethoxazole: R >2/38    -  Vancomycin: S 1    Specimen Source: .Other Left Incision site    Culture Results:   Moderate Staphylococcus simulans  Numerous Corynebacterium striatum group    Organism Identification: Staphylococcus simulans    Organism: Staphylococcus simulans    Method Type: JOHNNY        PHYSICAL EXAM  Left Lower Extremity Focused   Vasc: 1/4 DP/PT b/l. L foot edema. No erythema   Derm: L foot subMT 2-3 superficial wound, granular base with sanginous drainage, negative for undermining, malodor, purulent drainage or signs of infection. Dorsal 4th interspace L foot linear opening probing proximally 6cm, is +PTB towards 4th and 5th MT bases. Serosanguionus drainage from this wound, negative for malodor, proximal streaking, or erythema. No macerations   Neuro: Protective sensation absent  MSK: 5/5 MMT b/l. -TTP of palpation to L foot.

## 2023-01-08 NOTE — PROGRESS NOTE ADULT - ASSESSMENT
63 yo M pmhx DM, HTN, CAD (stent about 1mo ago), Hep C s/p treatment p/w L foot pain and swelling going on for a few days. Pt noted he recently had L foot amputation by Vascular surgery for OM (partial 4th and 5th ray amputation with digits intact). Pt cannot recall who his surgeon was or when exactly it was done, but notes it was at [facility]. Podiatry consulted for evaluation of wound, r/o OM, r/o gas. Of note XR wet read hardware in L 1st TMT joint, broken screws present. Evidence of previous 4th and 5th partial MT resection. No evidence of gas. L dorsal wound +PTB, suspicious for OM. MRI shows OM of 4th & 5th digit and remaining 4th & 5th metatarsal bases. Deep wound culture growing staph simulans.    Plan:  -  Recc c/w ABX per ID  - Local wound care: WTD to L plantar wound, flushed dorsal wound and placed 1/2" packing in sinus tract, wrapped with DSD, Kerlix, ACE  - Recc WBAT w/ cane to L foot  - Pt is refusing surgical intervention at this team, despite the fact that it was explained to him that this condition can lead to loss of limb or loss of life without intervention.  - rest of care per primary team    Podiatry following. Plan d/w attending.

## 2023-01-08 NOTE — PROGRESS NOTE ADULT - PROBLEM SELECTOR PLAN 4
Home meds: lisinopril 40mg, nifedipine 90mg  Plan:  - Lisinopril held in the setting of DEBBY. Will likely start tomorrow   - continue home nifedipine 90mg

## 2023-01-08 NOTE — PROGRESS NOTE ADULT - SUBJECTIVE AND OBJECTIVE BOX
*INCOMPLETE*    OVERNIGHT EVENTS:    SUBJECTIVE / INTERVAL HPI: Patient seen and examined at bedside.       PHYSICAL EXAM:    General: WDWN  HEENT: NC/AT; PERRL, anicteric sclera; MMM  Neck: supple  Cardiovascular: +S1/S2, RRR  Respiratory: CTA B/L; no W/R/R  Gastrointestinal: soft, NT/ND; +BSx4  Extremities: WWP; no edema, clubbing or cyanosis  Vascular: 2+ radial, DP/PT pulses B/L  Neurological: AAOx3; no focal deficits  Psychiatric: pleasant mood and affect  Dermatologic: no appreciable wounds or damage to the skin    VITAL SIGNS:  Vital Signs Last 24 Hrs  T(C): 36.8 (07 Jan 2023 16:18), Max: 36.8 (07 Jan 2023 16:18)  T(F): 98.2 (07 Jan 2023 16:18), Max: 98.2 (07 Jan 2023 16:18)  HR: 86 (07 Jan 2023 16:18) (85 - 86)  BP: 158/87 (07 Jan 2023 16:18) (158/87 - 181/112)  BP(mean): --  RR: 17 (07 Jan 2023 16:18) (17 - 17)  SpO2: 98% (07 Jan 2023 16:18) (98% - 98%)    Parameters below as of 07 Jan 2023 16:18  Patient On (Oxygen Delivery Method): room air          MEDICATIONS:  MEDICATIONS  (STANDING):  aspirin enteric coated 81 milliGRAM(s) Oral daily  atorvastatin 80 milliGRAM(s) Oral at bedtime  clopidogrel Tablet 75 milliGRAM(s) Oral daily  doxycycline monohydrate Capsule 100 milliGRAM(s) Oral every 12 hours  heparin   Injectable 5000 Unit(s) SubCutaneous every 8 hours  insulin glargine Injectable (LANTUS) 14 Unit(s) SubCutaneous at bedtime  insulin lispro (ADMELOG) corrective regimen sliding scale   SubCutaneous Before meals and at bedtime  insulin lispro Injectable (ADMELOG) 5 Unit(s) SubCutaneous three times a day before meals  lactated ringers. 1000 milliLiter(s) (80 mL/Hr) IV Continuous <Continuous>  meropenem  IVPB 1000 milliGRAM(s) IV Intermittent every 8 hours  multivitamin 1 Tablet(s) Oral daily  NIFEdipine XL 90 milliGRAM(s) Oral daily    MEDICATIONS  (PRN):  acetaminophen     Tablet .. 650 milliGRAM(s) Oral every 6 hours PRN Temp greater or equal to 38C (100.4F), Mild Pain (1 - 3)      ALLERGIES:  Allergies    labetalol (Stomach Upset)  pork (Unknown)  vancomycin (Stomach Upset)    Intolerances        LABS:                        8.3    11.64 )-----------( 240      ( 06 Jan 2023 07:08 )             27.3     01-06    135  |  103  |  16  ----------------------------<  285<H>  3.8   |  22  |  1.50<H>    Ca    9.0      06 Jan 2023 07:08  Phos  2.5     01-06  Mg     1.7     01-06    TPro  7.2  /  Alb  2.9<L>  /  TBili  <0.2  /  DBili  x   /  AST  14  /  ALT  <5<L>  /  AlkPhos  131<H>  01-06        CAPILLARY BLOOD GLUCOSE      POCT Blood Glucose.: 165 mg/dL (07 Jan 2023 21:49)      RADIOLOGY & ADDITIONAL TESTS: Reviewed. SUBJECTIVE / INTERVAL HPI: Patient seen and examined at bedside. Overnight patient refused insulin but acknowledged       PHYSICAL EXAM:    General: WDWN  HEENT: NC/AT; PERRL, anicteric sclera; MMM  Neck: supple  Cardiovascular: +S1/S2, RRR  Respiratory: CTA B/L; no W/R/R  Gastrointestinal: soft, NT/ND; +BSx4  Extremities: WWP; no edema, clubbing or cyanosis  Vascular: 2+ radial, DP/PT pulses B/L  Neurological: AAOx3; no focal deficits  Psychiatric: pleasant mood and affect  Dermatologic: no appreciable wounds or damage to the skin    VITAL SIGNS:  Vital Signs Last 24 Hrs  T(C): 36.8 (07 Jan 2023 16:18), Max: 36.8 (07 Jan 2023 16:18)  T(F): 98.2 (07 Jan 2023 16:18), Max: 98.2 (07 Jan 2023 16:18)  HR: 86 (07 Jan 2023 16:18) (85 - 86)  BP: 158/87 (07 Jan 2023 16:18) (158/87 - 181/112)  BP(mean): --  RR: 17 (07 Jan 2023 16:18) (17 - 17)  SpO2: 98% (07 Jan 2023 16:18) (98% - 98%)    Parameters below as of 07 Jan 2023 16:18  Patient On (Oxygen Delivery Method): room air          MEDICATIONS:  MEDICATIONS  (STANDING):  aspirin enteric coated 81 milliGRAM(s) Oral daily  atorvastatin 80 milliGRAM(s) Oral at bedtime  clopidogrel Tablet 75 milliGRAM(s) Oral daily  doxycycline monohydrate Capsule 100 milliGRAM(s) Oral every 12 hours  heparin   Injectable 5000 Unit(s) SubCutaneous every 8 hours  insulin glargine Injectable (LANTUS) 14 Unit(s) SubCutaneous at bedtime  insulin lispro (ADMELOG) corrective regimen sliding scale   SubCutaneous Before meals and at bedtime  insulin lispro Injectable (ADMELOG) 5 Unit(s) SubCutaneous three times a day before meals  lactated ringers. 1000 milliLiter(s) (80 mL/Hr) IV Continuous <Continuous>  meropenem  IVPB 1000 milliGRAM(s) IV Intermittent every 8 hours  multivitamin 1 Tablet(s) Oral daily  NIFEdipine XL 90 milliGRAM(s) Oral daily    MEDICATIONS  (PRN):  acetaminophen     Tablet .. 650 milliGRAM(s) Oral every 6 hours PRN Temp greater or equal to 38C (100.4F), Mild Pain (1 - 3)      ALLERGIES:  Allergies    labetalol (Stomach Upset)  pork (Unknown)  vancomycin (Stomach Upset)    Intolerances        LABS:                        8.3    11.64 )-----------( 240      ( 06 Jan 2023 07:08 )             27.3     01-06    135  |  103  |  16  ----------------------------<  285<H>  3.8   |  22  |  1.50<H>    Ca    9.0      06 Jan 2023 07:08  Phos  2.5     01-06  Mg     1.7     01-06    TPro  7.2  /  Alb  2.9<L>  /  TBili  <0.2  /  DBili  x   /  AST  14  /  ALT  <5<L>  /  AlkPhos  131<H>  01-06        CAPILLARY BLOOD GLUCOSE      POCT Blood Glucose.: 165 mg/dL (07 Jan 2023 21:49)      RADIOLOGY & ADDITIONAL TESTS: Reviewed. SUBJECTIVE / INTERVAL HPI: Patient seen and examined at bedside. Overnight patient refused insulin but acknowledged that he would take it in the morning as to which he did. This occurred similarly with his blood pressure medications as to which he took at 12 pm versus his earlier scheduled time.       PHYSICAL EXAM:    General: WDWN  HEENT: NC/AT; PERRL, anicteric sclera; MMM  Neck: supple  Cardiovascular: +S1/S2, RRR  Respiratory: CTA B/L; no W/R/R  Gastrointestinal: soft, NT/ND; +BSx4  Extremities: WWP; no edema, clubbing or cyanosis  Vascular: 2+ radial, DP/PT pulses B/L  Neurological: AAOx3; no focal deficits  Psychiatric: pleasant mood and affect  Dermatologic: no appreciable wounds or damage to the skin    VITAL SIGNS:  Vital Signs Last 24 Hrs  T(C): 36.8 (07 Jan 2023 16:18), Max: 36.8 (07 Jan 2023 16:18)  T(F): 98.2 (07 Jan 2023 16:18), Max: 98.2 (07 Jan 2023 16:18)  HR: 86 (07 Jan 2023 16:18) (85 - 86)  BP: 158/87 (07 Jan 2023 16:18) (158/87 - 181/112)  BP(mean): --  RR: 17 (07 Jan 2023 16:18) (17 - 17)  SpO2: 98% (07 Jan 2023 16:18) (98% - 98%)    Parameters below as of 07 Jan 2023 16:18  Patient On (Oxygen Delivery Method): room air          MEDICATIONS:  MEDICATIONS  (STANDING):  aspirin enteric coated 81 milliGRAM(s) Oral daily  atorvastatin 80 milliGRAM(s) Oral at bedtime  clopidogrel Tablet 75 milliGRAM(s) Oral daily  doxycycline monohydrate Capsule 100 milliGRAM(s) Oral every 12 hours  heparin   Injectable 5000 Unit(s) SubCutaneous every 8 hours  insulin glargine Injectable (LANTUS) 14 Unit(s) SubCutaneous at bedtime  insulin lispro (ADMELOG) corrective regimen sliding scale   SubCutaneous Before meals and at bedtime  insulin lispro Injectable (ADMELOG) 5 Unit(s) SubCutaneous three times a day before meals  lactated ringers. 1000 milliLiter(s) (80 mL/Hr) IV Continuous <Continuous>  meropenem  IVPB 1000 milliGRAM(s) IV Intermittent every 8 hours  multivitamin 1 Tablet(s) Oral daily  NIFEdipine XL 90 milliGRAM(s) Oral daily    MEDICATIONS  (PRN):  acetaminophen     Tablet .. 650 milliGRAM(s) Oral every 6 hours PRN Temp greater or equal to 38C (100.4F), Mild Pain (1 - 3)      ALLERGIES:  Allergies    labetalol (Stomach Upset)  pork (Unknown)  vancomycin (Stomach Upset)    Intolerances        LABS:                        8.3    11.64 )-----------( 240      ( 06 Jan 2023 07:08 )             27.3     01-06    135  |  103  |  16  ----------------------------<  285<H>  3.8   |  22  |  1.50<H>    Ca    9.0      06 Jan 2023 07:08  Phos  2.5     01-06  Mg     1.7     01-06    TPro  7.2  /  Alb  2.9<L>  /  TBili  <0.2  /  DBili  x   /  AST  14  /  ALT  <5<L>  /  AlkPhos  131<H>  01-06        CAPILLARY BLOOD GLUCOSE      POCT Blood Glucose.: 165 mg/dL (07 Jan 2023 21:49)      RADIOLOGY & ADDITIONAL TESTS: Reviewed. SUBJECTIVE / INTERVAL HPI: Patient seen and examined at bedside. Overnight patient refused insulin. This occurred similarly with his blood pressure medications but took at 12 pm versus his earlier scheduled time.       PHYSICAL EXAM:    General: NAD, resting comfortably  HEENT: NC/AT; PERRL, anicteric sclera; MMM  Neck: supple  Cardiovascular: +S1/S2, RRR  Respiratory: CTA B/L; no W/R/R  Gastrointestinal: soft, NT/ND; +BSx4  Extremities: WWP; no edema, clubbing or cyanosis  Vascular: 2+ radial, DP/PT pulses B/L  Neurological: AAOx3; no focal deficits  Psychiatric: pleasant mood and affect  Dermatologic: no appreciable wounds or damage to the skin    VITAL SIGNS:  Vital Signs Last 24 Hrs  T(C): 36.8 (07 Jan 2023 16:18), Max: 36.8 (07 Jan 2023 16:18)  T(F): 98.2 (07 Jan 2023 16:18), Max: 98.2 (07 Jan 2023 16:18)  HR: 86 (07 Jan 2023 16:18) (85 - 86)  BP: 158/87 (07 Jan 2023 16:18) (158/87 - 181/112)  BP(mean): --  RR: 17 (07 Jan 2023 16:18) (17 - 17)  SpO2: 98% (07 Jan 2023 16:18) (98% - 98%)    Parameters below as of 07 Jan 2023 16:18  Patient On (Oxygen Delivery Method): room air          MEDICATIONS:  MEDICATIONS  (STANDING):  aspirin enteric coated 81 milliGRAM(s) Oral daily  atorvastatin 80 milliGRAM(s) Oral at bedtime  clopidogrel Tablet 75 milliGRAM(s) Oral daily  doxycycline monohydrate Capsule 100 milliGRAM(s) Oral every 12 hours  heparin   Injectable 5000 Unit(s) SubCutaneous every 8 hours  insulin glargine Injectable (LANTUS) 14 Unit(s) SubCutaneous at bedtime  insulin lispro (ADMELOG) corrective regimen sliding scale   SubCutaneous Before meals and at bedtime  insulin lispro Injectable (ADMELOG) 5 Unit(s) SubCutaneous three times a day before meals  lactated ringers. 1000 milliLiter(s) (80 mL/Hr) IV Continuous <Continuous>  meropenem  IVPB 1000 milliGRAM(s) IV Intermittent every 8 hours  multivitamin 1 Tablet(s) Oral daily  NIFEdipine XL 90 milliGRAM(s) Oral daily    MEDICATIONS  (PRN):  acetaminophen     Tablet .. 650 milliGRAM(s) Oral every 6 hours PRN Temp greater or equal to 38C (100.4F), Mild Pain (1 - 3)      ALLERGIES:  Allergies    labetalol (Stomach Upset)  pork (Unknown)  vancomycin (Stomach Upset)    Intolerances        LABS:                        8.3    11.64 )-----------( 240      ( 06 Jan 2023 07:08 )             27.3     01-06    135  |  103  |  16  ----------------------------<  285<H>  3.8   |  22  |  1.50<H>    Ca    9.0      06 Jan 2023 07:08  Phos  2.5     01-06  Mg     1.7     01-06    TPro  7.2  /  Alb  2.9<L>  /  TBili  <0.2  /  DBili  x   /  AST  14  /  ALT  <5<L>  /  AlkPhos  131<H>  01-06        CAPILLARY BLOOD GLUCOSE      POCT Blood Glucose.: 165 mg/dL (07 Jan 2023 21:49)      RADIOLOGY & ADDITIONAL TESTS: Reviewed. SUBJECTIVE / INTERVAL HPI: Patient seen and examined at bedside. Overnight patient refused insulin. This occurred similarly with his blood pressure medications but took at 12 pm versus his earlier scheduled time.       PHYSICAL EXAM:    General: NAD, resting comfortably  HEENT: NC/AT; PERRL, anicteric sclera; MMM  Neck: supple  Cardiovascular: +S1/S2, RRR  Respiratory: CTA B/L; no W/R/R  Gastrointestinal: soft, NT/ND; +BSx4  Extremities: WWP; no edema, clubbing or cyanosis  Vascular: 2+ radial, DP/PT pulses B/L  Neurological: AAOx3; no focal deficits  Psychiatric: pleasant mood and affect  Dermatologic: no appreciable wounds or damage to the skin    VITAL SIGNS:  Vital Signs Last 24 Hrs  T(C): 36.8 (07 Jan 2023 16:18), Max: 36.8 (07 Jan 2023 16:18)  T(F): 98.2 (07 Jan 2023 16:18), Max: 98.2 (07 Jan 2023 16:18)  HR: 86 (07 Jan 2023 16:18) (85 - 86)  BP: 158/87 (07 Jan 2023 16:18) (158/87 - 181/112)  BP(mean): --  RR: 17 (07 Jan 2023 16:18) (17 - 17)  SpO2: 98% (07 Jan 2023 16:18) (98% - 98%)    Parameters below as of 07 Jan 2023 16:18  Patient On (Oxygen Delivery Method): room air      MEDICATIONS:  MEDICATIONS  (STANDING):  aspirin enteric coated 81 milliGRAM(s) Oral daily  atorvastatin 80 milliGRAM(s) Oral at bedtime  clopidogrel Tablet 75 milliGRAM(s) Oral daily  doxycycline monohydrate Capsule 100 milliGRAM(s) Oral every 12 hours  heparin   Injectable 5000 Unit(s) SubCutaneous every 8 hours  insulin glargine Injectable (LANTUS) 14 Unit(s) SubCutaneous at bedtime  insulin lispro (ADMELOG) corrective regimen sliding scale   SubCutaneous Before meals and at bedtime  insulin lispro Injectable (ADMELOG) 5 Unit(s) SubCutaneous three times a day before meals  lactated ringers. 1000 milliLiter(s) (80 mL/Hr) IV Continuous <Continuous>  meropenem  IVPB 1000 milliGRAM(s) IV Intermittent every 8 hours  multivitamin 1 Tablet(s) Oral daily  NIFEdipine XL 90 milliGRAM(s) Oral daily    MEDICATIONS  (PRN):  acetaminophen     Tablet .. 650 milliGRAM(s) Oral every 6 hours PRN Temp greater or equal to 38C (100.4F), Mild Pain (1 - 3)      ALLERGIES:  Allergies    labetalol (Stomach Upset)  pork (Unknown)  vancomycin (Stomach Upset)    Intolerances        LABS:                        8.3    11.64 )-----------( 240      ( 06 Jan 2023 07:08 )             27.3     01-06    135  |  103  |  16  ----------------------------<  285<H>  3.8   |  22  |  1.50<H>    Ca    9.0      06 Jan 2023 07:08  Phos  2.5     01-06  Mg     1.7     01-06    TPro  7.2  /  Alb  2.9<L>  /  TBili  <0.2  /  DBili  x   /  AST  14  /  ALT  <5<L>  /  AlkPhos  131<H>  01-06        CAPILLARY BLOOD GLUCOSE      POCT Blood Glucose.: 165 mg/dL (07 Jan 2023 21:49)      RADIOLOGY & ADDITIONAL TESTS: Reviewed.

## 2023-01-09 ENCOUNTER — RESULT REVIEW (OUTPATIENT)
Age: 63
End: 2023-01-09

## 2023-01-09 ENCOUNTER — TRANSCRIPTION ENCOUNTER (OUTPATIENT)
Age: 63
End: 2023-01-09

## 2023-01-09 PROBLEM — I25.10 ATHEROSCLEROTIC HEART DISEASE OF NATIVE CORONARY ARTERY WITHOUT ANGINA PECTORIS: Chronic | Status: ACTIVE | Noted: 2023-01-05

## 2023-01-09 LAB
-  TETRACYCLINE: SIGNIFICANT CHANGE UP
ALBUMIN SERPL ELPH-MCNC: 3 G/DL — LOW (ref 3.3–5)
ALP SERPL-CCNC: 150 U/L — HIGH (ref 40–120)
ALT FLD-CCNC: 5 U/L — LOW (ref 10–45)
ANION GAP SERPL CALC-SCNC: 8 MMOL/L — SIGNIFICANT CHANGE UP (ref 5–17)
AST SERPL-CCNC: 13 U/L — SIGNIFICANT CHANGE UP (ref 10–40)
BILIRUB SERPL-MCNC: <0.2 MG/DL — SIGNIFICANT CHANGE UP (ref 0.2–1.2)
BUN SERPL-MCNC: 33 MG/DL — HIGH (ref 7–23)
CALCIUM SERPL-MCNC: 8.9 MG/DL — SIGNIFICANT CHANGE UP (ref 8.4–10.5)
CHLORIDE SERPL-SCNC: 106 MMOL/L — SIGNIFICANT CHANGE UP (ref 96–108)
CO2 SERPL-SCNC: 23 MMOL/L — SIGNIFICANT CHANGE UP (ref 22–31)
CREAT SERPL-MCNC: 1.43 MG/DL — HIGH (ref 0.5–1.3)
CULTURE RESULTS: SIGNIFICANT CHANGE UP
EGFR: 56 ML/MIN/1.73M2 — LOW
GLUCOSE BLDC GLUCOMTR-MCNC: 168 MG/DL — HIGH (ref 70–99)
GLUCOSE BLDC GLUCOMTR-MCNC: 217 MG/DL — HIGH (ref 70–99)
GLUCOSE BLDC GLUCOMTR-MCNC: 235 MG/DL — HIGH (ref 70–99)
GLUCOSE BLDC GLUCOMTR-MCNC: 235 MG/DL — HIGH (ref 70–99)
GLUCOSE SERPL-MCNC: 272 MG/DL — HIGH (ref 70–99)
GRAM STN FLD: SIGNIFICANT CHANGE UP
HCT VFR BLD CALC: 29 % — LOW (ref 39–50)
HGB BLD-MCNC: 8.7 G/DL — LOW (ref 13–17)
MAGNESIUM SERPL-MCNC: 1.9 MG/DL — SIGNIFICANT CHANGE UP (ref 1.6–2.6)
MCHC RBC-ENTMCNC: 22.5 PG — LOW (ref 27–34)
MCHC RBC-ENTMCNC: 30 GM/DL — LOW (ref 32–36)
MCV RBC AUTO: 75.1 FL — LOW (ref 80–100)
NRBC # BLD: 0 /100 WBCS — SIGNIFICANT CHANGE UP (ref 0–0)
ORGANISM # SPEC MICROSCOPIC CNT: SIGNIFICANT CHANGE UP
ORGANISM # SPEC MICROSCOPIC CNT: SIGNIFICANT CHANGE UP
PHOSPHATE SERPL-MCNC: 2.7 MG/DL — SIGNIFICANT CHANGE UP (ref 2.5–4.5)
PLATELET # BLD AUTO: 307 K/UL — SIGNIFICANT CHANGE UP (ref 150–400)
POTASSIUM SERPL-MCNC: 3.9 MMOL/L — SIGNIFICANT CHANGE UP (ref 3.5–5.3)
POTASSIUM SERPL-SCNC: 3.9 MMOL/L — SIGNIFICANT CHANGE UP (ref 3.5–5.3)
PROT SERPL-MCNC: 7.5 G/DL — SIGNIFICANT CHANGE UP (ref 6–8.3)
RBC # BLD: 3.86 M/UL — LOW (ref 4.2–5.8)
RBC # FLD: 17.1 % — HIGH (ref 10.3–14.5)
SODIUM SERPL-SCNC: 137 MMOL/L — SIGNIFICANT CHANGE UP (ref 135–145)
SPECIMEN SOURCE: SIGNIFICANT CHANGE UP
WBC # BLD: 9.32 K/UL — SIGNIFICANT CHANGE UP (ref 3.8–10.5)
WBC # FLD AUTO: 9.32 K/UL — SIGNIFICANT CHANGE UP (ref 3.8–10.5)

## 2023-01-09 PROCEDURE — 88311 DECALCIFY TISSUE: CPT | Mod: 26

## 2023-01-09 PROCEDURE — 88304 TISSUE EXAM BY PATHOLOGIST: CPT | Mod: 26

## 2023-01-09 PROCEDURE — 99233 SBSQ HOSP IP/OBS HIGH 50: CPT | Mod: GC

## 2023-01-09 PROCEDURE — 99232 SBSQ HOSP IP/OBS MODERATE 35: CPT

## 2023-01-09 RX ORDER — INSULIN GLARGINE 100 [IU]/ML
18 INJECTION, SOLUTION SUBCUTANEOUS
Qty: 0 | Refills: 0 | DISCHARGE
Start: 2023-01-09

## 2023-01-09 RX ORDER — INSULIN GLARGINE 100 [IU]/ML
18 INJECTION, SOLUTION SUBCUTANEOUS AT BEDTIME
Refills: 0 | Status: DISCONTINUED | OUTPATIENT
Start: 2023-01-09 | End: 2023-01-10

## 2023-01-09 RX ORDER — LISINOPRIL 2.5 MG/1
1 TABLET ORAL
Qty: 0 | Refills: 0 | DISCHARGE

## 2023-01-09 RX ORDER — LINEZOLID 600 MG/300ML
1 INJECTION, SOLUTION INTRAVENOUS
Qty: 28 | Refills: 0
Start: 2023-01-09 | End: 2023-01-22

## 2023-01-09 RX ORDER — LINEZOLID 600 MG/300ML
600 INJECTION, SOLUTION INTRAVENOUS EVERY 12 HOURS
Refills: 0 | Status: DISCONTINUED | OUTPATIENT
Start: 2023-01-09 | End: 2023-01-12

## 2023-01-09 RX ADMIN — Medication 4: at 11:33

## 2023-01-09 RX ADMIN — ATORVASTATIN CALCIUM 80 MILLIGRAM(S): 80 TABLET, FILM COATED ORAL at 22:35

## 2023-01-09 RX ADMIN — CLOPIDOGREL BISULFATE 75 MILLIGRAM(S): 75 TABLET, FILM COATED ORAL at 12:55

## 2023-01-09 RX ADMIN — Medication 5 UNIT(S): at 12:56

## 2023-01-09 RX ADMIN — Medication 5 UNIT(S): at 17:09

## 2023-01-09 RX ADMIN — Medication 5 UNIT(S): at 09:02

## 2023-01-09 RX ADMIN — Medication 1 TABLET(S): at 12:56

## 2023-01-09 RX ADMIN — LINEZOLID 600 MILLIGRAM(S): 600 INJECTION, SOLUTION INTRAVENOUS at 22:35

## 2023-01-09 RX ADMIN — Medication 100 MILLIGRAM(S): at 06:19

## 2023-01-09 RX ADMIN — Medication 4: at 17:09

## 2023-01-09 RX ADMIN — Medication 90 MILLIGRAM(S): at 06:19

## 2023-01-09 RX ADMIN — INSULIN GLARGINE 18 UNIT(S): 100 INJECTION, SOLUTION SUBCUTANEOUS at 22:35

## 2023-01-09 RX ADMIN — Medication 81 MILLIGRAM(S): at 12:55

## 2023-01-09 RX ADMIN — LINEZOLID 600 MILLIGRAM(S): 600 INJECTION, SOLUTION INTRAVENOUS at 12:56

## 2023-01-09 RX ADMIN — Medication 4: at 12:56

## 2023-01-09 NOTE — DISCHARGE NOTE PROVIDER - NSDCFUSCHEDAPPT_GEN_ALL_CORE_FT
[facility] Physician [facility]  INTMED [address]  Scheduled Appointment: 01/17/2023    Dajuan Doe  [facility]  INFDISEASE [address]  Scheduled Appointment: 01/23/2023

## 2023-01-09 NOTE — PROGRESS NOTE ADULT - SUBJECTIVE AND OBJECTIVE BOX
Physical Medicine and Rehabilitation Progress Note :       Patient is a 62y old  Male who presents with a chief complaint of left foot infection (09 Jan 2023 12:33)      HPI:  62M HTN, DM, CAD (stent about 1mo ago), hep C s/p treatment presenting with foot pain and swelling x1 day. Pt reports that he had a surgery on his foot a couple weeks ago but does not remember what surgery or what hospital. Yesterday he started to develop warmth and pain of his L foot. Denies any fever, chills, chest pain, sob, n/v/d/c, drainage from foot.    ED Course:  VS: T 97.9, HR 88, /71, O2 100%  Labs: WBC 16.5, Hb 9 (10.2 as of 01/2022), Cr 2.62 (1.1 as of 01/2022), ESR 9, CRP 18.8, lactate 2.3  XR L foot: pending  Tx: 2L NS, zosyn (05 Jan 2023 05:12)                            8.7    9.32  )-----------( 307      ( 09 Jan 2023 05:30 )             29.0       01-09    137  |  106  |  33<H>  ----------------------------<  272<H>  3.9   |  23  |  1.43<H>    Ca    8.9      09 Jan 2023 05:30  Phos  2.7     01-09  Mg     1.9     01-09    TPro  7.5  /  Alb  3.0<L>  /  TBili  <0.2  /  DBili  x   /  AST  13  /  ALT  5<L>  /  AlkPhos  150<H>  01-09    Vital Signs Last 24 Hrs  T(C): 36.8 (09 Jan 2023 09:30), Max: 36.9 (08 Jan 2023 21:30)  T(F): 98.2 (09 Jan 2023 09:30), Max: 98.5 (08 Jan 2023 21:30)  HR: 93 (09 Jan 2023 09:30) (83 - 96)  BP: 138/76 (09 Jan 2023 09:30) (119/67 - 153/77)  BP(mean): --  RR: 17 (09 Jan 2023 09:30) (17 - 18)  SpO2: 100% (09 Jan 2023 09:30) (99% - 100%)    Parameters below as of 09 Jan 2023 09:30  Patient On (Oxygen Delivery Method): room air        MEDICATIONS  (STANDING):  aspirin enteric coated 81 milliGRAM(s) Oral daily  atorvastatin 80 milliGRAM(s) Oral at bedtime  clopidogrel Tablet 75 milliGRAM(s) Oral daily  heparin   Injectable 5000 Unit(s) SubCutaneous every 8 hours  insulin glargine Injectable (LANTUS) 18 Unit(s) SubCutaneous at bedtime  insulin lispro (ADMELOG) corrective regimen sliding scale   SubCutaneous Before meals and at bedtime  insulin lispro Injectable (ADMELOG) 5 Unit(s) SubCutaneous three times a day before meals  linezolid    Tablet 600 milliGRAM(s) Oral every 12 hours  multivitamin 1 Tablet(s) Oral daily  NIFEdipine XL 90 milliGRAM(s) Oral daily    MEDICATIONS  (PRN):  acetaminophen     Tablet .. 650 milliGRAM(s) Oral every 6 hours PRN Temp greater or equal to 38C (100.4F), Mild Pain (1 - 3)         Physical Therapy Functional Status Assessment :       Pain Assessment/Intervention/Re-Assesssment    Pain Assessment/Number Scale (0-10) Adult  Presence of Pain: complains of pain/discomfort  Body Location: unwilling to quantify    Pain Interventions-Number Scale  Interventions: Pain Interventions-Number Scale: repositioned; returned to bed    Re-assessment (Number Scale)  Pain Response to Interventions: partial relief    Safety      AM-PAC Functional Assessment: Basic Mobility  Type of Assessment: Daily assessment  Turning from your back to your side while in a flat bed without using bedrails?: 4 = No assist / stand by assistance  Moving from lying on your back to sitting on the flat side of a flat bed without using bedrails?: 4 = No assist / stand by assistance  Moving to and from a bed to a chair (including a wheelchair)?: 4 = No assist / stand by assistance  Standing up from a chair using your arms (e.g. wheelchair or bedside chair)?: 4 = No assist / stand by assistance  Walking in hospital room?: 4 = No assist / stand by assistance  Climbing 3-5 steps with a railing?: 3 = A little assistance  Score: 23   Row Comment: Ask the patient "How much help from another person do you currently need? (If the patient hasn't done an activity recently, how much help from another person do you think he/she needs if he/she tried?)    Cognitive/Neuro      Cognitive/Neuro/Behavioral  Cognitive/Neuro/Behavioral [WDL Definition: Alert; opens eyes spontaneously; arouses to voice or touch; oriented x 4; follows commands; speech spontaneous, logical; purposeful motor response; behavior appropriate to situation]: WDL except  Level of Consciousness: alert  Arousal Level: arouses to voice  Orientation: unable to assess  Speech: clear  Mood/Behavior: angry;  agitated;  hyperactive (agitated, impulsive);  uncooperative    Language Assistance  Preferred Language to Address Healthcare Preferred Language to Address Healthcare: English    Musculoskeletal      Musculoskeletal  Musculoskeletal [WDL Definition: No observed or reported muscle weakness, joint swelling or tenderness; all extremities with symmetrical movement bilaterally]: WDL except  General Mobility: generalized weakness    Therapeutic Interventions      Bed Mobility  Bed Mobility Training Rolling/Turning: independent  Bed Mobility Training Sit-to-Supine: independent  Bed Mobility Training Supine-to-Sit: independent  Bed Mobility Training Limitations: decreased strength;  pain    Sit-Stand Transfer Training  Transfer Training Sit-to-Stand Transfer: independent;  weight-bearing as tolerated   in LLE   refused to use RW  Transfer Training Stand-to-Sit Transfer: independent;  weight-bearing as tolerated   in LLE   refused to use RW  Sit-to-Stand Transfer Training Transfer Safety Analysis: decreased balance;  pain;  impaired balance;  decreased strength;  refused assistive device     Gait Training  Gait Training: independent;  weight-bearing as tolerated   in LLE   refused any assistive device ;  10 feet;  x2  Gait Analysis: decreased weight-shifting ability;  antalgic gait pattern;  pain;  impaired balance;  10 feet;  x2;  refused any assistive device   Gait Number of Times:: x 2  Type of Rest Type of Rest: sitting            PM&R Impression : as above    Current Disposition Plan Recommendations :    d/come, outpatient physical therapy

## 2023-01-09 NOTE — DISCHARGE NOTE PROVIDER - CARE PROVIDERS DIRECT ADDRESSES
,[email or web address]

## 2023-01-09 NOTE — PROGRESS NOTE ADULT - ASSESSMENT
IMPRESSION:   Osteomyelitis of the left lower extremity.  Bone culture with Corynebacterium and Coagulase negative staph. The Corynebacterium shows numerous growth and there are gram positive rods on the gram stain.  Therefore, Corynebacterium should be considered a pathogen here.  I have requested sensitivities.  They are a send out to the [facility].  The best empiric options are Vancomycin, Daptomycin, and Linezolid.   Patient is declining IV Vancomycin and IV Daptomycin.  My concern with linezolid is that it can cause bone marrow suppression when used for > 2 weeks.  Doxycycline may have activity but it is not reliable and there is a good chance at resistance.    Recommend:  1.  Can stop Meropenem as there are no gram negatives or anaerobes  2.  He will need 6 weeks of antibiotics (day 1 = 1/5/23)  3.  Can discharge with linezolid 600 mg PO q12 x 2 weeks.  He can follow up with me outpatient for additional antibiotic prescriptions.  This will allow time for Corynebacterium susceptibilities to result.  We will determine which antibiotic to finish the course based on the susceptibilities.  He can also follow up with me to check labs  4.  If patient declines linezolid then he can be discharged with Doxycycline 100 mg PO q12 x 5 weeks to complete 6 week course    ID team 2 sign off.  Reconsult as needed

## 2023-01-09 NOTE — DISCHARGE NOTE PROVIDER - HOSPITAL COURSE
62M HTN, DM, CAD (stent about 1mo ago), hep C s/p treatment presenting with foot pain and swelling x1 day, found to have MRI confirmed osteomyelitis of tips of 4th and 5th metatarsals and admitted for management. PT was consulted for wound evaluation, performed wound care ( 1/2" packing in sinus tract, wrapped with DSD, Kerlix, ACE). Pt refused surgical intervention despite the fact that it was explained to him that this condition can lead to loss of limb or loss of life. ID was consulted for antibiotics management and pt was started on meropenem and daptomycin as cultures grew GP cocci; patient later refused both abx as he experienced diarrhea, despite the primary team explaining to him that this was a side effect of antibiotics. ID recommended doxycycline which patient took. Cultures then grew Corynebacterium, for which ID recommended switching to linezolid for 2 weeks as sensitivities came back. In the interim, patient also intermittently refused insulin, vital signs, and blood draws. He was restarted on home nifedipine. PT recommended no YUMIKO for the patient. Patient is stable to discharge with close follow up from Infectious diseases.     Problem/Plan - 1:  ·  Problem: Osteomyelitis.   ·  Plan: MRI shows OM of 4th & 5th digit and remaining 4th & 5th metatarsal bases. Patient refused: surgical intervention, meropenem, and daptomycin even as risks were explained to him including worsening infection, death, and losing more limbs.     Both ID and Podiatry following, wound Cx so far growing staph simulans, Corneybacterium  - Per ID, will d/c on Linezolid 600 BID for 2 weeks pending sensitivities and then transition to abx.     Problem/Plan - 2:  ·  Problem: DEBBY (acute kidney injury).   ·  Plan: Cr 2.62 from 1.1 as of 01/2022, likely prerenal in the setting of active infection, downtrended to 1.43.  - CTM with outpatient f/u      Problem/Plan - 3:  ·  Problem: IDDM (insulin dependent diabetes mellitus).   ·  Plan: Home meds: lantus 16, lispro 8  - Patient intermittently refusing insulin as "he knows his own blood sugars."    - d/c on increased Lantus dose of 18U and lispro 8.       Problem/Plan - 4:  ·  Problem: Hypertension.   ·  Plan: Home meds: lisinopril 40mg, nifedipine 90mg  Plan:  - continue home nifedipine 90mg.  - Hold lisinopril on d/c due to DEBBY.     Problem/Plan - 5:  ·  Problem: CAD (coronary artery disease).   ·  Plan: Pt reports recent stent (?12/2022). Unclear anatomy    - d/c on home asa, plavix, lipitor 80mg.    New Medications: Linezolid 600mg BID x 2 weeks (1/9-1/23), Lantus Dose at 18 units from 16.   Held Old Medications: Lisinopril 40mg 62M HTN, DM, CAD (stent about 1mo ago), hep C s/p treatment presenting with foot pain and swelling x1 day, found to have MRI confirmed osteomyelitis of tips of 4th and 5th metatarsals and admitted for management. PT was consulted for wound evaluation, performed wound care ( 1/2" packing in sinus tract, wrapped with DSD, Kerlix, ACE). Pt refused surgical intervention despite the fact that it was explained to him that this condition can lead to loss of limb or loss of life. ID was consulted for antibiotics management and pt was started on meropenem and daptomycin as cultures grew GP cocci; patient later refused both abx as he experienced diarrhea, despite the primary team explaining to him that this was a side effect of antibiotics. ID recommended doxycycline which patient took. Cultures then grew Corynebacterium, for which ID recommended switching to linezolid for 2 weeks as sensitivities came back. In the interim, patient also intermittently refused insulin, vital signs, and blood draws. He was restarted on home nifedipine. PT recommended no YUMIKO for the patient. Patient is stable to discharge with close follow up from Infectious diseases.     Problem/Plan - 1:  ·  Problem: Osteomyelitis.   ·  Plan: MRI shows OM of 4th & 5th digit and remaining 4th & 5th metatarsal bases. Patient refused: surgical intervention, IV meropenem, and daptomycin even as risks were explained to him including worsening infection, death, and losing more limbs.     Both ID and Podiatry following, wound Cx so far growing staph simulans, Corneybacterium  - Per ID, will d/c on Linezolid 600 BID for 2 weeks pending sensitivities; he will need 6 weeks of antibiotics total (day 1 = 1/5/23). Will discharge with linezolid 600 mg PO q12 x 2 weeks.  He can follow up with ID Dr. Doe for outpatient for additional antibiotic prescriptions.  This will allow time for Corynebacterium susceptibilities to result.  We will determine which antibiotic to finish the course based on the susceptibilities.        Problem/Plan - 2:  ·  Problem: DEBBY (acute kidney injury).   ·  Plan: Cr 2.62 from 1.1 as of 01/2022, likely prerenal in the setting of active infection, downtrended to 1.43.  - CTM with outpatient f/u      Problem/Plan - 3:  ·  Problem: IDDM (insulin dependent diabetes mellitus).   ·  Plan: Home meds: lantus 16, lispro 8  - Patient intermittently refusing insulin as "he knows his own blood sugars."    - d/c on increased Lantus dose of 18U and lispro 8.       Problem/Plan - 4:  ·  Problem: Hypertension.   ·  Plan: Home meds: lisinopril 40mg, nifedipine 90mg  Plan:  - continue home nifedipine 90mg.  - Hold lisinopril on d/c due to DEBBY.     Problem/Plan - 5:  ·  Problem: CAD (coronary artery disease).   ·  Plan: Pt reports recent stent (?12/2022). Unclear anatomy    - d/c on home asa, plavix, lipitor 80mg.    New Medications: Linezolid 600mg BID x 2 weeks (1/9-1/23), Lantus Dose at 18 units from 16.   Held Old Medications: Lisinopril 40mg 62M HTN, DM, CAD (stent about 1mo ago), hep C s/p treatment presenting with foot pain and swelling x1 day, found to have MRI confirmed osteomyelitis of tips of 4th and 5th metatarsals and admitted for management. PT was consulted for wound evaluation, performed wound care ( 1/2" packing in sinus tract, wrapped with DSD, Kerlix, ACE). Pt refused surgical intervention despite the fact that it was explained to him that this condition can lead to loss of limb or loss of life. ID was consulted for antibiotics management and pt was started on meropenem and daptomycin as cultures grew GP cocci; patient later refused both abx as he experienced diarrhea, despite the primary team explaining to him that this was a side effect of antibiotics. ID recommended doxycycline which patient took. Cultures then grew Corynebacterium, for which ID recommended switching to linezolid for 2 weeks as sensitivities came back. In the interim, patient also intermittently refused insulin, vital signs, and blood draws. He was restarted on home nifedipine. PT recommended no YUMIKO for the patient. Patient is stable to discharge with close follow up from Infectious diseases.     #Osteomyelitis.   MRI shows OM of 4th & 5th digit and remaining 4th & 5th metatarsal bases. Patient refused: surgical intervention, IV meropenem, and daptomycin even as risks were explained to him including worsening infection, death, and losing more limbs.   Both ID and Podiatry following, wound Cx so far growing staph simulans, Corneybacterium  - Per ID, will d/c on Linezolid 600 BID for 2 weeks pending sensitivities; he will need 6 weeks of antibiotics total (day 1 = 1/5/23). Will discharge with linezolid 600 mg PO q12 x 2 weeks.  He can follow up with ID Dr. Doe for outpatient for additional antibiotic prescriptions.  This will allow time for Corynebacterium susceptibilities to result.  We will determine which antibiotic to finish the course based on the susceptibilities.      #DEBBY (acute kidney injury).   ·  Plan: Cr 2.62 from 1.1 as of 01/2022, likely prerenal in the setting of active infection, downtrended to 1.43.  - CTM with outpatient f/u    #IDDM (insulin dependent diabetes mellitus).   Home meds: lantus 16, lispro 8  - Patient intermittently refusing insulin as "he knows his own blood sugars."  - d/c on increased Lantus dose of 18U and lispro 8.     #Hypertension.   Home meds: lisinopril 40mg, nifedipine 90mg  - continue home nifedipine 90mg.  - Hold lisinopril on d/c due to DEBBY.     #CAD (coronary artery disease).   Pt reports recent stent (?12/2022). Unclear anatomy  - d/c on home asa, plavix, lipitor 80mg.    New Medications: Linezolid 600mg BID x 2 weeks (1/9-1/23), Lantus Dose at 18 units from 16.   Held Old Medications: Lisinopril 40mg 62M HTN, DM, CAD (stent about 1mo ago), hep C s/p treatment presenting with foot pain and swelling x1 day, found to have MRI confirmed osteomyelitis of tips of 4th and 5th metatarsals and admitted for management. PT was consulted for wound evaluation, performed wound care ( 1/2" packing in sinus tract, wrapped with DSD, Kerlix, ACE). Pt refused surgical intervention despite the fact that it was explained to him that this condition can lead to loss of limb or loss of life. ID was consulted for antibiotics management and pt was started on meropenem and daptomycin as cultures grew GP cocci; patient later refused both abx as he experienced diarrhea, despite the primary team explaining to him that this was a side effect of antibiotics. ID recommended doxycycline which patient took. Cultures then grew Corynebacterium, for which ID recommended switching to linezolid for 2 weeks as sensitivities came back. In the interim, patient also intermittently refused insulin, vital signs, and blood draws. He was restarted on home nifedipine. PT recommended no YUMIKO for the patient. Patient is stable to discharge with close follow up from Infectious diseases.     #Osteomyelitis.   MRI shows OM of 4th & 5th digit and remaining 4th & 5th metatarsal bases. Patient refused: surgical intervention, IV meropenem, and daptomycin even as risks were explained to him including worsening infection, death, and losing more limbs. Wound Cx so far growing staph simulans, Corynebacterium.  - Per ID, he will need 6 weeks of antibiotics total (day 1 = 1/5/23). Will d/c on Linezolid 600 BID x 2 weeks, pending sensitivities. Pt will follow up with ID Dr. Doe outpatient for additional antibiotic prescriptions. This will allow time for Corynebacterium susceptibilities to result. We will determine which antibiotic to finish the course based on the susceptibilities.    #DEBBY (acute kidney injury).   Cr 2.62 from 1.1 as of 01/2022, likely prerenal in the setting of active infection, downtrended to 1.24, however restarted lisinopril and Cr increased to 1.44 again. Pt is producing urine.  - discontinued home lisinopril  - advised to avoid NSAIDs and nephrotoxic agents    #IDDM (insulin dependent diabetes mellitus).   Home meds: lantus 16, lispro 8. Patient intermittently refusing insulin as "he knows his own blood sugars." Insulin regimen was adjusted to lantus 22 units and lispro 5 units. FS well controlled.  - continue lispro 5 units SQ TID before meals  - continue lantus 22 units SQ qhs  - dietary/lifestyle modifications discussed at length    #Hypertension.   Home meds: lisinopril 40mg, nifedipine 90mg. Held lisinopril i/s/o DEBBY. Cr downtrended to 1.24, restarted lisinopril 10 mg qd, pt received 2 doses and Cr increased to 1.44. D/c'ed lisinopril and started HCTZ 12.5 mg qd. BP well controlled on this regimen.  - discontinued home lisinopril due to DEBBY  - continue home nifedipine 90 mg PO qd  - continue HCTZ 12.5 mg PO qd  - dietary/lifestyle modifications discussed at length    #CAD (coronary artery disease).   Home meds: ASA 81 mg qd, plavix 75 mg qd, lipitor 80 mg qhs. Pt reports recent stent (?12/2022). Unclear anatomy.  - continue home ASA 81 mg PO qd  - continue home plavix 75 mg PO qd  - continue home lipitor 80 mg PO qhs    New Medications: Linezolid 600mg PO BID x 2 weeks (1/9-1/23), HCTZ 12.5 mg PO qd  Medications that were discontinued: lisinopril  Changes to old medications: lantus 22 units qhs, lispro 5 units TID before meals 62M HTN, DM, CAD (stent about 1mo ago), hep C s/p treatment presenting with foot pain and swelling x1 day, found to have MRI confirmed osteomyelitis of tips of 4th and 5th metatarsals and admitted for management. PT was consulted for wound evaluation, performed wound care ( 1/2" packing in sinus tract, wrapped with DSD, Kerlix, ACE). Pt refused surgical intervention despite the fact that it was explained to him that this condition can lead to loss of limb or loss of life. ID was consulted for antibiotics management and pt was started on meropenem and daptomycin as cultures grew GP cocci; patient later refused both abx as he experienced diarrhea, despite the primary team explaining to him that this was a side effect of antibiotics. ID recommended doxycycline which patient took. Cultures then grew Corynebacterium, for which ID recommended switching to linezolid for 2 weeks as sensitivities came back. In the interim, patient also intermittently refused insulin, vital signs, and blood draws. He was restarted on home nifedipine. PT recommended no YUMIKO for the patient. Patient is stable to discharge with close follow up from Infectious diseases.     #Osteomyelitis.   MRI shows OM of 4th & 5th digit and remaining 4th & 5th metatarsal bases. Patient refused: surgical intervention, IV meropenem, and daptomycin even as risks were explained to him including worsening infection, death, and losing more limbs. Wound Cx so far growing staph simulans, Corynebacterium.  - Per ID, he will need 6 weeks of antibiotics total (day 1 = 1/5/23). Will d/c on Linezolid 600 BID x 2 weeks, pending sensitivities. Pt will follow up with ID Dr. Doe outpatient for additional antibiotic prescriptions. This will allow time for Corynebacterium susceptibilities to result. We will determine which antibiotic to finish the course based on the susceptibilities.  - per podiatry, RLE wound discharge dressing instructions: Cleanse wound with normal sailine daily, pat dry with sterile guaze, pack wound defect on top of foot with 1/4 inch iodoform packing, apply betadine to macerated edges, cover with dry sterile gauze, Apply saline-soaked gauze to wound on plantar foot. Wrap with Kerlix and light ACE bandage.  - follow-up with podiatry as outpatient at [facility]    #DEBBY (acute kidney injury).   Cr 2.62 from 1.1 as of 01/2022, likely prerenal in the setting of active infection, downtrended to 1.24, however restarted lisinopril and Cr increased to 1.44 again. Pt is producing urine.  - discontinued home lisinopril  - advised to avoid NSAIDs and nephrotoxic agents    #IDDM (insulin dependent diabetes mellitus).   Home meds: lantus 16, lispro 8. Patient intermittently refusing insulin as "he knows his own blood sugars." Insulin regimen was adjusted to lantus 22 units and lispro 5 units. FS well controlled.  - continue lispro 5 units SQ TID before meals  - continue lantus 22 units SQ qhs  - dietary/lifestyle modifications discussed at length    #Hypertension.   Home meds: lisinopril 40mg, nifedipine 90mg. Held lisinopril i/s/o DEBBY. Cr downtrended to 1.24, restarted lisinopril 10 mg qd, pt received 2 doses and Cr increased to 1.44. D/c'ed lisinopril and started HCTZ 12.5 mg qd. BP well controlled on this regimen.  - discontinued home lisinopril due to DEBBY  - continue home nifedipine 90 mg PO qd  - continue HCTZ 12.5 mg PO qd  - dietary/lifestyle modifications discussed at length    #CAD (coronary artery disease).   Home meds: ASA 81 mg qd, plavix 75 mg qd, lipitor 80 mg qhs. Pt reports recent stent (?12/2022). Unclear anatomy.  - continue home ASA 81 mg PO qd  - continue home plavix 75 mg PO qd  - continue home lipitor 80 mg PO qhs    New Medications: Linezolid 600mg PO BID x 2 weeks (1/9-1/23), HCTZ 12.5 mg PO qd  Medications that were discontinued: lisinopril  Changes to old medications: lantus 22 units qhs, lispro 5 units TID before meals

## 2023-01-09 NOTE — DISCHARGE NOTE PROVIDER - NSDCCPCAREPLAN_GEN_ALL_CORE_FT
PRINCIPAL DISCHARGE DIAGNOSIS  Diagnosis: Osteomyelitis  Assessment and Plan of Treatment: You had an infection of the bones of your feet also known as osteomyelitis. You were started on an antibiotic called daptomycin and meropenem, however you refused to continue taking them because of the side effect of diarrhea. You were then switched to doxycycline, and then based on new culture data, will be discharged on a two week course of Linezolid. Please complete the course and follow up with Dr. Doe at your appointment. You were recommended for surgical amputation however you refused despite it being explained to you that refusal and nonsugery could lead to loss of limb and life.     Please complete your antibiotic course (Last day 1/23). If you begin experiencing severly increased pain, purulent drainage, and/or fevers, please go to the hospital immediately.      SECONDARY DISCHARGE DIAGNOSES  Diagnosis: DEBBY (acute kidney injury)  Assessment and Plan of Treatment:      PRINCIPAL DISCHARGE DIAGNOSIS  Diagnosis: Osteomyelitis  Assessment and Plan of Treatment: You had an infection of the bones of your feet also known as osteomyelitis. You were started on an antibiotic called daptomycin and meropenem, however you refused to continue taking them because of the side effect of diarrhea. You were then switched to doxycycline, and then based on new culture data, will be discharged on a two week course of Linezolid. Please complete the course and follow up with Dr. Doe at your appointment. You were recommended for surgical amputation however you refused despite it being explained to you that refusal and nonsugery could lead to loss of limb and life.     Please complete your antibiotic course (Last day 1/23). If you begin experiencing severly increased pain, purulent drainage, and/or fevers, please go to the hospital immediately.      SECONDARY DISCHARGE DIAGNOSES  Diagnosis: IDDM (insulin dependent diabetes mellitus)  Assessment and Plan of Treatment: Diabetes mellitus refers to a group of diseases that affect how your body uses blood sugar (glucose). Glucose is vital to your health because it's an important source of energy for the cells that make up your muscles and tissues. It's also your brain's main source of fuel. The underlying cause of diabetes varies by type. But, no matter what type of diabetes you have, it can lead to excess sugar in your blood. Too much sugar in your blood can lead to serious health problems. Chronic diabetes conditions include type 1 diabetes (generally diagnosed in childhood) and type 2 diabetes (a disease in adults, can be associated with increased weight and diet). Some symptoms to watch out for with high blood sugar are nausea, vomiting, abdominal pain, increased thirst or urination, blurred vision, fatigue. Diabetes is generally diagnosed via a blood test called Hemoglobin a1c. It represents a marker of glucose in the blood.   Your Lantus dose was increased to 18 units this admission because you had high blood sugars. Please continue taking this dose and follow up at our primary care clinic. They will call you to make an appointment.        PRINCIPAL DISCHARGE DIAGNOSIS  Diagnosis: Osteomyelitis  Assessment and Plan of Treatment: You had an infection of the bones of your feet also known as osteomyelitis. You were started on an antibiotic called daptomycin and meropenem, however you refused to continue taking them because of the side effect of diarrhea. You were then switched to doxycycline, and then based on new culture data, will be discharged on a two week course of Linezolid. Please complete the 2 week course and follow up with Dr. Doe as an outpatient. You will need a total of 6 weeks of antibiotics, so it is very important that you see Dr. Doe in his office so that he can give you antibiotics for the remaining 4 weeks. You were recommended for surgical amputation however you refused despite it being explained to you that refusal and nonsugery could lead to loss of limb and life.     If you begin experiencing severly increased pain, purulent drainage, and/or fevers, please go to the hospital immediately.      SECONDARY DISCHARGE DIAGNOSES  Diagnosis: IDDM (insulin dependent diabetes mellitus)  Assessment and Plan of Treatment: Diabetes mellitus refers to a group of diseases that affect how your body uses blood sugar (glucose). Glucose is vital to your health because it's an important source of energy for the cells that make up your muscles and tissues. It's also your brain's main source of fuel. The underlying cause of diabetes varies by type. But, no matter what type of diabetes you have, it can lead to excess sugar in your blood. Too much sugar in your blood can lead to serious health problems. Chronic diabetes conditions include type 1 diabetes (generally diagnosed in childhood) and type 2 diabetes (a disease in adults, can be associated with increased weight and diet). Some symptoms to watch out for with high blood sugar are nausea, vomiting, abdominal pain, increased thirst or urination, blurred vision, fatigue. Diabetes is generally diagnosed via a blood test called Hemoglobin a1c. It represents a marker of glucose in the blood.   Your Lantus dose was increased to 18 units this admission because you had high blood sugars. Please continue taking this dose and follow up at our primary care clinic. They will call you to make an appointment.        PRINCIPAL DISCHARGE DIAGNOSIS  Diagnosis: Osteomyelitis  Assessment and Plan of Treatment: You had an infection of the bones of your feet also known as osteomyelitis. You were started on an antibiotic called daptomycin and meropenem, however you refused to continue taking them because of the side effect of diarrhea. You were then switched to doxycycline, and then based on new culture data, will be discharged on a two week course of Linezolid. Please complete the 2 week course and follow up with Dr. Doe as an outpatient on 1/23/23 at 12:00 PM. You will need a total of 6 weeks of antibiotics, so it is very important that you see Dr. Doe in his office so that he can give you antibiotics for the remaining 4 weeks. You were recommended for surgical amputation however you refused despite it being explained to you that refusal and nonsugery could lead to loss of limb and life.     If you begin experiencing severly increased pain, purulent drainage, and/or fevers, please go to the hospital immediately.      SECONDARY DISCHARGE DIAGNOSES  Diagnosis: IDDM (insulin dependent diabetes mellitus)  Assessment and Plan of Treatment: Diabetes mellitus refers to a group of diseases that affect how your body uses blood sugar (glucose). Glucose is vital to your health because it's an important source of energy for the cells that make up your muscles and tissues. It's also your brain's main source of fuel. The underlying cause of diabetes varies by type. But, no matter what type of diabetes you have, it can lead to excess sugar in your blood. Too much sugar in your blood can lead to serious health problems. Chronic diabetes conditions include type 1 diabetes (generally diagnosed in childhood) and type 2 diabetes (a disease in adults, can be associated with increased weight and diet). Some symptoms to watch out for with high blood sugar are nausea, vomiting, abdominal pain, increased thirst or urination, blurred vision, fatigue. Diabetes is generally diagnosed via a blood test called Hemoglobin a1c. It represents a marker of glucose in the blood.   Your Lantus dose was increased to 18 units this admission because you had high blood sugars. Please continue taking this dose and follow up at our primary care clinic. They will call you to make an appointment.        PRINCIPAL DISCHARGE DIAGNOSIS  Diagnosis: Osteomyelitis  Assessment and Plan of Treatment: You had an infection of the bones of your feet also known as osteomyelitis. You were started on an antibiotic called daptomycin and meropenem, however you declined to continue taking them because of the side effect of diarrhea. You were then switched to doxycycline, and then based on new culture data, will be discharged on a two week course of Linezolid. Please complete the 2 week course and follow up with Dr. Doe as an outpatient on 1/23/23 at 12:00 PM. You will need a total of 6 weeks of antibiotics, so it is very important that you see Dr. Doe in his office so that he can give you antibiotics for the remaining 4 weeks. You were recommended for surgical amputation however you declined despite it being explained to you that refusal and nonsugery could lead to loss of limb and life.   If you begin experiencing severly increased pain, purulent drainage, and/or fevers, please go to the hospital immediately.      SECONDARY DISCHARGE DIAGNOSES  Diagnosis: IDDM (insulin dependent diabetes mellitus)  Assessment and Plan of Treatment: Diabetes mellitus refers to a group of diseases that affect how your body uses blood sugar (glucose). Glucose is vital to your health because it's an important source of energy for the cells that make up your muscles and tissues. It's also your brain's main source of fuel. The underlying cause of diabetes varies by type. But, no matter what type of diabetes you have, it can lead to excess sugar in your blood. Too much sugar in your blood can lead to serious health problems. Chronic diabetes conditions include type 1 diabetes (generally diagnosed in childhood) and type 2 diabetes (a disease in adults, can be associated with increased weight and diet). Some symptoms to watch out for with high blood sugar are nausea, vomiting, abdominal pain, increased thirst or urination, blurred vision, fatigue. Diabetes is generally diagnosed via a blood test called Hemoglobin a1c. It represents a marker of glucose in the blood.   Your Lantus dose was increased to 22 units at bedtime because you had high blood sugars. Your lispro dose was decreased to 5 units three times a day befor meals. Please continue taking these doses and follow up at our primary care clinic. They will call you to make an appointment.       Diagnosis: HTN (hypertension)  Assessment and Plan of Treatment: Hypertension is the medical term for high blood pressure. Blood pressure refers to the pressure that blood applies to the inner walls of the arteries. Arteries carry blood from the heart to other organs and parts of the body. Untreated high blood pressure increases the strain on the heart and arteries, eventually causing organ damage. High blood pressure increases the risk of heart failure, heart attack (myocardial infarction), stroke, and kidney failure. High blood pressure does not usually cause any symptoms. Treatment of hypertension usually begins with lifestyle changes. Making these lifestyle changes involves little or no risk. Recommended changes often include reducing the amount of salt in your diet, losing weight if you are overweight or obese, avoiding drinking too much alcohol, stopping smoking and exercising at least 30 minutes per day most days of the week. It is important to take these as prescribed to prevent the possible complications of uncontrolled hypertension. We stopped your lisinopril during this admission because of your kidney function. We replaced it with a new medication called hydrochlorothiazide (HCTZ). Please take the following medications as described:  - DO NOT TAKE lisinopril  - CONTINUE nifedipine XL 90 mg once daily  - CONTINUE hydrochlorhothiazide (HCTZ) 12.5 mg once daily  Please follow up at our primary care clinic for further management of your blood pressure. They will call you to make an appointment.

## 2023-01-09 NOTE — PROGRESS NOTE ADULT - SUBJECTIVE AND OBJECTIVE BOX
Patient is a 62y old  Male who presents with a chief complaint of left foot infection (09 Jan 2023 12:33)      INTERVAL HPI/ OVERNIGHT EVENTS. Pt seen and examined bedside. Discussed bone biopsy with patient, and pt interested in moving forward with bone biopsy.       LABS                        8.7    9.32  )-----------( 307      ( 09 Jan 2023 05:30 )             29.0     01-09    137  |  106  |  33<H>  ----------------------------<  272<H>  3.9   |  23  |  1.43<H>    Ca    8.9      09 Jan 2023 05:30  Phos  2.7     01-09  Mg     1.9     01-09    TPro  7.5  /  Alb  3.0<L>  /  TBili  <0.2  /  DBili  x   /  AST  13  /  ALT  5<L>  /  AlkPhos  150<H>  01-09        ICU Vital Signs Last 24 Hrs  T(C): 36.8 (09 Jan 2023 09:30), Max: 36.9 (08 Jan 2023 21:30)  T(F): 98.2 (09 Jan 2023 09:30), Max: 98.5 (08 Jan 2023 21:30)  HR: 93 (09 Jan 2023 09:30) (83 - 96)  BP: 138/76 (09 Jan 2023 09:30) (119/67 - 153/77)  BP(mean): --  ABP: --  ABP(mean): --  RR: 17 (09 Jan 2023 09:30) (17 - 18)  SpO2: 100% (09 Jan 2023 09:30) (99% - 100%)    O2 Parameters below as of 09 Jan 2023 09:30  Patient On (Oxygen Delivery Method): room air            RADIOLOGY  < from: MR Foot No Cont, Left (01.06.23 @ 19:10) >  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 It is unclear if a   portion portion of this absence of these bones is related to prior   surgery. Correlation with surgical history is recommended.  Osteomyelitis at the tips of what remains of the bases of these fourth   and fifth metatarsals.  Osseous erosion/resorption at the base of the fourth digit proximal   phalanx with osteomyelitis throughout the proximal phalanx.  Osteomyelitis of the fifth digit proximal phalanx.  < end of copied text >    MICROBIOLOGY  Culture - Other (01.05.23 @ 08:36)    Gram Stain:   Rare Gram Positive Rods  Rare Gram positive cocci in pairs  Few WBC's    -  Clindamycin: R >4    -  Erythromycin: R >4    -  Linezolid: S 1    -  Oxacillin: R >2    -  Rifampin: S <=1 Should not be used as monotherapy    -  Trimethoprim/Sulfamethoxazole: R >2/38    -  Vancomycin: S 1    Specimen Source: .Other Left Incision site    Culture Results:   Moderate Staphylococcus simulans  Numerous Corynebacterium striatum group    Organism Identification: Staphylococcus simulans    Organism: Staphylococcus simulans    Method Type: JOHNNY        PHYSICAL EXAM  Left Lower Extremity Focused   Vasc: 1/4 DP/PT b/l. L foot edema. No erythema   Derm: L foot subMT 2-3 superficial wound, granular base with sanginous drainage, negative for undermining, malodor, purulent drainage or signs of infection. Dorsal 4th interspace L foot linear opening probing proximally 6cm, is +PTB towards 4th and 5th MT bases. Serosanguionus drainage from this wound, negative for malodor, proximal streaking, or erythema. No macerations   Neuro: Protective sensation absent  MSK: 5/5 MMT b/l. -TTP of palpation to L foot.

## 2023-01-09 NOTE — PROGRESS NOTE ADULT - SUBJECTIVE AND OBJECTIVE BOX
INTERVAL HPI/OVERNIGHT EVENTS:    Patient was seen and examined at bedside.  Reports diarrhea with daptomycin.  He also does not want to take meropenem anymore.    CONSTITUTIONAL:  Negative fever or chills, feels well, good appetite  EYES:  Negative  blurry vision or double vision  CARDIOVASCULAR:  Negative for chest pain or palpitations  RESPIRATORY:  Negative for cough, wheezing, or SOB   GASTROINTESTINAL:  Negative for nausea, vomiting, diarrhea, constipation, or abdominal pain  GENITOURINARY:  Negative frequency, urgency or dysuria  NEUROLOGIC:  No headache, confusion, dizziness, lightheadedness      ANTIBIOTICS/RELEVANT:    MEDICATIONS  (STANDING):  aspirin enteric coated 81 milliGRAM(s) Oral daily  atorvastatin 80 milliGRAM(s) Oral at bedtime  clopidogrel Tablet 75 milliGRAM(s) Oral daily  heparin   Injectable 5000 Unit(s) SubCutaneous every 8 hours  insulin glargine Injectable (LANTUS) 18 Unit(s) SubCutaneous at bedtime  insulin lispro (ADMELOG) corrective regimen sliding scale   SubCutaneous Before meals and at bedtime  insulin lispro Injectable (ADMELOG) 5 Unit(s) SubCutaneous three times a day before meals  linezolid    Tablet 600 milliGRAM(s) Oral every 12 hours  multivitamin 1 Tablet(s) Oral daily  NIFEdipine XL 90 milliGRAM(s) Oral daily    MEDICATIONS  (PRN):  acetaminophen     Tablet .. 650 milliGRAM(s) Oral every 6 hours PRN Temp greater or equal to 38C (100.4F), Mild Pain (1 - 3)        Vital Signs Last 24 Hrs  T(C): 36.8 (09 Jan 2023 09:30), Max: 36.9 (08 Jan 2023 21:30)  T(F): 98.2 (09 Jan 2023 09:30), Max: 98.5 (08 Jan 2023 21:30)  HR: 93 (09 Jan 2023 09:30) (83 - 96)  BP: 138/76 (09 Jan 2023 09:30) (119/67 - 153/77)  BP(mean): --  RR: 17 (09 Jan 2023 09:30) (17 - 18)  SpO2: 100% (09 Jan 2023 09:30) (99% - 100%)    Parameters below as of 09 Jan 2023 09:30  Patient On (Oxygen Delivery Method): room air        PHYSICAL EXAM:  Constitutional:  non-toxic, no distress  Eyes:LUCY, EOMI  Ear/Nose/Throat: no oral lesion, no sinus tenderness on percussion	  Neck:  supple  Respiratory: CTA [place]  Cardiovascular: S1S2 RRR, no murmurs  Gastrointestinal:soft, (+) BS, no HSM  Extremities:  dressing in place   Vascular: DP Pulse:	right normal; left normal      LABS:                        8.7    9.32  )-----------( 307      ( 09 Jan 2023 05:30 )             29.0     01-09    137  |  106  |  33<H>  ----------------------------<  272<H>  3.9   |  23  |  1.43<H>    Ca    8.9      09 Jan 2023 05:30  Phos  2.7     01-09  Mg     1.9     01-09    TPro  7.5  /  Alb  3.0<L>  /  TBili  <0.2  /  DBili  x   /  AST  13  /  ALT  5<L>  /  AlkPhos  150<H>  01-09          MICROBIOLOGY:    Culture - Other (01.05.23 @ 08:36)    Gram Stain:   Rare Gram Positive Rods  Rare Gram positive cocci in pairs  Few WBC's    -  Erythromycin: R >4    -  Clindamycin: R >4    -  Linezolid: S 1    -  Rifampin: S <=1 Should not be used as monotherapy    -  Tetracycline: S <=1    -  Oxacillin: R >2    -  Trimethoprim/Sulfamethoxazole: R >2/38    -  Vancomycin: S 1    Specimen Source: .Other Left Incision site    Culture Results:   Moderate Staphylococcus simulans  Numerous Corynebacterium striatum group    Organism Identification: Staphylococcus simulans    Organism: Staphylococcus simulans    Method Type: JOHNNY        RADIOLOGY & ADDITIONAL STUDIES:    < from: MR Foot No Cont, Left (01.06.23 @ 19:10) >    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 It is unclear if a   portion portion of this absence of these bones is related to prior   surgery. Correlation with surgical history is recommended.    Osteomyelitis at the tips of what remains of the bases of these fourth   and fifth metatarsals.    Osseous erosion/resorption at the base of the fourth digit proximal   phalanx with osteomyelitis throughout the proximal phalanx.    Osteomyelitis of the fifth digit proximal phalanx.    < end of copied text >

## 2023-01-09 NOTE — DISCHARGE NOTE PROVIDER - NSDCFUADDINST_GEN_ALL_CORE_FT
RLE wound: Discharge dressing instructions: Cleanse wound with normal sailine daily, pat dry with sterile guaze, pack wound defect on top of foot with 1/4 inch iodoform packing, apply betadine to macerated edges, cover with dry sterile gauze, Apply saline-soaked gauze to wound on plantar foot. Wrap with Kerlix and light ACE bandage.

## 2023-01-09 NOTE — DISCHARGE NOTE PROVIDER - NSDCDCMDCOMP_GEN_ALL_CORE
Detail Level: Zone General Sunscreen Counseling: I recommended a broad spectrum sunscreen with a SPF of 30 or higher. This document is complete and the patient is ready for discharge.

## 2023-01-09 NOTE — DISCHARGE NOTE PROVIDER - CARE PROVIDER_API CALL
Dajuan Doe)  Internal Medicine  [address]  [place], [place] [address]  Phone: [phone]  Fax: [phone]  Established Patient  Follow Up Time:    Dajuan Doe)  Internal Medicine  [address] [place] [address]  Phone: [phone]  Fax: [phone]  Established Patient  Follow Up Time:     Sergio Salinas)  Internal Medicine  [address] [place] [address]  Phone: [phone]  Fax: [phone]  Follow Up Time: 2 weeks   Dajuan Doe)  Internal Medicine  [address], [address] [place] [address]  Phone: [phone]  Fax: [phone]  Established Patient  Follow Up Time: 2 weeks    Sergio Salinas)  Internal Medicine  [address] [place] [address]  Phone: [phone]  Fax: [phone]  Follow Up Time: 2 weeks   Dajuan Doe)  Internal Medicine  [address] [place] [address]  Phone: [phone]  Fax: [phone]  Scheduled Appointment: 01/23/2023 12:00 PM    Sergio Salinas)  Internal Medicine  [address] [place] [address]  Phone: [phone]  Fax: [phone]  Follow Up Time: 2 weeks

## 2023-01-09 NOTE — DISCHARGE NOTE PROVIDER - ATTENDING DISCHARGE PHYSICAL EXAMINATION:
61 yo M pmhx DM, HTN, CAD vs PAD (stent about 1mo ago), Hep C s/p treatment p/w L foot pain and swelling x days.   Pt admitted for OM of left foot     # OM of  left foot   MRI: reviewed and cw OM   elevated ESR/CRP: CRP of 18.8, ESR 90, wbc trended down   was on zosyn (renally dosed), then meropenam and daptomycin for bs coverage. pt refused medications bc he was having diarrhea and was transitioned to doxy   fu blood cx NGTD and wound cx showed Corynebacterium: sensitive to linezolid and Vanc. pt will be started on linezolid 1/9 x 2 weeks and will have a fu with ID for repeat cbc, cmp, esr, crp and will fu final cultures to complete 6 week treatment   Podiatry following     #DEBBY   #ATN fena 1.6: intrinsic 2/2 NSAID use   cr 2.6 on admission -> 2.4 --1.4 now   last known wnl last year   avoid NSAID     #HTN, holding home ACEi d/t DEBBY. cw nifedipine     #uncontrolled DM   7.6 a1c, cw hss, will increase lantus to 14 u tonight     #CAD/PAD   continue with home medications    #anemia   likely ACOD   hgb remains btw 9-8   will need repeat labs in 2 weeks while on linezolid as it causes BM suppression  61 yo M pmhx DM, HTN, CAD vs PAD (stent about 1mo ago), Hep C s/p treatment p/w L foot pain and swelling x days.   Pt admitted for OM of left foot     # OM of  left foot   MRI: reviewed and cw OM   elevated ESR/CRP: CRP of 18.8, ESR 90, wbc trended down   was on zosyn (renally dosed), then meropenam and daptomycin for bs coverage. pt refused medications bc he was having diarrhea and was transitioned to doxy   fu blood cx NGTD and wound cx showed Corynebacterium: sensitive to linezolid and Vanc. pt will be started on linezolid 1/9 x 2 weeks and will have a fu with ID for repeat cbc, cmp, esr, crp and will fu final cultures to complete 6 week treatment   Podiatry following     #DEBBY   #ATN fena 1.6: intrinsic 2/2 NSAID use   cr 2.6 on admission -> 2.4 --1.4 now   last known wnl last year   avoid NSAID     #HTN, holding home ACEi d/t DEBBY. cw nifedipine and HCTZ on dc      #uncontrolled DM   7.6 a1c, cw hss, will increase lantus to 22 u on discharge     #CAD/PAD   continue with home medications    #anemia   likely ACOD   hgb remains btw 9-8   will need repeat labs in 2 weeks while on linezolid as it causes BM suppression

## 2023-01-09 NOTE — DISCHARGE NOTE PROVIDER - NSDCFUADDAPPT_GEN_ALL_CORE_FT
[facility]   Address: [address] [place] [id number]  Phone: [phone]

## 2023-01-09 NOTE — PROGRESS NOTE ADULT - ASSESSMENT
63 yo M pmhx DM, HTN, CAD (stent about 1mo ago), Hep C s/p treatment p/w L foot pain and swelling going on for a few days. Pt noted he recently had L foot amputation by Vascular surgery for OM (partial 4th and 5th ray amputation with digits intact). Pt cannot recall who his surgeon was or when exactly it was done, but notes it was at [facility]. Podiatry consulted for evaluation of wound, r/o OM, r/o gas. Of note XR wet read hardware in L 1st TMT joint, broken screws present. Evidence of previous 4th and 5th partial MT resection. No evidence of gas. L dorsal wound +PTB, suspicious for OM. MRI shows OM of 4th & 5th digit and remaining 4th & 5th metatarsal bases. Deep wound culture growing staph simulans. Pt is now s/p L foot bone biopsy of 5th digit proximal phalanx (1/9).     Plan:  - Bone biopsy was performed bedside (see procedure note for details). Procedure and alternatives were explained to patient in detail and informed consent was obtained prior to commencement of procedure. Pt tolerated procedure well.   -  Recc c/w ABX per ID  - Local wound care: WTD to L plantar wound, flushed dorsal wound and placed 1/2" packing in sinus tract, wrapped with DSD, Kerlix, ACE  - Recc WBAT w/ cane to L foot  - rest of care per primary team    Podiatry following. Plan d/w attending.

## 2023-01-09 NOTE — PROGRESS NOTE ADULT - ASSESSMENT
per Internal Medicine    62 y o M HTN, DM, CAD (stent about 1mo ago?), hep C s/p treatment presenting with foot pain and swelling x1 day found to have osteomyelitis of 4th & 5th digit and remaining 4th & 5th metatarsal bases, course made complex as patient refused antibiotics intermittently as well as surgical intervention, now plan to treat with Linezolid.     Problem/Plan - 1:  ·  Problem: Osteomyelitis.   ·  Plan: MRI shows OM of 4th & 5th digit and remaining 4th & 5th metatarsal bases. Patient refused: surgical intervention, meropenem, and daptomycin even as risks were explained to him including worsening infection, death, and losing more limbs.     Both ID and Podiatry following  - Wound Cx so far growing staph simulans, Corneybacterium  - Per ID, will start on Linezolid 600 BID for 2 weeks pending sensitivities and then transition to abx.   - Appreciate ID recs.    Problem/Plan - 2:  ·  Problem: DEBBY (acute kidney injury).   ·  Plan: Cr 2.62 from 1.1 as of 01/2022  - likely prerenal in the setting of active infection, downtrended.  - CTM.    Problem/Plan - 3:  ·  Problem: IDDM (insulin dependent diabetes mellitus).   ·  Plan: Home meds: lantus 16, lispro 8  - Patient intermittently refusing insulin as "he knows his own blood sugars."    - on Lantus 16U at bedtime- will increase to 18U  - Lispo 5ITD  - mISS.    Problem/Plan - 4:  ·  Problem: Hypertension.   ·  Plan: Home meds: lisinopril 40mg, nifedipine 90mg  Plan:  - Lisinopril held in the setting of DEBBY. Will likely start tomorrow   - continue home nifedipine 90mg.    Problem/Plan - 5:  ·  Problem: CAD (coronary artery disease).   ·  Plan: Pt reports recent stent (?12/2022). Unclear anatomy    - continue asa, plavix, lipitor 80mg.    Problem/Plan - 6:  ·  Problem: Nutrition, metabolism, and development symptoms.   ·  Plan: F: none  E: caution in DEBBY  N: consistent carb  DVT ppx: lovenox  Code status: full code.

## 2023-01-09 NOTE — PROCEDURE NOTE - GENERAL PROCEDURE DETAILS
Prepped area with betadine. Using sterile gloves, and instrumentation performed the procedure. Made stab incision using a #15 blade to dorsolateral 5th digit. Using hemostat, dissected to layer of bone. Using jamshidi needle, took 2 samples of bone. Sent bone for culture and pathology.

## 2023-01-09 NOTE — PROGRESS NOTE ADULT - SUBJECTIVE AND OBJECTIVE BOX
OVERNIGHT EVENTS: Refused meropenem.    SUBJECTIVE:  Patient seen and examined at bedside. Endorsed refusing meropenem because of diarrhea. Endorsed willingness to take doxycycline for the same reason. Requested that his toes be sewn and fixed from a prior bad surgery. Endorsed refusing insulin. Endorsed refusing daptomycin because of diarrhea. Explained to patient diarrhea was a possible side effect of the antibiotics. Patient endorsed understanding and still refused.     Vital Signs Last 12 Hrs  T(F): 98.2 (01-09-23 @ 09:30), Max: 98.2 (01-09-23 @ 05:50)  HR: 93 (01-09-23 @ 09:30) (93 - 96)  BP: 138/76 (01-09-23 @ 09:30) (138/76 - 153/77)  BP(mean): --  RR: 17 (01-09-23 @ 09:30) (17 - 18)  SpO2: 100% (01-09-23 @ 09:30) (100% - 100%)  I&O's Summary      PHYSICAL EXAM:  Constitutional: Resting comfortably in bed; NAD  ENT: MMM  Neck: supple  Respiratory: CTA B/L; no W/R/R, no retractions  Cardiac: +S1/S2; RRR; no M/R/G  Gastrointestinal: soft, NT/ND; no rebound or guarding  Extremities: WWP, no clubbing or cyanosis; no peripheral edema  Vascular: 1+ radial, DP pulses B/L  Dermatologic: 3cm x 3cm superficial ulcer on plantar aspect of L foot without drainage or visible bone, now wrapped.   Neurologic: AAOx3; CNII-XII grossly intact; no focal deficits  Psychiatric: affect and characteristics of appearance, verbalizations, behaviors are appropriate        LABS:                        8.7    9.32  )-----------( 307      ( 09 Jan 2023 05:30 )             29.0     01-09    137  |  106  |  33<H>  ----------------------------<  272<H>  3.9   |  23  |  1.43<H>    Ca    8.9      09 Jan 2023 05:30  Phos  2.7     01-09  Mg     1.9     01-09    TPro  7.5  /  Alb  3.0<L>  /  TBili  <0.2  /  DBili  x   /  AST  13  /  ALT  5<L>  /  AlkPhos  150<H>  01-09            RADIOLOGY & ADDITIONAL TESTS:    MEDICATIONS  (STANDING):  aspirin enteric coated 81 milliGRAM(s) Oral daily  atorvastatin 80 milliGRAM(s) Oral at bedtime  clopidogrel Tablet 75 milliGRAM(s) Oral daily  heparin   Injectable 5000 Unit(s) SubCutaneous every 8 hours  insulin glargine Injectable (LANTUS) 18 Unit(s) SubCutaneous at bedtime  insulin lispro (ADMELOG) corrective regimen sliding scale   SubCutaneous Before meals and at bedtime  insulin lispro Injectable (ADMELOG) 5 Unit(s) SubCutaneous three times a day before meals  linezolid    Tablet 600 milliGRAM(s) Oral every 12 hours  multivitamin 1 Tablet(s) Oral daily  NIFEdipine XL 90 milliGRAM(s) Oral daily    MEDICATIONS  (PRN):  acetaminophen     Tablet .. 650 milliGRAM(s) Oral every 6 hours PRN Temp greater or equal to 38C (100.4F), Mild Pain (1 - 3)

## 2023-01-09 NOTE — PROGRESS NOTE ADULT - ASSESSMENT
62M HTN, DM, CAD (stent about 1mo ago?), hep C s/p treatment presenting with foot pain and swelling x1 day found to have osteomyelitis of 4th & 5th digit and remaining 4th & 5th metatarsal bases, course made complex as patient refused antibiotics intermittently as well as surgical intervention, now plan to treat with Linezolid.

## 2023-01-09 NOTE — DISCHARGE NOTE PROVIDER - NSDCMRMEDTOKEN_GEN_ALL_CORE_FT
Admelog 100 units/mL injectable solution: 8 unit(s) injectable 3 times a day (before meals)  Aspirin Enteric Coated 81 mg oral delayed release tablet: 1 tab(s) orally once a day  insulin glargine 100 units/mL subcutaneous solution: 18 unit(s) subcutaneous once a day (at bedtime)  linezolid 600 mg oral tablet: 1 tab(s) orally every 12 hours  Lipitor 80 mg oral tablet: 1 tab(s) orally once a day  NIFEdipine 90 mg oral tablet, extended release: 1 tab(s) orally once a day  Plavix 75 mg oral tablet: 1 tab(s) orally once a day   Admelog 100 units/mL injectable solution: 5 unit(s) injectable 3 times a day (before meals)  Aspirin Enteric Coated 81 mg oral delayed release tablet: 1 tab(s) orally once a day  hydroCHLOROthiazide 12.5 mg oral capsule: 1 cap(s) orally every 24 hours  insulin glargine 100 units/mL subcutaneous solution: 18 unit(s) subcutaneous once a day (at bedtime)  insulin glargine 100 units/mL subcutaneous solution: 22 unit(s) subcutaneous once a day (at bedtime)  linezolid 600 mg oral tablet: 1 tab(s) orally every 12 hours  Lipitor 80 mg oral tablet: 1 tab(s) orally once a day  NIFEdipine 90 mg oral tablet, extended release: 1 tab(s) orally once a day  Plavix 75 mg oral tablet: 1 tab(s) orally once a day

## 2023-01-09 NOTE — DISCHARGE NOTE PROVIDER - PROVIDER TOKENS
PROVIDER:[TOKEN:[[id number]],ESTABLISHEDPATIENT:[T]] PROVIDER:[TOKEN:[[id number]],ESTABLISHEDPATIENT:[T]],PROVIDER:[TOKEN:[[id number]],FOLLOWUP:[2 weeks]] PROVIDER:[TOKEN:[[id number]],FOLLOWUP:[2 weeks],ESTABLISHEDPATIENT:[T]],PROVIDER:[TOKEN:[[id number]],FOLLOWUP:[2 weeks]] PROVIDER:[TOKEN:[[id number]],SCHEDULEDAPPT:[01/23/2023],SCHEDULEDAPPTTIME:[12:00 PM]],PROVIDER:[TOKEN:[[id number]],FOLLOWUP:[2 weeks]]

## 2023-01-10 LAB
ANION GAP SERPL CALC-SCNC: 10 MMOL/L — SIGNIFICANT CHANGE UP (ref 5–17)
BASOPHILS # BLD AUTO: 0.07 K/UL — SIGNIFICANT CHANGE UP (ref 0–0.2)
BASOPHILS NFR BLD AUTO: 0.7 % — SIGNIFICANT CHANGE UP (ref 0–2)
BUN SERPL-MCNC: 27 MG/DL — HIGH (ref 7–23)
CALCIUM SERPL-MCNC: 9.3 MG/DL — SIGNIFICANT CHANGE UP (ref 8.4–10.5)
CHLORIDE SERPL-SCNC: 102 MMOL/L — SIGNIFICANT CHANGE UP (ref 96–108)
CO2 SERPL-SCNC: 25 MMOL/L — SIGNIFICANT CHANGE UP (ref 22–31)
CREAT SERPL-MCNC: 1.24 MG/DL — SIGNIFICANT CHANGE UP (ref 0.5–1.3)
CULTURE RESULTS: SIGNIFICANT CHANGE UP
CULTURE RESULTS: SIGNIFICANT CHANGE UP
EGFR: 67 ML/MIN/1.73M2 — SIGNIFICANT CHANGE UP
EOSINOPHIL # BLD AUTO: 0.29 K/UL — SIGNIFICANT CHANGE UP (ref 0–0.5)
EOSINOPHIL NFR BLD AUTO: 2.9 % — SIGNIFICANT CHANGE UP (ref 0–6)
GLUCOSE BLDC GLUCOMTR-MCNC: 116 MG/DL — HIGH (ref 70–99)
GLUCOSE BLDC GLUCOMTR-MCNC: 153 MG/DL — HIGH (ref 70–99)
GLUCOSE BLDC GLUCOMTR-MCNC: 203 MG/DL — HIGH (ref 70–99)
GLUCOSE BLDC GLUCOMTR-MCNC: 215 MG/DL — HIGH (ref 70–99)
GLUCOSE SERPL-MCNC: 199 MG/DL — HIGH (ref 70–99)
HCT VFR BLD CALC: 29.4 % — LOW (ref 39–50)
HGB BLD-MCNC: 8.8 G/DL — LOW (ref 13–17)
IMM GRANULOCYTES NFR BLD AUTO: 0.4 % — SIGNIFICANT CHANGE UP (ref 0–0.9)
LYMPHOCYTES # BLD AUTO: 2.63 K/UL — SIGNIFICANT CHANGE UP (ref 1–3.3)
LYMPHOCYTES # BLD AUTO: 26.2 % — SIGNIFICANT CHANGE UP (ref 13–44)
MAGNESIUM SERPL-MCNC: 1.8 MG/DL — SIGNIFICANT CHANGE UP (ref 1.6–2.6)
MCHC RBC-ENTMCNC: 22.5 PG — LOW (ref 27–34)
MCHC RBC-ENTMCNC: 29.9 GM/DL — LOW (ref 32–36)
MCV RBC AUTO: 75.2 FL — LOW (ref 80–100)
MONOCYTES # BLD AUTO: 0.59 K/UL — SIGNIFICANT CHANGE UP (ref 0–0.9)
MONOCYTES NFR BLD AUTO: 5.9 % — SIGNIFICANT CHANGE UP (ref 2–14)
NEUTROPHILS # BLD AUTO: 6.42 K/UL — SIGNIFICANT CHANGE UP (ref 1.8–7.4)
NEUTROPHILS NFR BLD AUTO: 63.9 % — SIGNIFICANT CHANGE UP (ref 43–77)
NRBC # BLD: 0 /100 WBCS — SIGNIFICANT CHANGE UP (ref 0–0)
PHOSPHATE SERPL-MCNC: 2.7 MG/DL — SIGNIFICANT CHANGE UP (ref 2.5–4.5)
PLATELET # BLD AUTO: 346 K/UL — SIGNIFICANT CHANGE UP (ref 150–400)
POTASSIUM SERPL-MCNC: 3.7 MMOL/L — SIGNIFICANT CHANGE UP (ref 3.5–5.3)
POTASSIUM SERPL-SCNC: 3.7 MMOL/L — SIGNIFICANT CHANGE UP (ref 3.5–5.3)
RBC # BLD: 3.91 M/UL — LOW (ref 4.2–5.8)
RBC # FLD: 17 % — HIGH (ref 10.3–14.5)
SODIUM SERPL-SCNC: 137 MMOL/L — SIGNIFICANT CHANGE UP (ref 135–145)
SPECIMEN SOURCE: SIGNIFICANT CHANGE UP
SPECIMEN SOURCE: SIGNIFICANT CHANGE UP
WBC # BLD: 10.04 K/UL — SIGNIFICANT CHANGE UP (ref 3.8–10.5)
WBC # FLD AUTO: 10.04 K/UL — SIGNIFICANT CHANGE UP (ref 3.8–10.5)

## 2023-01-10 PROCEDURE — 99233 SBSQ HOSP IP/OBS HIGH 50: CPT | Mod: GC

## 2023-01-10 RX ORDER — MAGNESIUM SULFATE 500 MG/ML
2 VIAL (ML) INJECTION ONCE
Refills: 0 | Status: COMPLETED | OUTPATIENT
Start: 2023-01-10 | End: 2023-01-10

## 2023-01-10 RX ORDER — INSULIN GLARGINE 100 [IU]/ML
20 INJECTION, SOLUTION SUBCUTANEOUS AT BEDTIME
Refills: 0 | Status: DISCONTINUED | OUTPATIENT
Start: 2023-01-10 | End: 2023-01-11

## 2023-01-10 RX ORDER — MAGNESIUM OXIDE 400 MG ORAL TABLET 241.3 MG
400 TABLET ORAL ONCE
Refills: 0 | Status: COMPLETED | OUTPATIENT
Start: 2023-01-10 | End: 2023-01-10

## 2023-01-10 RX ADMIN — Medication 4: at 17:56

## 2023-01-10 RX ADMIN — Medication 5 UNIT(S): at 17:56

## 2023-01-10 RX ADMIN — LINEZOLID 600 MILLIGRAM(S): 600 INJECTION, SOLUTION INTRAVENOUS at 11:22

## 2023-01-10 RX ADMIN — Medication 1 TABLET(S): at 11:24

## 2023-01-10 RX ADMIN — Medication 5 UNIT(S): at 11:29

## 2023-01-10 RX ADMIN — LINEZOLID 600 MILLIGRAM(S): 600 INJECTION, SOLUTION INTRAVENOUS at 22:37

## 2023-01-10 RX ADMIN — Medication 2: at 11:29

## 2023-01-10 RX ADMIN — CLOPIDOGREL BISULFATE 75 MILLIGRAM(S): 75 TABLET, FILM COATED ORAL at 11:22

## 2023-01-10 RX ADMIN — Medication 4: at 08:43

## 2023-01-10 RX ADMIN — Medication 5 UNIT(S): at 08:43

## 2023-01-10 RX ADMIN — MAGNESIUM OXIDE 400 MG ORAL TABLET 400 MILLIGRAM(S): 241.3 TABLET ORAL at 17:55

## 2023-01-10 RX ADMIN — Medication 81 MILLIGRAM(S): at 11:22

## 2023-01-10 RX ADMIN — Medication 90 MILLIGRAM(S): at 07:20

## 2023-01-10 RX ADMIN — INSULIN GLARGINE 20 UNIT(S): 100 INJECTION, SOLUTION SUBCUTANEOUS at 22:37

## 2023-01-10 NOTE — PROGRESS NOTE ADULT - SUBJECTIVE AND OBJECTIVE BOX
Patient is a 62y old  Male who presents with a chief complaint of left foot infection (09 Jan 2023 12:33)      INTERVAL HPI/ OVERNIGHT EVENTS. Pt seen and examined bedside. No acute pedal complaints at this time.       LABS                        8.7    9.32  )-----------( 307      ( 09 Jan 2023 05:30 )             29.0     01-09    137  |  106  |  33<H>  ----------------------------<  272<H>  3.9   |  23  |  1.43<H>    Ca    8.9      09 Jan 2023 05:30  Phos  2.7     01-09  Mg     1.9     01-09    TPro  7.5  /  Alb  3.0<L>  /  TBili  <0.2  /  DBili  x   /  AST  13  /  ALT  5<L>  /  AlkPhos  150<H>  01-09        ICU Vital Signs Last 24 Hrs  T(C): 37.1 (10 Freeman 2023 05:00), Max: 37.2 (09 Jan 2023 17:00)  T(F): 98.7 (10 Freeman 2023 05:00), Max: 98.9 (09 Jan 2023 17:00)  HR: 94 (10 Freeman 2023 05:00) (93 - 98)  BP: 165/86 (10 Freeman 2023 05:00) (137/80 - 165/86)  BP(mean): --  ABP: --  ABP(mean): --  RR: 18 (10 Freeman 2023 05:00) (17 - 18)  SpO2: 99% (10 Freeman 2023 05:00) (98% - 100%)    O2 Parameters below as of 10 Freeman 2023 05:00  Patient On (Oxygen Delivery Method): room air            RADIOLOGY  < from: MR Foot No Cont, Left (01.06.23 @ 19:10) >  IMPRESSION:    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 It is unclear if a   portion portion of this absence of these bones is related to prior   surgery. Correlation with surgical history is recommended.    Osteomyelitis at the tips of what remains of the bases of these fourth   and fifth metatarsals.    Osseous erosion/resorption at the base of the fourth digit proximal   phalanx with osteomyelitis throughout the proximal phalanx.    Osteomyelitis of the fifth digit proximal phalanx.    < end of copied text >    MICROBIOLOGY  Culture - Tissue with Gram Stain (collected 09 Jan 2023 14:11)  Source: .Tissue Bone biopsy of L 5th digit  Gram Stain (09 Jan 2023 16:12):    No organisms seen    No WBC's seen.        PHYSICAL EXAM  Left Lower Extremity Focused   Vasc: 1/4 DP/PT b/l. L foot edema. No erythema   Derm: L foot sub MT 2-3 superficial wound, granular base with sanginous drainage, negative for undermining, malodor, purulent drainage or signs of infection. Dorsal 4th interspace L foot linear opening probing proximally 6cm, is +PTB towards 4th and 5th MT bases. Serosanguionus drainage from this wound, negative for malodor, proximal streaking, or erythema. No macerations   Neuro: Protective sensation absent  MSK: 5/5 MMT b/l. -TTP of palpation to L foot.

## 2023-01-10 NOTE — PROGRESS NOTE ADULT - ASSESSMENT
62M HTN, DM, CAD (stent about 1mo ago?), hep C s/p treatment presenting with foot pain and swelling x1 day found to have osteomyelitis of 4th & 5th digit and remaining 4th & 5th metatarsal bases with cx of Staph and Corynebacterium course made complex as patient refused antibiotics intermittently as well as surgical intervention, now on Linezolid while sensitivities return, pending YUMIKO.

## 2023-01-10 NOTE — PROGRESS NOTE ADULT - ASSESSMENT
63 yo M pmhx DM, HTN, CAD (stent about 1mo ago), Hep C s/p treatment p/w L foot pain and swelling going on for a few days. Pt noted he recently had L foot amputation by Vascular surgery for OM (partial 4th and 5th ray amputation with digits intact). Pt cannot recall who his surgeon was or when exactly it was done, but notes it was at [facility]. Podiatry consulted for evaluation of wound, r/o OM, r/o gas. Of note XR wet read hardware in L 1st TMT joint, broken screws present. Evidence of previous 4th and 5th partial MT resection. No evidence of gas. L dorsal wound +PTB, suspicious for OM. MRI shows OM of 4th & 5th digit and remaining 4th & 5th metatarsal bases. Deep wound culture growing staph simulans. Pt is now s/p L foot bone biopsy of 5th digit proximal phalanx (1/9).     Plan:  - F/u bone biopsy results   -  Recc c/w ABX per ID  - Local wound care: WTD to L plantar wound, flushed dorsal wound and placed 1/2" packing in sinus tract, wrapped with DSD, Kerlix, ACE  - Recc WBAT w/ cane to L foot  - rest of care per primary team    Podiatry following. Plan d/w attending.    Discharge dressing instructions: Cleanse wound with normal sailine daily, pat dry with sterile guaze, pack wound defect on top of foot with 1/4 inch iodoform packing, apply betadine to macerated edges, cover with dry sterile gauze, Apply saline-soaked gauze to wound on plantar foot. Wrap with Kerlix and light ACE bandage.     Pt can follow up at -   [facility]   Address: [address] [place] [address]  Phone: [phone]

## 2023-01-10 NOTE — PROGRESS NOTE ADULT - SUBJECTIVE AND OBJECTIVE BOX
OVERNIGHT EVENTS: None    SUBJECTIVE:  Patient seen and examined at bedside, sleepy, endorses taking Linezolid and no episodes of diarrhea. Says it is fine for him    Vital Signs Last 12 Hrs  T(F): 98 (01-10-23 @ 09:13), Max: 98.7 (01-10-23 @ 05:00)  HR: 86 (01-10-23 @ 09:13) (86 - 94)  BP: 151/77 (01-10-23 @ 09:13) (151/77 - 165/86)  BP(mean): --  RR: 17 (01-10-23 @ 09:13) (17 - 18)  SpO2: 100% (01-10-23 @ 09:13) (99% - 100%)  I&O's Summary      PHYSICAL EXAM:  Constitutional: Resting comfortably in bed; NAD  ENT: MMM  Neck: supple  Respiratory: CTA B/L; no W/R/R, no retractions  Cardiac: +S1/S2; RRR; no M/R/G  Gastrointestinal: soft, NT/ND; no rebound or guarding  Extremities: WWP, no clubbing or cyanosis; no peripheral edema  Vascular: 1+ radial, DP pulses B/L  Dermatologic: 3cm x 3cm superficial ulcer on plantar aspect of L foot without drainage or visible bone, now wrapped.   Neurologic: AAOx3; CNII-XII grossly intact; no focal deficits  Psychiatric: affect and characteristics of appearance, verbalizations, behaviors are appropriate          LABS:                        8.8    10.04 )-----------( 346      ( 10 Freeman 2023 09:38 )             29.4     01-10    137  |  102  |  27<H>  ----------------------------<  199<H>  3.7   |  25  |  1.24    Ca    9.3      10 Freeman 2023 09:38  Phos  2.7     01-10  Mg     1.8     01-10    TPro  7.5  /  Alb  3.0<L>  /  TBili  <0.2  /  DBili  x   /  AST  13  /  ALT  5<L>  /  AlkPhos  150<H>  01-09            RADIOLOGY & ADDITIONAL TESTS:    MEDICATIONS  (STANDING):  aspirin enteric coated 81 milliGRAM(s) Oral daily  atorvastatin 80 milliGRAM(s) Oral at bedtime  clopidogrel Tablet 75 milliGRAM(s) Oral daily  heparin   Injectable 5000 Unit(s) SubCutaneous every 8 hours  insulin glargine Injectable (LANTUS) 18 Unit(s) SubCutaneous at bedtime  insulin lispro (ADMELOG) corrective regimen sliding scale   SubCutaneous Before meals and at bedtime  insulin lispro Injectable (ADMELOG) 5 Unit(s) SubCutaneous three times a day before meals  linezolid    Tablet 600 milliGRAM(s) Oral every 12 hours  magnesium sulfate  IVPB 2 Gram(s) IV Intermittent once  multivitamin 1 Tablet(s) Oral daily  NIFEdipine XL 90 milliGRAM(s) Oral daily    MEDICATIONS  (PRN):  acetaminophen     Tablet .. 650 milliGRAM(s) Oral every 6 hours PRN Temp greater or equal to 38C (100.4F), Mild Pain (1 - 3)

## 2023-01-10 NOTE — PROGRESS NOTE ADULT - PROBLEM SELECTOR PLAN 4
Home meds: lisinopril 40mg, nifedipine 90mg  Plan:  - Lisinopril held in the setting of DEBBY.  - continue home nifedipine 90mg

## 2023-01-11 LAB
GLUCOSE BLDC GLUCOMTR-MCNC: 176 MG/DL — HIGH (ref 70–99)
GLUCOSE BLDC GLUCOMTR-MCNC: 201 MG/DL — HIGH (ref 70–99)
GLUCOSE BLDC GLUCOMTR-MCNC: 210 MG/DL — HIGH (ref 70–99)
GLUCOSE BLDC GLUCOMTR-MCNC: 89 MG/DL — SIGNIFICANT CHANGE UP (ref 70–99)
SARS-COV-2 RNA SPEC QL NAA+PROBE: NEGATIVE — SIGNIFICANT CHANGE UP

## 2023-01-11 PROCEDURE — 99233 SBSQ HOSP IP/OBS HIGH 50: CPT | Mod: GC

## 2023-01-11 RX ORDER — INSULIN GLARGINE 100 [IU]/ML
22 INJECTION, SOLUTION SUBCUTANEOUS AT BEDTIME
Refills: 0 | Status: DISCONTINUED | OUTPATIENT
Start: 2023-01-11 | End: 2023-01-12

## 2023-01-11 RX ORDER — LISINOPRIL 2.5 MG/1
10 TABLET ORAL EVERY 24 HOURS
Refills: 0 | Status: DISCONTINUED | OUTPATIENT
Start: 2023-01-11 | End: 2023-01-12

## 2023-01-11 RX ADMIN — INSULIN GLARGINE 22 UNIT(S): 100 INJECTION, SOLUTION SUBCUTANEOUS at 22:12

## 2023-01-11 RX ADMIN — Medication 81 MILLIGRAM(S): at 13:14

## 2023-01-11 RX ADMIN — Medication 4: at 22:09

## 2023-01-11 RX ADMIN — Medication 5 UNIT(S): at 12:47

## 2023-01-11 RX ADMIN — Medication 1 TABLET(S): at 13:14

## 2023-01-11 RX ADMIN — Medication 90 MILLIGRAM(S): at 06:57

## 2023-01-11 RX ADMIN — CLOPIDOGREL BISULFATE 75 MILLIGRAM(S): 75 TABLET, FILM COATED ORAL at 13:13

## 2023-01-11 RX ADMIN — Medication 4: at 12:46

## 2023-01-11 RX ADMIN — Medication 2: at 09:43

## 2023-01-11 RX ADMIN — LISINOPRIL 10 MILLIGRAM(S): 2.5 TABLET ORAL at 09:53

## 2023-01-11 RX ADMIN — LINEZOLID 600 MILLIGRAM(S): 600 INJECTION, SOLUTION INTRAVENOUS at 13:14

## 2023-01-11 RX ADMIN — Medication 5 UNIT(S): at 09:44

## 2023-01-11 NOTE — PROGRESS NOTE ADULT - PROBLEM SELECTOR PLAN 6
F: none  E: caution in DEBBY  N: consistent carb  DVT ppx: lovenox  Code status: full code
F: none  E: replete cautiously given DEBBY  N: consistent carb/DASH/TLC  DVT ppx: heparin SQ  Code status: FULL CODE  Dispo: RMF, pending YUMIKO
F: none  E: caution in DEBBY  N: consistent carb  DVT ppx: lovenox  Code status: full code

## 2023-01-11 NOTE — PROGRESS NOTE ADULT - ASSESSMENT
63 yo M pmhx DM, HTN, CAD (stent about 1mo ago), Hep C s/p treatment p/w L foot pain and swelling going on for a few days. Pt noted he recently had L foot amputation by Vascular surgery for OM (partial 4th and 5th ray amputation with digits intact). Pt cannot recall who his surgeon was or when exactly it was done, but notes it was at [facility]. Podiatry consulted for evaluation of wound, r/o OM, r/o gas. Of note XR wet read hardware in L 1st TMT joint, broken screws present. Evidence of previous 4th and 5th partial MT resection. No evidence of gas. L dorsal wound +PTB, suspicious for OM. MRI shows OM of 4th & 5th digit and remaining 4th & 5th metatarsal bases. Deep wound culture growing staph simulans. Pt is now s/p L foot bone biopsy of 5th digit proximal phalanx (1/9). Pt pending YUMIKO placement     Plan:  - F/u bone biopsy results   -  Recc c/w ABX per ID  - Local wound care: WTD to L plantar wound, flushed dorsal wound and placed 1/2" packing in sinus tract, wrapped with DSD, Kerlix, ACE  - Recc WBAT w/ cane to L foot  - rest of care per primary team    Podiatry following. Plan d/w attending.    Discharge dressing instructions: Cleanse wound with normal sailine daily, pat dry with sterile guaze, pack wound defect on top of foot with 1/4 inch iodoform packing, apply betadine to macerated edges, cover with dry sterile gauze, Apply saline-soaked gauze to wound on plantar foot. Wrap with Kerlix and light ACE bandage.     Pt can follow up at [facility]   Address: [address] [place] [address]  Phone: [phone]

## 2023-01-11 NOTE — PROGRESS NOTE ADULT - SUBJECTIVE AND OBJECTIVE BOX
Patient is a 62y old  Male who presents with a chief complaint of left foot infection (09 Jan 2023 12:33)      INTERVAL HPI/ OVERNIGHT EVENTS. Pt seen and examined bedside. Pt's pain is controlled. Dressing is C/D/I, Denies n/v/f/c/sob.       LABS                        8.8    10.04 )-----------( 346      ( 10 Freeman 2023 09:38 )             29.4     01-10    137  |  102  |  27<H>  ----------------------------<  199<H>  3.7   |  25  |  1.24    Ca    9.3      10 Freeman 2023 09:38  Phos  2.7     01-10  Mg     1.8     01-10          ICU Vital Signs Last 24 Hrs  T(C): 36.4 (11 Jan 2023 06:45), Max: 36.7 (10 Freeman 2023 21:03)  T(F): 97.5 (11 Jan 2023 06:45), Max: 98 (10 Freeman 2023 21:03)  HR: 85 (11 Jan 2023 06:45) (85 - 86)  BP: 160/86 (11 Jan 2023 06:45) (157/86 - 160/86)  BP(mean): --  ABP: --  ABP(mean): --  RR: 18 (11 Jan 2023 06:45) (18 - 18)  SpO2: 99% (11 Jan 2023 06:45) (99% - 99%)    O2 Parameters below as of 11 Jan 2023 06:45  Patient On (Oxygen Delivery Method): room air            RADIOLOGY  < from: MR Foot No Cont, Left (01.06.23 @ 19:10) >    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 It is unclear if a   portion portion of this absence of these bones is related to prior   surgery. Correlation with surgical history is recommended.    Osteomyelitis at the tips of what remains of the bases of these fourth   and fifth metatarsals.    Osseous erosion/resorption at the base of the fourth digit proximal   phalanx with osteomyelitis throughout the proximal phalanx.    Osteomyelitis of the fifth digit proximal phalanx.    < end of copied text >    MICROBIOLOGY  Culture - Tissue with Gram Stain (collected 09 Jan 2023 14:11)  Source: .Tissue Bone biopsy of L 5th digit  Gram Stain (09 Jan 2023 16:12):    No organisms seen    No WBC's seen.  Preliminary Report (11 Jan 2023 08:14):    Culture in progress          PHYSICAL EXAM  Left Lower Extremity Focused   Vasc: 1/4 DP/PT b/l. L foot edema. No erythema   Derm: L foot sub MT 2-3 superficial wound, granular base with sanginous drainage, negative for undermining, malodor, purulent drainage or signs of infection. Dorsal 4th interspace L foot linear opening probing proximally 6cm, is +PTB towards 4th and 5th MT bases. Serosanguionus drainage from this wound, negative for malodor, proximal streaking, or erythema. No macerations   Neuro: Protective sensation absent  MSK: 5/5 MMT b/l. -TTP of palpation to L foot.

## 2023-01-11 NOTE — PROGRESS NOTE ADULT - ASSESSMENT
per Internal Medicine    62 y o M pmhx DM, HTN, CAD vs PAD (stent about 1mo ago), Hep C s/p treatment p/w L foot pain and swelling x days.   Pt admitted for OM of left foot     #OM of  left foot   MRI: reviewed and cw OM   elevated ESR/CRP: CRP of 18.8, ESR 90, wbc trended down   was on zosyn (renally dosed), then meropenam and daptomycin for bs coverage.   fu blood cx NGTD and wound cx showed Corynebacterium: sensitive to linezolid and Vanc. pt started on linezolid 1/9 x 2 weeks and will have a fu with ID for repeat cbc, cmp, esr, crp and will fu final cultures to complete 6 week treatment   Pt has a fu 1/23 12 pm with Dr. Doe   Podiatry following     #DEBBY   #ATN fena 1.6: intrinsic 2/2 NSAID use   cr 2.6 on admission -> 2.4 --1.4 -1.24  last known wnl last year   avoid NSAID     #HTN, holding home ACEi d/t DEBBY. cw nifedipine     #uncontrolled DM   7.6 a1c, cw hss, will increase lantus to 14 u tonight     #CAD/PAD   continue with home medications    #anemia   likely ACOD   hgb remains btw 9-8   will need repeat labs in 2 weeks while on linezolid as it causes BM suppression     #dvt ppx hsq .  #pending auth for YUMIKO .

## 2023-01-11 NOTE — PROGRESS NOTE ADULT - PROBLEM SELECTOR PLAN 1
MRI shows OM of 4th & 5th digit and remaining 4th & 5th metatarsal bases.     Both ID and Podiatry following  - patient refused surgical intervention at this time  - c/w meropenem 1g q8h and daptomycin 650mg q24h  - Wound Cx so far growing staph simulans  - Appreciate ID recs
X-ray suspected OM of metatarsals 1+2, shallow 3x3cm ulcer on plantar foot without drainage, purulence, or visible bone. s/p zosyn in ED.    Plan:  - ID consulted, will start on Daptomycin 650 mg IV qday (1/6-) & Meropenem 1 gram IV q12 (1/6-)  - f/u CK levels  - f/u MRI
MRI shows OM of 4th & 5th digit and remaining 4th & 5th metatarsal bases. Patient refused: surgical intervention, meropenem, and daptomycin even as risks were explained to him including worsening infection, death, and losing more limbs.     Both ID and Podiatry following  - Wound Cx so far growing staph simulans, Corneybacterium  - Per ID, will start on Linezolid 600 BID for 2 weeks pending sensitivities and then transition to abx.   - Appreciate ID recs
MRI shows OM of 4th & 5th digit and remaining 4th & 5th metatarsal bases. Patient refused: surgical intervention, meropenem, and daptomycin even as risks were explained to him including worsening infection, death, and losing more limbs.     Both ID and Podiatry following  - Wound Cx so far growing staph simulans, Corneybacterium  - c/w  Linezolid 600 BID for 2 weeks pending sensitivities and then f/u with ID outpatient for sensitivities and further course- will require 6 weeks total.
MRI shows OM of 4th & 5th digit and remaining 4th & 5th metatarsal bases.     Both ID and Podiatry following  - patient refused surgical intervention at this time  - c/w meropenem 1g q8h and daptomycin 650mg q24h  - Wound Cx so far growing staph simulans  - Appreciate ID recs
MRI shows OM of 4th & 5th digit and remaining 4th & 5th metatarsal bases. Patient refused: surgical intervention, meropenem, and daptomycin even as risks were explained to him including worsening infection, death, and losing more limbs. Both ID and Podiatry following.  - Wound Cx so far growing staph simulans, Corynebacterium  - c/w Linezolid 600 mg PO BID x 2 weeks pending sensitivities, then f/u with ID outpatient for sensitivities and further course- will require 6 weeks total.

## 2023-01-11 NOTE — PROGRESS NOTE ADULT - PROBLEM SELECTOR PLAN 3
Home meds: lantus 16, lispro 8    - on Lantus 14 U at bedtime- will increase to 16U   - Lispo 5ITD  - mISS
Home meds: lantus 16, lispro 8  - Patient intermittently refusing insulin as "he knows his own blood sugars."    - on Lantus 16U at bedtime- will increase to 18U  - Lispo 5ITD  - mISS
Home meds: lantus 16, lispro 8  - Patient intermittently refusing insulin as "he knows his own blood sugars."    - on Lantus 16U at bedtime- will increase to 18U  - Lispo 5ITD  - mISS
Home meds: lantus 16, lispro 8    - on Lantus 14 U at bedtime- will increase to 16U   - Lispo 5ITD  - mISS
Home meds: lantus 16, lispro 8  Plan:   - f/u a1c  - will give lantus 14 (increased from 10 1/6 dt elevated sugars), lispro 5  - ISS
Home meds: lantus 16, lispro 8. Patient intermittently refusing insulin as "he knows his own blood sugars."  - increase lantus to 22 units SQ qhs  - c/w lispro 5 units SQ TID before meals  - c/w mISS  - consistent carb/DASH/TLC diet

## 2023-01-11 NOTE — PROGRESS NOTE ADULT - PROBLEM SELECTOR PROBLEM 2
DEBBY (acute kidney injury)

## 2023-01-11 NOTE — PROGRESS NOTE ADULT - PROBLEM SELECTOR PLAN 4
Home meds: lisinopril 40mg, nifedipine 90mg.   - c/w nifedipine 90mg PO qd  - lisinopril initially held in the setting of DEBBY, however DEBBY resolved and SBP 150s-170s -- restart lisinopril 10 mg PO qd  - consistent carb/DASH/TLC diet

## 2023-01-11 NOTE — PROGRESS NOTE ADULT - SUBJECTIVE AND OBJECTIVE BOX
OVERNIGHT EVENTS: No acute overnight events.    SUBJECTIVE: Patient seen and examined at bedside. Reports     Vital Signs Last 12 Hrs  T(F): 97.7 (01-11-23 @ 16:00), Max: 97.7 (01-11-23 @ 16:00)  HR: 90 (01-11-23 @ 16:00) (83 - 90)  BP: 170/87 (01-11-23 @ 16:00) (160/86 - 171/94)  BP(mean): --  RR: 18 (01-11-23 @ 16:00) (18 - 18)  SpO2: 100% (01-11-23 @ 16:00) (99% - 100%)  I&O's Summary      PHYSICAL EXAM:  Constitutional: NAD, comfortable in bed.  HEENT: NC/AT, PERRLA, EOMI, no conjunctival pallor or scleral icterus, MMM  Neck: Supple, no JVD  Respiratory: CTA B/L. No w/r/r.   Cardiovascular: RRR, normal S1 and S2, no m/r/g.   Gastrointestinal: +BS, soft NTND, no guarding or rebound tenderness, no palpable masses   Extremities: wwp; no cyanosis, clubbing or edema.   Vascular: Pulses equal and strong throughout.   Neurological: AAOx3, no CN deficits, strength and sensation intact throughout.   Skin: No gross skin abnormalities or rashes        LABS:                        8.8    10.04 )-----------( 346      ( 10 Freeman 2023 09:38 )             29.4     01-10    137  |  102  |  27<H>  ----------------------------<  199<H>  3.7   |  25  |  1.24    Ca    9.3      10 Freeman 2023 09:38  Phos  2.7     01-10  Mg     1.8     01-10              RADIOLOGY & ADDITIONAL TESTS:    MEDICATIONS  (STANDING):  aspirin enteric coated 81 milliGRAM(s) Oral daily  atorvastatin 80 milliGRAM(s) Oral at bedtime  clopidogrel Tablet 75 milliGRAM(s) Oral daily  heparin   Injectable 5000 Unit(s) SubCutaneous every 8 hours  insulin glargine Injectable (LANTUS) 22 Unit(s) SubCutaneous at bedtime  insulin lispro (ADMELOG) corrective regimen sliding scale   SubCutaneous Before meals and at bedtime  insulin lispro Injectable (ADMELOG) 5 Unit(s) SubCutaneous three times a day before meals  linezolid    Tablet 600 milliGRAM(s) Oral every 12 hours  lisinopril 10 milliGRAM(s) Oral every 24 hours  multivitamin 1 Tablet(s) Oral daily  NIFEdipine XL 90 milliGRAM(s) Oral daily    MEDICATIONS  (PRN):  acetaminophen     Tablet .. 650 milliGRAM(s) Oral every 6 hours PRN Temp greater or equal to 38C (100.4F), Mild Pain (1 - 3)   OVERNIGHT EVENTS: No acute overnight events.    SUBJECTIVE: Patient seen and examined at bedside. Reports generalized diffuse body aches and LLE foot pain. No other complaints at this time. Denies headaches, CP, SOB, abd pain, n/v/c/d.    Vital Signs Last 12 Hrs  T(F): 97.7 (01-11-23 @ 16:00), Max: 97.7 (01-11-23 @ 16:00)  HR: 90 (01-11-23 @ 16:00) (83 - 90)  BP: 170/87 (01-11-23 @ 16:00) (160/86 - 171/94)  BP(mean): --  RR: 18 (01-11-23 @ 16:00) (18 - 18)  SpO2: 100% (01-11-23 @ 16:00) (99% - 100%)  I&O's Summary      PHYSICAL EXAM:  Constitutional: NAD, comfortable in bed.  HEENT: NC/AT, EOMI, no conjunctival pallor or scleral icterus, MMM  Neck: Supple  Respiratory: CTA B/L. No w/r/r.   Cardiovascular: RRR, normal S1 and S2, no m/r/g.   Gastrointestinal: soft NTND, no guarding or rebound tenderness, no palpable masses   Extremities: wwp; no cyanosis, clubbing or edema.   Vascular: 1+ radial, DP pulses B/L  Dermatologic: 3cm x 3cm superficial ulcer on plantar aspect of L foot without drainage or visible bone, now wrapped.  Neurological: AAOx3, strength and sensation intact throughout.        LABS:                        8.8    10.04 )-----------( 346      ( 10 Freeman 2023 09:38 )             29.4     01-10    137  |  102  |  27<H>  ----------------------------<  199<H>  3.7   |  25  |  1.24    Ca    9.3      10 Freeman 2023 09:38  Phos  2.7     01-10  Mg     1.8     01-10              RADIOLOGY & ADDITIONAL TESTS:    MEDICATIONS  (STANDING):  aspirin enteric coated 81 milliGRAM(s) Oral daily  atorvastatin 80 milliGRAM(s) Oral at bedtime  clopidogrel Tablet 75 milliGRAM(s) Oral daily  heparin   Injectable 5000 Unit(s) SubCutaneous every 8 hours  insulin glargine Injectable (LANTUS) 22 Unit(s) SubCutaneous at bedtime  insulin lispro (ADMELOG) corrective regimen sliding scale   SubCutaneous Before meals and at bedtime  insulin lispro Injectable (ADMELOG) 5 Unit(s) SubCutaneous three times a day before meals  linezolid    Tablet 600 milliGRAM(s) Oral every 12 hours  lisinopril 10 milliGRAM(s) Oral every 24 hours  multivitamin 1 Tablet(s) Oral daily  NIFEdipine XL 90 milliGRAM(s) Oral daily    MEDICATIONS  (PRN):  acetaminophen     Tablet .. 650 milliGRAM(s) Oral every 6 hours PRN Temp greater or equal to 38C (100.4F), Mild Pain (1 - 3)

## 2023-01-11 NOTE — PROGRESS NOTE ADULT - PROBLEM SELECTOR PLAN 5
Home meds: ASA, lipitor, plavix. Pt reports recent stent (?12/2022). Unclear anatomy.  - c/w ASA 81 mg PO qd  - c/w plavix 75 mg PO qd  - c/w lipitor 80 mg PO qhs

## 2023-01-11 NOTE — PROGRESS NOTE ADULT - SUBJECTIVE AND OBJECTIVE BOX
Physical Medicine and Rehabilitation Progress Note :       Patient is a 62y old  Male who presents with a chief complaint of left foot infection (09 Jan 2023 12:33)      HPI:  62M HTN, DM, CAD (stent about 1mo ago), hep C s/p treatment presenting with foot pain and swelling x1 day. Pt reports that he had a surgery on his foot a couple weeks ago but does not remember what surgery or what hospital. Yesterday he started to develop warmth and pain of his L foot. Denies any fever, chills, chest pain, sob, n/v/d/c, drainage from foot.    ED Course:  VS: T 97.9, HR 88, /71, O2 100%  Labs: WBC 16.5, Hb 9 (10.2 as of 01/2022), Cr 2.62 (1.1 as of 01/2022), ESR 9, CRP 18.8, lactate 2.3  XR L foot: pending  Tx: 2L NS, zosyn (05 Jan 2023 05:12)                            8.8    10.04 )-----------( 346      ( 10 Freeman 2023 09:38 )             29.4       01-10    137  |  102  |  27<H>  ----------------------------<  199<H>  3.7   |  25  |  1.24    Ca    9.3      10 Freeman 2023 09:38  Phos  2.7     01-10  Mg     1.8     01-10      Vital Signs Last 24 Hrs  T(C): 36.4 (11 Jan 2023 11:35), Max: 36.7 (10 Freeman 2023 21:03)  T(F): 97.6 (11 Jan 2023 11:35), Max: 98 (10 Freeman 2023 21:03)  HR: 83 (11 Jan 2023 11:35) (83 - 86)  BP: 171/94 (11 Jan 2023 11:35) (157/86 - 171/94)  BP(mean): --  RR: 18 (11 Jan 2023 11:35) (18 - 18)  SpO2: 100% (11 Jan 2023 11:35) (99% - 100%)    Parameters below as of 11 Jan 2023 11:35  Patient On (Oxygen Delivery Method): room air        MEDICATIONS  (STANDING):  aspirin enteric coated 81 milliGRAM(s) Oral daily  atorvastatin 80 milliGRAM(s) Oral at bedtime  clopidogrel Tablet 75 milliGRAM(s) Oral daily  heparin   Injectable 5000 Unit(s) SubCutaneous every 8 hours  insulin glargine Injectable (LANTUS) 22 Unit(s) SubCutaneous at bedtime  insulin lispro (ADMELOG) corrective regimen sliding scale   SubCutaneous Before meals and at bedtime  insulin lispro Injectable (ADMELOG) 5 Unit(s) SubCutaneous three times a day before meals  linezolid    Tablet 600 milliGRAM(s) Oral every 12 hours  lisinopril 10 milliGRAM(s) Oral every 24 hours  multivitamin 1 Tablet(s) Oral daily  NIFEdipine XL 90 milliGRAM(s) Oral daily    MEDICATIONS  (PRN):  acetaminophen     Tablet .. 650 milliGRAM(s) Oral every 6 hours PRN Temp greater or equal to 38C (100.4F), Mild Pain (1 - 3)         Physical Therapy Functional Status Assessment :   1/10/2023    Pain Assessment/Number Scale (0-10) Adult  Presence of Pain: complains of pain/discomfort  Body Location: TIMOTHY Ron informed  Pain Rating (0-10): Rest: 10   Pain Rating (0-10): Activity: 10   Pain Precipitating/Aggravating Factors: movement    Re-assessment (Number Scale)  Pain Response to Interventions: no change in pain  Pain Rating: Rest (Reassessment) Pain Rating: Rest: 10     Safety      [facility] Functional Assessment: Basic Mobility  Type of Assessment: Daily assessment  Turning from your back to your side while in a flat bed without using bedrails?: 4 = No assist / stand by assistance  Moving from lying on your back to sitting on the flat side of a flat bed without using bedrails?: 4 = No assist / stand by assistance  Moving to and from a bed to a chair (including a wheelchair)?: 3 = A little assistance  Standing up from a chair using your arms (e.g. wheelchair or bedside chair)?: 3 = A little assistance  Walking in hospital room?: 3 = A little assistance  Climbing 3-5 steps with a railing?: 3 = A little assistance  Score: 20   Row Comment: Ask the patient "How much help from another person do you currently need? (If the patient hasn't done an activity recently, how much help from another person do you think he/she needs if he/she tried?)    Cognitive/Neuro      Cognitive/Neuro/Behavioral  Cognitive/Neuro/Behavioral [WDL Definition: Alert; opens eyes spontaneously; arouses to voice or touch; oriented x 4; follows commands; speech spontaneous, logical; purposeful motor response; behavior appropriate to situation]: WDL    Language Assistance  Preferred Language to Address Healthcare Preferred Language to Address Healthcare: English    Therapeutic Interventions      Sit-Stand Transfer Training  Transfer Training Sit-to-Stand Transfer: supervsion;  verbal cues;  1 person assist;  one trial with RW, one trial with SC  Transfer Training Stand-to-Sit Transfer: supervsion;  verbal cues;  1 person assist;  one trial with RW, one trial with SC  Sit-to-Stand Transfer Training Transfer Safety Analysis: decreased weight-shifting ability;  decreased balance;  decreased strength;  impaired balance;  pain    Gait Training  Gait Training: supervsion;  verbal cues;  1 person assist;  one trial with RW, one trial with SC;  10ft x 2 each trial;  patient should require CG 2/2 unsteady gait however patient not letting therapist touch patient   Gait Analysis: patient with unsteady gait, no LOB, decreased tolerance to longer distance ambulation 2/2 LLE pain;  decreased martha;  decreased step length;  trendelenberg;  decreased weight-shifting ability;  decreased strength;  impaired balance;  pain  Gait Number of Times:: x 1  Type of Rest Type of Rest: sitting  Duration of Rest Duration of Rest: 1 min     Therapeutic Exercise  Therapeutic Exercise Detail: ther-ex: LAQ x 5, seated marches x 5         PM&R Impression : as above    Current Disposition Plan Recommendations :    subacute rehab placement

## 2023-01-11 NOTE — PROGRESS NOTE ADULT - PROBLEM SELECTOR PLAN 2
Cr 2.62 from 1.1 as of 01/2022  - likely prerenal in the setting of active infection, now downtrending
Cr 2.62 from 1.1 as of 01/2022  - likely prerenal in the setting of active infection, now downtrending.   Plan:  - trend Cr  - avoid nephrotoxic drugs, renally dose meds  - CTM
Cr 2.62 from 1.1 as of 01/2022  - likely prerenal in the setting of active infection, downtrended.  - CTM.
Cr 2.62 from 1.1 as of 01/2022  - likely prerenal in the setting of active infection, downtrended.  - CTM.
Cr 2.62 from 1.1 as of 01/2022  - likely prerenal in the setting of active infection, now downtrending
Cr 2.62 from 1.1 as of 01/2022. Likely prerenal in the setting of active infection, downtrended.  - RESOLVED, Cr 1.24 on 1/10  - continue to monitor

## 2023-01-12 ENCOUNTER — TRANSCRIPTION ENCOUNTER (OUTPATIENT)
Age: 63
End: 2023-01-12

## 2023-01-12 VITALS
DIASTOLIC BLOOD PRESSURE: 85 MMHG | SYSTOLIC BLOOD PRESSURE: 154 MMHG | HEART RATE: 93 BPM | RESPIRATION RATE: 18 BRPM | OXYGEN SATURATION: 98 % | TEMPERATURE: 98 F

## 2023-01-12 LAB
-  AMPICILLIN/SULBACTAM: SIGNIFICANT CHANGE UP
-  CEFTRIAXONE: SIGNIFICANT CHANGE UP
-  CIPROFLOXACIN: SIGNIFICANT CHANGE UP
-  CLINDAMYCIN: SIGNIFICANT CHANGE UP
-  DAPTOMYCIN: SIGNIFICANT CHANGE UP
-  ERYTHROMYCIN: SIGNIFICANT CHANGE UP
-  GENTAMICIN: SIGNIFICANT CHANGE UP
-  LEVOFLOXACIN: SIGNIFICANT CHANGE UP
-  LINEZOLID: SIGNIFICANT CHANGE UP
-  MEROPENEM: SIGNIFICANT CHANGE UP
-  MOXIFLOXACIN: SIGNIFICANT CHANGE UP
-  OXACILLIN: SIGNIFICANT CHANGE UP
-  RIFAMPIN: SIGNIFICANT CHANGE UP
-  TETRACYCLINE: SIGNIFICANT CHANGE UP
-  TRIMETHOPRIM/SULFAMETHOXAZOLE: SIGNIFICANT CHANGE UP
-  VANCOMYCIN: SIGNIFICANT CHANGE UP
ANION GAP SERPL CALC-SCNC: 11 MMOL/L — SIGNIFICANT CHANGE UP (ref 5–17)
BLD GP AB SCN SERPL QL: NEGATIVE — SIGNIFICANT CHANGE UP
BUN SERPL-MCNC: 31 MG/DL — HIGH (ref 7–23)
CALCIUM SERPL-MCNC: 9.1 MG/DL — SIGNIFICANT CHANGE UP (ref 8.4–10.5)
CHLORIDE SERPL-SCNC: 105 MMOL/L — SIGNIFICANT CHANGE UP (ref 96–108)
CO2 SERPL-SCNC: 23 MMOL/L — SIGNIFICANT CHANGE UP (ref 22–31)
CREAT SERPL-MCNC: 1.44 MG/DL — HIGH (ref 0.5–1.3)
CULTURE RESULTS: SIGNIFICANT CHANGE UP
EGFR: 56 ML/MIN/1.73M2 — LOW
GLUCOSE BLDC GLUCOMTR-MCNC: 265 MG/DL — HIGH (ref 70–99)
GLUCOSE SERPL-MCNC: 162 MG/DL — HIGH (ref 70–99)
HCT VFR BLD CALC: 27.8 % — LOW (ref 39–50)
HGB BLD-MCNC: 8.3 G/DL — LOW (ref 13–17)
MAGNESIUM SERPL-MCNC: 2 MG/DL — SIGNIFICANT CHANGE UP (ref 1.6–2.6)
MCHC RBC-ENTMCNC: 22.6 PG — LOW (ref 27–34)
MCHC RBC-ENTMCNC: 29.9 GM/DL — LOW (ref 32–36)
MCV RBC AUTO: 75.5 FL — LOW (ref 80–100)
METHOD TYPE: SIGNIFICANT CHANGE UP
NRBC # BLD: 0 /100 WBCS — SIGNIFICANT CHANGE UP (ref 0–0)
ORGANISM # SPEC MICROSCOPIC CNT: SIGNIFICANT CHANGE UP
ORGANISM # SPEC MICROSCOPIC CNT: SIGNIFICANT CHANGE UP
PHOSPHATE SERPL-MCNC: 3.6 MG/DL — SIGNIFICANT CHANGE UP (ref 2.5–4.5)
PLATELET # BLD AUTO: 360 K/UL — SIGNIFICANT CHANGE UP (ref 150–400)
POTASSIUM SERPL-MCNC: 3.8 MMOL/L — SIGNIFICANT CHANGE UP (ref 3.5–5.3)
POTASSIUM SERPL-SCNC: 3.8 MMOL/L — SIGNIFICANT CHANGE UP (ref 3.5–5.3)
RBC # BLD: 3.68 M/UL — LOW (ref 4.2–5.8)
RBC # FLD: 17 % — HIGH (ref 10.3–14.5)
RH IG SCN BLD-IMP: POSITIVE — SIGNIFICANT CHANGE UP
SODIUM SERPL-SCNC: 139 MMOL/L — SIGNIFICANT CHANGE UP (ref 135–145)
SPECIMEN SOURCE: SIGNIFICANT CHANGE UP
WBC # BLD: 10.32 K/UL — SIGNIFICANT CHANGE UP (ref 3.8–10.5)
WBC # FLD AUTO: 10.32 K/UL — SIGNIFICANT CHANGE UP (ref 3.8–10.5)

## 2023-01-12 PROCEDURE — 81001 URINALYSIS AUTO W/SCOPE: CPT

## 2023-01-12 PROCEDURE — 86900 BLOOD TYPING SEROLOGIC ABO: CPT

## 2023-01-12 PROCEDURE — 80053 COMPREHEN METABOLIC PANEL: CPT

## 2023-01-12 PROCEDURE — 88304 TISSUE EXAM BY PATHOLOGIST: CPT

## 2023-01-12 PROCEDURE — 82962 GLUCOSE BLOOD TEST: CPT

## 2023-01-12 PROCEDURE — 85025 COMPLETE CBC W/AUTO DIFF WBC: CPT

## 2023-01-12 PROCEDURE — 87070 CULTURE OTHR SPECIMN AEROBIC: CPT

## 2023-01-12 PROCEDURE — 73718 MRI LOWER EXTREMITY W/O DYE: CPT

## 2023-01-12 PROCEDURE — 97530 THERAPEUTIC ACTIVITIES: CPT

## 2023-01-12 PROCEDURE — 97116 GAIT TRAINING THERAPY: CPT

## 2023-01-12 PROCEDURE — 83935 ASSAY OF URINE OSMOLALITY: CPT

## 2023-01-12 PROCEDURE — 86140 C-REACTIVE PROTEIN: CPT

## 2023-01-12 PROCEDURE — 99285 EMERGENCY DEPT VISIT HI MDM: CPT | Mod: 25

## 2023-01-12 PROCEDURE — 87186 SC STD MICRODIL/AGAR DIL: CPT

## 2023-01-12 PROCEDURE — 73630 X-RAY EXAM OF FOOT: CPT

## 2023-01-12 PROCEDURE — 84100 ASSAY OF PHOSPHORUS: CPT

## 2023-01-12 PROCEDURE — 84156 ASSAY OF PROTEIN URINE: CPT

## 2023-01-12 PROCEDURE — 83036 HEMOGLOBIN GLYCOSYLATED A1C: CPT

## 2023-01-12 PROCEDURE — 84133 ASSAY OF URINE POTASSIUM: CPT

## 2023-01-12 PROCEDURE — 97161 PT EVAL LOW COMPLEX 20 MIN: CPT

## 2023-01-12 PROCEDURE — 96365 THER/PROPH/DIAG IV INF INIT: CPT

## 2023-01-12 PROCEDURE — 83735 ASSAY OF MAGNESIUM: CPT

## 2023-01-12 PROCEDURE — 83605 ASSAY OF LACTIC ACID: CPT

## 2023-01-12 PROCEDURE — 86850 RBC ANTIBODY SCREEN: CPT

## 2023-01-12 PROCEDURE — 84300 ASSAY OF URINE SODIUM: CPT

## 2023-01-12 PROCEDURE — 82550 ASSAY OF CK (CPK): CPT

## 2023-01-12 PROCEDURE — 86901 BLOOD TYPING SEROLOGIC RH(D): CPT

## 2023-01-12 PROCEDURE — 84540 ASSAY OF URINE/UREA-N: CPT

## 2023-01-12 PROCEDURE — 87635 SARS-COV-2 COVID-19 AMP PRB: CPT

## 2023-01-12 PROCEDURE — 88311 DECALCIFY TISSUE: CPT

## 2023-01-12 PROCEDURE — 87040 BLOOD CULTURE FOR BACTERIA: CPT

## 2023-01-12 PROCEDURE — 80048 BASIC METABOLIC PNL TOTAL CA: CPT

## 2023-01-12 PROCEDURE — 85652 RBC SED RATE AUTOMATED: CPT

## 2023-01-12 PROCEDURE — 87075 CULTR BACTERIA EXCEPT BLOOD: CPT

## 2023-01-12 PROCEDURE — 99239 HOSP IP/OBS DSCHRG MGMT >30: CPT | Mod: GC

## 2023-01-12 PROCEDURE — 82570 ASSAY OF URINE CREATININE: CPT

## 2023-01-12 PROCEDURE — 85027 COMPLETE CBC AUTOMATED: CPT

## 2023-01-12 PROCEDURE — 36415 COLL VENOUS BLD VENIPUNCTURE: CPT

## 2023-01-12 RX ORDER — INSULIN GLARGINE 100 [IU]/ML
22 INJECTION, SOLUTION SUBCUTANEOUS
Qty: 0 | Refills: 0 | DISCHARGE
Start: 2023-01-12

## 2023-01-12 RX ORDER — HYDROCHLOROTHIAZIDE 25 MG
1 TABLET ORAL
Qty: 0 | Refills: 0 | DISCHARGE
Start: 2023-01-12

## 2023-01-12 RX ADMIN — Medication 90 MILLIGRAM(S): at 06:49

## 2023-01-12 RX ADMIN — Medication 81 MILLIGRAM(S): at 11:53

## 2023-01-12 RX ADMIN — Medication 1 TABLET(S): at 11:54

## 2023-01-12 RX ADMIN — Medication 5 UNIT(S): at 10:00

## 2023-01-12 RX ADMIN — CLOPIDOGREL BISULFATE 75 MILLIGRAM(S): 75 TABLET, FILM COATED ORAL at 11:54

## 2023-01-12 RX ADMIN — Medication 6: at 10:02

## 2023-01-12 RX ADMIN — LINEZOLID 600 MILLIGRAM(S): 600 INJECTION, SOLUTION INTRAVENOUS at 00:22

## 2023-01-12 NOTE — PROGRESS NOTE ADULT - SUBJECTIVE AND OBJECTIVE BOX
Patient is a 62y old  Male who presents with a chief complaint of left foot infection (09 Jan 2023 12:33)      INTERVAL HPI/ OVERNIGHT EVENTS. Pt seen and examined bedside. Pt denies pain at this time.       LABS                        8.3    10.32 )-----------( 360      ( 12 Jan 2023 07:08 )             27.8     01-12    139  |  105  |  31<H>  ----------------------------<  162<H>  3.8   |  23  |  1.44<H>    Ca    9.1      12 Jan 2023 07:08  Phos  3.6     01-12  Mg     2.0     01-12          ICU Vital Signs Last 24 Hrs  T(C): 36.9 (12 Jan 2023 06:21), Max: 36.9 (12 Jan 2023 06:21)  T(F): 98.4 (12 Jan 2023 06:21), Max: 98.4 (12 Jan 2023 06:21)  HR: 93 (12 Jan 2023 06:21) (90 - 97)  BP: 154/85 (12 Jan 2023 06:21) (154/85 - 170/87)  BP(mean): --  ABP: --  ABP(mean): --  RR: 18 (12 Jan 2023 06:21) (18 - 18)  SpO2: 98% (12 Jan 2023 06:21) (98% - 100%)    O2 Parameters below as of 12 Jan 2023 06:21  Patient On (Oxygen Delivery Method): room air            RADIOLOGY  < from: MR Foot No Cont, Left (01.06.23 @ 19:10) >    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 It is unclear if a   portion portion of this absence of these bones is related to prior   surgery. Correlation with surgical history is recommended.    Osteomyelitis at the tips of what remains of the bases of these fourth   and fifth metatarsals.    Osseous erosion/resorption at the base of the fourth digit proximal   phalanx with osteomyelitis throughout the proximal phalanx.    Osteomyelitis of the fifth digit proximal phalanx.    < end of copied text >    MICROBIOLOGY  Culture - Tissue with Gram Stain (collected 09 Jan 2023 14:11)  Source: .Tissue Bone biopsy of L 5th digit  Gram Stain (09 Jan 2023 16:12):    No organisms seen    No WBC's seen.  Preliminary Report (11 Jan 2023 08:14):    Culture in progress          PHYSICAL EXAM  Left Lower Extremity Focused   Vasc: 1/4 DP/PT b/l. L foot edema. No erythema   Derm: L foot sub MT 2-3 superficial wound, granular base with sanginous drainage, negative for undermining, malodor, purulent drainage or signs of infection. Dorsal 4th interspace L foot linear opening probing proximally 6cm, is +PTB towards 4th and 5th MT bases. Serosanguionus drainage from this wound, negative for malodor, proximal streaking, or erythema. No macerations   Neuro: Protective sensation absent  MSK: 5/5 MMT b/l. -TTP of palpation to L foot.

## 2023-01-12 NOTE — DISCHARGE NOTE NURSING/CASE MANAGEMENT/SOCIAL WORK - NSDCPEFALRISK_GEN_ALL_CORE
For information on Fall & Injury Prevention, visit: [email or web address] OR  [email or web address] OR  https://www.cdc.gov/steadi/patient.html

## 2023-01-12 NOTE — DISCHARGE NOTE NURSING/CASE MANAGEMENT/SOCIAL WORK - PATIENT PORTAL LINK FT
You can access the FollowMyHealth Patient Portal offered by [facility] by registering at the following website: [email or web address]. By joining [employer]’s FollowMyHealth portal, you will also be able to view your health information using other applications (apps) compatible with our system.

## 2023-01-12 NOTE — PROGRESS NOTE ADULT - ATTENDING COMMENTS
61 yo M pmhx DM, HTN, CAD vs PAD (stent about 1mo ago), Hep C s/p treatment p/w L foot pain and swelling x days.   Pt admitted for OM of left foot     #OM of  left foot   MRI: reviewed and cw OM   elevated ESR/CRP: CRP of 18.8, ESR 90, wbc trended down   was on zosyn (renally dosed), then meropenam and daptomycin for bs coverage.   fu blood cx NGTD and wound cx showed Corynebacterium: sensitive to linezolid and Vanc. pt started on linezolid 1/9 x 2 weeks and will have a fu with ID for repeat cbc, cmp, esr, crp and will fu final cultures to complete 6 week treatment   Pt has a fu 1/23 12 pm with Dr. Doe   Podiatry following     #DEBBY   #ATN fena 1.6: intrinsic 2/2 NSAID use   cr 2.6 on admission -> 2.4 --1.4 -1.24  last known wnl last year   avoid NSAID     #HTN, holding home ACEi d/t DEBBY. cw nifedipine     #uncontrolled DM   7.6 a1c, cw hss, will increase lantus to 14 u tonight     #CAD/PAD   continue with home medications    #anemia   likely ACOD   hgb remains btw 9-8   will need repeat labs in 2 weeks while on linezolid as it causes BM suppression     #dvt ppx hsq .  #pending auth for [employer]UM[employer]
63 yo M pmhx DM, HTN, CAD vs PAD (stent about 1mo ago), Hep C s/p treatment p/w L foot pain and swelling x days.   Pt admitted for OM of left foot     #OM of  left foot   MRI: reviewed and cw OM   elevated ESR/CRP: CRP of 18.8, ESR 90, wbc trended down   was on zosyn (renally dosed), then meropenam and daptomycin for bs coverage.   fu blood cx NGTD and wound cx showed Corynebacterium: sensitive to linezolid and Vanc. pt started on linezolid 1/9 x 2 weeks and will have a fu with ID for repeat cbc, cmp, esr, crp and will fu final cultures to complete 6 week treatment   Pt has a fu 1/23 12 pm with Dr. Doe   Podiatry following     #DEBBY   #ATN fena 1.6: intrinsic 2/2 NSAID use   cr 2.6 on admission -> 2.4 --1.4 -1.24-1.4  last known wnl last year   avoid NSAID, holding ACEI/ARB    #HTN, holding home ACEi d/t DEBBY. cw nifedipine + hctz On dc      #uncontrolled DM   7.6 a1c, cw hss, will increase lantus to 14 u tonight     #CAD/PAD   continue with home medications    #anemia   likely ACOD   hgb remains btw 9-8   will need repeat labs in 2 weeks while on linezolid as it causes BM suppression   appt at 2/23 12 pm       #dvt ppx hsq .  #pending auth for [employer]UM[employer]KO
63 yo M pmhx DM, HTN, CAD vs PAD (stent about 1mo ago), Hep C s/p treatment p/w L foot pain and swelling x days.   Pt admitted for OM of left foot     # OM of  left foot   MRI: reviewed and cw OM   elevated ESR/CRP: CRP of 18.8, ESR 90, wbc trended down   was on zosyn (renally dosed), then meropenam and daptomycin for bs coverage. pt refused medications bc he was having diarrhea and was transitioned to doxy   fu blood cx NGTD and wound cx showed Corynebacterium: sensitive to linezolid and Vanc. pt will be started on linezolid 1/9 x 2 weeks and will have a fu with ID for repeat cbc, cmp, esr, crp and will fu final cultures to complete 6 week treatment   Podiatry following and ID consulted    #DEBBY   #ATN fena 1.6: intrinsic 2/2 NSAID use   cr 2.6 on admission -> 2.4 --1.4   last known wnl last year   avoid NSAID     #HTN, holding home ACEi d/t DEBBY. cw nifedipine     #uncontrolled DM   7.6 a1c, cw hss, will increase lantus to 14 u tonight     #CAD/PAD   continue with home medications    #anemia   likely ACOD   hgb remains btw 9-8   will need repeat labs in 2 weeks while on linezolid as it causes BM suppression     #dvt ppx hsq .  #pending auth for [employer]
63 yo M pmhx DM, HTN, CAD vs PAD (stent about 1mo ago), Hep C s/p treatment p/w L foot pain and swelling x days.   Pt admitted for OM of left foot     # OM of  left foot   MRI: reviewed and cw OM   elevated ESR/CRP: CRP of 18.8, ESR 90, wbc trended down   was on zosyn (renally dosed), then meropenam and daptomycin for bs coverage. pt refused medications bc he was having diarrhea and was transitioned to doxy   fu blood cx NGTD and wound cx showed Corynebacterium: sensitive to linezolid and Vanc. pt will be started on linezolid 1/9 x 2 weeks and will have a fu with ID for repeat cbc, cmp, esr, crp and will fu final cultures to complete 6 week treatment   Podiatry following and ID consulted    #DEBBY   #ATN fena 1.6: intrinsic 2/2 NSAID use   cr 2.6 on admission -> 2.4 --1.4 now   last known wnl last year   avoid NSAID     #HTN, holding home ACEi d/t DEBBY. cw nifedipine     #uncontrolled DM   7.6 a1c, cw hss, will increase lantus to 14 u tonight     #CAD/PAD   continue with home medications    #anemia   likely ACOD   hgb remains btw 9-8   will need repeat labs in 2 weeks while on linezolid as it causes BM suppression     #dvt ppx hsq .
63 yo M pmhx DM, HTN, CAD vs PAD (stent about 1mo ago), Hep C s/p treatment p/w L foot pain and swelling x days.   xray foot concerning for OM. Podiatry recommended MRI, pt is s/p iv zosyn.      #cellulitis / suspected OM of  left foot   Pending MRI   fu blood cx and wound cx   elevated ESR/CRP: CRP of 18.8, ESR 90, wbc trending down   foot xray :  per podiatry read hardware in L 1st TMT joint, broken screws present  was on zosyn (renally dosed), now on meropenam and daptomycin for bs coverage  pt is refusing surgical intervention despite risks, pt will be continued on IV abx, will confirm OM w MRI and plan for PICC   Podiatry following and ID consulted    #DEBBY   #ATN fena 1.6: intrinsic 2/2 NSAID use   cr 2.6 on admission -> 2.4 --1.5 now   last known wnl last year   avoid NSAID     #HTN, holding home ACEi d/t DEBBY. cw nifedipine     #uncontrolled DM   7.6 a1c, cw hss, will increase lantus to 14 u tonight     #CAD/PAD   continue with home medications    #anemia   likely ACOD   hgb remains btw 9-8   fu anemia panel     #dvt ppx hsq

## 2023-01-12 NOTE — PROGRESS NOTE ADULT - PROVIDER SPECIALTY LIST ADULT
Internal Medicine
Podiatry
Rehab Medicine
Podiatry
Rehab Medicine
Podiatry
Infectious Disease
Infectious Disease
Internal Medicine
Hospitalist

## 2023-01-12 NOTE — PROGRESS NOTE ADULT - ASSESSMENT
63 yo M pmhx DM, HTN, CAD (stent about 1mo ago), Hep C s/p treatment p/w L foot pain and swelling going on for a few days. Pt noted he recently had L foot amputation by Vascular surgery for OM (partial 4th and 5th ray amputation with digits intact). Pt cannot recall who his surgeon was or when exactly it was done, but notes it was at [facility]. Podiatry consulted for evaluation of wound, r/o OM, r/o gas. Of note XR wet read hardware in L 1st TMT joint, broken screws present. Evidence of previous 4th and 5th partial MT resection. No evidence of gas. L dorsal wound +PTB, suspicious for OM. MRI shows OM of 4th & 5th digit and remaining 4th & 5th metatarsal bases. Deep wound culture growing staph simulans. Pt is now s/p L foot bone biopsy of 5th digit proximal phalanx (1/9). Pt planned to be discharged to [facility] today.     Plan:  - F/u bone biopsy results   -  Recc c/w ABX per ID  - Local wound care: WTD to L plantar wound, flushed dorsal wound and placed 1/2" packing in sinus tract, wrapped with DSD, Kerlix, ACE  - Recc WBAT w/ cane to L foot  - rest of care per primary team    Podiatry following. Plan d/w attending. 61 yo M pmhx DM, HTN, CAD (stent about 1mo ago), Hep C s/p treatment p/w L foot pain and swelling going on for a few days. Pt noted he recently had L foot amputation by Vascular surgery for OM (partial 4th and 5th ray amputation with digits intact). Pt cannot recall who his surgeon was or when exactly it was done, but notes it was at [facility]. Podiatry consulted for evaluation of wound, r/o OM, r/o gas. Of note XR wet read hardware in L 1st TMT joint, broken screws present. Evidence of previous 4th and 5th partial MT resection. No evidence of gas. L dorsal wound +PTB, suspicious for OM. MRI shows OM of 4th & 5th digit and remaining 4th & 5th metatarsal bases. Deep wound culture growing staph simulans. Pt is now s/p L foot bone biopsy of 5th digit proximal phalanx (1/9). Pt planned to be discharged to [facility] today.     Plan:  - F/u bone biopsy results   -  Recc c/w ABX per ID  - Local wound care: WTD to L plantar wound, flushed dorsal wound and placed 1/2" packing in sinus tract, wrapped with DSD, Kerlix, ACE  - Recc WBAT w/ cane to L foot  - rest of care per primary team    Podiatry following. Plan d/w attending.    Discharge dressing instructions: Cleanse wound with normal sailine daily, pat dry with sterile guaze, pack wound defect on top of foot with 1/4 inch iodoform packing, apply betadine to macerated edges, cover with dry sterile gauze, Apply saline-soaked gauze to wound on plantar foot. Wrap with Kerlix and light ACE bandage.     Pt can follow up at [facility]ry clinic   Address: [address] [place] [address]  Phone: [phone]

## 2023-01-16 LAB — MISCELLANEOUS TEST NAME: SIGNIFICANT CHANGE UP

## 2023-01-17 ENCOUNTER — APPOINTMENT (OUTPATIENT)
Age: 63
End: 2023-01-17

## 2023-01-17 DIAGNOSIS — I10 ESSENTIAL (PRIMARY) HYPERTENSION: ICD-10-CM

## 2023-01-17 DIAGNOSIS — L97.529 NON-PRESSURE CHRONIC ULCER OF OTHER PART OF LEFT FOOT WITH UNSPECIFIED SEVERITY: ICD-10-CM

## 2023-01-17 DIAGNOSIS — E11.51 TYPE 2 DIABETES MELLITUS WITH DIABETIC PERIPHERAL ANGIOPATHY WITHOUT GANGRENE: ICD-10-CM

## 2023-01-17 DIAGNOSIS — L03.116 CELLULITIS OF LEFT LOWER LIMB: ICD-10-CM

## 2023-01-17 DIAGNOSIS — M86.8X7 OTHER OSTEOMYELITIS, ANKLE AND FOOT: ICD-10-CM

## 2023-01-17 DIAGNOSIS — Z79.4 LONG TERM (CURRENT) USE OF INSULIN: ICD-10-CM

## 2023-01-17 DIAGNOSIS — F17.210 NICOTINE DEPENDENCE, CIGARETTES, UNCOMPLICATED: ICD-10-CM

## 2023-01-17 DIAGNOSIS — T39.395A ADVERSE EFFECT OF OTHER NONSTEROIDAL ANTI-INFLAMMATORY DRUGS [NSAID], INITIAL ENCOUNTER: ICD-10-CM

## 2023-01-17 DIAGNOSIS — B95.7 OTHER STAPHYLOCOCCUS AS THE CAUSE OF DISEASES CLASSIFIED ELSEWHERE: ICD-10-CM

## 2023-01-17 DIAGNOSIS — Z53.29 PROCEDURE AND TREATMENT NOT CARRIED OUT BECAUSE OF PATIENT'S DECISION FOR OTHER REASONS: ICD-10-CM

## 2023-01-17 DIAGNOSIS — I25.10 ATHEROSCLEROTIC HEART DISEASE OF NATIVE CORONARY ARTERY WITHOUT ANGINA PECTORIS: ICD-10-CM

## 2023-01-17 DIAGNOSIS — Z59.00 HOMELESSNESS UNSPECIFIED: ICD-10-CM

## 2023-01-17 DIAGNOSIS — D63.8 ANEMIA IN OTHER CHRONIC DISEASES CLASSIFIED ELSEWHERE: ICD-10-CM

## 2023-01-17 DIAGNOSIS — E11.621 TYPE 2 DIABETES MELLITUS WITH FOOT ULCER: ICD-10-CM

## 2023-01-17 DIAGNOSIS — N17.0 ACUTE KIDNEY FAILURE WITH TUBULAR NECROSIS: ICD-10-CM

## 2023-01-17 DIAGNOSIS — Z89.422 ACQUIRED ABSENCE OF OTHER LEFT TOE(S): ICD-10-CM

## 2023-01-17 DIAGNOSIS — B96.89 OTHER SPECIFIED BACTERIAL AGENTS AS THE CAUSE OF DISEASES CLASSIFIED ELSEWHERE: ICD-10-CM

## 2023-01-17 DIAGNOSIS — Z95.5 PRESENCE OF CORONARY ANGIOPLASTY IMPLANT AND GRAFT: ICD-10-CM

## 2023-01-17 DIAGNOSIS — Z79.82 LONG TERM (CURRENT) USE OF ASPIRIN: ICD-10-CM

## 2023-01-17 DIAGNOSIS — E11.69 TYPE 2 DIABETES MELLITUS WITH OTHER SPECIFIED COMPLICATION: ICD-10-CM

## 2023-01-17 DIAGNOSIS — Z86.19 PERSONAL HISTORY OF OTHER INFECTIOUS AND PARASITIC DISEASES: ICD-10-CM

## 2023-01-17 SDOH — ECONOMIC STABILITY - HOUSING INSECURITY: HOMELESSNESS UNSPECIFIED: Z59.00

## 2023-01-18 ENCOUNTER — EMERGENCY (EMERGENCY)
Facility: HOSPITAL | Age: 63
LOS: 0 days | Discharge: HOME | End: 2023-01-19
Attending: STUDENT IN AN ORGANIZED HEALTH CARE EDUCATION/TRAINING PROGRAM
Payer: MEDICAID

## 2023-01-18 VITALS
HEART RATE: 99 BPM | TEMPERATURE: 99 F | HEIGHT: 73 IN | OXYGEN SATURATION: 99 % | RESPIRATION RATE: 18 BRPM | WEIGHT: 184.09 LBS | SYSTOLIC BLOOD PRESSURE: 146 MMHG | DIASTOLIC BLOOD PRESSURE: 96 MMHG

## 2023-01-18 DIAGNOSIS — Z88.8 ALLERGY STATUS TO OTHER DRUGS, MEDICAMENTS AND BIOLOGICAL SUBSTANCES STATUS: ICD-10-CM

## 2023-01-18 DIAGNOSIS — M86.679 OTHER CHRONIC OSTEOMYELITIS, UNSPECIFIED ANKLE AND FOOT: ICD-10-CM

## 2023-01-18 DIAGNOSIS — Z79.82 LONG TERM (CURRENT) USE OF ASPIRIN: ICD-10-CM

## 2023-01-18 DIAGNOSIS — I25.10 ATHEROSCLEROTIC HEART DISEASE OF NATIVE CORONARY ARTERY WITHOUT ANGINA PECTORIS: ICD-10-CM

## 2023-01-18 DIAGNOSIS — E10.69 TYPE 1 DIABETES MELLITUS WITH OTHER SPECIFIED COMPLICATION: ICD-10-CM

## 2023-01-18 DIAGNOSIS — M79.89 OTHER SPECIFIED SOFT TISSUE DISORDERS: ICD-10-CM

## 2023-01-18 DIAGNOSIS — Z89.422 ACQUIRED ABSENCE OF OTHER LEFT TOE(S): Chronic | ICD-10-CM

## 2023-01-18 DIAGNOSIS — Z79.01 LONG TERM (CURRENT) USE OF ANTICOAGULANTS: ICD-10-CM

## 2023-01-18 DIAGNOSIS — Z89.422 ACQUIRED ABSENCE OF OTHER LEFT TOE(S): ICD-10-CM

## 2023-01-18 DIAGNOSIS — Z79.4 LONG TERM (CURRENT) USE OF INSULIN: ICD-10-CM

## 2023-01-18 DIAGNOSIS — Z91.014 ALLERGY TO MAMMALIAN MEATS: ICD-10-CM

## 2023-01-18 DIAGNOSIS — I10 ESSENTIAL (PRIMARY) HYPERTENSION: ICD-10-CM

## 2023-01-18 DIAGNOSIS — Z88.1 ALLERGY STATUS TO OTHER ANTIBIOTIC AGENTS STATUS: ICD-10-CM

## 2023-01-18 DIAGNOSIS — Z95.5 PRESENCE OF CORONARY ANGIOPLASTY IMPLANT AND GRAFT: ICD-10-CM

## 2023-01-18 PROCEDURE — 99283 EMERGENCY DEPT VISIT LOW MDM: CPT

## 2023-01-18 NOTE — ED PROVIDER NOTE - PHYSICAL EXAMINATION
VITAL SIGNS: I have reviewed nursing notes and confirm.  CONSTITUTIONAL: well-appearing, non-toxic, NAD  SKIN: Warm dry, normal skin turgor  HEAD: NCAT  CARD: RRR, no murmurs, rubs or gallops  RESP: clear to ausculation b/l.  No rales, rhonchi, or wheezing..  PSYCH: Cooperative, appropriate.

## 2023-01-18 NOTE — ED ADULT NURSE NOTE - NSIMPLEMENTINTERV_GEN_ALL_ED
Implemented All Fall with Harm Risk Interventions:  [place] to call system. Call bell, personal items and telephone within reach. Instruct patient to call for assistance. Room bathroom lighting operational. Non-slip footwear when patient is off stretcher. Physically safe environment: no spills, clutter or unnecessary equipment. Stretcher in lowest position, wheels locked, appropriate side rails in place. Provide visual cue, wrist band, yellow gown, etc. Monitor gait and stability. Monitor for mental status changes and reorient to person, place, and time. Review medications for side effects contributing to fall risk. Reinforce activity limits and safety measures with patient and family. Provide visual clues: red socks.

## 2023-01-18 NOTE — ED PROVIDER NOTE - PATIENT PORTAL LINK FT
You can access the FollowMyHealth Patient Portal offered by [facility] by registering at the following website: [email or web address]. By joining [employer]’s FollowMyHealth portal, you will also be able to view your health information using other applications (apps) compatible with our system.

## 2023-01-18 NOTE — ED ADULT NURSE NOTE - SKIN INTEGRITY
L foot wound stage 2, R buttock wound stage L foot wound stage 2, R buttock wound stage 2 L foot wound stage 2, R buttock wound stage 4

## 2023-01-18 NOTE — ED PROVIDER NOTE - OBJECTIVE STATEMENT
62M HTN, DM, CAD (stent about 1mo ago), hep C s/p treatment presenting with foot pain and swelling x1 day, found to have MRI confirmed osteomyelitis of tips of 4th and 5th metatarsals and admitted for medical management and discharged to rehab Presenting to the ED with complaints that nursing staff at rehab facility are not changing wounds when requested, patient denies any fever chills, denies any worsening pain other than his chronic foot pain.  Patient denies other complaints.

## 2023-01-18 NOTE — ED ADULT NURSE NOTE - OBJECTIVE STATEMENT
pt is AOx3, pt came in complaining of wound check. pt states he has 2 wounds, one on the L foot stage 2 and one on the R gluteal cheek. pt states he came in to the ED for treatment because his nursing home ([facility]) hasn't been providing adequate treatment. pt states he last received wound care yesterday at his facility. pt rates his pain  a10/10, pt hasn't taken anything for the pain. pt denies any CP, SOB, fever, chills, N/V/D at this time.     pt has pmh of HTN, diabetes, osteomyelitis pt is AOx3, pt came in complaining of wound check. pt states he has 2 wounds, one on the L foot stage 2 and one on the R gluteal cheek. pt states he came in to the ED for treatment because his nursing home ([facility]) hasn't been providing adequate treatment. pt states he last received wound care yesterday at his facility, facility states he is taking daily antibiotics for the wounds. pt rates his pain  a10/10, pt hasn't taken anything for the pain. pt denies any CP, SOB, fever, chills, N/V/D at this time.     pt has pmh of HTN, diabetes, osteomyelitis pt is AOx3, pt came in complaining of wound check. pt states he has 2 wounds, one on the L foot stage 2 and one on the R gluteal cheek stage 2. pt states he came in to the ED for treatment because his nursing home ([facility]) hasn't provided wound care today. pt states he last received wound care yesterday at his facility, facility states he is taking daily antibiotics for the wounds. pt rates his pain  a10/10, pt hasn't taken anything for the pain. pt denies any CP, SOB, fever, chills, N/V/D at this time.     pt has pmh of HTN, diabetes, osteomyelitis pt is AOx3, pt came in complaining of wound check. pt states he has 2 wounds, one on the L foot stage 2 and one on the R gluteal cheek stage 4, both well appearing with no drainage/foul smell. pt states he came in to the ED for treatment because his nursing home ([facility]) hasn't provided wound care today. pt states he last received wound care yesterday at his facility, facility states he is taking daily antibiotics for the wounds. pt rates his pain  a10/10, pt hasn't taken anything for the pain. pt denies any CP, SOB, fever, chills, N/V/D at this time.     pt has pmh of HTN, diabetes, osteomyelitis

## 2023-01-18 NOTE — ED PROVIDER NOTE - CLINICAL SUMMARY MEDICAL DECISION MAKING FREE TEXT BOX
62-year-old male with reported pulmonary conditions listed above recently admitted and discharged with known diagnosis of osteomyelitis takes linezolid at nursing facility, spoke with nursing staff as wound dressing changes every day, patient is difficult as per nursing staff and requesting wound dressing changes at certain times that staff is unable to accommodate.  No acute medical complaints, no fever no chills vitals stable, patient to be discharged back to facility.

## 2023-01-19 VITALS
OXYGEN SATURATION: 100 % | TEMPERATURE: 98 F | SYSTOLIC BLOOD PRESSURE: 160 MMHG | RESPIRATION RATE: 16 BRPM | HEART RATE: 90 BPM | DIASTOLIC BLOOD PRESSURE: 82 MMHG

## 2023-01-19 NOTE — ED ADULT NURSE REASSESSMENT NOTE - NS ED NURSE REASSESS COMMENT FT1
covering for primary RN, left leg wound dressing intact, done by PA, wound checked by covering RN, PA and doctor taurus made aware, no covering noted, will continue to monitor

## 2023-01-23 ENCOUNTER — APPOINTMENT (OUTPATIENT)
Dept: INFECTIOUS DISEASE | Facility: CLINIC | Age: 63
End: 2023-01-23

## 2023-04-24 NOTE — ED ADULT NURSE NOTE - HISTORY OF COVID-19 VACCINATION
Medication refill information reviewed. UDS was updated November 2022. Follow up is scheduled 05/08/23    Due date for fentaNYL (DURAGESIC) 25 mcg/hr 72 hr patch and oxyCODONE (ROXICODONE) 5 MG/5ML solution is 05/03/23     Prescriptions prepped for review.     Will route to provider.            Vaccine status unknown

## 2023-06-26 NOTE — DISCHARGE NOTE NURSING/CASE MANAGEMENT/SOCIAL WORK - MODE OF TRANSPORTATION
Ambulette [FreeTextEntry1] : Hypertension\par -At home blood pressures have had a max of 150 systolic\par -Patient has been thoroughly worked up and counseled regarding his blood pressure by multiple specialists including nephrology cardiology neurology-\par -blood pressure is likely a component of anxiety stress\par -Patient did not trial the clonazepam I think that this may be beneficial to the patient giving the significant elevations in blood pressure that occurred\par -There have been no secondary causes to suggest a reason for him having this elevated blood pressure\par \par \par Allergies\par -History of allergies patient cannot take Benadryl because there has been increase his blood pressure recommended Singulair to trial also recommended topical agents such as azelastine or fluticasone

## 2023-08-06 VITALS
HEART RATE: 70 BPM | RESPIRATION RATE: 18 BRPM | TEMPERATURE: 98 F | SYSTOLIC BLOOD PRESSURE: 125 MMHG | DIASTOLIC BLOOD PRESSURE: 72 MMHG | OXYGEN SATURATION: 97 %

## 2023-08-06 LAB
BASOPHILS # BLD AUTO: 0.07 K/UL — SIGNIFICANT CHANGE UP (ref 0–0.2)
BASOPHILS NFR BLD AUTO: 0.5 % — SIGNIFICANT CHANGE UP (ref 0–2)
EOSINOPHIL # BLD AUTO: 0.24 K/UL — SIGNIFICANT CHANGE UP (ref 0–0.5)
EOSINOPHIL NFR BLD AUTO: 1.6 % — SIGNIFICANT CHANGE UP (ref 0–6)
HCT VFR BLD CALC: 30.3 % — LOW (ref 39–50)
HGB BLD-MCNC: 9.3 G/DL — LOW (ref 13–17)
IMM GRANULOCYTES NFR BLD AUTO: 0.7 % — SIGNIFICANT CHANGE UP (ref 0–0.9)
LACTATE SERPL-SCNC: 2.8 MMOL/L — HIGH (ref 0.5–2)
LYMPHOCYTES # BLD AUTO: 1.9 K/UL — SIGNIFICANT CHANGE UP (ref 1–3.3)
LYMPHOCYTES # BLD AUTO: 12.7 % — LOW (ref 13–44)
MCHC RBC-ENTMCNC: 23.8 PG — LOW (ref 27–34)
MCHC RBC-ENTMCNC: 30.7 GM/DL — LOW (ref 32–36)
MCV RBC AUTO: 77.5 FL — LOW (ref 80–100)
MONOCYTES # BLD AUTO: 0.76 K/UL — SIGNIFICANT CHANGE UP (ref 0–0.9)
MONOCYTES NFR BLD AUTO: 5.1 % — SIGNIFICANT CHANGE UP (ref 2–14)
NEUTROPHILS # BLD AUTO: 11.93 K/UL — HIGH (ref 1.8–7.4)
NEUTROPHILS NFR BLD AUTO: 79.4 % — HIGH (ref 43–77)
NRBC # BLD: 0 /100 WBCS — SIGNIFICANT CHANGE UP (ref 0–0)
PLATELET # BLD AUTO: 184 K/UL — SIGNIFICANT CHANGE UP (ref 150–400)
RBC # BLD: 3.91 M/UL — LOW (ref 4.2–5.8)
RBC # FLD: 16.1 % — HIGH (ref 10.3–14.5)
WBC # BLD: 15 K/UL — HIGH (ref 3.8–10.5)
WBC # FLD AUTO: 15 K/UL — HIGH (ref 3.8–10.5)

## 2023-08-06 PROCEDURE — 99285 EMERGENCY DEPT VISIT HI MDM: CPT | Mod: 25

## 2023-08-06 RX ORDER — SODIUM CHLORIDE 9 MG/ML
1000 INJECTION, SOLUTION INTRAVENOUS ONCE
Refills: 0 | Status: COMPLETED | OUTPATIENT
Start: 2023-08-06 | End: 2023-08-06

## 2023-08-06 NOTE — ED ADULT NURSE NOTE - NSFALLRISKINTERV_ED_ALL_ED

## 2023-08-06 NOTE — ED ADULT NURSE NOTE - OBJECTIVE STATEMENT
Patient to the ED c/o chronic wound to right lower back x 3 years. Recently admitted to outside hospital for IV ABX and left AMA due to "not liking the care I was getting". AAOX4, NAD.

## 2023-08-06 NOTE — ED ADULT TRIAGE NOTE - ARRIVAL INFO ADDITIONAL COMMENTS
pt c/o wound on his back and bilateral feet, was seen last week at [facility] and told they were infected but he walked out.  now c/o pain.

## 2023-08-07 ENCOUNTER — INPATIENT (INPATIENT)
Facility: HOSPITAL | Age: 63
LOS: 3 days | Discharge: HOME | DRG: 304 | End: 2023-08-11
Attending: STUDENT IN AN ORGANIZED HEALTH CARE EDUCATION/TRAINING PROGRAM | Admitting: STUDENT IN AN ORGANIZED HEALTH CARE EDUCATION/TRAINING PROGRAM
Payer: MEDICAID

## 2023-08-07 DIAGNOSIS — I16.0 HYPERTENSIVE URGENCY: ICD-10-CM

## 2023-08-07 DIAGNOSIS — Z88.1 ALLERGY STATUS TO OTHER ANTIBIOTIC AGENTS STATUS: ICD-10-CM

## 2023-08-07 DIAGNOSIS — A41.9 SEPSIS, UNSPECIFIED ORGANISM: ICD-10-CM

## 2023-08-07 DIAGNOSIS — N17.9 ACUTE KIDNEY FAILURE, UNSPECIFIED: ICD-10-CM

## 2023-08-07 DIAGNOSIS — E87.1 HYPO-OSMOLALITY AND HYPONATREMIA: ICD-10-CM

## 2023-08-07 DIAGNOSIS — Z79.4 LONG TERM (CURRENT) USE OF INSULIN: ICD-10-CM

## 2023-08-07 DIAGNOSIS — Z99.3 DEPENDENCE ON WHEELCHAIR: ICD-10-CM

## 2023-08-07 DIAGNOSIS — E11.65 TYPE 2 DIABETES MELLITUS WITH HYPERGLYCEMIA: ICD-10-CM

## 2023-08-07 DIAGNOSIS — L89.891 PRESSURE ULCER OF OTHER SITE, STAGE 1: ICD-10-CM

## 2023-08-07 DIAGNOSIS — Z91.014 ALLERGY TO MAMMALIAN MEATS: ICD-10-CM

## 2023-08-07 DIAGNOSIS — E43 UNSPECIFIED SEVERE PROTEIN-CALORIE MALNUTRITION: ICD-10-CM

## 2023-08-07 DIAGNOSIS — Z86.19 PERSONAL HISTORY OF OTHER INFECTIOUS AND PARASITIC DISEASES: ICD-10-CM

## 2023-08-07 DIAGNOSIS — Z89.432 ACQUIRED ABSENCE OF LEFT FOOT: ICD-10-CM

## 2023-08-07 DIAGNOSIS — S31.809A UNSPECIFIED OPEN WOUND OF UNSPECIFIED BUTTOCK, INITIAL ENCOUNTER: ICD-10-CM

## 2023-08-07 DIAGNOSIS — Z79.82 LONG TERM (CURRENT) USE OF ASPIRIN: ICD-10-CM

## 2023-08-07 DIAGNOSIS — B96.89 OTHER SPECIFIED BACTERIAL AGENTS AS THE CAUSE OF DISEASES CLASSIFIED ELSEWHERE: ICD-10-CM

## 2023-08-07 DIAGNOSIS — I10 ESSENTIAL (PRIMARY) HYPERTENSION: ICD-10-CM

## 2023-08-07 DIAGNOSIS — I25.10 ATHEROSCLEROTIC HEART DISEASE OF NATIVE CORONARY ARTERY WITHOUT ANGINA PECTORIS: ICD-10-CM

## 2023-08-07 DIAGNOSIS — Z00.00 ENCOUNTER FOR GENERAL ADULT MEDICAL EXAMINATION WITHOUT ABNORMAL FINDINGS: ICD-10-CM

## 2023-08-07 DIAGNOSIS — Z89.422 ACQUIRED ABSENCE OF OTHER LEFT TOE(S): Chronic | ICD-10-CM

## 2023-08-07 DIAGNOSIS — R31.9 HEMATURIA, UNSPECIFIED: ICD-10-CM

## 2023-08-07 DIAGNOSIS — L89.153 PRESSURE ULCER OF SACRAL REGION, STAGE 3: ICD-10-CM

## 2023-08-07 DIAGNOSIS — Z88.8 ALLERGY STATUS TO OTHER DRUGS, MEDICAMENTS AND BIOLOGICAL SUBSTANCES STATUS: ICD-10-CM

## 2023-08-07 DIAGNOSIS — L89.214 PRESSURE ULCER OF RIGHT HIP, STAGE 4: ICD-10-CM

## 2023-08-07 DIAGNOSIS — R65.10 SYSTEMIC INFLAMMATORY RESPONSE SYNDROME (SIRS) OF NON-INFECTIOUS ORIGIN WITHOUT ACUTE ORGAN DYSFUNCTION: ICD-10-CM

## 2023-08-07 DIAGNOSIS — E11.9 TYPE 2 DIABETES MELLITUS WITHOUT COMPLICATIONS: ICD-10-CM

## 2023-08-07 DIAGNOSIS — S91.302A UNSPECIFIED OPEN WOUND, LEFT FOOT, INITIAL ENCOUNTER: ICD-10-CM

## 2023-08-07 DIAGNOSIS — Z95.5 PRESENCE OF CORONARY ANGIOPLASTY IMPLANT AND GRAFT: ICD-10-CM

## 2023-08-07 LAB
ALBUMIN SERPL ELPH-MCNC: 2.9 G/DL — LOW (ref 3.3–5)
ALBUMIN SERPL ELPH-MCNC: 3 G/DL — LOW (ref 3.3–5)
ALBUMIN SERPL ELPH-MCNC: 3.2 G/DL — LOW (ref 3.3–5)
ALP SERPL-CCNC: 111 U/L — SIGNIFICANT CHANGE UP (ref 40–120)
ALP SERPL-CCNC: 112 U/L — SIGNIFICANT CHANGE UP (ref 40–120)
ALP SERPL-CCNC: SIGNIFICANT CHANGE UP (ref 40–120)
ALT FLD-CCNC: 6 U/L — LOW (ref 10–45)
ALT FLD-CCNC: <5 U/L — LOW (ref 10–45)
ALT FLD-CCNC: SIGNIFICANT CHANGE UP (ref 10–45)
ANION GAP SERPL CALC-SCNC: 11 MMOL/L — SIGNIFICANT CHANGE UP (ref 5–17)
ANION GAP SERPL CALC-SCNC: 11 MMOL/L — SIGNIFICANT CHANGE UP (ref 5–17)
ANION GAP SERPL CALC-SCNC: 9 MMOL/L — SIGNIFICANT CHANGE UP (ref 5–17)
APPEARANCE UR: CLEAR — SIGNIFICANT CHANGE UP
APPEARANCE UR: CLEAR — SIGNIFICANT CHANGE UP
AST SERPL-CCNC: 12 U/L — SIGNIFICANT CHANGE UP (ref 10–40)
AST SERPL-CCNC: 15 U/L — SIGNIFICANT CHANGE UP (ref 10–40)
AST SERPL-CCNC: SIGNIFICANT CHANGE UP (ref 10–40)
BACTERIA # UR AUTO: PRESENT /HPF
BACTERIA # UR AUTO: PRESENT /HPF
BASOPHILS # BLD AUTO: 0.07 K/UL — SIGNIFICANT CHANGE UP (ref 0–0.2)
BASOPHILS NFR BLD AUTO: 0.5 % — SIGNIFICANT CHANGE UP (ref 0–2)
BILIRUB DIRECT SERPL-MCNC: <0.2 MG/DL — SIGNIFICANT CHANGE UP (ref 0–0.3)
BILIRUB INDIRECT FLD-MCNC: SIGNIFICANT CHANGE UP MG/DL (ref 0.2–1)
BILIRUB SERPL-MCNC: 0.2 MG/DL — SIGNIFICANT CHANGE UP (ref 0.2–1.2)
BILIRUB SERPL-MCNC: <0.2 MG/DL — SIGNIFICANT CHANGE UP (ref 0.2–1.2)
BILIRUB SERPL-MCNC: <0.2 MG/DL — SIGNIFICANT CHANGE UP (ref 0.2–1.2)
BILIRUB UR-MCNC: NEGATIVE — SIGNIFICANT CHANGE UP
BILIRUB UR-MCNC: NEGATIVE — SIGNIFICANT CHANGE UP
BUN SERPL-MCNC: 28 MG/DL — HIGH (ref 7–23)
BUN SERPL-MCNC: 33 MG/DL — HIGH (ref 7–23)
BUN SERPL-MCNC: 35 MG/DL — HIGH (ref 7–23)
CALCIUM SERPL-MCNC: 8 MG/DL — LOW (ref 8.4–10.5)
CALCIUM SERPL-MCNC: 9 MG/DL — SIGNIFICANT CHANGE UP (ref 8.4–10.5)
CALCIUM SERPL-MCNC: 9.3 MG/DL — SIGNIFICANT CHANGE UP (ref 8.4–10.5)
CHLORIDE SERPL-SCNC: 94 MMOL/L — LOW (ref 96–108)
CHLORIDE SERPL-SCNC: 97 MMOL/L — SIGNIFICANT CHANGE UP (ref 96–108)
CHLORIDE SERPL-SCNC: 99 MMOL/L — SIGNIFICANT CHANGE UP (ref 96–108)
CK MB CFR SERPL CALC: 1.3 NG/ML — SIGNIFICANT CHANGE UP (ref 0–6.7)
CK SERPL-CCNC: 96 U/L — SIGNIFICANT CHANGE UP (ref 30–200)
CO2 SERPL-SCNC: 23 MMOL/L — SIGNIFICANT CHANGE UP (ref 22–31)
CO2 SERPL-SCNC: 26 MMOL/L — SIGNIFICANT CHANGE UP (ref 22–31)
CO2 SERPL-SCNC: 27 MMOL/L — SIGNIFICANT CHANGE UP (ref 22–31)
COLOR SPEC: YELLOW — SIGNIFICANT CHANGE UP
COLOR SPEC: YELLOW — SIGNIFICANT CHANGE UP
CREAT SERPL-MCNC: 2.51 MG/DL — HIGH (ref 0.5–1.3)
CREAT SERPL-MCNC: 2.8 MG/DL — HIGH (ref 0.5–1.3)
CREAT SERPL-MCNC: 2.81 MG/DL — HIGH (ref 0.5–1.3)
DIFF PNL FLD: ABNORMAL
DIFF PNL FLD: ABNORMAL
EGFR: 25 ML/MIN/1.73M2 — LOW
EGFR: 25 ML/MIN/1.73M2 — LOW
EGFR: 28 ML/MIN/1.73M2 — LOW
EOSINOPHIL # BLD AUTO: 0.31 K/UL — SIGNIFICANT CHANGE UP (ref 0–0.5)
EOSINOPHIL NFR BLD AUTO: 2.1 % — SIGNIFICANT CHANGE UP (ref 0–6)
EPI CELLS # UR: SIGNIFICANT CHANGE UP /HPF (ref 0–5)
EPI CELLS # UR: SIGNIFICANT CHANGE UP /HPF (ref 0–5)
GLUCOSE BLDC GLUCOMTR-MCNC: 142 MG/DL — HIGH (ref 70–99)
GLUCOSE BLDC GLUCOMTR-MCNC: 190 MG/DL — HIGH (ref 70–99)
GLUCOSE BLDC GLUCOMTR-MCNC: 357 MG/DL — HIGH (ref 70–99)
GLUCOSE BLDC GLUCOMTR-MCNC: 403 MG/DL — HIGH (ref 70–99)
GLUCOSE SERPL-MCNC: 335 MG/DL — HIGH (ref 70–99)
GLUCOSE SERPL-MCNC: 358 MG/DL — HIGH (ref 70–99)
GLUCOSE SERPL-MCNC: 363 MG/DL — HIGH (ref 70–99)
GLUCOSE UR QL: 500
GLUCOSE UR QL: >=1000
GRAM STN FLD: SIGNIFICANT CHANGE UP
HCT VFR BLD CALC: 34.3 % — LOW (ref 39–50)
HGB BLD-MCNC: 10.4 G/DL — LOW (ref 13–17)
HYALINE CASTS # UR AUTO: SIGNIFICANT CHANGE UP /LPF (ref 0–2)
IMM GRANULOCYTES NFR BLD AUTO: 0.5 % — SIGNIFICANT CHANGE UP (ref 0–0.9)
KETONES UR-MCNC: NEGATIVE — SIGNIFICANT CHANGE UP
KETONES UR-MCNC: NEGATIVE — SIGNIFICANT CHANGE UP
LACTATE SERPL-SCNC: 1.2 MMOL/L — SIGNIFICANT CHANGE UP (ref 0.5–2)
LEUKOCYTE ESTERASE UR-ACNC: NEGATIVE — SIGNIFICANT CHANGE UP
LEUKOCYTE ESTERASE UR-ACNC: NEGATIVE — SIGNIFICANT CHANGE UP
LYMPHOCYTES # BLD AUTO: 18.6 % — SIGNIFICANT CHANGE UP (ref 13–44)
LYMPHOCYTES # BLD AUTO: 2.7 K/UL — SIGNIFICANT CHANGE UP (ref 1–3.3)
MAGNESIUM SERPL-MCNC: 1.8 MG/DL — SIGNIFICANT CHANGE UP (ref 1.6–2.6)
MCHC RBC-ENTMCNC: 23.4 PG — LOW (ref 27–34)
MCHC RBC-ENTMCNC: 30.3 GM/DL — LOW (ref 32–36)
MCV RBC AUTO: 77.3 FL — LOW (ref 80–100)
MONOCYTES # BLD AUTO: 0.87 K/UL — SIGNIFICANT CHANGE UP (ref 0–0.9)
MONOCYTES NFR BLD AUTO: 6 % — SIGNIFICANT CHANGE UP (ref 2–14)
NEUTROPHILS # BLD AUTO: 10.51 K/UL — HIGH (ref 1.8–7.4)
NEUTROPHILS NFR BLD AUTO: 72.3 % — SIGNIFICANT CHANGE UP (ref 43–77)
NITRITE UR-MCNC: NEGATIVE — SIGNIFICANT CHANGE UP
NITRITE UR-MCNC: NEGATIVE — SIGNIFICANT CHANGE UP
NRBC # BLD: 0 /100 WBCS — SIGNIFICANT CHANGE UP (ref 0–0)
OSMOLALITY SERPL: 307 MOSM/KG — HIGH (ref 280–301)
PH UR: 6 — SIGNIFICANT CHANGE UP (ref 5–8)
PH UR: 6 — SIGNIFICANT CHANGE UP (ref 5–8)
PHOSPHATE SERPL-MCNC: 3.3 MG/DL — SIGNIFICANT CHANGE UP (ref 2.5–4.5)
PLATELET # BLD AUTO: 207 K/UL — SIGNIFICANT CHANGE UP (ref 150–400)
POTASSIUM SERPL-MCNC: 3.3 MMOL/L — LOW (ref 3.5–5.3)
POTASSIUM SERPL-MCNC: 3.5 MMOL/L — SIGNIFICANT CHANGE UP (ref 3.5–5.3)
POTASSIUM SERPL-MCNC: SIGNIFICANT CHANGE UP (ref 3.5–5.3)
POTASSIUM SERPL-SCNC: 3.3 MMOL/L — LOW (ref 3.5–5.3)
POTASSIUM SERPL-SCNC: 3.5 MMOL/L — SIGNIFICANT CHANGE UP (ref 3.5–5.3)
POTASSIUM SERPL-SCNC: SIGNIFICANT CHANGE UP (ref 3.5–5.3)
PROT SERPL-MCNC: 7.1 G/DL — SIGNIFICANT CHANGE UP (ref 6–8.3)
PROT SERPL-MCNC: 7.4 G/DL — SIGNIFICANT CHANGE UP (ref 6–8.3)
PROT SERPL-MCNC: 7.5 G/DL — SIGNIFICANT CHANGE UP (ref 6–8.3)
PROT UR-MCNC: 100 MG/DL
PROT UR-MCNC: 30 MG/DL
RBC # BLD: 4.44 M/UL — SIGNIFICANT CHANGE UP (ref 4.2–5.8)
RBC # FLD: 15.9 % — HIGH (ref 10.3–14.5)
RBC CASTS # UR COMP ASSIST: ABNORMAL /HPF
RBC CASTS # UR COMP ASSIST: ABNORMAL /HPF
SODIUM SERPL-SCNC: 128 MMOL/L — LOW (ref 135–145)
SODIUM SERPL-SCNC: 132 MMOL/L — LOW (ref 135–145)
SODIUM SERPL-SCNC: 137 MMOL/L — SIGNIFICANT CHANGE UP (ref 135–145)
SP GR SPEC: 1.01 — SIGNIFICANT CHANGE UP (ref 1–1.03)
SP GR SPEC: 1.01 — SIGNIFICANT CHANGE UP (ref 1–1.03)
SPECIMEN SOURCE: SIGNIFICANT CHANGE UP
TROPONIN T, HIGH SENSITIVITY RESULT: 35 NG/L — SIGNIFICANT CHANGE UP (ref 0–51)
UROBILINOGEN FLD QL: 0.2 E.U./DL — SIGNIFICANT CHANGE UP
UROBILINOGEN FLD QL: 0.2 E.U./DL — SIGNIFICANT CHANGE UP
WBC # BLD: 14.53 K/UL — HIGH (ref 3.8–10.5)
WBC # FLD AUTO: 14.53 K/UL — HIGH (ref 3.8–10.5)
WBC UR QL: < 5 /HPF — SIGNIFICANT CHANGE UP
WBC UR QL: ABNORMAL /HPF

## 2023-08-07 PROCEDURE — 99223 1ST HOSP IP/OBS HIGH 75: CPT

## 2023-08-07 PROCEDURE — 73718 MRI LOWER EXTREMITY W/O DYE: CPT | Mod: 26,LT

## 2023-08-07 PROCEDURE — 73630 X-RAY EXAM OF FOOT: CPT | Mod: 26,LT,RT

## 2023-08-07 PROCEDURE — 72192 CT PELVIS W/O DYE: CPT | Mod: 26,MA

## 2023-08-07 RX ORDER — NIFEDIPINE 30 MG
90 TABLET, EXTENDED RELEASE 24 HR ORAL ONCE
Refills: 0 | Status: COMPLETED | OUTPATIENT
Start: 2023-08-07 | End: 2023-08-07

## 2023-08-07 RX ORDER — SODIUM CHLORIDE 9 MG/ML
1000 INJECTION, SOLUTION INTRAVENOUS
Refills: 0 | Status: DISCONTINUED | OUTPATIENT
Start: 2023-08-07 | End: 2023-08-11

## 2023-08-07 RX ORDER — SODIUM CHLORIDE 9 MG/ML
1000 INJECTION, SOLUTION INTRAVENOUS ONCE
Refills: 0 | Status: COMPLETED | OUTPATIENT
Start: 2023-08-07 | End: 2023-08-07

## 2023-08-07 RX ORDER — ACETAMINOPHEN 500 MG
650 TABLET ORAL EVERY 6 HOURS
Refills: 0 | Status: DISCONTINUED | OUTPATIENT
Start: 2023-08-07 | End: 2023-08-11

## 2023-08-07 RX ORDER — SODIUM CHLORIDE 9 MG/ML
1000 INJECTION, SOLUTION INTRAVENOUS
Refills: 0 | Status: DISCONTINUED | OUTPATIENT
Start: 2023-08-07 | End: 2023-08-10

## 2023-08-07 RX ORDER — PIPERACILLIN AND TAZOBACTAM 4; .5 G/20ML; G/20ML
3.38 INJECTION, POWDER, LYOPHILIZED, FOR SOLUTION INTRAVENOUS ONCE
Refills: 0 | Status: COMPLETED | OUTPATIENT
Start: 2023-08-07 | End: 2023-08-07

## 2023-08-07 RX ORDER — INSULIN LISPRO 100/ML
3 VIAL (ML) SUBCUTANEOUS
Refills: 0 | Status: DISCONTINUED | OUTPATIENT
Start: 2023-08-07 | End: 2023-08-07

## 2023-08-07 RX ORDER — POTASSIUM CHLORIDE 20 MEQ
40 PACKET (EA) ORAL ONCE
Refills: 0 | Status: COMPLETED | OUTPATIENT
Start: 2023-08-07 | End: 2023-08-07

## 2023-08-07 RX ORDER — DEXTROSE 50 % IN WATER 50 %
12.5 SYRINGE (ML) INTRAVENOUS ONCE
Refills: 0 | Status: DISCONTINUED | OUTPATIENT
Start: 2023-08-07 | End: 2023-08-11

## 2023-08-07 RX ORDER — VANCOMYCIN HCL 1 G
1250 VIAL (EA) INTRAVENOUS ONCE
Refills: 0 | Status: COMPLETED | OUTPATIENT
Start: 2023-08-07 | End: 2023-08-07

## 2023-08-07 RX ORDER — INSULIN GLARGINE 100 [IU]/ML
8 INJECTION, SOLUTION SUBCUTANEOUS AT BEDTIME
Refills: 0 | Status: DISCONTINUED | OUTPATIENT
Start: 2023-08-07 | End: 2023-08-07

## 2023-08-07 RX ORDER — DEXTROSE 50 % IN WATER 50 %
15 SYRINGE (ML) INTRAVENOUS ONCE
Refills: 0 | Status: DISCONTINUED | OUTPATIENT
Start: 2023-08-07 | End: 2023-08-11

## 2023-08-07 RX ORDER — GLUCAGON INJECTION, SOLUTION 0.5 MG/.1ML
1 INJECTION, SOLUTION SUBCUTANEOUS ONCE
Refills: 0 | Status: DISCONTINUED | OUTPATIENT
Start: 2023-08-07 | End: 2023-08-11

## 2023-08-07 RX ORDER — ATORVASTATIN CALCIUM 80 MG/1
40 TABLET, FILM COATED ORAL AT BEDTIME
Refills: 0 | Status: DISCONTINUED | OUTPATIENT
Start: 2023-08-07 | End: 2023-08-11

## 2023-08-07 RX ORDER — INSULIN LISPRO 100/ML
4 VIAL (ML) SUBCUTANEOUS
Refills: 0 | Status: DISCONTINUED | OUTPATIENT
Start: 2023-08-07 | End: 2023-08-10

## 2023-08-07 RX ORDER — DEXTROSE 50 % IN WATER 50 %
25 SYRINGE (ML) INTRAVENOUS ONCE
Refills: 0 | Status: DISCONTINUED | OUTPATIENT
Start: 2023-08-07 | End: 2023-08-11

## 2023-08-07 RX ORDER — ASPIRIN/CALCIUM CARB/MAGNESIUM 324 MG
81 TABLET ORAL DAILY
Refills: 0 | Status: DISCONTINUED | OUTPATIENT
Start: 2023-08-07 | End: 2023-08-11

## 2023-08-07 RX ORDER — INSULIN GLARGINE 100 [IU]/ML
12 INJECTION, SOLUTION SUBCUTANEOUS AT BEDTIME
Refills: 0 | Status: DISCONTINUED | OUTPATIENT
Start: 2023-08-07 | End: 2023-08-10

## 2023-08-07 RX ORDER — INSULIN LISPRO 100/ML
VIAL (ML) SUBCUTANEOUS
Refills: 0 | Status: DISCONTINUED | OUTPATIENT
Start: 2023-08-07 | End: 2023-08-11

## 2023-08-07 RX ORDER — NIFEDIPINE 30 MG
90 TABLET, EXTENDED RELEASE 24 HR ORAL EVERY 24 HOURS
Refills: 0 | Status: DISCONTINUED | OUTPATIENT
Start: 2023-08-07 | End: 2023-08-09

## 2023-08-07 RX ADMIN — Medication 4 UNIT(S): at 18:32

## 2023-08-07 RX ADMIN — SODIUM CHLORIDE 100 MILLILITER(S): 9 INJECTION, SOLUTION INTRAVENOUS at 12:08

## 2023-08-07 RX ADMIN — Medication 40 MILLIEQUIVALENT(S): at 18:02

## 2023-08-07 RX ADMIN — Medication 0.2 MILLIGRAM(S): at 06:50

## 2023-08-07 RX ADMIN — PIPERACILLIN AND TAZOBACTAM 200 GRAM(S): 4; .5 INJECTION, POWDER, LYOPHILIZED, FOR SOLUTION INTRAVENOUS at 00:24

## 2023-08-07 RX ADMIN — Medication 90 MILLIGRAM(S): at 22:54

## 2023-08-07 RX ADMIN — SODIUM CHLORIDE 100 MILLILITER(S): 9 INJECTION, SOLUTION INTRAVENOUS at 22:53

## 2023-08-07 RX ADMIN — Medication 81 MILLIGRAM(S): at 13:53

## 2023-08-07 RX ADMIN — INSULIN GLARGINE 12 UNIT(S): 100 INJECTION, SOLUTION SUBCUTANEOUS at 22:55

## 2023-08-07 RX ADMIN — ATORVASTATIN CALCIUM 40 MILLIGRAM(S): 80 TABLET, FILM COATED ORAL at 22:54

## 2023-08-07 RX ADMIN — Medication 4 UNIT(S): at 12:38

## 2023-08-07 RX ADMIN — Medication 90 MILLIGRAM(S): at 03:54

## 2023-08-07 RX ADMIN — Medication 10: at 12:37

## 2023-08-07 RX ADMIN — SODIUM CHLORIDE 1000 MILLILITER(S): 9 INJECTION, SOLUTION INTRAVENOUS at 00:04

## 2023-08-07 RX ADMIN — Medication 166.67 MILLIGRAM(S): at 00:54

## 2023-08-07 RX ADMIN — Medication 12: at 09:44

## 2023-08-07 RX ADMIN — Medication 650 MILLIGRAM(S): at 23:05

## 2023-08-07 RX ADMIN — SODIUM CHLORIDE 1000 MILLILITER(S): 9 INJECTION, SOLUTION INTRAVENOUS at 02:16

## 2023-08-07 RX ADMIN — Medication 0.1 MILLIGRAM(S): at 04:50

## 2023-08-07 RX ADMIN — Medication 3 UNIT(S): at 09:43

## 2023-08-07 NOTE — H&P ADULT - ASSESSMENT
Patient is a 63 year old male, currently undomiciled, w/ DM and h/o osteomyelitis who presents to the ED for evaluation of chronic wounds, found to have severe sepsis and hypertensive urgency.  Patient is a 63 year old male, currently undomiciled, w/ DM and h/o osteomyelitis who presents to the ED for evaluation of chronic wounds, found to have severe sepsis and hypertensive urgency - being treated with nifedipine and clonidine.

## 2023-08-07 NOTE — PATIENT PROFILE ADULT - FALL HARM RISK - HARM RISK INTERVENTIONS

## 2023-08-07 NOTE — H&P ADULT - PROBLEM SELECTOR PLAN 5
Chronic of 3 years. Patient ambulates with roller and walker, was at [facility] for a few days and left AMA 2/2 not liking the care he received. Presents today as he felt sick again.   CT with stranding and concern for osteo.     Plan:   - would need surgical+ ortho evaluation (+/- plastics)   - f/u MRI  - c/w broad spectrum Ab Chronic of 3 years. Patient ambulates with roller and walker, was at [facility] for a few days and left AMA 2/2 not liking the care he received. Presents today as he felt sick again.   CT with stranding and concern for osteo.     Plan:   - would need surgical+ ortho evaluation (+/- plastics)   - f/u MRI  - will hold broad spectrum abx for now until podiatry fu

## 2023-08-07 NOTE — CONSULT NOTE ADULT - ASSESSMENT
61 yo M pmhx DM, HTN, CAD, Hep C s/p treatment p/w L foot non-healing ulcer. 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 Pt cannot recall who his surgeon was or when exactly it was done, but notes it was at [facility]. Podiatry consulted for evaluation of wound, r/o OM, r/o gas. Of note XR wet read hardware in L 1st TMT joint, broken screws present. Evidence of previous 4th and 5th partial MT resection. No evidence of gas. No significant xr findings of the R foot. On PE, submet 2 with bony probe stop, concern for OM. Pt labs with elevated wbc at 14.53 and ESR 62.     Plan:  - Pt evaluated, chart reviewed  - c/w IV abx   - Local wound care: Betadine DSD to L plantar wound, wrapped with DSD, Kerlix, ACE  - Recc MRI L foot r/o OM  - f/u Left foot plantar foot deep wound cx   - WBAT w/ cane to L foot  - rest of care per primary team    Podiatry following. Plan d/w attending.   61 yo M pmhx DM, HTN, CAD, Hep C s/p treatment p/w L foot non-healing ulcer. 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 Pt cannot recall who his surgeon was or when exactly it was done, but notes it was at [facility]. Podiatry consulted for evaluation of wound, r/o OM, r/o gas. Of note XR wet read hardware in L 1st TMT joint, broken screws present. Evidence of previous 4th and 5th partial MT resection. No evidence of gas. No significant xr findings of the R foot. On PE, submet 2 with bony probe stop, concern for OM. Pt labs with elevated wbc at 14.53 and ESR 62.     Plan:  - Pt evaluated, chart reviewed  - c/w IV abx   - Local wound care: Betadine DSD to L plantar wound, wrapped with DSD, Kerlix, ACE  - R foot great toe cleansed with NS. No underlying open wound noted.   - Recc MRI L foot r/o OM  - f/u Left foot plantar foot deep wound cx   - WBAT w/ cane to L foot  - rest of care per primary team    Podiatry following. Plan d/w attending.   63 yo M pmhx DM, HTN, CAD, Hep C s/p treatment p/w L foot non-healing ulcer. 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 Pt cannot recall who his surgeon was or when exactly it was done, but notes it was at [facility]. Podiatry consulted for evaluation of wound, r/o OM, r/o gas. Of note XR wet read hardware in L 1st TMT joint, broken screws present. Evidence of previous 4th and 5th partial MT resection. No evidence of gas. No significant xr findings of the R foot. On PE, submet 2 with bony probe stop, concern for OM. Pt labs with elevated wbc at 14.53 and ESR 62.     Plan:  - Pt evaluated, chart reviewed  - c/w IV abx   - Sharp excisional debridement of left plantar wound with #15 blade, down to and including dermis. Pt tolerated well.   - Local wound care: Betadine DSD to L plantar wound, wrapped with DSD, Kerlix, ACE  - R foot great toe cleansed with NS. No underlying open wound noted.   - Recc MRI L foot r/o OM  - f/u Left foot plantar foot deep wound cx   - WBAT w/ cane to L foot  - rest of care per primary team    Podiatry following. Plan d/w attending.   63 yo M pmhx DM, HTN, CAD, Hep C s/p treatment p/w L foot non-healing ulcer. 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 Pt cannot recall who his surgeon was or when exactly it was done, but notes it was at [facility]. Podiatry consulted for evaluation of wound, r/o OM, r/o gas. Of note XR wet read hardware in L 1st TMT joint, broken screws present. Evidence of previous 4th and 5th partial MT resection. No evidence of gas. No significant xr findings of the R foot. On PE, submet 2 with bony probe stop, concern for OM. Pt labs with elevated wbc at 14.53 and ESR 62. Prior bone biopsy in January was of the left 5th digit.     Plan:  - Pt evaluated, chart reviewed  - c/w IV abx   - Sharp excisional debridement of left plantar wound with #15 blade, down to and including dermis. Pt tolerated well.   - Local wound care: Betadine DSD to L plantar wound, wrapped with DSD, Kerlix, ACE  - R foot great toe cleansed with NS. No underlying open wound noted.   - Recc MRI L foot r/o OM  - f/u Left foot plantar foot deep wound cx   - WBAT w/ cane to L foot  - rest of care per primary team    Podiatry following. Plan d/w attending.

## 2023-08-07 NOTE — PROGRESS NOTE ADULT - PROBLEM SELECTOR PLAN 8
[place], has had osteo in the past. Refused IV antibiotics and was discharged with PO linezolid. Per patient, he did complete course. Wound is worsened from last admission. Last cultures growing corynebacterium.     Plan:   - would continue Vanc + Zosyn for now   - f/u Xray read  - consult podiatry in AM   - consult ID

## 2023-08-07 NOTE — H&P ADULT - NSHPPHYSICALEXAM_GEN_ALL_CORE
VITALS:   T(C): 37.2 (08-07-23 @ 03:40), Max: 37.2 (08-07-23 @ 03:40)  HR: 84 (08-07-23 @ 05:22) (70 - 89)  BP: 178/87 (08-07-23 @ 05:22) (125/72 - 200/111)  RR: 18 (08-07-23 @ 05:22) (18 - 18)  SpO2: 98% (08-07-23 @ 05:22) (97% - 100%)    GENERAL: NAD, lying in bed comfortably  HEAD:  Atraumatic, normocephalic  EYES: EOMI, PERRLA, conjunctiva and sclera clear  ENT: Moist mucous membranes  NECK: Supple, no JVD  HEART: Regular rate and rhythm, no murmurs, rubs, or gallops  LUNGS: Unlabored respirations.  Clear to auscultation bilaterally, no crackles, wheezing, or rhonchi  ABDOMEN: Soft, nontender, nondistended, +BS  EXTREMITIES: 2+ peripheral pulses bilaterally. No clubbing, cyanosis, or edema  NERVOUS SYSTEM:  A&Ox3, no focal deficits   SKIN: No rashes or lesions VITALS:   T(C): 37.2 (08-07-23 @ 03:40), Max: 37.2 (08-07-23 @ 03:40)  HR: 84 (08-07-23 @ 05:22) (70 - 89)  BP: 178/87 (08-07-23 @ 05:22) (125/72 - 200/111)  RR: 18 (08-07-23 @ 05:22) (18 - 18)  SpO2: 98% (08-07-23 @ 05:22) (97% - 100%)    GENERAL: NAD, lying in bed comfortably, pleasant, cooperative   HEAD:  Atraumatic, normocephalic  EYES: EOMI, PERRLA, conjunctiva and sclera clear  ENT: Moist mucous membranes  NECK: Supple, no JVD  HEART: Regular rate and rhythm, no murmurs, rubs, or gallops  LUNGS: Unlabored respirations.  Clear to auscultation bilaterally, no crackles, wheezing, or rhonchi  ABDOMEN: Soft, nontender, nondistended, +BS  Gluteus: Large 3X3 cm hole in the   EXTREMITIES: Extremities dry and cracked with sever deformity of toes of L foot, coin sized open wound on plantar aspect of the foot. Appears chronic.   NERVOUS SYSTEM:  A&Ox3, no focal deficits   SKIN: scars along upper extremities VITALS:   T(C): 37.2 (08-07-23 @ 03:40), Max: 37.2 (08-07-23 @ 03:40)  HR: 84 (08-07-23 @ 05:22) (70 - 89)  BP: 178/87 (08-07-23 @ 05:22) (125/72 - 200/111)  RR: 18 (08-07-23 @ 05:22) (18 - 18)  SpO2: 98% (08-07-23 @ 05:22) (97% - 100%)    GENERAL: NAD, lying in bed comfortably, pleasant, cooperative   HEAD:  Atraumatic, normocephalic  EYES: EOMI, PERRLA, conjunctiva and sclera clear  ENT: Moist mucous membranes  NECK: Supple, no JVD  HEART: Regular rate and rhythm, no murmurs, rubs, or gallops  LUNGS: Unlabored respirations.  Clear to auscultation bilaterally, no crackles, wheezing, or rhonchi  ABDOMEN: Soft, nontender, nondistended, +BS  Gluteus: Large 3X3 cm hole in the gluteal cleft, borders are clean, no discharge, non purulent   EXTREMITIES: Extremities dry and cracked with sever deformity of toes of L foot, coin sized open wound on plantar aspect of the foot. Appears chronic.   NERVOUS SYSTEM:  A&Ox3, no focal deficits   SKIN: scars along upper extremities

## 2023-08-07 NOTE — CONSULT NOTE ADULT - SUBJECTIVE AND OBJECTIVE BOX
Attending: Dr. Tacos Nix DPM     Patient is a 63y old  Male who presents with a chief complaint of DEBBY(ACUTE KIDNEY INJURY)     (07 Aug 2023 10:10)      HPI:  Patient is a 63 year old man with insulin dependent diabetes mellitus, CAD s/p stent 1 yr ago, osteo of the left foot in January who presents to the ED with complaints of nausea and chills and worsening wounds of several day onset. He was recently hospitalized at [facility] for his wounds and per patient was given Zosyn. He left AMA as he did not like the care he was receiving. Today, patient returns to the ED for evaluation. Of note, patient was hospitalized at [facility] for left foot osteomyelitis growing corynebacterium, ultimately refused IV antibiotics and was discharged home with Linezolid. Patient reports compliance with medications. For his stent, he is unsure who his cardiologist is but reports compliance with ASA and Plavix for six months and then discontinued Plavix. The patient is currently undomiciled and ambulates with a wheelchair. ROS is positive for chills and nausea in the past few days and bloody discharge from his gluteal wound. He also reports poor PO intake to the last "several days." Otherwise, ROS is negative.     In ED, patient hypertensive to 200/111 --> given nifedipine 90 mg and clonidine 0.1 mg --> blood pressure remains elevated. ICU consult was called for hypertensive urgency. Of note, patient is allergic to Labetalol and hydralazine, reports difficulty breathing with both. ICU assessed --> ordered 0.2 mg clonidine PO.  For his wounds, he was treated with Vanc and Zosyn as well as fluid resuscitated w/ 2L normal saline.  (07 Aug 2023 05:35)    Podiatry Addendum: Pt with prior of admission at [facility] for left foot wound. Bone biopsy was done during the say in Januray 2023, and pt was discharged with PO linezolid after pt refused IV abx for OM treatment. Pt since then states of not following up with a provider and did dressing changes at home. Cleansed wound with soap and water. Recently pt was at [facility], and left AMA after not being satisfied with care. Pt states that he does not recall any medical treatment that was done at [facility]. Currently pt complains of non-healing wound to the left submet 2 area of the left foot. Denies any drainage, malodor, or discomfort. Pt also reports of having avulsed his R great toe nail. States that it bled initially but not anymore. Denies putting any topical medications. No other complaints or concerns.       Review of systems negative except per HPI and as stated below      PAST MEDICAL & SURGICAL HISTORY:  IDDM (insulin dependent diabetes mellitus)      Hypertension      CAD (coronary artery disease)      Left toe amputee  Great toe bone removal        Home Medications:  Admelog 100 units/mL injectable solution: 5 unit(s) injectable 3 times a day (before meals) (12 Jan 2023 10:43)  Aspirin Enteric Coated 81 mg oral delayed release tablet: 1 tab(s) orally once a day (05 Jan 2023 05:26)  hydroCHLOROthiazide 12.5 mg oral capsule: 1 cap(s) orally every 24 hours (12 Jan 2023 10:43)  insulin glargine 100 units/mL subcutaneous solution: 18 unit(s) subcutaneous once a day (at bedtime) (09 Jan 2023 12:21)  insulin glargine 100 units/mL subcutaneous solution: 22 unit(s) subcutaneous once a day (at bedtime) (12 Jan 2023 10:43)  Lipitor 80 mg oral tablet: 1 tab(s) orally once a day (05 Jan 2023 05:26)  NIFEdipine 90 mg oral tablet, extended release: 1 tab(s) orally once a day (05 Jan 2023 05:26)  Plavix 75 mg oral tablet: 1 tab(s) orally once a day (05 Jan 2023 05:26)    Allergies    labetalol (Stomach Upset)  hydrALAZINE (Rash)  pork (Unknown)  vancomycin (Stomach Upset)    Intolerances      FAMILY HISTORY:  FH: type 2 diabetes mellitus      Social History:       LABS                        10.4   14.53 )-----------( 207      ( 07 Aug 2023 05:30 )             34.3     08-07    137  |  99  |  28<H>  ----------------------------<  358<H>  3.3<L>   |  27  |  2.51<H>    Ca    9.0      07 Aug 2023 05:30  Phos  3.3     08-07  Mg     1.8     08-07    TPro  7.1  /  Alb  3.0<L>  /  TBili  0.2  /  DBili  x   /  AST  12  /  ALT  <5<L>  /  AlkPhos  112  08-07      ESR: 62  CRP: --  08-06 @ 23:27    Vital Signs Last 24 Hrs  T(C): 36.6 (07 Aug 2023 09:25), Max: 37.2 (07 Aug 2023 03:40)  T(F): 97.8 (07 Aug 2023 09:25), Max: 98.9 (07 Aug 2023 03:40)  HR: 82 (07 Aug 2023 09:25) (70 - 92)  BP: 133/66 (07 Aug 2023 09:25) (125/72 - 200/111)  BP(mean): --  RR: 18 (07 Aug 2023 09:25) (18 - 18)  SpO2: 97% (07 Aug 2023 09:25) (97% - 100%)    Parameters below as of 07 Aug 2023 09:25  Patient On (Oxygen Delivery Method): room air        PHYSICAL EXAM  General: NAD, AA0x3    Lower Extremity Focused:  Vasc: 1/4 DP/PT. negative for edema   Derm: L foot subMT 2/3  wound (3.0 x 3.0 cm), granular base, negative for undermining, malodor, purulent drainage or signs of infection. Diffuse Dry scaly skin present to b/l feet in mocassin distribution.   Neuro: Protective sensation absent  MSK: 5/5 MMT b/l. -TTP of palpation to L foot.     RADIOLOGY  XR b/l feet: RESIDENT WET READ   Left:   There is no fracture or dislocation. Status post partial interpretation of the fourth and fifth rays. Unchanged screw fractures. No radiographic evidence for acute osteomyelitis. There is no focal soft tissue abnormality. Negative for soft tissue air.     Right:   No soft tissue air, Fb or fx.                    Attending: Dr. Tacos Nix DPM     Patient is a 63y old  Male who presents with a chief complaint of DEBBY(ACUTE KIDNEY INJURY)     (07 Aug 2023 10:10)      HPI:  Patient is a 63 year old man with insulin dependent diabetes mellitus, CAD s/p stent 1 yr ago, osteo of the left foot in January who presents to the ED with complaints of nausea and chills and worsening wounds of several day onset. He was recently hospitalized at [facility] for his wounds and per patient was given Zosyn. He left AMA as he did not like the care he was receiving. Today, patient returns to the ED for evaluation. Of note, patient was hospitalized at [facility] for left foot osteomyelitis growing corynebacterium, ultimately refused IV antibiotics and was discharged home with Linezolid. Patient reports compliance with medications. For his stent, he is unsure who his cardiologist is but reports compliance with ASA and Plavix for six months and then discontinued Plavix. The patient is currently undomiciled and ambulates with a wheelchair. ROS is positive for chills and nausea in the past few days and bloody discharge from his gluteal wound. He also reports poor PO intake to the last "several days." Otherwise, ROS is negative.     In ED, patient hypertensive to 200/111 --> given nifedipine 90 mg and clonidine 0.1 mg --> blood pressure remains elevated. ICU consult was called for hypertensive urgency. Of note, patient is allergic to Labetalol and hydralazine, reports difficulty breathing with both. ICU assessed --> ordered 0.2 mg clonidine PO.  For his wounds, he was treated with Vanc and Zosyn as well as fluid resuscitated w/ 2L normal saline.  (07 Aug 2023 05:35)    Podiatry Addendum: Pt with prior of admission at [facility] for left foot wound. Bone biopsy was done during the say in Januray 2023, and pt was discharged with PO linezolid after pt refused IV abx for OM treatment. Pt since then states of not following up with a provider and did dressing changes at home. Cleansed wound with soap and water. Recently pt was at [facility], and left AMA after not being satisfied with care. Pt states that he does not recall any medical treatment that was done at [facility]. Currently pt complains of non-healing wound to the left submet 2 area of the left foot. Denies any drainage, malodor, or discomfort. Pt also reports of having avulsed his R great toe nail. States that it bled initially but not anymore. Denies putting any topical medications. No other complaints or concerns.       Review of systems negative except per HPI and as stated below      PAST MEDICAL & SURGICAL HISTORY:  IDDM (insulin dependent diabetes mellitus)      Hypertension      CAD (coronary artery disease)      Left toe amputee  Great toe bone removal        Home Medications:  Admelog 100 units/mL injectable solution: 5 unit(s) injectable 3 times a day (before meals) (12 Jan 2023 10:43)  Aspirin Enteric Coated 81 mg oral delayed release tablet: 1 tab(s) orally once a day (05 Jan 2023 05:26)  hydroCHLOROthiazide 12.5 mg oral capsule: 1 cap(s) orally every 24 hours (12 Jan 2023 10:43)  insulin glargine 100 units/mL subcutaneous solution: 18 unit(s) subcutaneous once a day (at bedtime) (09 Jan 2023 12:21)  insulin glargine 100 units/mL subcutaneous solution: 22 unit(s) subcutaneous once a day (at bedtime) (12 Jan 2023 10:43)  Lipitor 80 mg oral tablet: 1 tab(s) orally once a day (05 Jan 2023 05:26)  NIFEdipine 90 mg oral tablet, extended release: 1 tab(s) orally once a day (05 Jan 2023 05:26)  Plavix 75 mg oral tablet: 1 tab(s) orally once a day (05 Jan 2023 05:26)    Allergies    labetalol (Stomach Upset)  hydrALAZINE (Rash)  pork (Unknown)  vancomycin (Stomach Upset)    Intolerances      FAMILY HISTORY:  FH: type 2 diabetes mellitus      Social History:       LABS                        10.4   14.53 )-----------( 207      ( 07 Aug 2023 05:30 )             34.3     08-07    137  |  99  |  28<H>  ----------------------------<  358<H>  3.3<L>   |  27  |  2.51<H>    Ca    9.0      07 Aug 2023 05:30  Phos  3.3     08-07  Mg     1.8     08-07    TPro  7.1  /  Alb  3.0<L>  /  TBili  0.2  /  DBili  x   /  AST  12  /  ALT  <5<L>  /  AlkPhos  112  08-07      ESR: 62  CRP: --  08-06 @ 23:27    Vital Signs Last 24 Hrs  T(C): 36.6 (07 Aug 2023 09:25), Max: 37.2 (07 Aug 2023 03:40)  T(F): 97.8 (07 Aug 2023 09:25), Max: 98.9 (07 Aug 2023 03:40)  HR: 82 (07 Aug 2023 09:25) (70 - 92)  BP: 133/66 (07 Aug 2023 09:25) (125/72 - 200/111)  BP(mean): --  RR: 18 (07 Aug 2023 09:25) (18 - 18)  SpO2: 97% (07 Aug 2023 09:25) (97% - 100%)    Parameters below as of 07 Aug 2023 09:25  Patient On (Oxygen Delivery Method): room air        PHYSICAL EXAM  General: NAD, AA0x3    Lower Extremity Focused:  Vasc: 1/4 DP/PT. negative for edema   Derm: L foot subMT 2/3  wound (3.0 x 3.0 cm), granular base, negative for undermining, malodor, purulent drainage or signs of infection. Diffuse Dry scaly skin present to b/l feet in mocassin distribution.   R Foot great toe nail with dried superficial hematoma, no open lesions.   Neuro: Protective sensation absent  MSK: 5/5 MMT b/l. -TTP of palpation to L foot.     RADIOLOGY  XR b/l feet: RESIDENT WET READ   Left:   There is no fracture or dislocation. Status post partial interpretation of the fourth and fifth rays. Unchanged screw fractures. No radiographic evidence for acute osteomyelitis. There is no focal soft tissue abnormality. Negative for soft tissue air.     Right:   No soft tissue air, Fb or fx.                    Attending: Dr. Tacos Nix DPM     Patient is a 63y old  Male who presents with a chief complaint of DEBBY(ACUTE KIDNEY INJURY)     (07 Aug 2023 10:10)      HPI:  Patient is a 63 year old man with insulin dependent diabetes mellitus, CAD s/p stent 1 yr ago, osteo of the left foot in January who presents to the ED with complaints of nausea and chills and worsening wounds of several day onset. He was recently hospitalized at [facility] for his wounds and per patient was given Zosyn. He left AMA as he did not like the care he was receiving. Today, patient returns to the ED for evaluation. Of note, patient was hospitalized at [facility] for left foot osteomyelitis growing corynebacterium, ultimately refused IV antibiotics and was discharged home with Linezolid. Patient reports compliance with medications. For his stent, he is unsure who his cardiologist is but reports compliance with ASA and Plavix for six months and then discontinued Plavix. The patient is currently undomiciled and ambulates with a wheelchair. ROS is positive for chills and nausea in the past few days and bloody discharge from his gluteal wound. He also reports poor PO intake to the last "several days." Otherwise, ROS is negative.     In ED, patient hypertensive to 200/111 --> given nifedipine 90 mg and clonidine 0.1 mg --> blood pressure remains elevated. ICU consult was called for hypertensive urgency. Of note, patient is allergic to Labetalol and hydralazine, reports difficulty breathing with both. ICU assessed --> ordered 0.2 mg clonidine PO.  For his wounds, he was treated with Vanc and Zosyn as well as fluid resuscitated w/ 2L normal saline.  (07 Aug 2023 05:35)    Podiatry Addendum: Pt with prior of admission at [facility] for left foot wound. Bone biopsy was done during the say in January 2023 of left 5th digit, and pt was discharged with PO linezolid after pt refused IV abx for OM treatment. Pt since then states of not following up with a provider. Currently pt has been completing dressing changes of the left submet 2 wound at home with soap and water.  Recently pt was at [facility], and left AMA after not being satisfied with care. Pt states that he does not recall any medical treatment that was done at [facility]. Currently pt complains of non-healing wound to the left submet 2 area of the left foot. Denies any drainage, malodor, or discomfort. Pt also reports of having avulsed his R great toe nail. States that it bled initially but not anymore. Denies putting any topical medications. No other complaints or concerns.       Review of systems negative except per HPI and as stated below      PAST MEDICAL & SURGICAL HISTORY:  IDDM (insulin dependent diabetes mellitus)      Hypertension      CAD (coronary artery disease)      Left toe amputee  Great toe bone removal        Home Medications:  Admelog 100 units/mL injectable solution: 5 unit(s) injectable 3 times a day (before meals) (12 Jan 2023 10:43)  Aspirin Enteric Coated 81 mg oral delayed release tablet: 1 tab(s) orally once a day (05 Jan 2023 05:26)  hydroCHLOROthiazide 12.5 mg oral capsule: 1 cap(s) orally every 24 hours (12 Jan 2023 10:43)  insulin glargine 100 units/mL subcutaneous solution: 18 unit(s) subcutaneous once a day (at bedtime) (09 Jan 2023 12:21)  insulin glargine 100 units/mL subcutaneous solution: 22 unit(s) subcutaneous once a day (at bedtime) (12 Jan 2023 10:43)  Lipitor 80 mg oral tablet: 1 tab(s) orally once a day (05 Jan 2023 05:26)  NIFEdipine 90 mg oral tablet, extended release: 1 tab(s) orally once a day (05 Jan 2023 05:26)  Plavix 75 mg oral tablet: 1 tab(s) orally once a day (05 Jan 2023 05:26)    Allergies    labetalol (Stomach Upset)  hydrALAZINE (Rash)  pork (Unknown)  vancomycin (Stomach Upset)    Intolerances      FAMILY HISTORY:  FH: type 2 diabetes mellitus      Social History:       LABS                        10.4   14.53 )-----------( 207      ( 07 Aug 2023 05:30 )             34.3     08-07    137  |  99  |  28<H>  ----------------------------<  358<H>  3.3<L>   |  27  |  2.51<H>    Ca    9.0      07 Aug 2023 05:30  Phos  3.3     08-07  Mg     1.8     08-07    TPro  7.1  /  Alb  3.0<L>  /  TBili  0.2  /  DBili  x   /  AST  12  /  ALT  <5<L>  /  AlkPhos  112  08-07      ESR: 62  CRP: --  08-06 @ 23:27    Vital Signs Last 24 Hrs  T(C): 36.6 (07 Aug 2023 09:25), Max: 37.2 (07 Aug 2023 03:40)  T(F): 97.8 (07 Aug 2023 09:25), Max: 98.9 (07 Aug 2023 03:40)  HR: 82 (07 Aug 2023 09:25) (70 - 92)  BP: 133/66 (07 Aug 2023 09:25) (125/72 - 200/111)  BP(mean): --  RR: 18 (07 Aug 2023 09:25) (18 - 18)  SpO2: 97% (07 Aug 2023 09:25) (97% - 100%)    Parameters below as of 07 Aug 2023 09:25  Patient On (Oxygen Delivery Method): room air        PHYSICAL EXAM  General: NAD, AA0x3    Lower Extremity Focused:  Vasc: 1/4 DP/PT. negative for edema   Derm: L foot subMT 2/3  wound (3.0 x 3.0 cm), granular base, negative for undermining, malodor, purulent drainage or signs of infection. Diffuse Dry scaly skin present to b/l feet in mocassin distribution.   R Foot great toe nail with dried superficial hematoma, no open lesions.   Neuro: Protective sensation absent  MSK: 5/5 MMT b/l. -TTP of palpation to L foot.     RADIOLOGY  XR b/l feet: RESIDENT WET READ   Left:   There is no fracture or dislocation. Status post partial interpretation of the fourth and fifth rays. Unchanged screw fractures. No radiographic evidence for acute osteomyelitis. There is no focal soft tissue abnormality. Negative for soft tissue air.     Right:   No soft tissue air, Fb or fx.

## 2023-08-07 NOTE — DIETITIAN INITIAL EVALUATION ADULT - PERTINENT MEDS FT
MEDICATIONS  (STANDING):  aspirin  chewable 81 milliGRAM(s) Oral daily  atorvastatin 40 milliGRAM(s) Oral at bedtime  dextrose 5%. 1000 milliLiter(s) (50 mL/Hr) IV Continuous <Continuous>  dextrose 5%. 1000 milliLiter(s) (100 mL/Hr) IV Continuous <Continuous>  dextrose 50% Injectable 25 Gram(s) IV Push once  dextrose 50% Injectable 25 Gram(s) IV Push once  dextrose 50% Injectable 12.5 Gram(s) IV Push once  glucagon  Injectable 1 milliGRAM(s) IntraMuscular once  insulin glargine Injectable (LANTUS) 12 Unit(s) SubCutaneous at bedtime  insulin lispro (ADMELOG) corrective regimen sliding scale   SubCutaneous three times a day before meals  insulin lispro Injectable (ADMELOG) 4 Unit(s) SubCutaneous three times a day before meals  lactated ringers. 1000 milliLiter(s) (100 mL/Hr) IV Continuous <Continuous>  NIFEdipine XL 90 milliGRAM(s) Oral every 24 hours  potassium chloride   Powder 40 milliEquivalent(s) Oral once    MEDICATIONS  (PRN):  dextrose Oral Gel 15 Gram(s) Oral once PRN Blood Glucose LESS THAN 70 milliGRAM(s)/deciliter

## 2023-08-07 NOTE — ED PROVIDER NOTE - PHYSICAL EXAMINATION
CONST: nontoxic NAD speaking in full sentences  HEAD: atraumatic  EYES: conjunctivae clear  NECK: supple  CARD: regular rate  CHEST: breathing comfortably, no stridor/retractions/tripoding  BACK: Large gaping hold in R gluteal area above gluteal cleft, appears very deep. Edges of wound look clean and pink.  EXT: Small ulcer to bottom of L foot, dried blood by 1st R toe  SKIN: warm, dry  NEURO: awake answering questions following commands moving all extremities

## 2023-08-07 NOTE — DIETITIAN INITIAL EVALUATION ADULT - OTHER INFO
63 year old man with insulin dependent diabetes mellitus, CAD s/p stent 1 yr ago, osteo of the left foot in January who presents to the ED with complaints of nausea and chills and worsening wounds of several day onset. He was recently hospitalized at [facility] for his wounds and per patient was given Zosyn. He left AMA as he did not like the care he was receiving. Today, patient returns to the ED for evaluation. Of note, patient was hospitalized at [facility] for left foot osteomyelitis growing corynebacterium, ultimately refused IV antibiotics and was discharged home with Linezolid. Patient reports compliance with medications. For his stent, he is unsure who his cardiologist is but reports compliance with ASA and Plavix for six months and then discontinued Plavix. The patient is currently undomiciled and ambulates with a wheelchair. ROS is positive for chills and nausea in the past few days and bloody discharge from his gluteal wound. He also reports poor PO intake to the last "several days."     Patient seen at bedside for nutrition consult. Current diet order: consistent carbohydrate, low sodium. NKFA, reports intolerance to pork and tilapia. No difficulty chewing/swallowing reported. Reports good appetite now, however poor appetite and PO intake 1 month PTA. Pt consumed 100 % of breakfast which included eggs, oatmeal, banana, milk. Provided nutrition education discussing consistent carbohydrates with adequate fiber, importance of adequate protein for wound healing. Patient verbalized understanding. Food preferences: double portions of protein, yogurt, salads. Dosing weight: 195 pounds, BMI 25, reports UBW of 210 pounds x1 month. 15lb/7.2% weight loss x1 month - clinically significant. Stage 3 PU to R buttocks. No edema documented. Denies N/V/D/C. Elevated blood sugar, hypokalemic, elevated BUN, Cr, GFR 28. Noted with elevated BP. Ordered for HgbA1c tomorrow 8/8. Meds: insulin, potassium chloride. Observed with moderate muscle wasting to temples. Based on ASPEN guidelines, pt meets criteria for severe malnutrition. See nutrition recommendations below.

## 2023-08-07 NOTE — DIETITIAN INITIAL EVALUATION ADULT - NSFNSPHYEXAMSKINFT_GEN_A_CORE
Pressure Injury 1: Right:, buttocks, R gluteal fold, Stage III  Pressure Injury 2: none, none  Pressure Injury 3: none, none  Pressure Injury 4: none, none  Pressure Injury 5: none, none  Pressure Injury 6: none, none  Pressure Injury 7: none, none  Pressure Injury 8: none, none  Pressure Injury 9: none, none  Pressure Injury 10: none, none  Pressure Injury 11: none, none

## 2023-08-07 NOTE — DIETITIAN INITIAL EVALUATION ADULT - PROBLEM SELECTOR PLAN 5
Chronic of 3 years. Patient ambulates with roller and walker, was at [facility] for a few days and left AMA 2/2 not liking the care he received. Presents today as he felt sick again.   CT with stranding and concern for osteo.     Plan:   - would need surgical+ ortho evaluation (+/- plastics)   - f/u MRI  - c/w broad spectrum Ab

## 2023-08-07 NOTE — PROGRESS NOTE ADULT - ASSESSMENT
Patient is a 63 year old male, currently undomiciled, w/ DM and h/o osteomyelitis who presents to the ED for evaluation of chronic wounds, found to have severe sepsis and hypertensive urgency - being treated with nifedipine and clonidine.

## 2023-08-07 NOTE — H&P ADULT - HISTORY OF PRESENT ILLNESS
Patient is a 63 year old man with insulin dependent diabetes mellitus, CAD s/p stent 1 yr ago, osteo of the left foot in January who presents to the ED with complaints of nausea and chills and worsening wounds of several day onset. He was recently hospitalized at [facility] for his wounds and per patient was given Zosyn. He left AMA as he did not like the care he was receiving. Today, patient returns to the ED for evaluation. Of note, patient was hospitalized at [facility] for left foot osteomyelitis growing corynebacterium, ultimately refused IV antibiotics and was discharged home with Linezolid. Patient reports compliance with medications. For his stent, he is unsure who his cardiologist is but reports compliance with ASA and Plavix for six months and then discontinued Plavix. The patient is currently undomiciled and ambulates with a wheelchair. ROS is positive for chills and nausea in the past few days and bloody discharge from his gluteal wound. He also reports poor PO intake to the last "several days." Otherwise, ROS is negative.     In ED, patient hypertensive to 200/111 --> given nifedipine 90 mg and clonidine 0.1 mg --> blood pressure remains elevated. ICU consult was called for hypertensive urgency. Of note, patient is allergic to labetolol and hydralazine, reports difficulty breathing with both.  Patient is a 63 year old man with insulin dependent diabetes mellitus, CAD s/p stent 1 yr ago, osteo of the left foot in January who presents to the ED with complaints of nausea and chills and worsening wounds of several day onset. He was recently hospitalized at [facility] for his wounds and per patient was given Zosyn. He left AMA as he did not like the care he was receiving. Today, patient returns to the ED for evaluation. Of note, patient was hospitalized at [facility] for left foot osteomyelitis growing corynebacterium, ultimately refused IV antibiotics and was discharged home with Linezolid. Patient reports compliance with medications. For his stent, he is unsure who his cardiologist is but reports compliance with ASA and Plavix for six months and then discontinued Plavix. The patient is currently undomiciled and ambulates with a wheelchair. ROS is positive for chills and nausea in the past few days and bloody discharge from his gluteal wound. He also reports poor PO intake to the last "several days." Otherwise, ROS is negative.     In ED, patient hypertensive to 200/111 --> given nifedipine 90 mg and clonidine 0.1 mg --> blood pressure remains elevated. ICU consult was called for hypertensive urgency. Of note, patient is allergic to Labetalol and hydralazine, reports difficulty breathing with both. ICU assessed --> ordered 0.2 mg clonidine PO.  For his wounds, he was treated with Vanc and Zosyn as well as fluid resucitated w/ 2L normal saline.

## 2023-08-07 NOTE — ED PROVIDER NOTE - PROGRESS NOTE DETAILS
Rebeka- received s/o pending rpt lact/K and ct pelvis rst  lact cleared, K 3.5, CT shows no collection/abscess

## 2023-08-07 NOTE — CONSULT NOTE ADULT - SUBJECTIVE AND OBJECTIVE BOX
[facility] SERVICE CONSULTATION NOTE    YULIET OLVERA, 63y, Male  Patient is a 63y old  Male who presents with a chief complaint of wound infetcction (07 Aug 2023 05:35)      HPI:  63M undomniciled uncontrolled HTN, DM, CAD (stent about 1 year ago), hep C s/p, and chronic L foot OM in January who presents to the ED with complaints of nausea and chills and worsening wounds of several day onset. He was recently hospitalized at [facility] for his wounds and per patient was given Zosyn. He left A as he did not like the care he was receiving. Today, patient returns to the ED for evaluation. Of note, patient was hospitalized at [facility] for left foot osteomyelitis growing corynebacterium, ultimately refused IV antibiotics and was discharged home with Linezolid. Patient reports compliance with medications. For his stent, he is unsure who his cardiologist is but reports compliance with ASA and Plavix for six months and then discontinued Plavix. The patient is currently undomiciled and ambulates with a wheelchair. ROS is positive for chills and nausea in the past few days and bloody discharge from his gluteal wound. He also reports poor PO intake to the last "several days." Otherwise, ROS is negative.     In ED, patient hypertensive to 200/111 --> given nifedipine 90 mg and clonidine 0.1 mg --> blood pressure remains elevated. ICU consult was called for hypertensive urgency. Of note, patient is allergic to labetolol and hydralazine, reports difficulty breathing with both.  (07 Aug 2023 05:35)      ROS:  Otherwise negative, except as specified in HPI.    labetalol (Stomach Upset)  hydrALAZINE (Rash)  pork (Unknown)  vancomycin (Stomach Upset)    Intolerances      Home Medications:  Admelog 100 units/mL injectable solution: 5 unit(s) injectable 3 times a day (before meals) (2023 10:43)  Aspirin Enteric Coated 81 mg oral delayed release tablet: 1 tab(s) orally once a day (2023 05:26)  hydroCHLOROthiazide 12.5 mg oral capsule: 1 cap(s) orally every 24 hours (2023 10:43)  insulin glargine 100 units/mL subcutaneous solution: 18 unit(s) subcutaneous once a day (at bedtime) (2023 12:21)  insulin glargine 100 units/mL subcutaneous solution: 22 unit(s) subcutaneous once a day (at bedtime) (2023 10:43)  Lipitor 80 mg oral tablet: 1 tab(s) orally once a day (2023 05:26)  NIFEdipine 90 mg oral tablet, extended release: 1 tab(s) orally once a day (2023 05:26)  Plavix 75 mg oral tablet: 1 tab(s) orally once a day (2023 05:26)      PHYSICAL EXAM  ICU Vital Signs Last 24 Hrs  T(C): 37.2 (07 Aug 2023 03:40), Max: 37.2 (07 Aug 2023 03:40)  T(F): 98.9 (07 Aug 2023 03:40), Max: 98.9 (07 Aug 2023 03:40)  HR: 83 (07 Aug 2023 06:10) (70 - 89)  BP: 184/106 (07 Aug 2023 06:10) (125/72 - 200/111)  BP(mean): --  ABP: --  ABP(mean): --  RR: 18 (07 Aug 2023 05:22) (18 - 18)  SpO2: 98% (07 Aug 2023 05:22) (97% - 100%)    O2 Parameters below as of 07 Aug 2023 05:22  Patient On (Oxygen Delivery Method): room air          General: NAD, laying in bed, speaking in full sentences  HEENT: head NC/AT, no conjunctival injection, EOMI, dry MM  Neck: supple, no JVD  Cardio: RRR, +S1/S2, no M/R/G  Resp: lungs CTAB, no cough/wheezes/rales/rhonchi  Abdo: soft, NT, ND, +bowel sounds x4, no organomegaly or palpable mass    Extremities: Extremities dry and cracked with sever deformity of toes of L foot. Open wound on plantar aspect of L foot  Neuro: AAOx3, no focal deficits, no visual deficits  Psych: speech non-pressured, thoughts goal-oriented  Skin: Large 3X3 cm hole in the   MSK: no joint swelling    LABS:                        9.3    15.00 )-----------( 184      ( 06 Aug 2023 23:27 )             30.3     08-07    132<L>  |  97  |  35<H>  ----------------------------<  335<H>  3.5   |  26  |  2.80<H>    Ca    9.3      07 Aug 2023 01:59    TPro  7.4  /  Alb  2.9<L>  /  TBili  <0.2  /  DBili  x   /  AST  See Note  /  ALT  See Note  /  AlkPhos  See Note        Lactate, Blood: 1.2 mmol/L (23 @ 02:38)  Lactate, Blood: 2.8 mmol/L (23 @ 23:27)      Urinalysis Basic - ( 07 Aug 2023 03:50 )    Color: Yellow / Appearance: Clear / S.010 / pH: x  Gluc: x / Ketone: NEGATIVE  / Bili: Negative / Urobili: 0.2 E.U./dL   Blood: x / Protein: 30 mg/dL / Nitrite: NEGATIVE   Leuk Esterase: NEGATIVE / RBC: 5-10 /HPF / WBC < 5 /HPF   Sq Epi: x / Non Sq Epi: x / Bacteria: Present /HPF      EKG: Reviewed.    RADIOLOGY & ADDITIONAL TESTS: Reviewed.

## 2023-08-07 NOTE — PROGRESS NOTE ADULT - PROBLEM SELECTOR PLAN 1
Patient w/ BP in ED to 200/111 asymptomatic. Cr was already elevated prior to this BP recording. Received nifedipine 90 mg and clonidine 0.1 mg, both of which are home meds and BP responded appropriately.     So far:   - nifedipine 90 mg   - clonidine 0.1 mg   - clonidine 0.2 mg    Plan:   - close observation of BP   - restart home meds

## 2023-08-07 NOTE — DIETITIAN NUTRITION RISK NOTIFICATION - ADDITIONAL COMMENTS/DIETITIAN RECOMMENDATIONS
15lb/7.2% weight loss x1 month, poor appetite and PO intake 1 month PTA.     Continue with consistent carbohydrate, low sodium diet. Allow for double portions of protein. Will add yogurt with meals. Consider oral nutrition supplement if PO intake declines. Provided nutrition education.

## 2023-08-07 NOTE — PROGRESS NOTE ADULT - SUBJECTIVE AND OBJECTIVE BOX
**INCOMPLETE NOTE    OVERNIGHT EVENTS:    SUBJECTIVE:  Patient seen and examined at bedside.    Vital Signs Last 12 Hrs  T(F): 97.7 (23 @ 07:00), Max: 98.9 (23 @ 03:40)  HR: 92 (23 @ 07:33) (70 - 92)  BP: 160/82 (23 @ 07:33) (125/72 - 200/111)  BP(mean): --  RR: 18 (23 @ 07:00) (18 - 18)  SpO2: 100% (23 @ 07:00) (97% - 100%)  I&O's Summary      PHYSICAL EXAM:  Constitutional: NAD, comfortable in bed.  HEENT: NC/AT, PERRLA, EOMI, no conjunctival pallor or scleral icterus, MMM  Neck: Supple, no JVD  Respiratory: CTA B/L. No w/r/r.   Cardiovascular: RRR, normal S1 and S2, no m/r/g.   Gastrointestinal: +BS, soft NTND, no guarding or rebound tenderness, no palpable masses   Extremities: wwp; no cyanosis, clubbing or edema.   Vascular: Pulses equal and strong throughout.   Neurological: AAOx3, no CN deficits, strength and sensation intact throughout.   Skin: No gross skin abnormalities or rashes        LABS:                        9.3    15.00 )-----------( 184      ( 06 Aug 2023 23:27 )             30.3         132<L>  |  97  |  35<H>  ----------------------------<  335<H>  3.5   |  26  |  2.80<H>    Ca    9.3      07 Aug 2023 01:59    TPro  7.5  /  Alb  3.2<L>  /  TBili  <0.2  /  DBili  <0.2  /  AST  15  /  ALT  6<L>  /  AlkPhos  111        Urinalysis Basic - ( 07 Aug 2023 03:50 )    Color: Yellow / Appearance: Clear / S.010 / pH: x  Gluc: x / Ketone: NEGATIVE  / Bili: Negative / Urobili: 0.2 E.U./dL   Blood: x / Protein: 30 mg/dL / Nitrite: NEGATIVE   Leuk Esterase: NEGATIVE / RBC: 5-10 /HPF / WBC < 5 /HPF   Sq Epi: x / Non Sq Epi: x / Bacteria: Present /HPF          RADIOLOGY & ADDITIONAL TESTS:    MEDICATIONS  (STANDING):  dextrose 5%. 1000 milliLiter(s) (50 mL/Hr) IV Continuous <Continuous>  dextrose 5%. 1000 milliLiter(s) (100 mL/Hr) IV Continuous <Continuous>  dextrose 50% Injectable 25 Gram(s) IV Push once  dextrose 50% Injectable 25 Gram(s) IV Push once  dextrose 50% Injectable 12.5 Gram(s) IV Push once  glucagon  Injectable 1 milliGRAM(s) IntraMuscular once  insulin glargine Injectable (LANTUS) 8 Unit(s) SubCutaneous at bedtime  insulin lispro (ADMELOG) corrective regimen sliding scale   SubCutaneous three times a day before meals  insulin lispro Injectable (ADMELOG) 3 Unit(s) SubCutaneous three times a day before meals  NIFEdipine XL 90 milliGRAM(s) Oral every 24 hours    MEDICATIONS  (PRN):  dextrose Oral Gel 15 Gram(s) Oral once PRN Blood Glucose LESS THAN 70 milliGRAM(s)/deciliter

## 2023-08-07 NOTE — ED PROVIDER NOTE - CLINICAL SUMMARY MEDICAL DECISION MAKING FREE TEXT BOX
Pt w/ chronic wounds, recently admitted for IV abx and left AMA  VS normal here  Gluteal wound very deep, concerned for possible osteo  plan for labs, CT pelvis, xray b/l feet, abx    -->Pt w/ elevated WBC and lactate. Hyponatremia and DEBBY. Will get CT w/o contrast due to DEBBY. Pt will need admission
no

## 2023-08-07 NOTE — H&P ADULT - PROBLEM SELECTOR PLAN 1
Patient severly septic on admission, received Vanc + Zosyn in ED. Lactate 2.8 on admission, now downtrended after fluids. Source is likely osteomyelitis from left foot or gluteal wound.     Plan:   - f/u BC   - c/w Vanc + Zosyn   - daily CBC Patient w/ BP in ED to 200/111 asymptomatic. Cr was already elevated prior to this BP recording. Received nifedipine 90 mg and clonidine 0.1 mg, both of which are home meds and BP responded appropriately.     So far:   - nifedipine 90 mg   - clonidine 0.1 mg   - clonidine 0.2 mg    Plan:   - close observation of BP   - restart home meds

## 2023-08-07 NOTE — H&P ADULT - NSHPLABSRESULTS_GEN_ALL_CORE
.  LABS:                         9.3    15.00 )-----------( 184      ( 06 Aug 2023 23:27 )             30.3         132<L>  |  97  |  35<H>  ----------------------------<  335<H>  3.5   |  26  |  2.80<H>    Ca    9.3      07 Aug 2023 01:59    TPro  7.4  /  Alb  2.9<L>  /  TBili  <0.2  /  DBili  x   /  AST  See Note  /  ALT  See Note  /  AlkPhos  See Note        Urinalysis Basic - ( 07 Aug 2023 03:50 )    Color: Yellow / Appearance: Clear / S.010 / pH: x  Gluc: x / Ketone: NEGATIVE  / Bili: Negative / Urobili: 0.2 E.U./dL   Blood: x / Protein: 30 mg/dL / Nitrite: NEGATIVE   Leuk Esterase: NEGATIVE / RBC: 5-10 /HPF / WBC < 5 /HPF   Sq Epi: x / Non Sq Epi: x / Bacteria: Present /HPF            Lactate, Blood: 1.2 mmol/L ( @ 02:38)  Lactate, Blood: 2.8 mmol/L ( @ 23:27)      RADIOLOGY, EKG & ADDITIONAL TESTS: Reviewed.

## 2023-08-07 NOTE — PROGRESS NOTE ADULT - PROBLEM SELECTOR PLAN 3
Creatinine at baseline appears to be 1.44. On admission - Cr 2.80. Patient s/p 2 L in ED. Voiding spontaneously in ED w/ light yellow urine. Appears to be prenal 2/2 sepsis .    Plan:   - obtain Ulytes   - bladder scan   - daily BMP

## 2023-08-07 NOTE — H&P ADULT - PROBLEM SELECTOR PLAN 4
Patient w/ sodium to 128 on admission. On repeat  after fluids.   Likely hypovolemic hyponatremia.     Plan:   - f/u Uosms

## 2023-08-07 NOTE — H&P ADULT - PROBLEM SELECTOR PLAN 8
F:   E:   N:   DVT: Chornic, has had osteo in the past. Refused IV antibiotics and was discharged with PO linezolid. Per patient, he did complete course. Wound is worsened from last admission. Last cultures growing corynebacterium.     Plan:   - would continue Vanc + Zosyn for now   - f/u Xray read  - consult podiatry in AM   - consult ID Chronic, has had osteo in the past. Refused IV antibiotics and was discharged with PO linezolid. Per patient, he did complete course. Wound is worsened from last admission. Last cultures growing corynebacterium.     Plan:   - would continue Vanc + Zosyn for now   - f/u Xray read  - consult podiatry in AM   - consult ID

## 2023-08-07 NOTE — PROGRESS NOTE ADULT - PROBLEM SELECTOR PLAN 5
Chronic of 3 years. Patient ambulates with roller and walker, was at [facility] for a few days and left AMA 2/2 not liking the care he received. Presents today as he felt sick again.   CT with stranding and concern for osteo.     Plan:   - would need surgical+ ortho evaluation (+/- plastics)   - f/u MRI  - c/w broad spectrum Ab

## 2023-08-07 NOTE — DIETITIAN INITIAL EVALUATION ADULT - OTHER CALCULATIONS
Based on Standards of Care pt within % IBW (103%) thus actual body weight used for all calculations (195#). Needs adjusted for DEBBY, PU and malnutrition.

## 2023-08-07 NOTE — H&P ADULT - PROBLEM SELECTOR PLAN 3
Chronic of 3 years. Patient ambulates with roller and walker, was at [facility] for a few days and left AMA 2/2 not liking the care he received. Presents today as he felt sick again.   CT with stranding and concern for osteo.     Plan:   - would need surgical+ ortho evaluation (+/- plastics)   - f/u MRI  - c/w broad spectrum Ab Creatinine at baseline appears to be 1.44. On admission - Cr 2.80. Patient s/p 2 L in ED. Voiding spontaneously in ED w/ light yellow urine. Appears to be prenal 2/2 sepsis .    Plan:   - obtain Ulytes   - bladder scan   - daily BMP

## 2023-08-07 NOTE — H&P ADULT - PROBLEM SELECTOR PLAN 2
Patient w/ BP in ED to 200/111 asymptomatic. Cr was already elevated prior to this BP recording. Received nifedipine 90 mg and clonidine 0.1 mg, both of which are home meds and BP responded appropriately.     Plan:   - would continue w/ home meds Patient severly septic on admission, received Vanc + Zosyn in ED. Lactate 2.8 on admission, now downtrended after fluids. Source is likely osteomyelitis from left foot or gluteal wound.     Plan:   - f/u BC   - c/w Vanc + Zosyn   - daily CBC

## 2023-08-07 NOTE — PROGRESS NOTE ADULT - PROBLEM SELECTOR PLAN 6
Per patient, takes 10 U lantus and 4-5 premeal.     Plan:   - will start 3 U premeal   - 8 U basal   - miSS   - consistent carb renal diet

## 2023-08-07 NOTE — DIETITIAN INITIAL EVALUATION ADULT - PROBLEM SELECTOR PLAN 7
Patient w/ stent approximately 1 yr ago.   EKG: NSR w/ possible L atrial enlargement   No chest pain on admission, EKG w/o evidence of acute ischemia.   Trops negative.     Plan:  - c.w ASA 81 mg

## 2023-08-07 NOTE — CONSULT NOTE ADULT - ASSESSMENT
63M undomniciled uncontrolled HTN, DM, CAD (stent about 1 year ago), hep C s/p, and chronic L foot OM in January who presents to the ED with complaints of nausea and chills and worsening wounds of several day onset, found to have sepsis 2/2 chronic OM vs. sacral wound.    #HTN Urgency   On admission, presented with /111. Reportedly on home Nifedipine 90mg XL and Clonidine 0.2mg. Likely 2/2 non-compliance and rebound clonidine hypertension. Patient endorsed non-compliance for a few days. Per chart review, was previously given Amlodipine 10mg and Lisinopril 40mg. Patient reports allergies to Hydralazine and Labetalol, specifically ?dyspnea. In the ED, patient was given Nifedipine 90mg and Clonidine 0.1mg x1. Patient found to have DEBBY on CKD (baseline 1.4-1.6), however has admissions with elevated Cr. Patient endorsed poor PO intake and likely component of pre-renal DEBBY. Lactate 2.8 s/p 2L LR. Repeat lactate 1.2. Patient remains asymptomatic.   - Repeat BP at bedside 190/96 on R arm 187/98 on L arm.   - c/w Nifedipine 90mg XL qd  - Give another Clonidine 0.2mg PO STAT   - Can consider starting Amlodipine 10mg qd however will not be beneficial for acute BP control.  - Consider Renal consult given multiple admissions with DEBBY on CKD    MISC  DVT Prophylaxis: Heparin SubQ  Code Status: Full code  Dispo: RMF    Thank you for this consult. At this time, the patient does not require telemetry or ICU level of care.  Discussed with Intensivist Dr. Cali. Recommendations are final after attending attestation.

## 2023-08-07 NOTE — H&P ADULT - NSHPREVIEWOFSYSTEMS_GEN_ALL_CORE
REVIEW OF SYSTEMS:  CONSTITUTIONAL: No weakness, fevers or chills  EYES/ENT: No visual changes;  No vertigo or throat pain   NECK: No pain or stiffness  RESPIRATORY: No cough, wheezing, hemoptysis; No shortness of breath  CARDIOVASCULAR: No chest pain or palpitations  GASTROINTESTINAL: No abdominal or epigastric pain. No vomiting, or hematemesis; No diarrhea or constipation. No melena or hematochezia. ++ nausea   GENITOURINARY: No dysuria, frequency or hematuria  NEUROLOGICAL: No numbness or weakness  SKIN: left foot wound chronic, L gluteal wound >3 yrs REVIEW OF SYSTEMS:  CONSTITUTIONAL: Difficulty ambulating, weakness, chills   EYES/ENT: No visual changes;  No vertigo or throat pain   NECK: No pain or stiffness  RESPIRATORY: No cough, wheezing, hemoptysis; No shortness of breath  CARDIOVASCULAR: No chest pain or palpitations  GASTROINTESTINAL: No abdominal or epigastric pain. No vomiting, or hematemesis; No diarrhea or constipation. No melena or hematochezia. ++ nausea   GENITOURINARY: No dysuria, frequency or hematuria  NEUROLOGICAL: No numbness or weakness. Difficulty ambulating  SKIN: left foot wound chronic, L gluteal wound >3 yrs

## 2023-08-07 NOTE — H&P ADULT - ATTENDING COMMENTS
admitted for Severe sepsis SIRs 1/4 + elevated lactate.   wbc 15 k, lactate 2.8-- trended down to 1.2 after IVF.   DEBBY on admission w cr 2.8-- down to 2.5: fu urine lyte- will resume IVF after urine is collected   Infection source is likely osteomyelitis from left foot or chronic gluteal wound. ]  Pt states he has hx of OM of R gluteal region. Pending collateral from [facility]. Pt received vanc/zosyn in ED. Will hold antibiotics for Now. Pending ID, wound and podiatry follow up.   MRI pending   Hyponatremi: 128 on admission resolved after ivf   HTN emergency: DEBBY improving. will cw nifedipine 90 qd for now. ICU recs appreciated   If SBP >180 mmHg persistently, [ ] Get bladder scan [ ] if pain well-controlled, and bladder scan shows no urinary retention, can use labetalol 10mg IV push if HR >60 bpm or hydralazine IV 5mg if HR <60   CAD: cw asa, statin

## 2023-08-07 NOTE — DIETITIAN INITIAL EVALUATION ADULT - ADD RECOMMEND
1. Continue with consistent carbohydrate, low sodium diet. Allow for double portions of protein. Will add yogurt with meals. Consider oral nutrition supplement if PO intake declines. Provided nutrition education.  2. Encourage pt to meet nutritional needs as able   3. Monitor labs: electrolytes, cmp, fingersticks, renal indicators, Hgba1c  4. Monitor weights   5. Pain and bowel regimen per team   6. Will continue to assess/honor food preferences as able  7. Monitor adherence to diet education

## 2023-08-07 NOTE — PROGRESS NOTE ADULT - PROBLEM SELECTOR PLAN 2
Patient severly septic on admission, received Vanc + Zosyn in ED. Lactate 2.8 on admission, now downtrended after fluids. Source is likely osteomyelitis from left foot or gluteal wound.     Plan:   - f/u BC   - c/w Vanc + Zosyn   - daily CBC

## 2023-08-07 NOTE — H&P ADULT - PROBLEM SELECTOR PLAN 6
Patient w/ stent ____ time ago.   EKG:     Plan: Per patient, takes 10 U lantus and 4-5 premeal.     Plan:   - will start 3 U premeal   - 8 U basal   - miSS   - consistent carb renal diet Per patient, takes 10 U lantus and 4-6 premeal at home     Plan:   - will start 4 U premeal   - 12 U basal   - miSS   - consistent carb renal diet

## 2023-08-07 NOTE — H&P ADULT - PROBLEM SELECTOR PLAN 7
Chornic, has had osteo in the past. Refused IV antibiotics and was discharged with PO linezolid. Per patient, he did complete course. Wound is worsened from last admission. Last cultures growing corynebacterium.     Plan:   - would continue Vanc + Zosyn for now   - f/u Xray read  - consult podiatry in AM   - consult ID Patient w/ stent approximately 1 yr ago.   EKG: NSR w/ possible L atrial enlargement   No chest pain on admission, EKG w/o evidence of acute ischemia.   Trops negative.     Plan:  - c.w ASA 81 mg

## 2023-08-07 NOTE — ED PROVIDER NOTE - OBJECTIVE STATEMENT
63-year-old male with HTN, DM, CAD s/p stent, hep C s/p treatment, prior osteomyelitis of metatarsals, infected gluteal wounds, comes in for evaluation.  Patient says he has had these wounds for a long time, but does not follow with a doctor, although occasionally sees podiatry.  Endorses nausea/vomiting.  No fevers or chills.  Says he was recently admitted at [facility] for IV antibiotics and left AMA because he did not like the care there.  Per chart review, patient has a history of refusing interventions including blood draws/surgical interventions/insulin. 63-year-old male with HTN, DM, CAD s/p stent, hep C s/p treatment, prior osteomyelitis of metatarsals, infected gluteal wounds, comes in for evaluation.  Patient says he has had these wounds for a long time, but does not follow with a doctor, although occasionally sees podiatry.  Endorses nausea/vomiting.  No fevers or chills.  Says he was recently admitted at [facility] for IV antibiotics for infection and left AMA because he did not like the care there. Pt unable to clarify if they were concerned about the feet or back, says "maybe both."  Per chart review, patient has a history of refusing interventions including blood draws/surgical interventions/insulin.

## 2023-08-07 NOTE — DIETITIAN INITIAL EVALUATION ADULT - PERTINENT LABORATORY DATA
08-07    137  |  99  |  28<H>  ----------------------------<  358<H>  3.3<L>   |  27  |  2.51<H>    Ca    9.0      07 Aug 2023 05:30  Phos  3.3     08-07  Mg     1.8     08-07    TPro  7.1  /  Alb  3.0<L>  /  TBili  0.2  /  DBili  x   /  AST  12  /  ALT  <5<L>  /  AlkPhos  112  08-07  POCT Blood Glucose.: 403 mg/dL (08-07-23 @ 09:31)  A1C with Estimated Average Glucose Result: 7.6 % (01-06-23 @ 07:08)  A1C with Estimated Average Glucose Result: 7.5 % (01-05-23 @ 06:52)

## 2023-08-08 ENCOUNTER — TRANSCRIPTION ENCOUNTER (OUTPATIENT)
Age: 63
End: 2023-08-08

## 2023-08-08 DIAGNOSIS — S91.302A UNSPECIFIED OPEN WOUND, LEFT FOOT, INITIAL ENCOUNTER: ICD-10-CM

## 2023-08-08 DIAGNOSIS — E87.1 HYPO-OSMOLALITY AND HYPONATREMIA: ICD-10-CM

## 2023-08-08 LAB
A1C WITH ESTIMATED AVERAGE GLUCOSE RESULT: 8.2 % — HIGH (ref 4–5.6)
ALBUMIN SERPL ELPH-MCNC: 2.9 G/DL — LOW (ref 3.3–5)
ALP SERPL-CCNC: 103 U/L — SIGNIFICANT CHANGE UP (ref 40–120)
ALT FLD-CCNC: <5 U/L — LOW (ref 10–45)
ANION GAP SERPL CALC-SCNC: 10 MMOL/L — SIGNIFICANT CHANGE UP (ref 5–17)
APPEARANCE UR: CLEAR — SIGNIFICANT CHANGE UP
AST SERPL-CCNC: 13 U/L — SIGNIFICANT CHANGE UP (ref 10–40)
BACTERIA # UR AUTO: PRESENT /HPF
BASOPHILS # BLD AUTO: 0.06 K/UL — SIGNIFICANT CHANGE UP (ref 0–0.2)
BASOPHILS NFR BLD AUTO: 0.6 % — SIGNIFICANT CHANGE UP (ref 0–2)
BILIRUB SERPL-MCNC: 0.2 MG/DL — SIGNIFICANT CHANGE UP (ref 0.2–1.2)
BILIRUB UR-MCNC: NEGATIVE — SIGNIFICANT CHANGE UP
BUN SERPL-MCNC: 30 MG/DL — HIGH (ref 7–23)
CALCIUM SERPL-MCNC: 8.8 MG/DL — SIGNIFICANT CHANGE UP (ref 8.4–10.5)
CHLORIDE SERPL-SCNC: 102 MMOL/L — SIGNIFICANT CHANGE UP (ref 96–108)
CO2 SERPL-SCNC: 26 MMOL/L — SIGNIFICANT CHANGE UP (ref 22–31)
COLOR SPEC: YELLOW — SIGNIFICANT CHANGE UP
COMMENT - URINE: SIGNIFICANT CHANGE UP
CREAT ?TM UR-MCNC: 61 MG/DL — SIGNIFICANT CHANGE UP
CREAT SERPL-MCNC: 2.03 MG/DL — HIGH (ref 0.5–1.3)
DIFF PNL FLD: ABNORMAL
EGFR: 37 ML/MIN/1.73M2 — LOW
EOSINOPHIL # BLD AUTO: 0.35 K/UL — SIGNIFICANT CHANGE UP (ref 0–0.5)
EOSINOPHIL NFR BLD AUTO: 3.3 % — SIGNIFICANT CHANGE UP (ref 0–6)
EPI CELLS # UR: SIGNIFICANT CHANGE UP /HPF (ref 0–5)
ESTIMATED AVERAGE GLUCOSE: 189 MG/DL — HIGH (ref 68–114)
GLUCOSE BLDC GLUCOMTR-MCNC: 126 MG/DL — HIGH (ref 70–99)
GLUCOSE BLDC GLUCOMTR-MCNC: 164 MG/DL — HIGH (ref 70–99)
GLUCOSE BLDC GLUCOMTR-MCNC: 218 MG/DL — HIGH (ref 70–99)
GLUCOSE BLDC GLUCOMTR-MCNC: 220 MG/DL — HIGH (ref 70–99)
GLUCOSE SERPL-MCNC: 203 MG/DL — HIGH (ref 70–99)
GLUCOSE UR QL: 500
HCT VFR BLD CALC: 31.4 % — LOW (ref 39–50)
HGB BLD-MCNC: 9.7 G/DL — LOW (ref 13–17)
IMM GRANULOCYTES NFR BLD AUTO: 0.8 % — SIGNIFICANT CHANGE UP (ref 0–0.9)
KETONES UR-MCNC: NEGATIVE — SIGNIFICANT CHANGE UP
LEUKOCYTE ESTERASE UR-ACNC: NEGATIVE — SIGNIFICANT CHANGE UP
LYMPHOCYTES # BLD AUTO: 2.41 K/UL — SIGNIFICANT CHANGE UP (ref 1–3.3)
LYMPHOCYTES # BLD AUTO: 22.6 % — SIGNIFICANT CHANGE UP (ref 13–44)
MAGNESIUM SERPL-MCNC: 1.8 MG/DL — SIGNIFICANT CHANGE UP (ref 1.6–2.6)
MCHC RBC-ENTMCNC: 23.7 PG — LOW (ref 27–34)
MCHC RBC-ENTMCNC: 30.9 GM/DL — LOW (ref 32–36)
MCV RBC AUTO: 76.6 FL — LOW (ref 80–100)
MONOCYTES # BLD AUTO: 0.64 K/UL — SIGNIFICANT CHANGE UP (ref 0–0.9)
MONOCYTES NFR BLD AUTO: 6 % — SIGNIFICANT CHANGE UP (ref 2–14)
NEUTROPHILS # BLD AUTO: 7.12 K/UL — SIGNIFICANT CHANGE UP (ref 1.8–7.4)
NEUTROPHILS NFR BLD AUTO: 66.7 % — SIGNIFICANT CHANGE UP (ref 43–77)
NITRITE UR-MCNC: NEGATIVE — SIGNIFICANT CHANGE UP
NRBC # BLD: 0 /100 WBCS — SIGNIFICANT CHANGE UP (ref 0–0)
OSMOLALITY UR: 396 MOSM/KG — SIGNIFICANT CHANGE UP (ref 300–900)
PH UR: 6.5 — SIGNIFICANT CHANGE UP (ref 5–8)
PHOSPHATE SERPL-MCNC: 2.7 MG/DL — SIGNIFICANT CHANGE UP (ref 2.5–4.5)
PLATELET # BLD AUTO: 184 K/UL — SIGNIFICANT CHANGE UP (ref 150–400)
POTASSIUM SERPL-MCNC: 3.2 MMOL/L — LOW (ref 3.5–5.3)
POTASSIUM SERPL-SCNC: 3.2 MMOL/L — LOW (ref 3.5–5.3)
POTASSIUM UR-SCNC: 26 MMOL/L — SIGNIFICANT CHANGE UP
PROT ?TM UR-MCNC: 186 MG/DL — HIGH (ref 0–12)
PROT SERPL-MCNC: 7 G/DL — SIGNIFICANT CHANGE UP (ref 6–8.3)
PROT UR-MCNC: >=300 MG/DL
PROT/CREAT UR-RTO: 3 RATIO — HIGH (ref 0–0.2)
RBC # BLD: 4.1 M/UL — LOW (ref 4.2–5.8)
RBC # FLD: 15.9 % — HIGH (ref 10.3–14.5)
RBC CASTS # UR COMP ASSIST: < 5 /HPF — SIGNIFICANT CHANGE UP
SODIUM SERPL-SCNC: 138 MMOL/L — SIGNIFICANT CHANGE UP (ref 135–145)
SODIUM UR-SCNC: 57 MMOL/L — SIGNIFICANT CHANGE UP
SP GR SPEC: 1.01 — SIGNIFICANT CHANGE UP (ref 1–1.03)
UROBILINOGEN FLD QL: 0.2 E.U./DL — SIGNIFICANT CHANGE UP
UUN UR-MCNC: 543 MG/DL — SIGNIFICANT CHANGE UP
WBC # BLD: 10.67 K/UL — HIGH (ref 3.8–10.5)
WBC # FLD AUTO: 10.67 K/UL — HIGH (ref 3.8–10.5)
WBC UR QL: < 5 /HPF — SIGNIFICANT CHANGE UP

## 2023-08-08 PROCEDURE — 99222 1ST HOSP IP/OBS MODERATE 55: CPT

## 2023-08-08 PROCEDURE — 99233 SBSQ HOSP IP/OBS HIGH 50: CPT | Mod: GC

## 2023-08-08 RX ORDER — NIFEDIPINE 30 MG
1 TABLET, EXTENDED RELEASE 24 HR ORAL
Qty: 30 | Refills: 0
Start: 2023-08-08 | End: 2023-09-06

## 2023-08-08 RX ORDER — POTASSIUM CHLORIDE 20 MEQ
40 PACKET (EA) ORAL ONCE
Refills: 0 | Status: COMPLETED | OUTPATIENT
Start: 2023-08-08 | End: 2023-08-08

## 2023-08-08 RX ORDER — ATORVASTATIN CALCIUM 80 MG/1
1 TABLET, FILM COATED ORAL
Qty: 0 | Refills: 0 | DISCHARGE

## 2023-08-08 RX ORDER — MAGNESIUM SULFATE 500 MG/ML
1 VIAL (ML) INJECTION ONCE
Refills: 0 | Status: COMPLETED | OUTPATIENT
Start: 2023-08-08 | End: 2023-08-08

## 2023-08-08 RX ORDER — ATORVASTATIN CALCIUM 80 MG/1
1 TABLET, FILM COATED ORAL
Qty: 30 | Refills: 0
Start: 2023-08-08 | End: 2023-09-06

## 2023-08-08 RX ORDER — INSULIN GLARGINE 100 [IU]/ML
12 INJECTION, SOLUTION SUBCUTANEOUS
Qty: 2 | Refills: 0
Start: 2023-08-08 | End: 2023-09-06

## 2023-08-08 RX ORDER — PIPERACILLIN AND TAZOBACTAM 4; .5 G/20ML; G/20ML
3.38 INJECTION, POWDER, LYOPHILIZED, FOR SOLUTION INTRAVENOUS ONCE
Refills: 0 | Status: DISCONTINUED | OUTPATIENT
Start: 2023-08-08 | End: 2023-08-08

## 2023-08-08 RX ORDER — VANCOMYCIN HCL 1 G
1250 VIAL (EA) INTRAVENOUS ONCE
Refills: 0 | Status: DISCONTINUED | OUTPATIENT
Start: 2023-08-08 | End: 2023-08-08

## 2023-08-08 RX ORDER — CLOPIDOGREL BISULFATE 75 MG/1
1 TABLET, FILM COATED ORAL
Qty: 0 | Refills: 0 | DISCHARGE

## 2023-08-08 RX ORDER — INSULIN LISPRO 100/ML
4 VIAL (ML) SUBCUTANEOUS
Qty: 2 | Refills: 0
Start: 2023-08-08 | End: 2023-09-06

## 2023-08-08 RX ORDER — NIFEDIPINE 30 MG
1 TABLET, EXTENDED RELEASE 24 HR ORAL
Qty: 0 | Refills: 0 | DISCHARGE

## 2023-08-08 RX ORDER — INSULIN GLARGINE 100 [IU]/ML
12 INJECTION, SOLUTION SUBCUTANEOUS
Refills: 0 | DISCHARGE

## 2023-08-08 RX ORDER — ASPIRIN/CALCIUM CARB/MAGNESIUM 324 MG
1 TABLET ORAL
Qty: 30 | Refills: 0
Start: 2023-08-08 | End: 2023-09-06

## 2023-08-08 RX ORDER — ASPIRIN/CALCIUM CARB/MAGNESIUM 324 MG
1 TABLET ORAL
Qty: 0 | Refills: 0 | DISCHARGE

## 2023-08-08 RX ORDER — PIPERACILLIN AND TAZOBACTAM 4; .5 G/20ML; G/20ML
3.38 INJECTION, POWDER, LYOPHILIZED, FOR SOLUTION INTRAVENOUS ONCE
Refills: 0 | Status: COMPLETED | OUTPATIENT
Start: 2023-08-08 | End: 2023-08-08

## 2023-08-08 RX ORDER — INSULIN LISPRO 100/ML
4 VIAL (ML) SUBCUTANEOUS
Refills: 0 | DISCHARGE

## 2023-08-08 RX ORDER — POTASSIUM CHLORIDE 20 MEQ
40 PACKET (EA) ORAL ONCE
Refills: 0 | Status: DISCONTINUED | OUTPATIENT
Start: 2023-08-08 | End: 2023-08-08

## 2023-08-08 RX ORDER — POTASSIUM CHLORIDE 20 MEQ
10 PACKET (EA) ORAL
Refills: 0 | Status: COMPLETED | OUTPATIENT
Start: 2023-08-08 | End: 2023-08-08

## 2023-08-08 RX ORDER — INSULIN ASPART 100 [IU]/ML
4 INJECTION, SOLUTION SUBCUTANEOUS
Qty: 2 | Refills: 0
Start: 2023-08-08 | End: 2023-09-06

## 2023-08-08 RX ADMIN — PIPERACILLIN AND TAZOBACTAM 25 GRAM(S): 4; .5 INJECTION, POWDER, LYOPHILIZED, FOR SOLUTION INTRAVENOUS at 10:55

## 2023-08-08 RX ADMIN — Medication 4: at 17:49

## 2023-08-08 RX ADMIN — Medication 40 MILLIEQUIVALENT(S): at 10:12

## 2023-08-08 RX ADMIN — Medication 4 UNIT(S): at 12:59

## 2023-08-08 RX ADMIN — Medication 4: at 09:10

## 2023-08-08 RX ADMIN — Medication 0.2 MILLIGRAM(S): at 13:15

## 2023-08-08 RX ADMIN — INSULIN GLARGINE 12 UNIT(S): 100 INJECTION, SOLUTION SUBCUTANEOUS at 22:09

## 2023-08-08 RX ADMIN — Medication 100 GRAM(S): at 19:01

## 2023-08-08 RX ADMIN — Medication 650 MILLIGRAM(S): at 00:05

## 2023-08-08 RX ADMIN — Medication 4 UNIT(S): at 17:50

## 2023-08-08 RX ADMIN — Medication 4 UNIT(S): at 09:11

## 2023-08-08 RX ADMIN — Medication 90 MILLIGRAM(S): at 22:09

## 2023-08-08 RX ADMIN — Medication 81 MILLIGRAM(S): at 13:15

## 2023-08-08 NOTE — DISCHARGE NOTE PROVIDER - NSDCCPCAREPLAN_GEN_ALL_CORE_FT
PRINCIPAL DISCHARGE DIAGNOSIS  Diagnosis: Gluteal cleft wound  Assessment and Plan of Treatment: Always wash your hands with soap and water for at least 20 seconds before and after changing your dressing.  Change your dressing as told by your health care provider.  To help with healing, eat foods that are rich in protein, vitamin A, vitamin C, and other nutrients.  Check your wound every day for signs of infection. Contact your health care provider if you think that your wound is infected.     PRINCIPAL DISCHARGE DIAGNOSIS  Diagnosis: Gluteal cleft wound  Assessment and Plan of Treatment: Always wash your hands with soap and water for at least 20 seconds before and after changing your dressing.  Change your dressing as told by your health care provider.  To help with healing, eat foods that are rich in protein, vitamin A, vitamin C, and other nutrients.  Check your wound every day for signs of infection. Contact your health care provider if you think that your wound is infected.   Take all your medications as directed, including your insulin. Poorly controlled diabetes is known to contribute to non-healing wounds.     PRINCIPAL DISCHARGE DIAGNOSIS  Diagnosis: Gluteal cleft wound  Assessment and Plan of Treatment: Always wash your hands with soap and water for at least 20 seconds before and after changing your dressing.  Change your dressing as told by your health care provider.  To help with healing, eat foods that are rich in protein, vitamin A, vitamin C, and other nutrients.  Check your wound every day for signs of infection. Contact your health care provider if you think that your wound is infected.   Take all your medications as directed, including your insulin. Poorly controlled diabetes is known to contribute to non-healing wounds.  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 Site irrigated with NS and hydrofiber lightly packed into wound bed, periwound protected with skin barrier wipes, site covered with foam dressing.  You should irrigate with saline and pack the wound with hydrofiber as above. Then you should cover the wound with foam dressing.       PRINCIPAL DISCHARGE DIAGNOSIS  Diagnosis: Gluteal cleft wound  Assessment and Plan of Treatment: Always wash your hands with soap and water for at least 20 seconds before and after changing your dressing. Change your dressing as told by your health care provider. To help with healing, eat foods that are rich in protein, vitamin A, vitamin C, and other nutrients.  Check your wound every day for signs of infection. Contact your health care provider if you think that your wound is infected. Take all your medications as directed, including your insulin. Poorly controlled diabetes is known to contribute to non-healing wounds.  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 Site irrigated with NS and hydrofiber lightly packed into wound bed, periwound protected with skin barrier wipes, site covered with foam dressing.  You should irrigate with saline and pack the wound with hydrofiber as above. Then you should cover the wound with foam dressing.        SECONDARY DISCHARGE DIAGNOSES  Diagnosis: Hypertensive urgency  Assessment and Plan of Treatment:

## 2023-08-08 NOTE — PHYSICAL THERAPY INITIAL EVALUATION ADULT - MD/RN NOTIFIED
PT RESTING IN BED READING HER BOOK WITH NO C/O PAIN AT THIS TIME. PT HAS NO
QUESTIONS OR CONCERNS AT THIS TIME. BREATHING UNDER CONTROL AS SHE'S RESTING.
CALL LIGHT WIHTIN REACH. WILL CONTINUE TO MONITOR. yes

## 2023-08-08 NOTE — PROGRESS NOTE ADULT - SUBJECTIVE AND OBJECTIVE BOX
**INCOMPLETE NOTE    OVERNIGHT EVENTS:    SUBJECTIVE:  Patient seen and examined at bedside.    Vital Signs Last 12 Hrs  T(F): 97.7 (08-07-23 @ 20:58), Max: 97.7 (08-07-23 @ 20:58)  HR: 86 (08-07-23 @ 20:58) (86 - 86)  BP: 142/78 (08-07-23 @ 20:58) (142/78 - 142/78)  BP(mean): --  RR: 17 (08-07-23 @ 20:58) (17 - 17)  SpO2: 99% (08-07-23 @ 20:58) (99% - 99%)  I&O's Summary      PHYSICAL EXAM:  Constitutional: NAD, comfortable in bed.  HEENT: NC/AT, PERRLA, EOMI, no conjunctival pallor or scleral icterus, MMM  Neck: Supple, no JVD  Respiratory: CTA B/L. No w/r/r.   Cardiovascular: RRR, normal S1 and S2, no m/r/g.   Gastrointestinal: +BS, soft NTND, no guarding or rebound tenderness, no palpable masses   Extremities: wwp; no cyanosis, clubbing or edema.   Vascular: Pulses equal and strong throughout.   Neurological: AAOx3, no CN deficits, strength and sensation intact throughout.   Skin: No gross skin abnormalities or rashes        LABS:                        10.4   14.53 )-----------( 207      ( 07 Aug 2023 05:30 )             34.3     08-07    137  |  99  |  28<H>  ----------------------------<  358<H>  3.3<L>   |  27  |  2.51<H>    Ca    9.0      07 Aug 2023 05:30  Phos  3.3     08-07  Mg     1.8     08-07    TPro  7.1  /  Alb  3.0<L>  /  TBili  0.2  /  DBili  x   /  AST  12  /  ALT  <5<L>  /  AlkPhos  112  08-07      Urinalysis Basic - ( 07 Aug 2023 05:30 )    Color: x / Appearance: x / SG: x / pH: x  Gluc: 358 mg/dL / Ketone: x  / Bili: x / Urobili: x   Blood: x / Protein: x / Nitrite: x   Leuk Esterase: x / RBC: x / WBC x   Sq Epi: x / Non Sq Epi: x / Bacteria: x          RADIOLOGY & ADDITIONAL TESTS:    MEDICATIONS  (STANDING):  aspirin  chewable 81 milliGRAM(s) Oral daily  atorvastatin 40 milliGRAM(s) Oral at bedtime  dextrose 5%. 1000 milliLiter(s) (50 mL/Hr) IV Continuous <Continuous>  dextrose 5%. 1000 milliLiter(s) (100 mL/Hr) IV Continuous <Continuous>  dextrose 50% Injectable 25 Gram(s) IV Push once  dextrose 50% Injectable 25 Gram(s) IV Push once  dextrose 50% Injectable 12.5 Gram(s) IV Push once  glucagon  Injectable 1 milliGRAM(s) IntraMuscular once  insulin glargine Injectable (LANTUS) 12 Unit(s) SubCutaneous at bedtime  insulin lispro (ADMELOG) corrective regimen sliding scale   SubCutaneous three times a day before meals  insulin lispro Injectable (ADMELOG) 4 Unit(s) SubCutaneous three times a day before meals  lactated ringers. 1000 milliLiter(s) (100 mL/Hr) IV Continuous <Continuous>  NIFEdipine XL 90 milliGRAM(s) Oral every 24 hours    MEDICATIONS  (PRN):  acetaminophen     Tablet .. 650 milliGRAM(s) Oral every 6 hours PRN Mild Pain (1 - 3), Moderate Pain (4 - 6)  dextrose Oral Gel 15 Gram(s) Oral once PRN Blood Glucose LESS THAN 70 milliGRAM(s)/deciliter   OVERNIGHT EVENTS: ENEDINA    SUBJECTIVE:  Patient seen and examined at bedside. Patient was sleeping comfortably in bed. No complaint but asked about antibiotic treatments. It was explained to the patient that the we are waiting for recommendation from infectious disease and cultures before initiating antibiotics.    Vital Signs Last 12 Hrs  T(F): 97.7 (08-07-23 @ 20:58), Max: 97.7 (08-07-23 @ 20:58)  HR: 86 (08-07-23 @ 20:58) (86 - 86)  BP: 142/78 (08-07-23 @ 20:58) (142/78 - 142/78)  BP(mean): --  RR: 17 (08-07-23 @ 20:58) (17 - 17)  SpO2: 99% (08-07-23 @ 20:58) (99% - 99%)  I&O's Summary      PHYSICAL EXAM:  Constitutional: NAD, comfortable in bed.  HEENT: NC/AT, PERRLA, EOMI, no conjunctival pallor or scleral icterus, MMM  Neck: Supple, no JVD  Respiratory: CTA B/L. No w/r/r.   Cardiovascular: RRR, normal S1 and S2, no m/r/g.   Gastrointestinal: +BS, soft NTND, no guarding or rebound tenderness, no palpable masses   Extremities: wwp; no cyanosis, clubbing or edema.   Vascular: Pulses equal and strong throughout.   Neurological: AAOx3, no CN deficits, strength and sensation intact throughout.   Skin: No gross skin abnormalities or rashes        LABS:                        10.4   14.53 )-----------( 207      ( 07 Aug 2023 05:30 )             34.3     08-07    137  |  99  |  28<H>  ----------------------------<  358<H>  3.3<L>   |  27  |  2.51<H>    Ca    9.0      07 Aug 2023 05:30  Phos  3.3     08-07  Mg     1.8     08-07    TPro  7.1  /  Alb  3.0<L>  /  TBili  0.2  /  DBili  x   /  AST  12  /  ALT  <5<L>  /  AlkPhos  112  08-07      Urinalysis Basic - ( 07 Aug 2023 05:30 )    Color: x / Appearance: x / SG: x / pH: x  Gluc: 358 mg/dL / Ketone: x  / Bili: x / Urobili: x   Blood: x / Protein: x / Nitrite: x   Leuk Esterase: x / RBC: x / WBC x   Sq Epi: x / Non Sq Epi: x / Bacteria: x          RADIOLOGY & ADDITIONAL TESTS:    MEDICATIONS  (STANDING):  aspirin  chewable 81 milliGRAM(s) Oral daily  atorvastatin 40 milliGRAM(s) Oral at bedtime  dextrose 5%. 1000 milliLiter(s) (50 mL/Hr) IV Continuous <Continuous>  dextrose 5%. 1000 milliLiter(s) (100 mL/Hr) IV Continuous <Continuous>  dextrose 50% Injectable 25 Gram(s) IV Push once  dextrose 50% Injectable 25 Gram(s) IV Push once  dextrose 50% Injectable 12.5 Gram(s) IV Push once  glucagon  Injectable 1 milliGRAM(s) IntraMuscular once  insulin glargine Injectable (LANTUS) 12 Unit(s) SubCutaneous at bedtime  insulin lispro (ADMELOG) corrective regimen sliding scale   SubCutaneous three times a day before meals  insulin lispro Injectable (ADMELOG) 4 Unit(s) SubCutaneous three times a day before meals  lactated ringers. 1000 milliLiter(s) (100 mL/Hr) IV Continuous <Continuous>  NIFEdipine XL 90 milliGRAM(s) Oral every 24 hours    MEDICATIONS  (PRN):  acetaminophen     Tablet .. 650 milliGRAM(s) Oral every 6 hours PRN Mild Pain (1 - 3), Moderate Pain (4 - 6)  dextrose Oral Gel 15 Gram(s) Oral once PRN Blood Glucose LESS THAN 70 milliGRAM(s)/deciliter   Hospital Course:  Patient i      OVERNIGHT EVENTS: ENEDINA    SUBJECTIVE:  Patient seen and examined at bedside. Patient was sleeping comfortably in bed. No complaint but asked about antibiotic treatments. It was explained to the patient that the we are waiting for recommendation from infectious disease and cultures before initiating antibiotics.    Vital Signs Last 12 Hrs  T(F): 97.7 (08-07-23 @ 20:58), Max: 97.7 (08-07-23 @ 20:58)  HR: 86 (08-07-23 @ 20:58) (86 - 86)  BP: 142/78 (08-07-23 @ 20:58) (142/78 - 142/78)  BP(mean): --  RR: 17 (08-07-23 @ 20:58) (17 - 17)  SpO2: 99% (08-07-23 @ 20:58) (99% - 99%)  I&O's Summary      PHYSICAL EXAM:  Constitutional: NAD, comfortable in bed.  HEENT: NC/AT, PERRLA, EOMI, no conjunctival pallor or scleral icterus, MMM  Neck: Supple, no JVD  Respiratory: CTA B/L. No w/r/r.   Cardiovascular: RRR, normal S1 and S2, no m/r/g.   Gastrointestinal: +BS, soft NTND, no guarding or rebound tenderness, no palpable masses   Extremities: wwp; no cyanosis, clubbing or edema.   Vascular: Pulses equal and strong throughout.   Neurological: AAOx3, no CN deficits, strength and sensation intact throughout.   Skin: No gross skin abnormalities or rashes        LABS:                        10.4   14.53 )-----------( 207      ( 07 Aug 2023 05:30 )             34.3     08-07    137  |  99  |  28<H>  ----------------------------<  358<H>  3.3<L>   |  27  |  2.51<H>    Ca    9.0      07 Aug 2023 05:30  Phos  3.3     08-07  Mg     1.8     08-07    TPro  7.1  /  Alb  3.0<L>  /  TBili  0.2  /  DBili  x   /  AST  12  /  ALT  <5<L>  /  AlkPhos  112  08-07      Urinalysis Basic - ( 07 Aug 2023 05:30 )    Color: x / Appearance: x / SG: x / pH: x  Gluc: 358 mg/dL / Ketone: x  / Bili: x / Urobili: x   Blood: x / Protein: x / Nitrite: x   Leuk Esterase: x / RBC: x / WBC x   Sq Epi: x / Non Sq Epi: x / Bacteria: x          RADIOLOGY & ADDITIONAL TESTS:    MEDICATIONS  (STANDING):  aspirin  chewable 81 milliGRAM(s) Oral daily  atorvastatin 40 milliGRAM(s) Oral at bedtime  dextrose 5%. 1000 milliLiter(s) (50 mL/Hr) IV Continuous <Continuous>  dextrose 5%. 1000 milliLiter(s) (100 mL/Hr) IV Continuous <Continuous>  dextrose 50% Injectable 25 Gram(s) IV Push once  dextrose 50% Injectable 25 Gram(s) IV Push once  dextrose 50% Injectable 12.5 Gram(s) IV Push once  glucagon  Injectable 1 milliGRAM(s) IntraMuscular once  insulin glargine Injectable (LANTUS) 12 Unit(s) SubCutaneous at bedtime  insulin lispro (ADMELOG) corrective regimen sliding scale   SubCutaneous three times a day before meals  insulin lispro Injectable (ADMELOG) 4 Unit(s) SubCutaneous three times a day before meals  lactated ringers. 1000 milliLiter(s) (100 mL/Hr) IV Continuous <Continuous>  NIFEdipine XL 90 milliGRAM(s) Oral every 24 hours    MEDICATIONS  (PRN):  acetaminophen     Tablet .. 650 milliGRAM(s) Oral every 6 hours PRN Mild Pain (1 - 3), Moderate Pain (4 - 6)  dextrose Oral Gel 15 Gram(s) Oral once PRN Blood Glucose LESS THAN 70 milliGRAM(s)/deciliter   Hospital Course:  Patient is a 63 year old male, currently undomiciled, w/ DM and h/o osteomyelitis who presents to the ED for evaluation of chronic wounds, found to have severe sepsis 2/2 LLE OM vs R gluteal wound, hypertensive urgency, and DEBBY. Podiatry, wound care, and ID has been consulted; IV LR started for DEBBY. Insulin adjusted given hyperglycemia. Started Lipitor 40mg QD. Left foot Wcx Gram stain showing Gram+ cocci in clusters & rare Gram+ rods. MR ANNA foot showed no evidence of osteomyelitis. ID and wound care evaluation pending on 8/8, so a dose of Vanc/Zosyn was given.      OVERNIGHT EVENTS: ENEDINA    SUBJECTIVE:  Patient seen and examined at bedside. Patient was sleeping comfortably in bed. No complaint but asked about antibiotic treatments. It was explained to the patient that the we are waiting for recommendation from infectious disease and cultures before initiating antibiotics.    Vital Signs Last 12 Hrs  T(F): 97.7 (08-07-23 @ 20:58), Max: 97.7 (08-07-23 @ 20:58)  HR: 86 (08-07-23 @ 20:58) (86 - 86)  BP: 142/78 (08-07-23 @ 20:58) (142/78 - 142/78)  BP(mean): --  RR: 17 (08-07-23 @ 20:58) (17 - 17)  SpO2: 99% (08-07-23 @ 20:58) (99% - 99%)  I&O's Summary      PHYSICAL EXAM:  Constitutional: NAD, comfortable in bed.  HEENT: NC/AT, PERRLA, EOMI, no conjunctival pallor or scleral icterus, MMM  Neck: Supple, no JVD  Respiratory: CTA B/L. No w/r/r.   Cardiovascular: RRR, normal S1 and S2, no m/r/g.   Gastrointestinal: +BS, soft NTND, no guarding or rebound tenderness, no palpable masses   Extremities: wwp; no cyanosis, clubbing or edema.   Vascular: Pulses equal and strong throughout.   Neurological: AAOx3, no CN deficits, strength and sensation intact throughout.   Skin: No gross skin abnormalities or rashes        LABS:                        10.4   14.53 )-----------( 207      ( 07 Aug 2023 05:30 )             34.3     08-07    137  |  99  |  28<H>  ----------------------------<  358<H>  3.3<L>   |  27  |  2.51<H>    Ca    9.0      07 Aug 2023 05:30  Phos  3.3     08-07  Mg     1.8     08-07    TPro  7.1  /  Alb  3.0<L>  /  TBili  0.2  /  DBili  x   /  AST  12  /  ALT  <5<L>  /  AlkPhos  112  08-07      Urinalysis Basic - ( 07 Aug 2023 05:30 )    Color: x / Appearance: x / SG: x / pH: x  Gluc: 358 mg/dL / Ketone: x  / Bili: x / Urobili: x   Blood: x / Protein: x / Nitrite: x   Leuk Esterase: x / RBC: x / WBC x   Sq Epi: x / Non Sq Epi: x / Bacteria: x          RADIOLOGY & ADDITIONAL TESTS:    MEDICATIONS  (STANDING):  aspirin  chewable 81 milliGRAM(s) Oral daily  atorvastatin 40 milliGRAM(s) Oral at bedtime  dextrose 5%. 1000 milliLiter(s) (50 mL/Hr) IV Continuous <Continuous>  dextrose 5%. 1000 milliLiter(s) (100 mL/Hr) IV Continuous <Continuous>  dextrose 50% Injectable 25 Gram(s) IV Push once  dextrose 50% Injectable 25 Gram(s) IV Push once  dextrose 50% Injectable 12.5 Gram(s) IV Push once  glucagon  Injectable 1 milliGRAM(s) IntraMuscular once  insulin glargine Injectable (LANTUS) 12 Unit(s) SubCutaneous at bedtime  insulin lispro (ADMELOG) corrective regimen sliding scale   SubCutaneous three times a day before meals  insulin lispro Injectable (ADMELOG) 4 Unit(s) SubCutaneous three times a day before meals  lactated ringers. 1000 milliLiter(s) (100 mL/Hr) IV Continuous <Continuous>  NIFEdipine XL 90 milliGRAM(s) Oral every 24 hours    MEDICATIONS  (PRN):  acetaminophen     Tablet .. 650 milliGRAM(s) Oral every 6 hours PRN Mild Pain (1 - 3), Moderate Pain (4 - 6)  dextrose Oral Gel 15 Gram(s) Oral once PRN Blood Glucose LESS THAN 70 milliGRAM(s)/deciliter

## 2023-08-08 NOTE — DISCHARGE NOTE PROVIDER - NSDCMRMEDTOKEN_GEN_ALL_CORE_FT
Admelog 100 units/mL injectable solution: 5 unit(s) injectable 3 times a day (before meals)  Aspirin Enteric Coated 81 mg oral delayed release tablet: 1 tab(s) orally once a day  hydroCHLOROthiazide 12.5 mg oral capsule: 1 cap(s) orally every 24 hours  insulin glargine 100 units/mL subcutaneous solution: 18 unit(s) subcutaneous once a day (at bedtime)  insulin glargine 100 units/mL subcutaneous solution: 22 unit(s) subcutaneous once a day (at bedtime)  linezolid 600 mg oral tablet: 1 tab(s) orally every 12 hours  Lipitor 80 mg oral tablet: 1 tab(s) orally once a day  NIFEdipine 90 mg oral tablet, extended release: 1 tab(s) orally once a day  Plavix 75 mg oral tablet: 1 tab(s) orally once a day   Admelog 100 units/mL injectable solution: 4 injectable  Aspirin Enteric Coated 81 mg oral delayed release tablet: 1 tab(s) orally once a day  cloNIDine 0.1 mg oral tablet: 1 orally 2 times a day  Lantus 100 units/mL subcutaneous solution: 12 subcutaneous  Lipitor 80 mg oral tablet: 1 tab(s) orally once a day  NIFEdipine 90 mg oral tablet, extended release: 1 tab(s) orally once a day   Aspirin Enteric Coated 81 mg oral delayed release tablet: 1 tab(s) orally once a day  cloNIDine 0.2 mg oral tablet: 1 tab(s) orally once a day  HumaLOG KwikPen 100 units/mL injectable solution: 4 unit(s) injectable 3 times a day (before meals)  Lantus Solostar Pen 100 units/mL subcutaneous solution: 12 unit(s) subcutaneous once a day (at bedtime)  Lipitor 80 mg oral tablet: 1 tab(s) orally once a day (at bedtime)   Aspirin Enteric Coated 81 mg oral delayed release tablet: 1 tab(s) orally once a day  cloNIDine 0.2 mg oral tablet: 1 tab(s) orally once a day  HumaLOG KwikPen 100 units/mL injectable solution: 4 unit(s) injectable 3 times a day (before meals)  Lantus Solostar Pen 100 units/mL subcutaneous solution: 12 unit(s) subcutaneous once a day (at bedtime)  Lipitor 80 mg oral tablet: 1 tab(s) orally once a day (at bedtime)  NIFEdipine 60 mg oral tablet, extended release: 2 tab(s) orally once a day

## 2023-08-08 NOTE — CONSULT NOTE ADULT - ASSESSMENT
{\rtf1\dovfzz58038\ansi\euauobp0582\ftnbj\uc1\deff0  {\fonttbl{\f0 \fnil Segoe UI;}{\f1 \fnil \fcharset0 Segoe UI;}{\f2 \fnil Times New Emil;}}  {\colortbl ;\pwz377\hlnyc997\zcda771 ;\red0\green0\blue0 ;\red0\green0\msad378 ;\red0\green0\blue0 ;}  {\stylesheet{\f0\fs20 Normal;}{\cs1 Default Paragraph Font;}{\cs2\f0\fs16 Line Number;}{\cs3\f2\fs24\ul\cf3 Hyperlink;}}  {\*\revtbl{Unknown;}}  \udjucl02601\twfidi46867\mdfzb6750\phyww1929\wgwcq0649\nrklt8449\qrcnwpq679\nvtfufd188\nogrowautofit\iwzorh413\formshade\nofeaturethrottle1\dntblnsbdb\fet4\aendnotes\aftnnrlc\pgbrdrhead\pgbrdrfoot  \sectd\vdifqo17736\hcsirg63748\guttersxn0\aghhtvxm5978\jjzrmpzp0493\aawxtpip4621\yvkifhbw9052\lurdorx772\zjdngok817\sbkpage\pgncont\pgndec  \plain\plain\f0\fs24\ql\plain\f0\fs24\plain\f0\fs20\doua0415\hich\f0\dbch\f0\loch\f0\fs20 I M\par  \par  63 year old male, currently undomiciled, w/ DM and h/o osteomyelitis who presents to the ED for evaluation of chronic wounds, found to have severe sepsis and hypertensive urgency - being treated with nifedipine and clonidine. \par  \par  \plain\f1\fs20\tknf9130\hich\f1\dbch\f1\loch\f1\cf2\fs20\ul{\field{\*\fldinst HYPERLINK [id number] }{\fldrslt Problem/Plan - 1:}}\plain\f0\fs20\mmau6167\hich\f0\dbch\f0\loch\f0\fs20\ql\par  \'b7  {\*\bkmkstart [id number]}{\*\bkmkend zj36836090341}Problem: {\*\bkmkstart ll78840349406}{\*\bkmkend mq98409099849}Hypertensive urgency. \par  \'b7  {\*\bkmkstart cq59641302010}{\*\bkmkend rv45316573784}Plan: {\*\bkmkstart qc48516929143}{\*\bkmkend zg95956809414}Patient w/ BP in ED to 200/111 asymptomatic. Cr was already elevated prior to this BP recording. Received nifedipine 90 mg and clonidine   0.1 mg, both of which are home meds and BP responded appropriately. \par  \par  So far: \par  - nifedipine 90 mg \par  - clonidine 0.1 mg \par  - clonidine 0.2 mg\par  \par  Plan: \par  - close observation of BP \par  - restart home meds.\par  \par  \plain\f1\fs20\ndjx4765\hich\f1\dbch\f1\loch\f1\cf2\fs20\ul{\field{\*\fldinst HYPERLINK [id number] }{\fldrslt Problem/Plan - 2:}}\plain\f0\fs20\btdh9586\hich\f0\dbch\f0\loch\f0\fs20\ql\par  \'b7  {\*\bkmkstart ru88896509341}{\*\bkmkend au89152067296}Problem: {\*\bkmkstart ar36684995146}{\*\bkmkend aa64705437582}Severe sepsis. \par  \'b7  {\*\bkmkstart me43793698753}{\*\bkmkend ps36285340347}Plan: {\*\bkmkstart hm09949463330}{\*\bkmkend ju89386633847}Patient severly septic on admission, received Vanc + Zosyn in ED. Lactate 2.8 on admission, now downtrended after fluids. Source is   likely osteomyelitis from left foot or gluteal wound. \par  \par  Plan: \par  - f/u BC \par  - c/w Vanc + Zosyn \par  - daily CBC.\par  \par  \plain\f1\fs20\itka0063\hich\f1\dbch\f1\loch\f1\cf2\fs20\ul{\field{\*\fldinst HYPERLINK [id number] }{\fldrslt Problem/Plan - 3:}}\plain\f0\fs20\tlyz0212\hich\f0\dbch\f0\loch\f0\fs20\ql\par  \'b7  {\*\bkmkstart pr13333381579}{\*\bkmkend rv68030386037}Problem: {\*\bkmkstart oc98318290411}{\*\bkmkend hd70113439155}DEBBY (acute kidney injury). \par  \'b7  {\*\bkmkstart pz16056338957}{\*\bkmkend sx06755498030}Plan: {\*\bkmkstart pa71274050841}{\*\bkmkend ho54270137105}Creatinine at baseline appears to be 1.44. On admission - Cr 2.80. Patient s/p 2 L in ED. Voiding spontaneously in ED w/ light yellow   urine. Appears to be prenal 2/2 sepsis .\par  \par  Plan: \par  - obtain Ulytes \par  - bladder scan \par  - daily BMP.\par  \par  \plain\f1\fs20\ihnl4490\hich\f1\dbch\f1\loch\f1\cf2\fs20\ul{\field{\*\fldinst HYPERLINK [id number] }{\fldrslt Problem/Plan - 4:}}\plain\f0\fs20\fhhi8164\hich\f0\dbch\f0\loch\f0\fs20\ql\par  \'b7  {\*\bkmkstart vu31197413767}{\*\bkmkend sd95930800527}Problem: {\*\bkmkstart yw91470773957}{\*\bkmkend vr11796065423}Hyponatremia. \par  \'b7  {\*\bkmkstart wx79853091335}{\*\bkmkend qq55846147894}Plan: {\*\bkmkstart ms36078064610}{\*\bkmkend or26658966548}Patient w/ sodium to 128 on admission. On repeat  after fluids. \par  Likely hypovolemic hyponatremia. \par  \par  Plan: \par  - f/u Uosms.\par  \par  \plain\f1\fs20\pmzh6750\hich\f1\dbch\f1\loch\f1\cf2\fs20\ul{\field{\*\fldinst HYPERLINK [id number] }{\fldrslt Problem/Plan - 5:}}\plain\f0\fs20\jwoe4505\hich\f0\dbch\f0\loch\f0\fs20\ql\par  \'b7  {\*\bkmkstart om16282962515}{\*\bkmkend gi52785123158}Problem: {\*\bkmkstart fa20722433668}{\*\bkmkend fc75861814904}Gluteal cleft wound. \par  \'b7  {\*\bkmkstart og88471034593}{\*\bkmkend wc03057810394}Plan: {\*\bkmkstart rm04324374643}{\*\bkmkend sw51298605595}Chronic of 3 years. Patient ambulates with roller and walker, was at [facility] for a few days and left AMA 2/2 not liking the care he received.   Presents today as he felt sick again. \par  CT with stranding and concern for osteo. \par  \par  Plan: \par  - would need surgical+ ortho evaluation (+/- plastics) \par  - f/u MRI\par  - will hold broad spectrum abx for now until podiatry fu.\plain\f1\fs20\iynk6583\hich\f1\dbch\f1\loch\f1\cf2\fs20\strike\plain\f0\fs20\epoj8293\hich\f0\dbch\f0\loch\f0\fs20\par  \par  \plain\f1\fs20\rfic8324\hich\f1\dbch\f1\loch\f1\cf2\fs20\ul{\field{\*\fldinst HYPERLINK [id number] }{\fldrslt Problem/Plan - 6:}}\plain\f0\fs20\ksbq5167\hich\f0\dbch\f0\loch\f0\fs20\ql\par  \'b7  {\*\bkmkstart iz67762677237}{\*\bkmkend ac91127129098}Problem: {\*\bkmkstart pp91824512779}{\*\bkmkend tx53352665826}IDDM (insulin dependent diabetes mellitus). \par  \'b7  {\*\bkmkstart kz41238265338}{\*\bkmkend wk86199872210}Plan: {\*\bkmkstart na90235504014}{\*\bkmkend nh79074992490}Per patient, takes 10 U lantus and 4-6 premeal at home \par  \par  Plan: \par  - will start 4 U premeal \par  - 12 U basal \par  - miSS \par  - consistent carb renal diet.\plain\f1\fs20\wgsl5378\hich\f1\dbch\f1\loch\f1\cf2\fs20\strike\plain\f0\fs20\yvoe4153\hich\f0\dbch\f0\loch\f0\fs20\par  \par  \plain\f1\fs20\kvkq9319\hich\f1\dbch\f1\loch\f1\cf2\fs20\ul{\field{\*\fldinst HYPERLINK [id number] }{\fldrslt Problem/Plan - 7:}}\plain\f0\fs20\kqgl8409\hich\f0\dbch\f0\loch\f0\fs20\ql\par  \'b7  {\*\bkmkstart kr45891702090}{\*\bkmkend ok65884735094}Problem: {\*\bkmkstart nj06283448847}{\*\bkmkend ju45253738285}CAD (coronary artery disease). \par  \'b7  {\*\bkmkstart bj98636214115}{\*\bkmkend fh41843389732}Plan: {\*\bkmkstart gg50564952916}{\*\bkmkend lv23954774761}Patient w/ stent approximately 1 yr ago. \par  EKG: NSR w/ possible L atrial enlargement \par  No chest pain on admission, EKG w/o evidence of acute ischemia. \par  Trops negative. \par  \par  Plan:\par  - c.w ASA 81 mg.\par  \par  \plain\f1\fs20\pmfe3279\hich\f1\dbch\f1\loch\f1\cf2\fs20\ul{\field{\*\fldinst HYPERLINK [id number] }{\fldrslt Problem/Plan - 8:}}\plain\f0\fs20\ytia8235\hich\f0\dbch\f0\loch\f0\fs20\ql\par  \'b7  {\*\bkmkstart pj04748285468}{\*\bkmkend qv26922439886}Problem: {\*\bkmkstart yd10318611747}{\*\bkmkend hb69511764384}Open wound of left foot. \par  \'b7  {\*\bkmkstart hy63043559897}{\*\bkmkend ch55038792482}Plan: {\*\bkmkstart jl28518528980}{\*\bkmkend zv87598800077}Chronic, has had osteo in the past. Refused IV antibiotics and was discharged with PO linezolid. Per patient, he did complete course.   Wound is worsened from last admission. Last cultures growing corynebacterium. \par  \par  Plan: \par  - would continue Vanc + Zosyn for now \par  - f/u Xray read\par  - consult podiatry in AM \par  - consult ID.\plain\f1\fs20\rnbk7610\hich\f1\dbch\f1\loch\f1\cf2\fs20\strike\plain\f0\fs20\ecmb5423\hich\f0\dbch\f0\loch\f0\fs20\par  \par  \plain\f1\fs20\qpas5458\hich\f1\dbch\f1\loch\f1\cf2\fs20\ul{\field{\*\fldinst HYPERLINK [id number] }{\fldrslt Problem/Plan - 9:}}\plain\f0\fs20\zgon3554\hich\f0\dbch\f0\loch\f0\fs20\ql\par  \'b7  {\*\bkmkstart ip91380224561}{\*\bkmkend jg65908955100}Problem: {\*\bkmkstart zr36611587738}{\*\bkmkend vw45736640443}Healthcare maintenance. \par  \'b7  {\*\bkmkstart zt24356660729}{\*\bkmkend al87028661350}Plan: {\*\bkmkstart ez64865124659}{\*\bkmkend mi93397185074}F: s/p 2 L in ED, per oral \par  E: K>4, Mg>2 \par  N: Consistent carb, DASH\par  DVT: heparin subq.\par  \par  }

## 2023-08-08 NOTE — PROGRESS NOTE ADULT - ASSESSMENT
63 yo M pmhx DM, HTN, CAD, Hep C s/p treatment p/w L foot non-healing ulcer. 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 Pt cannot recall who his surgeon was or when exactly it was done, but notes it was at [facility]. Podiatry consulted for evaluation of wound, r/o OM, r/o gas. Of note XR wet read hardware in L 1st TMT joint, broken screws present. Evidence of previous 4th and 5th partial MT resection. No evidence of gas. No significant xr findings of the R foot. On PE, submet 2 with bony probe stop, concern for OM. Pt labs with elevated wbc at 14.53 and ESR 62. Prior bone biopsy in January was of the left 5th digit.     Plan:  - Pt evaluated, chart reviewed  - c/w IV abx   - Local wound care: Betadine DSD to L plantar wound, wrapped with DSD, Kerlix, ACE  - f/u MRI final read- prelim no OM.   - f/u Left foot plantar foot deep wound cx   - WBAT w/ cane to L foot  - rest of care per primary team    Podiatry following. Plan d/w attending. 63 yo M pmhx DM, HTN, CAD, Hep C s/p treatment p/w L foot non-healing ulcer. 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 Pt cannot recall who his surgeon was or when exactly it was done, but notes it was at [facility]. Podiatry consulted for evaluation of wound, r/o OM, r/o gas. Of note XR wet read hardware in L 1st TMT joint, broken screws present. Evidence of previous 4th and 5th partial MT resection. No evidence of gas. No significant xr findings of the R foot. On PE, submet 2 with bony probe stop, some concern for OM. Pt labs with elevated wbc at 14.53 and ESR 62. Prior bone biopsy in January was of the left 5th digit. Low suspicion of cellulitis of the left foot, likely not source of leukocytosis.     Plan:  - Pt evaluated, chart reviewed  - c/w IV abx   - Local wound care: Betadine DSD to L plantar wound, wrapped with DSD, Kerlix, ACE  - f/u MRI final read- prelim no OM.   - f/u Left foot plantar foot deep wound cx   - WBAT w/ cane to L foot  - rest of care per primary team    Podiatry following. Plan d/w attending. 63 yo M pmhx DM, HTN, CAD, Hep C s/p treatment p/w L foot non-healing ulcer. 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 Pt cannot recall who his surgeon was or when exactly it was done, but notes it was at [facility]. Podiatry consulted for evaluation of wound, r/o OM, r/o gas. Of note XR wet read hardware in L 1st TMT joint, broken screws present. Evidence of previous 4th and 5th partial MT resection. No evidence of gas. No significant xr findings of the R foot. Pt labs with elevated wbc at 14.53 and ESR 62. Prior bone biopsy in January was of the left 5th digit. Low suspicion of cellulitis of the left foot, likely not source of leukocytosis. Upon re-evaluation of wound 8/8, soft tissue stop noted with the PTB test, low concern for OM. MRI final read negative for OM.      Plan:  - Pt evaluated, chart reviewed  - c/w IV abx   - Local wound care: Betadine DSD to L plantar wound, wrapped with DSD, Kerlix, ACE  - f/u Left foot plantar foot deep wound cx   - WBAT w/ cane to L foot  - rest of care per primary team    Podiatry following. Plan d/w attending.

## 2023-08-08 NOTE — PROGRESS NOTE ADULT - PROBLEM SELECTOR PLAN 6
Per patient, takes 10 U lantus and 4-6 premeal at home     Plan:   - will start 4 U premeal   - 12 U basal   - miSS   - consistent carb renal diet Per patient, takes 10 U lantus and 4-6 premeal at home. A1C 8.2 on 8/8.    Plan:   - will start 4 U premeal   - 12 U basal   - miSS   - consistent carb renal diet

## 2023-08-08 NOTE — DISCHARGE NOTE PROVIDER - HOSPITAL COURSE
Patient is a 63 year old male, currently undomiciled, w/ DM and h/o osteomyelitis who presents to the ED for evaluation of chronic wounds, found to have severe sepsis 2/2 LLE OM vs R gluteal wound, hypertensive urgency, and DEBBY.    Problem List/Main Diagnoses (system-based):   ·  Problem: Gluteal cleft wound, stable - outpatient f/u with PCP  ·  Problem: Open wound of left foot, stable - outpatient f/u with PCP  ·  Problem: Hypertensive urgency, resolved - continue with home nifedipine 90mg QD and clonidine 0.1mg BID  ·  Problem: DEBBY (acute kidney injury), improving - f/u BMP, encourage PO intake  ·  Problem: IDDM (insulin dependent diabetes mellitus) - continue home insulin 4U Lispro and 12 Lantus  ·  Problem: CAD (coronary artery disease) - c/w ASA 81mg.  ·  Problem: Hyponatremia, improving - f/u urine osmo  ·  Problem: Severe sepsis, resolved      Inpatient treatment course:   In the ED, patient was hypertensive to 200/111 and received nifedipine 90 mg and clonidine 0.1 mg. ICU consult was called for hypertensive urgency. 0.2 mg clonidine PO was given. He also received Vanc and Zosyn as well as fluid resucitated w/ 2L normal saline. After admission to floor, antibiotics were on hold pending podiatry, wound care, and ID consult; IV LR started for DEBBY. Insulin adjusted given hyperglycemia. Started Lipitor 40mg QD. Left foot Wcx were sent. MR L. foot showed no evidence of osteomyelitis. Patient refused PT and wound care evaluation. Per ID recommendation, there is no concern of infection of the R. gluteal wound or the L. foot wounds, so no MRI or further antibiotics are indicated at this time. Patient is medically stable to be discharged.     New medications: none  Labs to be followed outpatient: Urine osmo  Exam to be followed outpatient: L. foot wound culture.   Patient is a 63 year old male, currently undomiciled, w/ DM and h/o osteomyelitis who presents to the ED for evaluation of chronic wounds, found to have severe sepsis 2/2 LLE OM vs R gluteal wound, hypertensive urgency, and DEBBY.    Problem List/Main Diagnoses (system-based):   ·  Problem: Gluteal cleft wound, stable - outpatient f/u with PCP  ·  Problem: Open wound of left foot, stable - outpatient f/u with PCP  ·  Problem: Hypertensive urgency, resolved - continue with home nifedipine 90mg QD and clonidine 0.1mg BID  ·  Problem: DEBBY (acute kidney injury), improving - f/u BMP, encourage PO intake, follow up with scheduled outpatient nephrologist  .  Problem: Proteinuria 08/08, urine protein > 300, and protein/Cr ratio:30; possibly due to HTN hx - f/u with nephrologist   .  Problem: Hematuria 08/08 moderate blood in UA, may be due to HTN - f/u with nephrologist  ·  Problem: IDDM (insulin dependent diabetes mellitus) - continue home insulin 4U Lispro and 12 Lantus  ·  Problem: CAD (coronary artery disease) - c/w ASA 81mg.  ·  Problem: Hyponatremia, improving - f/u urine osmo  ·  Problem: Severe sepsis, resolved      Inpatient treatment course:   In the ED, patient was hypertensive to 200/111 and received nifedipine 90 mg and clonidine 0.1 mg. ICU consult was called for hypertensive urgency. 0.2 mg clonidine PO was given. He also received Vanc and Zosyn as well as fluid resucitated w/ 2L normal saline. After admission to floor, antibiotics were on hold pending podiatry, wound care, and ID consult; IV LR started for DEBBY. Insulin adjusted given hyperglycemia. Started Lipitor 40mg QD. Left foot Wcx were sent. MR L. foot showed no evidence of osteomyelitis. Patient refused PT and wound care evaluation. Per ID recommendation, there is no concern of infection of the R. gluteal wound or the L. foot wounds, so no MRI or further antibiotics are indicated at this time. Patient is medically stable to be discharged.     New medications: none  Labs to be followed outpatient: Urine osmo, Urine protein, Protein/Cr ratio, Hematuria  Exam to be followed outpatient: L. foot wound culture.   Patient is a 63 year old male, currently undomiciled, w/ DM and h/o osteomyelitis who presents to the ED for evaluation of chronic wounds, found to have severe sepsis 2/2 LLE OM vs R gluteal wound, hypertensive urgency, and DEBBY.    Problem List/Main Diagnoses (system-based):   ·  Problem: Gluteal cleft wound, stable - outpatient f/u with PCP  ·  Problem: Open wound of left foot, stable - outpatient f/u with PCP  ·  Problem: Hypertensive urgency, resolved - continue with home nifedipine 90mg QD and clonidine 0.1mg BID  ·  Problem: DEBBY (acute kidney injury), improving - f/u BMP, encourage PO intake, follow up with scheduled outpatient nephrologist  .  Problem: Proteinuria 08/08, urine protein > 300, and protein/Cr ratio:30; possibly due to HTN hx - f/u with outpatient nephrology  .  Problem: Hematuria 08/08 moderate blood in UA, may be due to HTN - f/u with nephrologist  ·  Problem: IDDM (insulin dependent diabetes mellitus) - continue home insulin 4U Lispro and 12 Lantus  ·  Problem: CAD (coronary artery disease) - c/w ASA 81mg.  ·  Problem: Hyponatremia, improving - f/u urine osmo  ·  Problem: Severe sepsis, resolved      Inpatient treatment course:   In the ED, patient was hypertensive to 200/111 and received nifedipine 90 mg and clonidine 0.1 mg. ICU consult was called for hypertensive urgency. 0.2 mg clonidine PO was given. He also received Vanc and Zosyn as well as fluid resuscitated w/ 2L normal saline. After admission to floor, antibiotics were on hold pending podiatry, wound care, and ID consult; IV LR started for DEBBY. Insulin adjusted given hyperglycemia. Started Lipitor 40mg QD. Left foot Wcx were sent. MR L. foot showed no evidence of osteomyelitis. Patient refused PT and wound care evaluation. Per ID recommendation, there is no concern of infection of the R. gluteal wound or the L. foot wounds, so no MRI or further antibiotics are indicated at this time. Patient is medically stable to be discharged.     New medications: none  Labs to be followed outpatient: Urine osmo, Urine protein, Protein/Cr ratio, Hematuria  Exam to be followed outpatient: L. foot wound culture.   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         currently w/ DM and h/o osteomyelitis who presents to the ED for evaluation of chronic wounds, found to have severe sepsis 2/2 LLE OM vs R gluteal wound, hypertensive urgency, and DEBBY.         Problem List/Main Diagnoses (system-based):     Problem/Plan - 1:  ·  Problem: Gluteal cleft wound.   ·  Plan: Chronic of 3 years. Patient ambulates with roller and walker, was at [facility] for a few days and left AMA 2/2 not liking the care he received. Presents today as he felt sick again.   CT with stranding and concern for osteo. Per ID, no concern for osteo as wound does not appear infected. Abx have been d/c'd.     Plan:   - patient refused PT evaluation on 8/7  - patient refused wound care evaluation on 8/8  - Per ID recs, abx unnecessary as wound does not appear to be infected  - Wound care saw patient on 8/9, appropriately dressed wounds.  - f/u ID recs, consider MRI hip, surgical/ortho evaluation.     Problem/Plan - 2:  ·  Problem: Open wound of left foot.   ·  Plan: Chronic, has had osteo in the past. Refused IV antibiotics and was discharged with PO linezolid. Per patient, he did complete course. Wound is worsened from last admission. Last cultures growing corynebacterium.     Plan:   - MRI L. foot 8/7 showed no evidence of osteomyelitis  - Podiatry evaluated, performed excisional debridement of left plantar wound, and sent wound Cx.     Problem/Plan - 3:  ·  Problem: Severe sepsis.   ·  Plan: severe sepsis ruled   only met sirs 1/4 + elevated lactate    received Vanc + Zosyn in ED. Lactate 2.8 on admission, now downtrended after fluids. Source is likely osteomyelitis of the gluteal wound.     Plan:   - ngtd BCx  - daily CBC  - ID: no concern for infection. Stop abx.     Problem/Plan - 4:  ·  Problem: Hypertensive urgency.   ·  Plan: Patient w/ BP in ED to 200/111 asymptomatic. Cr was already elevated prior to this BP recording. Received nifedipine 90 mg and clonidine 0.1 mg, both of which are home meds and BP responded appropriately.     Plan:  - nifedipine increased to 120 mg   - clonidine 0.2 mg  - close observation of BPs - within normal limits.     Problem/Plan - 5:  ·  Problem: DEBBY (acute kidney injury).   ·  Plan: Creatinine at baseline appears to be 1.44. On admission - Cr 2.80. Patient s/p 2 L in ED. Voiding spontaneously in ED w/ light yellow urine. Appears to be prenal 2/2 sepsis. Resolved. 08/09 Cr is 1.78.     Problem/Plan - 6:  ·  Problem: IDDM (insulin dependent diabetes mellitus).   ·  Plan: Per patient, takes 10 U lantus and 4-6 premeal at home. A1C 8.2 on 8/8.    Plan:   - will start 6 U premeal   - 14 U basal   - miSS   - consistent carb renal diet.     Problem/Plan - 7:  ·  Problem: CAD (coronary artery disease).   ·  Plan: Patient w/ stent approximately 1 yr ago.   EKG: NSR w/ possible L atrial enlargement   No chest pain on admission, EKG w/o evidence of acute ischemia.   Trops negative.     Plan:  - c.w ASA 81 mg atorvastatin.     Problem/Plan - 8:  ·  Problem: Hyponatremia.   ·  Plan: Patient w/ sodium to 128 on admission. On repeat  after fluids.   Likely hypovolemic hyponatremia.     Plan:   - resolved.            Inpatient treatment course:   In the ED, patient was hypertensive to 200/111 and received nifedipine 90 mg and clonidine 0.1 mg. ICU consult was called for hypertensive urgency. 0.2 mg clonidine PO was given. He also received Vanc and Zosyn as well as fluid resuscitated w/ 2L normal saline. After admission to floor, antibiotics were on hold pending podiatry, wound care, and ID consult; IV LR started for DEBBY. Insulin adjusted given hyperglycemia. Started Lipitor 40mg QD. Left foot Wcx were sent. MR L. foot showed no evidence of osteomyelitis. Patient refused PT and wound care evaluation. Per ID recommendation, there is no concern of infection of the R. gluteal wound or the L. foot wounds, so no MRI or further antibiotics are indicated at this time. Patient is medically stable to be discharged.     New medications: none  Labs to be followed outpatient: Urine osmo, Urine protein, Protein/Cr ratio, Hematuria  Exam to be followed outpatient: L. foot wound culture.   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          Problem List/Main Diagnoses (system-based):     #Gluteal cleft wound.   Chronic of 3 years. Patient ambulates with roller and walker, was at [facility] for a few days and left AMA 2/2 not liking the care he received. Presented to [facility] as he felt sick again and was started on abx.   CT with stranding and concern for osteo. Per ID, no concern for osteo as wound does not appear infected, abx d/c'd.  Pt refused PT evaluation during admission.   - Follow up with primary care physician  - Continue with proper wound care      #Open wound of left foot.   Chronic, has had osteo in the past. Refused IV antibiotics and was discharged with PO linezolid. Per patient, he did complete course. Wound is worsened from last admission. Last cultures growing corynebacterium. MRI L. foot 8/7 showed no evidence of osteomyelitis. Podiatry evaluated, performed excisional debridement of left plantar wound, and sent wound Cx. ******  - Follow up      Problem/Plan - 3:  ·  Problem: Severe sepsis.   ·  Plan: severe sepsis ruled   only met sirs 1/4 + elevated lactate    received Vanc + Zosyn in ED. Lactate 2.8 on admission, now downtrended after fluids. Source is likely osteomyelitis of the gluteal wound.     Plan:   - ngtd BCx  - daily CBC  - ID: no concern for infection. Stop abx.     Problem/Plan - 4:  ·  Problem: Hypertensive urgency.   ·  Plan: Patient w/ BP in ED to 200/111 asymptomatic. Cr was already elevated prior to this BP recording. Received nifedipine 90 mg and clonidine 0.1 mg, both of which are home meds and BP responded appropriately.     Plan:  - nifedipine increased to 120 mg   - clonidine 0.2 mg  - close observation of BPs - within normal limits.     Problem/Plan - 5:  ·  Problem: DEBBY (acute kidney injury).   ·  Plan: Creatinine at baseline appears to be 1.44. On admission - Cr 2.80. Patient s/p 2 L in ED. Voiding spontaneously in ED w/ light yellow urine. Appears to be prenal 2/2 sepsis. Resolved. 08/09 Cr is 1.78.     Problem/Plan - 6:  ·  Problem: IDDM (insulin dependent diabetes mellitus).   ·  Plan: Per patient, takes 10 U lantus and 4-6 premeal at home. A1C 8.2 on 8/8.    Plan:   - will start 6 U premeal   - 14 U basal   - miSS   - consistent carb renal diet.     Problem/Plan - 7:  ·  Problem: CAD (coronary artery disease).   ·  Plan: Patient w/ stent approximately 1 yr ago.   EKG: NSR w/ possible L atrial enlargement   No chest pain on admission, EKG w/o evidence of acute ischemia.   Trops negative.     Plan:  - c.w ASA 81 mg atorvastatin.     Problem/Plan - 8:  ·  Problem: Hyponatremia.   ·  Plan: Patient w/ sodium to 128 on admission. On repeat  after fluids.   Likely hypovolemic hyponatremia.     Plan:   - resolved.            Inpatient treatment course:   In the ED, patient was hypertensive to 200/111 and received nifedipine 90 mg and clonidine 0.1 mg. ICU consult was called for hypertensive urgency. 0.2 mg clonidine PO was given. He also received Vanc and Zosyn as well as fluid resuscitated w/ 2L normal saline. After admission to floor, antibiotics were on hold pending podiatry, wound care, and ID consult; IV LR started for DEBBY. Insulin adjusted given hyperglycemia. Started Lipitor 40mg QD. Left foot Wcx were sent. MR L. foot showed no evidence of osteomyelitis. Patient refused PT and wound care evaluation. Per ID recommendation, there is no concern of infection of the R. gluteal wound or the L. foot wounds, so no MRI or further antibiotics are indicated at this time. Patient is medically stable to be discharged.     New medications: none  Labs to be followed outpatient: Urine osmo, Urine protein, Protein/Cr ratio, Hematuria  Exam to be followed outpatient: L. foot wound culture.   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          Problem List/Main Diagnoses (system-based):     #Gluteal cleft wound.   Chronic of 3 years. Patient ambulates with roller and walker, was at [facility] for a few days and left AMA 2/2 not liking the care he received. Presented to [facility] as he felt sick again and was started on abx.   CT with stranding and concern for osteo. Per ID, no concern for osteo as wound does not appear infected, abx d/c'd.  Pt refused PT evaluation during admission.   - Follow up with primary care physician  - Continue with proper wound care      #Open wound of left foot.   Chronic, has had osteo in the past. Refused IV antibiotics and was discharged with PO linezolid. Per patient, he did complete course. Wound is worsened from last admission. Last cultures growing corynebacterium. MRI L. foot 8/7 showed no evidence of osteomyelitis. Podiatry evaluated, performed excisional debridement of left plantar wound, and sent wound Cx, which grew *****  - Follow up with primary care physician, who can refer pt to a podiatrist who accepts Medicaid.        #Hypertensive urgency.   Patient w/ BP in ED to 200/111 asymptomatic. Cr was already elevated prior to this BP recording. Received nifedipine 90 mg and clonidine 0.1 mg, both of which are home meds and BP responded appropriately. BPs remained elevated; nifedipine was increased to 120mg and clonidine was increased to .2mg. Pt's blood pressures have been better controlled on this regimen, with an average of**  -     DEBBY (acute kidney injury).   Creatinine at baseline appears to be 1.44. On admission - Cr 2.80. Patient s/p 2 L in ED. Voiding spontaneously in ED w/ light yellow urine. Appears to be prenal 2/2 sepsis. Resolved. 08/09 Cr is 1.78.     Problem/Plan - 6:  ·  Problem: IDDM (insulin dependent diabetes mellitus).   ·  Plan: Per patient, takes 10 U lantus and 4-6 premeal at home. A1C 8.2 on 8/8.    Plan:   - will start 6 U premeal   - 14 U basal   - miSS   - consistent carb renal diet.     Problem/Plan - 7:  ·  Problem: CAD (coronary artery disease).   ·  Plan: Patient w/ stent approximately 1 yr ago.   EKG: NSR w/ possible L atrial enlargement   No chest pain on admission, EKG w/o evidence of acute ischemia.   Trops negative.     Plan:  - c.w ASA 81 mg atorvastatin.     Problem/Plan - 8:  ·  Problem: Hyponatremia.   ·  Plan: Patient w/ sodium to 128 on admission. On repeat  after fluids.   Likely hypovolemic hyponatremia.     Plan:   - resolved.          Inpatient treatment course:   In the ED, patient was hypertensive to 200/111 and received nifedipine 90 mg and clonidine 0.1 mg. ICU consult was called for hypertensive urgency. 0.2 mg clonidine PO was given. He also received Vanc and Zosyn as well as fluid resuscitated w/ 2L normal saline. After admission to floor, antibiotics were on hold pending podiatry, wound care, and ID consult; IV LR started for DEBBY. Insulin adjusted given hyperglycemia. Started Lipitor 40mg QD. Left foot Wcx were sent. MR L. foot showed no evidence of osteomyelitis. Patient refused PT and wound care evaluation. Per ID recommendation, there is no concern of infection of the R. gluteal wound or the L. foot wounds, so no MRI or further antibiotics are indicated at this time. Patient is medically stable to be discharged.     New medications: none  Labs to be followed outpatient: Urine osmo, Urine protein, Protein/Cr ratio, Hematuria  Exam to be followed outpatient: L. foot wound culture.   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          Problem List/Main Diagnoses (system-based):     #Gluteal cleft wound.   Chronic of 3 years. Patient ambulates with roller and walker, was at [facility] for a few days and left AMA 2/2 not liking the care he received. Presented to [facility] as he felt sick again and was started on abx.   CT with stranding and concern for osteo. Per ID, no concern for osteo as wound does not appear infected, abx d/c'd.  Pt refused PT evaluation during admission.   - Follow up with primary care physician  - Continue with proper wound care      #Open wound of left foot.   Chronic, has had osteo in the past. Refused IV antibiotics and was discharged with PO linezolid. Per patient, he did complete course. Wound is worsened from last admission. Last cultures growing corynebacterium. MRI L. foot 8/7 showed no evidence of osteomyelitis. Podiatry evaluated, performed excisional debridement of left plantar wound, and sent wound Cx, which grew *****  - Follow up with primary care physician, who can refer pt to a podiatrist who accepts Medicaid.      #Hypertensive urgency.   Patient w/ BP in ED to 200/111 asymptomatic. Cr was already elevated prior to this BP recording. Received nifedipine 90 mg and clonidine 0.1 mg, both of which are home meds and BP responded appropriately. BPs remained elevated; nifedipine was increased to 120mg and clonidine was increased to .2mg. Pt's blood pressures have been better controlled on this regimen.   - continue with clonidine .2mg daily  - continue with nifedipine 120mg daily    #DEBBY (acute kidney injury).   Creatinine at baseline appears to be 1.44. On admission - Cr 2.80. Appears to be prenal 2/2 vomiting and diarrhea. Resolved.     #IDDM (insulin dependent diabetes mellitus).   Per patient, takes 10 U lantus and 4-6 premeal at home. A1C 8.2 on 8/8. Glucose levels during admission were above 180; lantus increased to 14 and lispro 6 on 08/10. Glucose levels appear to be more stable and within goal of 100-180.   - continue 6 units of insulin premeal   -  continue 14 U basal   - continue with diabeitc-conscious diet  - follow up with primary care physician      #CAD (coronary artery disease).   Patient w/ stent approximately 1 yr ago. EKG: NSR w/ possible L atrial enlargement No chest pain on admission, EKG w/o evidence of acute ischemia. Trops negative. Patient continued on aspirin 81 and atorvastatin ** during admission.  - continue with aspirin and atorvastatin on discharge  - follow up with primary care physician      #Hyponatremia.   Patient w/ sodium to 128 on admission. Likely due to hypovolemic hyponatremia in the setting of recent hx of diarrhea and vomiting. Resolved during admission. On repeat  after fluids.         New medications: none  Labs to be followed outpatient: Urine osmo, Urine protein, Protein/Cr ratio, Hematuria  Exam to be followed outpatient: L. foot wound culture.   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        Problem List/Main Diagnoses (system-based):     #Gluteal cleft wound.   Chronic of 3 years. Patient ambulates with roller and walker, was at [facility] for a few days and left AMA 2/2 not liking the care he received. Presented to [facility] as he felt sick again and was started on abx.   CT with stranding and concern for osteo. Per ID, no concern for osteo as wound does not appear infected, abx d/c'd.  Pt refused PT evaluation during admission.   - Follow up with primary care physician  - Continue with proper wound care      #Open wound of left foot.   Chronic, has had osteo in the past. Refused IV antibiotics and was discharged with PO linezolid. Per patient, he did complete course. Wound is worsened from last admission. Last cultures growing corynebacterium. MRI L. foot 8/7 showed no evidence of osteomyelitis. Podiatry evaluated, performed excisional debridement of left plantar wound, and sent wound Cx, which grew: few group B strep, few-moderate staph simulans, few-moderate corynebacterium striatum. Per ID, no overt infection. No need for abx.   - Follow up with primary care physician, who can refer pt to a podiatrist who accepts Medicaid.      #Hypertensive urgency.   Patient w/ BP in ED to 200/111 asymptomatic. Cr was already elevated prior to this BP recording. Received nifedipine 90 mg and clonidine 0.1 mg, both of which are home meds and BP responded appropriately. BPs remained elevated; nifedipine was increased to 120mg and clonidine was increased to .2mg. Pt's blood pressures have been better controlled on this regimen.   - continue with clonidine .2mg daily  - continue with nifedipine 120mg daily    #DEBBY (acute kidney injury).   Creatinine at baseline appears to be 1.44. On admission - Cr 2.80. Appears to be prenal 2/2 vomiting and diarrhea. Resolved.     #IDDM (insulin dependent diabetes mellitus).   Per patient, takes 10 U lantus and 4-6 premeal at home. A1C 8.2 on 8/8. Glucose levels during admission were above 180; lantus increased to 14 and lispro 6 on 08/10. Glucose levels appear to be more stable and within goal of 100-180.   - continue 6 units of insulin premeal   -  continue 14 U basal   - continue with diabeitc-conscious diet  - follow up with primary care physician      #CAD (coronary artery disease).   Patient w/ stent approximately 1 yr ago. EKG: NSR w/ possible L atrial enlargement No chest pain on admission, EKG w/o evidence of acute ischemia. Trops negative. Patient continued on aspirin 81 and atorvastatin ** during admission.  - continue with aspirin and atorvastatin on discharge  - follow up with primary care physician      #Hyponatremia.   Patient w/ sodium to 128 on admission. Likely due to hypovolemic hyponatremia in the setting of recent hx of diarrhea and vomiting. Resolved during admission. On repeat  after fluids.         New medications: none  Labs to be followed outpatient: Urine osmo, Urine protein, Protein/Cr ratio, Hematuria  Exam to be followed outpatient: L. foot wound culture.   Patient is a 63 year old undomileed male, with PMH CAD s/p stent 1 yr ago, hx of L foot amputation for OM (partial 4th and 5th ray amputation with digits intact, more recently OM L foot in Jan ( discharged on linezolid - lost fu ) who came in due to several days of vomiting and diarrhea and open chronic gluteal and foot wounds, admitted for SIRs 1/4 + elevated lactate which downtrended from 2.8 to 1.2 after IVF, hypertensive urgency, and DEBBY.          Problem List/Main Diagnoses (system-based):     #Gluteal cleft wound.   Chronic of 3 years. Patient ambulates with roller and walker, was at [facility] for a few days and left AMA 2/2 not liking the care he received. Presented to [facility] as he felt sick again and was started on abx.   CT with stranding and concern for osteo. Per ID, no concern for osteo as wound does not appear infected, abx d/c'd.  Pt evaluation during admission determined no PT needs.    - Follow up with primary care physician  - Continue with proper wound care      #Open wound of left foot.   Chronic, has had osteo in the past. Refused IV antibiotics and was discharged with PO linezolid. Per patient, he did complete course. Wound is worsened from last admission. Last cultures growing corynebacterium. MRI L. foot 8/7 showed no evidence of osteomyelitis. Podiatry evaluated, performed excisional debridement of left plantar wound, and sent wound Cx, which grew: few group B strep, few-moderate staph simulans, few-moderate corynebacterium striatum. Per ID, no overt infection. No need for abx.   - Follow up with primary care physician, who can refer pt to a podiatrist who accepts Medicaid.      #Hypertensive urgency.   Patient w/ BP in ED to 200/111 asymptomatic. Cr was already elevated prior to this BP recording. Received nifedipine 90 mg and clonidine 0.1 mg, both of which are home meds and BP responded appropriately. BPs remained elevated; nifedipine was increased to 120mg and clonidine was increased to .2mg. Pt's blood pressures have been better controlled on this regimen.   - continue with clonidine .2mg daily  - continue with nifedipine 120mg daily    #Hematuria  UA from 08/08 demonstrates >300 protein, moderate blood, and protein/Cr ratio of 3.0. May be secondary to hypertension, but suggest to follow up with outpatient nephrology.  - Follow up with outpatient nephrology appointment  - Follow up with PCP       #IDDM (insulin dependent diabetes mellitus).   Per patient, takes 10 U lantus and 4-6 premeal at home. A1C 8.2 on 8/8. Glucose levels during admission were above 180; lantus increased to 14 and lispro 6 on 08/10. Glucose levels appear to be more stable and within goal of 100-180.   - continue 6 units of insulin premeal   -  continue 14 U basal   - continue with diabetic-conscious diet  - follow up with primary care physician      #CAD (coronary artery disease).   Patient w/ stent approximately 1 yr ago. EKG: NSR w/ possible L atrial enlargement No chest pain on admission, EKG w/o evidence of acute ischemia. Trops negative. Patient continued on aspirin 81 and atorvastatin ** during admission.  - continue with aspirin and atorvastatin on discharge  - follow up with primary care physician      #DEBBY (acute kidney injury).   Creatinine at baseline appears to be 1.44. On admission - Cr 2.80. Appears to be prenal 2/2 vomiting and diarrhea. Resolved.       #Hyponatremia.   Patient w/ sodium to 128 on admission. Likely due to hypovolemic hyponatremia in the setting of recent hx of diarrhea and vomiting. Resolved during admission. On repeat  after fluids.       New medications: none  Labs to be followed outpatient: Urine osmo, Urine protein, Protein/Cr ratio, Hematuria  Exam to be followed outpatient: L. foot wound culture.      MR of Foot (08/07/2023) Impression:    Moderately limited exam by susceptibility artifact and poor fat suppression. No confluent decreased T1 signal or significant erosive changes to suggest osteomyelitis. If clinical concern persists, nuclear medicine study may be performed. Midfoot arthrosis, Hardware at the medial midfoot limited by susceptibility artifact.

## 2023-08-08 NOTE — PROGRESS NOTE ADULT - PROBLEM SELECTOR PLAN 5
Chronic of 3 years. Patient ambulates with roller and walker, was at [facility] for a few days and left AMA 2/2 not liking the care he received. Presents today as he felt sick again.   CT with stranding and concern for osteo.     Plan:   - would need surgical+ ortho evaluation (+/- plastics)   - f/u MRI  - will hold broad spectrum abx for now until podiatry fu Creatinine at baseline appears to be 1.44. On admission - Cr 2.80. Patient s/p 2 L in ED. Voiding spontaneously in ED w/ light yellow urine. Appears to be prenal 2/2 sepsis .    Plan:   - obtain Ulytes   - bladder scan   - daily BMP Creatinine at baseline appears to be 1.44. On admission - Cr 2.80. Patient s/p 2 L in ED. Voiding spontaneously in ED w/ light yellow urine. Appears to be prenal 2/2 sepsis .    Plan:   - f/u Ulytes   - daily BMP Creatinine at baseline appears to be 1.44. On admission - Cr 2.80. Patient s/p 2 L in ED. Voiding spontaneously in ED w/ light yellow urine. Appears to be prenal 2/2 sepsis .    Plan:   - daily BMP

## 2023-08-08 NOTE — CONSULT NOTE ADULT - SUBJECTIVE AND OBJECTIVE BOX
Patient is a 63y old  Male who presents with a chief complaint of wound infetcction (08 Aug 2023 08:17)      HPI:  Patient is a 63 year old man with insulin dependent diabetes mellitus, CAD s/p stent 1 yr ago, osteo of the left foot in January who presents to the ED with complaints of nausea and chills and worsening wounds of several day onset. He was recently hospitalized at [facility] for his wounds and per patient was given Zosyn. He left AMA as he did not like the care he was receiving. Today, patient returns to the ED for evaluation. Of note, patient was hospitalized at [facility] for left foot osteomyelitis growing corynebacterium, ultimately refused IV antibiotics and was discharged home with Linezolid. Patient reports compliance with medications. For his stent, he is unsure who his cardiologist is but reports compliance with ASA and Plavix for six months and then discontinued Plavix. The patient is currently undomiciled and ambulates with a wheelchair. ROS is positive for chills and nausea in the past few days and bloody discharge from his gluteal wound. He also reports poor PO intake to the last "several days." Otherwise, ROS is negative.     In ED, patient hypertensive to 200/111 --> given nifedipine 90 mg and clonidine 0.1 mg --> blood pressure remains elevated. ICU consult was called for hypertensive urgency. Of note, patient is allergic to Labetalol and hydralazine, reports difficulty breathing with both. ICU assessed --> ordered 0.2 mg clonidine PO.  For his wounds, he was treated with Vanc and Zosyn as well as fluid resucitated w/ 2L normal saline.  (07 Aug 2023 05:35)    PAST MEDICAL & SURGICAL HISTORY:  IDDM (insulin dependent diabetes mellitus)      Hypertension      CAD (coronary artery disease)      Left toe amputee  Great toe bone removal        MEDICATIONS  (STANDING):  aspirin  chewable 81 milliGRAM(s) Oral daily  atorvastatin 40 milliGRAM(s) Oral at bedtime  dextrose 5%. 1000 milliLiter(s) (50 mL/Hr) IV Continuous <Continuous>  dextrose 5%. 1000 milliLiter(s) (100 mL/Hr) IV Continuous <Continuous>  dextrose 50% Injectable 25 Gram(s) IV Push once  dextrose 50% Injectable 25 Gram(s) IV Push once  dextrose 50% Injectable 12.5 Gram(s) IV Push once  glucagon  Injectable 1 milliGRAM(s) IntraMuscular once  insulin glargine Injectable (LANTUS) 12 Unit(s) SubCutaneous at bedtime  insulin lispro (ADMELOG) corrective regimen sliding scale   SubCutaneous three times a day before meals  insulin lispro Injectable (ADMELOG) 4 Unit(s) SubCutaneous three times a day before meals  lactated ringers. 1000 milliLiter(s) (100 mL/Hr) IV Continuous <Continuous>  NIFEdipine XL 90 milliGRAM(s) Oral every 24 hours    MEDICATIONS  (PRN):  acetaminophen     Tablet .. 650 milliGRAM(s) Oral every 6 hours PRN Mild Pain (1 - 3), Moderate Pain (4 - 6)  dextrose Oral Gel 15 Gram(s) Oral once PRN Blood Glucose LESS THAN 70 milliGRAM(s)/deciliter             Home Living Status : undomiciled       Baseline Functional Level Prior to Admission :             - ADL's/ IADL's :  independent          - Ambulatory status prior to admission :   walked with no DME         FAMILY HISTORY:  FH: type 2 diabetes mellitus        CBC Full  -  ( 08 Aug 2023 07:32 )  WBC Count : 10.67 K/uL  RBC Count : 4.10 M/uL  Hemoglobin : 9.7 g/dL  Hematocrit : 31.4 %  Platelet Count - Automated : 184 K/uL  Mean Cell Volume : 76.6 fl  Mean Cell Hemoglobin : 23.7 pg  Mean Cell Hemoglobin Concentration : 30.9 gm/dL  Auto Neutrophil # : 7.12 K/uL  Auto Lymphocyte # : 2.41 K/uL  Auto Monocyte # : 0.64 K/uL  Auto Eosinophil # : 0.35 K/uL  Auto Basophil # : 0.06 K/uL  Auto Neutrophil % : 66.7 %  Auto Lymphocyte % : 22.6 %  Auto Monocyte % : 6.0 %  Auto Eosinophil % : 3.3 %  Auto Basophil % : 0.6 %      08-08    138  |  102  |  30<H>  ----------------------------<  203<H>  3.2<L>   |  26  |  2.03<H>    Ca    8.8      08 Aug 2023 07:32  Phos  2.7     08-08  Mg     1.8     08-08    TPro  7.0  /  Alb  2.9<L>  /  TBili  0.2  /  DBili  x   /  AST  13  /  ALT  <5<L>  /  AlkPhos  103  08-08      Urinalysis Basic - ( 08 Aug 2023 07:32 )    Color: x / Appearance: x / SG: x / pH: x  Gluc: 203 mg/dL / Ketone: x  / Bili: x / Urobili: x   Blood: x / Protein: x / Nitrite: x   Leuk Esterase: x / RBC: x / WBC x   Sq Epi: x / Non Sq Epi: x / Bacteria: x        Radiology :     < from: MR Foot No Cont, Left (08.07.23 @ 22:33) >  ACC: [id number] EXAM:  MR FOOT LT   ORDERED BY: RAQUEL TAYLOR     PROCEDURE DATE:  08/07/2023          INTERPRETATION:  MR FOOT LEFT dated 8/7/2023 10:33 PM    INDICATION: Sepsis. Pain and swelling.    COMPARISON: Foot radiographs of the same date    TECHNIQUE: Multi-sequential, multiplanar MRI imaging of the left midfoot   and forefoot was performed per standard protocol.    FINDINGS: This study is limited by susceptibility artifact which obscures   a portion of the left medial midfoot and forefoot.    BONE MARROW: Susceptibility artifact in its evaluation the osseous   structures. There is poor fat saturation at the distal digits. Within   this limitation, the patient is noted to be status post partial   amputation of the fourth and fifth rayswithin the mid metatarsal region.   No confluent decreased T1 marrow signal is seen.. No significant erosion   is noted. There is diffuse arthritic changes across the tarsometatarsal   joints. Old fracture deformity of the second and third metatarsals.  SYNOVIUM/ JOINT FLUID: No joint effusion  TENDONS: Limited  MUSCLES: Atrophy of the musculature noted with diffuse edema.  NEUROVASCULAR STRUCTURES: Preserved  SUBCUTANEOUS SOFT TISSUES: Soft tissue swelling is noted. No drainable   fluid collection.    IMPRESSION:    Moderately limited exam by susceptibility artifact and poor fat   suppression. No confluent decreased T1 signal or significant erosive   change to suggest osteomyelitis. If clinical concern persists, nuclear   medicine study may be performed.  Midfoot arthrosis.  Hardware at the medial midfoot limited by susceptibility artifact.           Review of Systems : per HPI               Vital Signs Last 24 Hrs  T(C): 36.5 (07 Aug 2023 20:58), Max: 36.6 (07 Aug 2023 09:25)  T(F): 97.7 (07 Aug 2023 20:58), Max: 97.8 (07 Aug 2023 09:25)  HR: 86 (07 Aug 2023 20:58) (80 - 86)  BP: 142/78 (07 Aug 2023 20:58) (133/66 - 142/78)  BP(mean): --  RR: 17 (07 Aug 2023 20:58) (17 - 18)  SpO2: 99% (07 Aug 2023 20:58) (96% - 99%)    Parameters below as of 07 Aug 2023 20:58  Patient On (Oxygen Delivery Method): room air            Physical Exam :  on CONTACT  isolation 63 y o man lying comfortably in semi Rodriguez's position , awake , alert , no acute complaints     Head : normocephalic , atraumatic    Eyes : PERRLA , EOMI , no nystagmus , sclera anicteric    ENT / FACE : neg nasal discharge , uvula midline , no oropharyngeal erythema / exudate    Neck : supple , negative JVD , negative carotid bruits , no thyromegaly    Chest : CTA bilaterally , neg wheeze / rhonchi / rales / crackles / egophany    Cardiovascular : regular rate and rhythm , neg murmurs / rubs / gallops    Abdomen : soft , non distended , non tender to palpation in all 4 quadrants ,  normal bowel sounds     Extremities :  L foot Kerlix/Ace wrap    Neurologic Exam :    Alert and oriented to person , place , date/year     Motor Exam:          Right UE:               no focal weakness ,  > 3+/5 throughout     Left UE:                 no focal weakness ,  > 3+/5 throughout          Right LE:    no focal weakness ,  > 3+/5 throughout        Left LE:    no focal weakness ,  > 3+/5 throughout         Sensation :         intact to light touch x 4 extremities       DTR :     biceps/brachioradialis : equal                      patella/ankle : equal        Gait :  not tested          PM&R Impression :     admitted for sepsis/ r/o OM L foot , hypertensive urgency         Recommendations / Plan :           1) Physical / Occupational therapy focusing on therapeutic exercises , equipment evaluation , bed mobility/transfer out of bed evaluation , progressive ambulation with assistive devices prn .    2) Current disposition plan recommendation  :    pending functional progress

## 2023-08-08 NOTE — DISCHARGE NOTE PROVIDER - ATTENDING DISCHARGE PHYSICAL EXAMINATION:
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   wbc 15 k, lactate 2.8-- trended down to 1.2 after IVF.   DEBBY on admission w cr 2.8-- down to 2.5- 2 --1.78- will fu with outpt provider to repeat labs   Infection source was suspected osteomyelitis from left foot or chronic gluteal wound. MRI negative for OM- no MRi for Gluteal wound obtain as there is low suspicion for active infection - Pt rcvd 2 days of abx- discontinued after ID saw pt. wound cx w multi growth. Leukocytosis trended down, pt remains afebrile,   Pt has NO PT NEEDS. seen by wound care appreciate recs   DM: cw lantus and pre meals pn dc   Hyponatremia: 128 on admission resolved after ivf   HTN emergency: DEBBY improving. will cw nifedipine 120 qd and clonidine home dose .2 qd.   CAD: cw asa, statin  pt is ready for discharge - received dc notice 8/9    Physical exam:  GENERAL: NAD, lying in bed comfortably  HEAD:  Atraumatic, Normocephalic  EYES: EOMI, PERRLA, conjunctiva and sclera clear  ENT: Moist mucous membranes  NECK: Supple, No JVD  CHEST/LUNG: Clear to auscultation bilaterally; No rales, rhonchi, wheezing, or rubs.  HEART: Regular rate and rhythm; S1+ S2+   ABDOMEN: Bowel sounds present; Soft, Nontender, Nondistended   EXTREMITIES:  2+ Peripheral Pulses, brisk capillary refill. No clubbing, cyanosis, or edema  NERVOUS SYSTEM:  Alert & Oriented , speech clear   MSK: FROM all 4 extremities, full and equal strength, L foot  w partial 4th and 5th ray amputation   SKIN: No rashes or lesions   Pt with hx pf CAD s/p stent 1 yr ago, hx of L foot amputation for OM (partial 4th and 5th ray amputation with digits intact, more recently OM L foot in Jan ( discharged on linezolid - lost fu ) admitted for SIRs 1/4 + elevated lactate.   wbc 15 k, lactate 2.8-- trended down to 1.2 after IVF.   DEBBY on admission w cr 2.8-- down to 2.5- 2 --1.78- will fu with outpt provider to repeat labs   Infection source was suspected osteomyelitis from left foot or chronic gluteal wound. MRI negative for OM- no MRi for Gluteal wound obtain as there is low suspicion for active infection - Pt rcvd 2 days of abx- discontinued after ID saw pt. wound cx w multi growth. Leukocytosis trended down, pt remains afebrile,   Pt has NO PT NEEDS. seen by wound care appreciate recs   DM: cw 14 lantus and 6 u pre meals on dc   Hyponatremia: 128 on admission resolved after ivf   HTN emergency: DEBBY improving. will cw nifedipine 120 qd and clonidine home dose .2 qd.   CAD: cw asa, statin  pt is ready for discharge - received dc notice 8/9    Physical exam:  GENERAL: NAD, lying in bed comfortably  HEAD:  Atraumatic, Normocephalic  EYES: EOMI, PERRLA, conjunctiva and sclera clear  ENT: Moist mucous membranes  NECK: Supple, No JVD  CHEST/LUNG: Clear to auscultation bilaterally; No rales, rhonchi, wheezing, or rubs.  HEART: Regular rate and rhythm; S1+ S2+   ABDOMEN: Bowel sounds present; Soft, Nontender, Nondistended   EXTREMITIES:  2+ Peripheral Pulses, brisk capillary refill. No clubbing, cyanosis, or edema  NERVOUS SYSTEM:  Alert & Oriented , speech clear   MSK: FROM all 4 extremities, full and equal strength, L foot  w partial 4th and 5th ray amputation   SKIN: gluteal wound w tracking, no active infection , no erythema warmth or tenderness

## 2023-08-08 NOTE — PROGRESS NOTE ADULT - PROBLEM SELECTOR PLAN 2
Patient severly septic on admission, received Vanc + Zosyn in ED. Lactate 2.8 on admission, now downtrended after fluids. Source is likely osteomyelitis from left foot or gluteal wound.     Plan:   - f/u BC   - c/w Vanc + Zosyn   - daily CBC Chronic, has had osteo in the past. Refused IV antibiotics and was discharged with PO linezolid. Per patient, he did complete course. Wound is worsened from last admission. Last cultures growing corynebacterium.     Plan:   - MRI L. foot 8/7 showed no evidence of osteomyelitis  - Podiatry evaluated, performed excisional debridement of left plantar wound, and sent wound Cx.   - will hold broad spectrum abx for now until ID f/u Chronic, has had osteo in the past. Refused IV antibiotics and was discharged with PO linezolid. Per patient, he did complete course. Wound is worsened from last admission. Last cultures growing corynebacterium.     Plan:   - MRI L. foot 8/7 showed no evidence of osteomyelitis  - Podiatry evaluated, performed excisional debridement of left plantar wound, and sent wound Cx.

## 2023-08-08 NOTE — PHYSICAL THERAPY INITIAL EVALUATION ADULT - ADDITIONAL COMMENTS
Pt is undomiciled and reports to be indpt with all ADLs prior to admission. Pt reports to ambulate with SC  at baseline.

## 2023-08-08 NOTE — DISCHARGE NOTE PROVIDER - NSDCFUSCHEDAPPT_GEN_ALL_CORE_FT
Mendoza Lombardi  [facility] Physician [facility]  [address]  Scheduled Appointment: 09/05/2023

## 2023-08-08 NOTE — PHYSICAL THERAPY INITIAL EVALUATION ADULT - LEVEL OF INDEPENDENCE, REHAB EVAL
independent O-Z Flap Text: The defect edges were debeveled with a #15 scalpel blade.  Given the location of the defect, shape of the defect and the proximity to free margins an O-Z flap was deemed most appropriate.  Using a sterile surgical marker, an appropriate transposition flap was drawn incorporating the defect and placing the expected incisions within the relaxed skin tension lines where possible. The area thus outlined was incised deep to adipose tissue with a #15 scalpel blade.  The skin margins were undermined to an appropriate distance in all directions utilizing iris scissors.

## 2023-08-08 NOTE — CONSULT NOTE ADULT - TIME BILLING
Briefly, 64 yo male with DM, hx L 4th toe osteomyelitis 1/2023 (cx at that time with C. striatum--discharged on linezolid and lost to f/u), documented non-compliance with diagnostic and therapeutic interventions, recent presentation to OSH with vague symptoms (?N, diarrhea), chronic R gluteal wound for which he receives wound care when he is hospitalized and appears stable compared to CT 1/2022, here with ?chronic L MT wound (per podiatry exam documentation, does not appear infected, -PTB however culture from site sent--would not interpret this as infection; MR L foot without evidence of osteomyelitis; chronic R gluteal ulcer for which the patient refused wound care assessment. Afebrile, bcx ngtd, CRP not significantly elevated. Leukocytosis on presentation does not establish diagnosis of infection. Advise observation off antibiotics. Would change vancomycin reaction from allergy to intolerance as does not appear to be true allergy. Advise against pursuit of MR hip/pelvis which will likely be low yield.    Please reconsult with ?

## 2023-08-08 NOTE — CONSULT NOTE ADULT - ASSESSMENT
63M with IDDM, CAD s/p stent 1 yr ago, hx of L foot amputation for OM (partial 4th and 5th ray amputation with digits intact, more recently OM L foot in Jan (tx w/ Linezolid x 2 weeks lost to follow up) presenting with nausea and chills and worsening wounds x several days. Exam findings of gluteal wound and L foot wound without evidence of infection (no erythema or purulence). MRI w/o contrast of L foot negative for OM. He is s/p vanc and zosyn x 1 on 8/7. Patient has been afebrile, non toxic appearing, leukocytosis downtrending, ESR 62, CRP 4.6- no evidence of acute infectious process.      Suggest:  -discontinue all antibiotics   -would not recommend MRI to further evaluate chronic gluteal wound     Team 2 signing off. Thank you for your consultation   Please reconsult with questions   Case d/w primary team.  Final recommendation pending attending note.    Wendy Arias, Infectious Diseases PA  Please reach out for any questions 9 am-5pm. For evenings and weekends, please call the ID physician on call.  Work cell: [phone]   63M with IDDM, CAD s/p stent 1 yr ago, hx of L foot amputation for OM (partial 4th and 5th ray amputation with digits intact, more recently OM L foot in Jan (tx w/ linezolid x 2 weeks lost to follow up) presenting with nausea and chills and worsening wounds x several days. Exam findings of gluteal wound and L foot wound without evidence of infection (no erythema or purulence). MRI w/o contrast of L foot negative for OM. He is s/p vanc and zosyn x 1 on 8/7. Patient has been afebrile, non toxic appearing, leukocytosis downtrending, ESR 62, CRP 4.6- no evidence of acute infectious process.      Suggest:  - advise discontinue all antibiotics   - would not recommend MRI to further evaluate chronic gluteal wound     Team 2 signing off. Thank you for your consultation   Please reconsult with questions   Case d/w primary team.  Final recommendation pending attending note.    Wendy Arias, Infectious Diseases PA  Please reach out for any questions 9 am-5pm. For evenings and weekends, please call the ID physician on call.  Work cell: [phone]

## 2023-08-08 NOTE — PROGRESS NOTE ADULT - PROBLEM SELECTOR PLAN 1
Patient w/ BP in ED to 200/111 asymptomatic. Cr was already elevated prior to this BP recording. Received nifedipine 90 mg and clonidine 0.1 mg, both of which are home meds and BP responded appropriately.     So far:   - nifedipine 90 mg   - clonidine 0.1 mg   - clonidine 0.2 mg    Plan:   - close observation of BP   - restart home meds Chronic of 3 years. Patient ambulates with roller and walker, was at [facility] for a few days and left AMA 2/2 not liking the care he received. Presents today as he felt sick again.   CT with stranding and concern for osteo.     Plan:   - pending wound care evaluation  - consider surgical+ ortho evaluation (+/- plastics)   - consider MRI  - f/u ID recs Chronic of 3 years. Patient ambulates with roller and walker, was at [facility] for a few days and left AMA 2/2 not liking the care he received. Presents today as he felt sick again.   CT with stranding and concern for osteo.     Plan:   - pending wound care evaluation  - consider surgical+ ortho evaluation (+/- plastics)   - consider MRI  - f/u ID recs  - patient refused PT evaluation on 8/7 Chronic of 3 years. Patient ambulates with roller and walker, was at [facility] for a few days and left AMA 2/2 not liking the care he received. Presents today as he felt sick again.   CT with stranding and concern for osteo.     Plan:   - patient refused PT evaluation on 8/7  - patient refused wound care evaluation on 8/8  - f/u ID recs, consider MRI hip, surgical/ortho evaluation

## 2023-08-08 NOTE — PROGRESS NOTE ADULT - PROBLEM SELECTOR PLAN 8
Chronic, has had osteo in the past. Refused IV antibiotics and was discharged with PO linezolid. Per patient, he did complete course. Wound is worsened from last admission. Last cultures growing corynebacterium.     Plan:   - would continue Vanc + Zosyn for now   - f/u Xray read  - consult podiatry in AM   - consult ID Patient w/ sodium to 128 on admission. On repeat  after fluids.   Likely hypovolemic hyponatremia.     Plan:   - f/u [facility]

## 2023-08-08 NOTE — CONSULT NOTE ADULT - SUBJECTIVE AND OBJECTIVE BOX
INFECTIOUS DISEASES INITIAL CONSULT NOTE    HPI:  Patient is a 63 year old man with insulin dependent diabetes mellitus, CAD s/p stent 1 yr ago, osteo of the left foot in January (refused IV abx; d/c'd on Linezolid x 2 weeks). He presented to the ED with complaints of nausea and chills and worsening wounds of several day onset. He was recently hospitalized at [facility] for his wounds and per patient was given Zosyn. He left AMA as he did not like the care he was receiving. Today, patient returns to the ED for evaluation. Of note, patient was hospitalized at [facility] for left foot osteomyelitis growing corynebacterium, ultimately refused IV antibiotics and was discharged home with Linezolid. Patient reports compliance with medications. For his stent, he is unsure who his cardiologist is but reports compliance with ASA and Plavix for six months and then discontinued Plavix. The patient is currently undomiciled and ambulates with a wheelchair. ROS is positive for chills and nausea in the past few days and bloody discharge from his gluteal wound. He also reports poor PO intake to the last "several days." Otherwise, ROS is negative.     In ED, patient hypertensive to 200/111 --> given nifedipine 90 mg and clonidine 0.1 mg --> blood pressure remains elevated. ICU consult was called for hypertensive urgency. Of note, patient is allergic to Labetalol and hydralazine, reports difficulty breathing with both. ICU assessed --> ordered 0.2 mg clonidine PO.  For his wounds, he was treated with Vanc and Zosyn as well as fluid resucitated w/ 2L normal saline.  (07 Aug 2023 05:35)      PAST MEDICAL & SURGICAL HISTORY:  IDDM (insulin dependent diabetes mellitus)      Hypertension      CAD (coronary artery disease)      Left toe amputee  Great toe bone removal            Review of Systems:   Constitutional, eyes, ENT, cardiovascular, respiratory, gastrointestinal, genitourinary, integumentary, neurological, psychiatric and heme/lymph are otherwise negative other than noted above       ANTIBIOTICS:  MEDICATIONS  (STANDING):  aspirin  chewable 81 milliGRAM(s) Oral daily  atorvastatin 40 milliGRAM(s) Oral at bedtime  dextrose 5%. 1000 milliLiter(s) (50 mL/Hr) IV Continuous <Continuous>  dextrose 5%. 1000 milliLiter(s) (100 mL/Hr) IV Continuous <Continuous>  dextrose 50% Injectable 25 Gram(s) IV Push once  dextrose 50% Injectable 25 Gram(s) IV Push once  dextrose 50% Injectable 12.5 Gram(s) IV Push once  glucagon  Injectable 1 milliGRAM(s) IntraMuscular once  insulin glargine Injectable (LANTUS) 12 Unit(s) SubCutaneous at bedtime  insulin lispro (ADMELOG) corrective regimen sliding scale   SubCutaneous three times a day before meals  insulin lispro Injectable (ADMELOG) 4 Unit(s) SubCutaneous three times a day before meals  lactated ringers. 1000 milliLiter(s) (100 mL/Hr) IV Continuous <Continuous>  magnesium sulfate  IVPB 1 Gram(s) IV Intermittent once  NIFEdipine XL 90 milliGRAM(s) Oral every 24 hours  piperacillin/tazobactam IVPB.. 3.375 Gram(s) IV Intermittent once  potassium chloride  10 mEq/100 mL IVPB 10 milliEquivalent(s) IV Intermittent every 1 hour  vancomycin  IVPB 1250 milliGRAM(s) IV Intermittent once    MEDICATIONS  (PRN):  acetaminophen     Tablet .. 650 milliGRAM(s) Oral every 6 hours PRN Mild Pain (1 - 3), Moderate Pain (4 - 6)  dextrose Oral Gel 15 Gram(s) Oral once PRN Blood Glucose LESS THAN 70 milliGRAM(s)/deciliter      Allergies    labetalol (Stomach Upset)  hydrALAZINE (Rash)  pork (Unknown)  vancomycin (Stomach Upset)    Intolerances        SOCIAL HISTORY:    FAMILY HISTORY:  FH: type 2 diabetes mellitus     no FH leading to current infection    Vital Signs Last 24 Hrs  T(C): 36.5 (07 Aug 2023 20:58), Max: 36.5 (07 Aug 2023 20:58)  T(F): 97.7 (07 Aug 2023 20:58), Max: 97.7 (07 Aug 2023 20:58)  HR: 86 (07 Aug 2023 20:58) (80 - 86)  BP: 142/78 (07 Aug 2023 20:58) (134/68 - 142/78)  BP(mean): --  RR: 17 (07 Aug 2023 20:58) (17 - 17)  SpO2: 99% (07 Aug 2023 20:58) (96% - 99%)    Parameters below as of 07 Aug 2023 20:58  Patient On (Oxygen Delivery Method): room air          PHYSICAL EXAM:  Constitutional: alert, NAD  Eyes: the sclera and conjunctiva were normal.   ENT: the ears and nose were normal in appearance.   Neck: the appearance of the neck was normal and the neck was supple.   Pulmonary: no respiratory distress and lungs were clear to auscultation bilaterally.   Heart: heart rate was normal and rhythm regular, normal S1 and S2  Vascular:. there was no peripheral edema  Abdomen: normal bowel sounds, soft, non-tender  Neurological: no focal deficits.   Psychiatric: the affect was normal      LABS:                        9.7    10.67 )-----------( 184      ( 08 Aug 2023 07:32 )             31.4     08-08    138  |  102  |  30<H>  ----------------------------<  203<H>  3.2<L>   |  26  |  2.03<H>    Ca    8.8      08 Aug 2023 07:32  Phos  2.7     08-08  Mg     1.8     08-08    TPro  7.0  /  Alb  2.9<L>  /  TBili  0.2  /  DBili  x   /  AST  13  /  ALT  <5<L>  /  AlkPhos  103  08-08      Urinalysis Basic - ( 08 Aug 2023 07:32 )    Color: x / Appearance: x / SG: x / pH: x  Gluc: 203 mg/dL / Ketone: x  / Bili: x / Urobili: x   Blood: x / Protein: x / Nitrite: x   Leuk Esterase: x / RBC: x / WBC x   Sq Epi: x / Non Sq Epi: x / Bacteria: x        MICROBIOLOGY:    RADIOLOGY & ADDITIONAL STUDIES:   INFECTIOUS DISEASES INITIAL CONSULT NOTE    HPI:  63M with IDDM, CAD s/p stent 1 yr ago, hx of L foot amputation for OM (partial 4th and 5th ray amputation with digits intact, more recently OM L foot in Jan presenting with nausea and chills and worsening wounds x several days. He was recently hospitalized at [facility] for his wounds and per patient was given Zosyn. He left AMA as he did not like the care he was receiving.  Patient presented to [facility] 8/6. Of note, patient was hospitalized at [facility] for left foot osteomyelitis-bone cx grew corynebacterium, ultimately refused IV antibiotics and was discharged home with Linezolid- lost to follow up. Patient reports compliance with medications. The patient is currently undomiciled and ambulates with a wheelchair. ROS is positive for chills and nausea in the past few days and bloody discharge from his gluteal wound. He also reports poor PO intake to the last "several days." Otherwise, ROS is negative. Upon arrival, afebrile, WBC 15, ESR 62, Cr 2.81, lactate 2.8, CRP 4.6. Given vanc/zosyn x 1 in ED. Patient had hypertensive emergency- given nifedipine and clonidine, ICU consulted given clonidine, admitted to [facility]. Podiatry following- low suspicion for cellulitis of L foot, -PTB, MRI neg for OM.        PAST MEDICAL & SURGICAL HISTORY:  IDDM (insulin dependent diabetes mellitus)      Hypertension      CAD (coronary artery disease)      Left toe amputee  Great toe bone removal        Review of Systems:   Constitutional, eyes, ENT, cardiovascular, respiratory, gastrointestinal, genitourinary, integumentary, neurological, psychiatric and heme/lymph are otherwise negative other than noted above       ANTIBIOTICS:  MEDICATIONS  (STANDING):  aspirin  chewable 81 milliGRAM(s) Oral daily  atorvastatin 40 milliGRAM(s) Oral at bedtime  dextrose 5%. 1000 milliLiter(s) (50 mL/Hr) IV Continuous <Continuous>  dextrose 5%. 1000 milliLiter(s) (100 mL/Hr) IV Continuous <Continuous>  dextrose 50% Injectable 25 Gram(s) IV Push once  dextrose 50% Injectable 25 Gram(s) IV Push once  dextrose 50% Injectable 12.5 Gram(s) IV Push once  glucagon  Injectable 1 milliGRAM(s) IntraMuscular once  insulin glargine Injectable (LANTUS) 12 Unit(s) SubCutaneous at bedtime  insulin lispro (ADMELOG) corrective regimen sliding scale   SubCutaneous three times a day before meals  insulin lispro Injectable (ADMELOG) 4 Unit(s) SubCutaneous three times a day before meals  lactated ringers. 1000 milliLiter(s) (100 mL/Hr) IV Continuous <Continuous>  magnesium sulfate  IVPB 1 Gram(s) IV Intermittent once  NIFEdipine XL 90 milliGRAM(s) Oral every 24 hours  piperacillin/tazobactam IVPB.. 3.375 Gram(s) IV Intermittent once  potassium chloride  10 mEq/100 mL IVPB 10 milliEquivalent(s) IV Intermittent every 1 hour  vancomycin  IVPB 1250 milliGRAM(s) IV Intermittent once    MEDICATIONS  (PRN):  acetaminophen     Tablet .. 650 milliGRAM(s) Oral every 6 hours PRN Mild Pain (1 - 3), Moderate Pain (4 - 6)  dextrose Oral Gel 15 Gram(s) Oral once PRN Blood Glucose LESS THAN 70 milliGRAM(s)/deciliter      Allergies    labetalol (Stomach Upset)  hydrALAZINE (Rash)  pork (Unknown)  vancomycin (Stomach Upset)    Intolerances        SOCIAL HISTORY:    FAMILY HISTORY:  FH: type 2 diabetes mellitus     no FH leading to current infection    Vital Signs Last 24 Hrs  T(C): 36.5 (07 Aug 2023 20:58), Max: 36.5 (07 Aug 2023 20:58)  T(F): 97.7 (07 Aug 2023 20:58), Max: 97.7 (07 Aug 2023 20:58)  HR: 86 (07 Aug 2023 20:58) (80 - 86)  BP: 142/78 (07 Aug 2023 20:58) (134/68 - 142/78)  BP(mean): --  RR: 17 (07 Aug 2023 20:58) (17 - 17)  SpO2: 99% (07 Aug 2023 20:58) (96% - 99%)    Parameters below as of 07 Aug 2023 20:58  Patient On (Oxygen Delivery Method): room air          PHYSICAL EXAM:  Constitutional: alert, NAD  Eyes: the sclera and conjunctiva were normal.   ENT: the ears and nose were normal in appearance.   Neck: the appearance of the neck was normal and the neck was supple.   Pulmonary: no respiratory distress and lungs were clear to auscultation bilaterally.   Heart: heart rate was normal and rhythm regular, normal S1 and S2  Vascular:. there was no peripheral edema  Abdomen: normal bowel sounds, soft, non-tender  Neurological: no focal deficits.   Psychiatric: the affect was normal      LABS:                        9.7    10.67 )-----------( 184      ( 08 Aug 2023 07:32 )             31.4     08-08    138  |  102  |  30<H>  ----------------------------<  203<H>  3.2<L>   |  26  |  2.03<H>    Ca    8.8      08 Aug 2023 07:32  Phos  2.7     08-08  Mg     1.8     08-08    TPro  7.0  /  Alb  2.9<L>  /  TBili  0.2  /  DBili  x   /  AST  13  /  ALT  <5<L>  /  AlkPhos  103  08-08      Urinalysis Basic - ( 08 Aug 2023 07:32 )    Color: x / Appearance: x / SG: x / pH: x  Gluc: 203 mg/dL / Ketone: x  / Bili: x / Urobili: x   Blood: x / Protein: x / Nitrite: x   Leuk Esterase: x / RBC: x / WBC x   Sq Epi: x / Non Sq Epi: x / Bacteria: x        MICROBIOLOGY:    RADIOLOGY & ADDITIONAL STUDIES:   INFECTIOUS DISEASES INITIAL CONSULT NOTE    HPI:  63M with IDDM, CAD s/p stent 1 yr ago, hx of L foot amputation for OM (partial 4th and 5th ray amputation with digits intact, more recently OM L foot in Jan presenting with nausea and chills and worsening wounds x several days. He was recently hospitalized at [facility] for his wounds and per patient was given Zosyn. He left AMA as he did not like the care he was receiving.  Patient presented to [facility] 8/6. Of note, patient was hospitalized at [facility] for left foot osteomyelitis-bone cx grew corynebacterium, ultimately refused IV antibiotics and was discharged home with Linezolid- lost to follow up. Patient reports compliance with medications. The patient is currently undomiciled and ambulates with a wheelchair. ROS is positive for chills and nausea in the past few days and bloody discharge from his gluteal wound. He also reports poor PO intake to the last "several days." Otherwise, ROS is negative. Upon arrival, afebrile, WBC 15, ESR 62, Cr 2.81, lactate 2.8, CRP 4.6. Given vanc/zosyn x 1 in ED. Patient had hypertensive emergency- given nifedipine and clonidine, ICU consulted given clonidine, admitted to [facility]. Podiatry following- low suspicion for cellulitis of L foot, -PTB, MRI neg for OM.        PAST MEDICAL & SURGICAL HISTORY:  IDDM (insulin dependent diabetes mellitus)      Hypertension      CAD (coronary artery disease)      Left toe amputee  Great toe bone removal        Review of Systems:   Constitutional, eyes, ENT, cardiovascular, respiratory, gastrointestinal, genitourinary, integumentary, neurological, psychiatric and heme/lymph are otherwise negative other than noted above       ANTIBIOTICS:  MEDICATIONS  (STANDING):  aspirin  chewable 81 milliGRAM(s) Oral daily  atorvastatin 40 milliGRAM(s) Oral at bedtime  dextrose 5%. 1000 milliLiter(s) (50 mL/Hr) IV Continuous <Continuous>  dextrose 5%. 1000 milliLiter(s) (100 mL/Hr) IV Continuous <Continuous>  dextrose 50% Injectable 25 Gram(s) IV Push once  dextrose 50% Injectable 25 Gram(s) IV Push once  dextrose 50% Injectable 12.5 Gram(s) IV Push once  glucagon  Injectable 1 milliGRAM(s) IntraMuscular once  insulin glargine Injectable (LANTUS) 12 Unit(s) SubCutaneous at bedtime  insulin lispro (ADMELOG) corrective regimen sliding scale   SubCutaneous three times a day before meals  insulin lispro Injectable (ADMELOG) 4 Unit(s) SubCutaneous three times a day before meals  lactated ringers. 1000 milliLiter(s) (100 mL/Hr) IV Continuous <Continuous>  magnesium sulfate  IVPB 1 Gram(s) IV Intermittent once  NIFEdipine XL 90 milliGRAM(s) Oral every 24 hours  piperacillin/tazobactam IVPB.. 3.375 Gram(s) IV Intermittent once  potassium chloride  10 mEq/100 mL IVPB 10 milliEquivalent(s) IV Intermittent every 1 hour  vancomycin  IVPB 1250 milliGRAM(s) IV Intermittent once    MEDICATIONS  (PRN):  acetaminophen     Tablet .. 650 milliGRAM(s) Oral every 6 hours PRN Mild Pain (1 - 3), Moderate Pain (4 - 6)  dextrose Oral Gel 15 Gram(s) Oral once PRN Blood Glucose LESS THAN 70 milliGRAM(s)/deciliter      Allergies    labetalol (Stomach Upset)  hydrALAZINE (Rash)  pork (Unknown)  vancomycin (Stomach Upset)    Intolerances        SOCIAL HISTORY:  -undomiciled       FAMILY HISTORY:  FH: type 2 diabetes mellitus     no FH leading to current infection    Vital Signs Last 24 Hrs  T(C): 36.5 (07 Aug 2023 20:58), Max: 36.5 (07 Aug 2023 20:58)  T(F): 97.7 (07 Aug 2023 20:58), Max: 97.7 (07 Aug 2023 20:58)  HR: 86 (07 Aug 2023 20:58) (80 - 86)  BP: 142/78 (07 Aug 2023 20:58) (134/68 - 142/78)  BP(mean): --  RR: 17 (07 Aug 2023 20:58) (17 - 17)  SpO2: 99% (07 Aug 2023 20:58) (96% - 99%)    Parameters below as of 07 Aug 2023 20:58  Patient On (Oxygen Delivery Method): room air          PHYSICAL EXAM:  Constitutional: alert, NAD  Eyes: the sclera and conjunctiva were normal.   ENT: the ears and nose were normal in appearance.   Neck: the appearance of the neck was normal and the neck was supple.   Pulmonary: no respiratory distress and lungs were clear to auscultation bilaterally.   Heart: heart rate was normal and rhythm regular, normal S1 and S2  Vascular:. there was no peripheral edema  Abdomen: normal bowel sounds, soft, non-tender  Gluteal wound: allevyn dressing in place   Ext: L foot wrapped   Neurological: no focal deficits.   Psychiatric: the affect was normal      LABS:                        9.7    10.67 )-----------( 184      ( 08 Aug 2023 07:32 )             31.4     08-08    138  |  102  |  30<H>  ----------------------------<  203<H>  3.2<L>   |  26  |  2.03<H>    Ca    8.8      08 Aug 2023 07:32  Phos  2.7     08-08  Mg     1.8     08-08    TPro  7.0  /  Alb  2.9<L>  /  TBili  0.2  /  DBili  x   /  AST  13  /  ALT  <5<L>  /  AlkPhos  103  08-08      Urinalysis Basic - ( 08 Aug 2023 07:32 )    Color: x / Appearance: x / SG: x / pH: x  Gluc: 203 mg/dL / Ketone: x  / Bili: x / Urobili: x   Blood: x / Protein: x / Nitrite: x   Leuk Esterase: x / RBC: x / WBC x   Sq Epi: x / Non Sq Epi: x / Bacteria: x        MICROBIOLOGY:  reviewed   RADIOLOGY & ADDITIONAL STUDIES:  reviewed

## 2023-08-08 NOTE — PHYSICAL THERAPY INITIAL EVALUATION ADULT - PERTINENT HX OF CURRENT PROBLEM, REHAB EVAL
Patient is a 63 year old man with insulin dependent diabetes mellitus, CAD s/p stent 1 yr ago, osteo of the left foot in January who presents to the ED with complaints of nausea and chills and worsening wounds of several day onset. He was recently hospitalized at [facility] for his wounds and per patient was given Zosyn. He left AMA as he did not like the care he was receiving. Today, patient returns to the ED for evaluation. Of note, patient was hospitalized at [facility] for left foot osteomyelitis growing corynebacterium, ultimately refused IV antibiotics and was discharged home with Linezolid. Patient reports compliance with medications. For his stent, he is unsure who his cardiologist is but reports compliance with ASA and Plavix for six months and then discontinued Plavix. The patient is currently undomiciled and ambulates with a wheelchair. ROS is positive for chills and nausea in the past few days and bloody discharge from his gluteal wound. He also reports poor PO intake to the last "several days." Otherwise, ROS is negative.     In ED, patient hypertensive to 200/111 --> given nifedipine 90 mg and clonidine 0.1 mg --> blood pressure remains elevated. ICU consult was called for hypertensive urgency. Of note, patient is allergic to Labetalol and hydralazine, reports difficulty breathing with both. ICU assessed --> ordered 0.2 mg clonidine PO.  For his wounds, he was treated with Vanc and Zosyn as well as fluid resucitated w/ 2L normal saline.

## 2023-08-08 NOTE — PROGRESS NOTE ADULT - SUBJECTIVE AND OBJECTIVE BOX
Patient is a 63y old  Male who presents with a chief complaint of wound infetcction (08 Aug 2023 07:04)      INTERVAL HPI/ OVERNIGHT EVENTS: Pt evaluated this morning at bedside. Pt upset with diet restrictions and requests to speak to a dietitian. Denies any pain or discomfort to the L foot. Dressing dry intact and clean.     LABS                        9.7    10.67 )-----------( 184      ( 08 Aug 2023 07:32 )             31.4     08-07    137  |  99  |  28<H>  ----------------------------<  358<H>  3.3<L>   |  27  |  2.51<H>    Ca    9.0      07 Aug 2023 05:30  Phos  2.7     08-08  Mg     1.8     08-08    TPro  7.1  /  Alb  3.0<L>  /  TBili  0.2  /  DBili  x   /  AST  12  /  ALT  <5<L>  /  AlkPhos  112  08-07        ICU Vital Signs Last 24 Hrs  T(C): 36.5 (07 Aug 2023 20:58), Max: 36.6 (07 Aug 2023 09:25)  T(F): 97.7 (07 Aug 2023 20:58), Max: 97.8 (07 Aug 2023 09:25)  HR: 86 (07 Aug 2023 20:58) (80 - 86)  BP: 142/78 (07 Aug 2023 20:58) (133/66 - 142/78)  BP(mean): --  ABP: --  ABP(mean): --  RR: 17 (07 Aug 2023 20:58) (17 - 18)  SpO2: 99% (07 Aug 2023 20:58) (96% - 99%)    O2 Parameters below as of 07 Aug 2023 20:58  Patient On (Oxygen Delivery Method): room air            RADIOLOGY  < from: MR Foot No Cont, Left (08.07.23 @ 22:33) >  IMPRESSION:  1.   Postoperative changes including osteotomies of 4th and 5th digits.  2.   No bone marrow edema to indicate osteomyelitis within limits of metal  artifact.    < end of copied text >    MICROBIOLOGY  Culture - Other (08.07.23 @ 12:01)   Gram Stain:   Rare Gram Positive Cocci in Clusters   Rare to few Gram Positive Rods   Few WBC's  Specimen Source: Wound L foot submet 2 wound    PHYSICAL EXAM  Lower Extremity Focused:  Vasc: 1/4 DP/PT. negative for edema   Derm: L foot subMT 2/3  wound (3.0 x 3.0 cm), granular base, negative for undermining, malodor, purulent drainage or signs of infection. Negative PTB.  Diffuse Dry scaly skin present to b/l feet in mocassin distribution.   R Foot great toe nail with dried superficial hematoma, no open lesions.   Neuro: Protective sensation absent  MSK: 5/5 MMT b/l. -TTP of palpation to L foot.  Patient is a 63y old  Male who presents with a chief complaint of wound infetcction (08 Aug 2023 07:04)      INTERVAL HPI/ OVERNIGHT EVENTS: Pt evaluated this morning at bedside. Pt upset with diet restrictions and requests to speak to a dietitian. Denies any pain or discomfort to the L foot. Dressing dry intact and clean.     LABS                        9.7    10.67 )-----------( 184      ( 08 Aug 2023 07:32 )             31.4     08-07    137  |  99  |  28<H>  ----------------------------<  358<H>  3.3<L>   |  27  |  2.51<H>    Ca    9.0      07 Aug 2023 05:30  Phos  2.7     08-08  Mg     1.8     08-08    TPro  7.1  /  Alb  3.0<L>  /  TBili  0.2  /  DBili  x   /  AST  12  /  ALT  <5<L>  /  AlkPhos  112  08-07        ICU Vital Signs Last 24 Hrs  T(C): 36.5 (07 Aug 2023 20:58), Max: 36.6 (07 Aug 2023 09:25)  T(F): 97.7 (07 Aug 2023 20:58), Max: 97.8 (07 Aug 2023 09:25)  HR: 86 (07 Aug 2023 20:58) (80 - 86)  BP: 142/78 (07 Aug 2023 20:58) (133/66 - 142/78)  BP(mean): --  ABP: --  ABP(mean): --  RR: 17 (07 Aug 2023 20:58) (17 - 18)  SpO2: 99% (07 Aug 2023 20:58) (96% - 99%)    O2 Parameters below as of 07 Aug 2023 20:58  Patient On (Oxygen Delivery Method): room air            RADIOLOGY  < from: MR Foot No Cont, Left (08.07.23 @ 22:33) >  IMPRESSION:    Moderately limited exam by susceptibility artifact and poor fat   suppression. No confluent decreased T1 signal or significant erosive   change to suggest osteomyelitis. If clinical concern persists, nuclear   medicine study may be performed.  Midfoot arthrosis.  Hardware at the medial midfoot limited by susceptibility artifact.    < end of copied text >      MICROBIOLOGY  Culture - Other (08.07.23 @ 12:01)   Gram Stain:   Rare Gram Positive Cocci in Clusters   Rare to few Gram Positive Rods   Few WBC's  Specimen Source: Wound L foot submet 2 wound    PHYSICAL EXAM  Lower Extremity Focused:  Vasc: 1/4 DP/PT. negative for edema   Derm: L foot subMT 2/3  wound (3.0 x 3.0 cm), granular base, negative for undermining, malodor, purulent drainage or signs of infection. Negative PTB.  Diffuse Dry scaly skin present to b/l feet in mocassin distribution.   R Foot great toe nail with dried superficial hematoma, no open lesions.   Neuro: Protective sensation absent  MSK: 5/5 MMT b/l. -TTP of palpation to L foot.

## 2023-08-08 NOTE — PROGRESS NOTE ADULT - PROBLEM SELECTOR PLAN 4
Patient w/ sodium to 128 on admission. On repeat  after fluids.   Likely hypovolemic hyponatremia.     Plan:   - f/u [facility] Patient w/ BP in ED to 200/111 asymptomatic. Cr was already elevated prior to this BP recording. Received nifedipine 90 mg and clonidine 0.1 mg, both of which are home meds and BP responded appropriately.     So far:   - nifedipine 90 mg   - clonidine 0.1 mg   - clonidine 0.2 mg    Plan:   - close observation of BP   - restart home meds Patient w/ BP in ED to 200/111 asymptomatic. Cr was already elevated prior to this BP recording. Received nifedipine 90 mg and clonidine 0.1 mg, both of which are home meds and BP responded appropriately.     So far:   - nifedipine 90 mg   - clonidine 0.1 mg   - clonidine 0.2 mg    Plan:   - close observation of BP   - restarted nifedipine 90mg Patient w/ BP in ED to 200/111 asymptomatic. Cr was already elevated prior to this BP recording. Received nifedipine 90 mg and clonidine 0.1 mg, both of which are home meds and BP responded appropriately.     So far:   - nifedipine 90 mg   - clonidine 0.2 mg    Plan:   - close observation of BP   - restarted nifedipine 90mg

## 2023-08-08 NOTE — PROGRESS NOTE ADULT - PROBLEM SELECTOR PLAN 3
Creatinine at baseline appears to be 1.44. On admission - Cr 2.80. Patient s/p 2 L in ED. Voiding spontaneously in ED w/ light yellow urine. Appears to be prenal 2/2 sepsis .    Plan:   - obtain Ulytes   - bladder scan   - daily BMP Patient severely septic on admission, received Vanc + Zosyn in ED. Lactate 2.8 on admission, now downtrended after fluids. Source is likely osteomyelitis of the gluteal wound.     Plan:   - f/u BCx  - antibiotics on hold pending ID evaluation  - daily CBC Patient severely septic on admission, received Vanc + Zosyn in ED. Lactate 2.8 on admission, now downtrended after fluids. Source is likely osteomyelitis of the gluteal wound.     Plan:   - f/u BCx  - daily CBC  - ID: no concern for infection. Stop abx. severe sepsis ruled   only met sirs 1/4 + elevated lactate    received Vanc + Zosyn in ED. Lactate 2.8 on admission, now downtrended after fluids. Source is likely osteomyelitis of the gluteal wound.     Plan:   - f/u BCx  - daily CBC  - ID: no concern for infection. Stop abx.

## 2023-08-08 NOTE — DISCHARGE NOTE PROVIDER - DETAILS OF MALNUTRITION DIAGNOSIS/DIAGNOSES
This patient has been assessed with a concern for Malnutrition and was treated during this hospitalization for the following Nutrition diagnosis/diagnoses:     -  08/07/2023: Severe protein-calorie malnutrition

## 2023-08-08 NOTE — DISCHARGE NOTE PROVIDER - NSDCFUADDAPPT_GEN_ALL_CORE_FT
Mendoza Lombardi  [facility] Physician [facility]  [address]  Scheduled Appointment: 09/05/2023 at 1:30pm  Internal Medicine Physician  [address], [address]nd [place] [address]  Phone: [phone]  Follow up date/time: 08/19/2023 at 11:15am      Mendoza Lombardi  [facility]  [address]  Scheduled Appointment: 09/05/2023 at 1:30pm

## 2023-08-08 NOTE — DISCHARGE NOTE PROVIDER - CARE PROVIDER_API CALL
Sergio Salinas)  Internal Medicine  [address], [address]nd [place] [address]  Phone: [phone]  Fax: [phone]  Follow Up Time:

## 2023-08-09 LAB
ALBUMIN SERPL ELPH-MCNC: 3 G/DL — LOW (ref 3.3–5)
ALP SERPL-CCNC: 110 U/L — SIGNIFICANT CHANGE UP (ref 40–120)
ALT FLD-CCNC: 5 U/L — LOW (ref 10–45)
ANION GAP SERPL CALC-SCNC: 10 MMOL/L — SIGNIFICANT CHANGE UP (ref 5–17)
AST SERPL-CCNC: 16 U/L — SIGNIFICANT CHANGE UP (ref 10–40)
BASOPHILS # BLD AUTO: 0.09 K/UL — SIGNIFICANT CHANGE UP (ref 0–0.2)
BASOPHILS NFR BLD AUTO: 0.7 % — SIGNIFICANT CHANGE UP (ref 0–2)
BILIRUB SERPL-MCNC: 0.2 MG/DL — SIGNIFICANT CHANGE UP (ref 0.2–1.2)
BUN SERPL-MCNC: 23 MG/DL — SIGNIFICANT CHANGE UP (ref 7–23)
CALCIUM SERPL-MCNC: 8.7 MG/DL — SIGNIFICANT CHANGE UP (ref 8.4–10.5)
CHLORIDE SERPL-SCNC: 101 MMOL/L — SIGNIFICANT CHANGE UP (ref 96–108)
CO2 SERPL-SCNC: 24 MMOL/L — SIGNIFICANT CHANGE UP (ref 22–31)
CREAT SERPL-MCNC: 1.78 MG/DL — HIGH (ref 0.5–1.3)
EGFR: 43 ML/MIN/1.73M2 — LOW
EOSINOPHIL # BLD AUTO: 0.32 K/UL — SIGNIFICANT CHANGE UP (ref 0–0.5)
EOSINOPHIL NFR BLD AUTO: 2.6 % — SIGNIFICANT CHANGE UP (ref 0–6)
GLUCOSE BLDC GLUCOMTR-MCNC: 173 MG/DL — HIGH (ref 70–99)
GLUCOSE BLDC GLUCOMTR-MCNC: 174 MG/DL — HIGH (ref 70–99)
GLUCOSE BLDC GLUCOMTR-MCNC: 233 MG/DL — HIGH (ref 70–99)
GLUCOSE BLDC GLUCOMTR-MCNC: 346 MG/DL — HIGH (ref 70–99)
GLUCOSE SERPL-MCNC: 173 MG/DL — HIGH (ref 70–99)
HCT VFR BLD CALC: 32.1 % — LOW (ref 39–50)
HGB BLD-MCNC: 10.1 G/DL — LOW (ref 13–17)
IMM GRANULOCYTES NFR BLD AUTO: 0.4 % — SIGNIFICANT CHANGE UP (ref 0–0.9)
LYMPHOCYTES # BLD AUTO: 2.5 K/UL — SIGNIFICANT CHANGE UP (ref 1–3.3)
LYMPHOCYTES # BLD AUTO: 20.2 % — SIGNIFICANT CHANGE UP (ref 13–44)
MAGNESIUM SERPL-MCNC: 1.9 MG/DL — SIGNIFICANT CHANGE UP (ref 1.6–2.6)
MCHC RBC-ENTMCNC: 24.1 PG — LOW (ref 27–34)
MCHC RBC-ENTMCNC: 31.5 GM/DL — LOW (ref 32–36)
MCV RBC AUTO: 76.6 FL — LOW (ref 80–100)
MONOCYTES # BLD AUTO: 0.82 K/UL — SIGNIFICANT CHANGE UP (ref 0–0.9)
MONOCYTES NFR BLD AUTO: 6.6 % — SIGNIFICANT CHANGE UP (ref 2–14)
NEUTROPHILS # BLD AUTO: 8.6 K/UL — HIGH (ref 1.8–7.4)
NEUTROPHILS NFR BLD AUTO: 69.5 % — SIGNIFICANT CHANGE UP (ref 43–77)
NRBC # BLD: 0 /100 WBCS — SIGNIFICANT CHANGE UP (ref 0–0)
PHOSPHATE SERPL-MCNC: 2.9 MG/DL — SIGNIFICANT CHANGE UP (ref 2.5–4.5)
PLATELET # BLD AUTO: 218 K/UL — SIGNIFICANT CHANGE UP (ref 150–400)
POTASSIUM SERPL-MCNC: 3.2 MMOL/L — LOW (ref 3.5–5.3)
POTASSIUM SERPL-SCNC: 3.2 MMOL/L — LOW (ref 3.5–5.3)
PROT SERPL-MCNC: 7.3 G/DL — SIGNIFICANT CHANGE UP (ref 6–8.3)
RBC # BLD: 4.19 M/UL — LOW (ref 4.2–5.8)
RBC # FLD: 15.9 % — HIGH (ref 10.3–14.5)
SODIUM SERPL-SCNC: 135 MMOL/L — SIGNIFICANT CHANGE UP (ref 135–145)
WBC # BLD: 12.38 K/UL — HIGH (ref 3.8–10.5)
WBC # FLD AUTO: 12.38 K/UL — HIGH (ref 3.8–10.5)

## 2023-08-09 PROCEDURE — 99233 SBSQ HOSP IP/OBS HIGH 50: CPT | Mod: GC

## 2023-08-09 RX ORDER — NIFEDIPINE 30 MG
120 TABLET, EXTENDED RELEASE 24 HR ORAL EVERY 24 HOURS
Refills: 0 | Status: DISCONTINUED | OUTPATIENT
Start: 2023-08-09 | End: 2023-08-09

## 2023-08-09 RX ORDER — POTASSIUM CHLORIDE 20 MEQ
10 PACKET (EA) ORAL
Refills: 0 | Status: DISCONTINUED | OUTPATIENT
Start: 2023-08-09 | End: 2023-08-09

## 2023-08-09 RX ORDER — MAGNESIUM SULFATE 500 MG/ML
2 VIAL (ML) INJECTION ONCE
Refills: 0 | Status: COMPLETED | OUTPATIENT
Start: 2023-08-09 | End: 2023-08-09

## 2023-08-09 RX ORDER — NIFEDIPINE 30 MG
120 TABLET, EXTENDED RELEASE 24 HR ORAL EVERY 24 HOURS
Refills: 0 | Status: DISCONTINUED | OUTPATIENT
Start: 2023-08-10 | End: 2023-08-11

## 2023-08-09 RX ORDER — POTASSIUM CHLORIDE 20 MEQ
40 PACKET (EA) ORAL ONCE
Refills: 0 | Status: DISCONTINUED | OUTPATIENT
Start: 2023-08-09 | End: 2023-08-09

## 2023-08-09 RX ORDER — POTASSIUM CHLORIDE 20 MEQ
40 PACKET (EA) ORAL ONCE
Refills: 0 | Status: COMPLETED | OUTPATIENT
Start: 2023-08-09 | End: 2023-08-09

## 2023-08-09 RX ORDER — NIFEDIPINE 30 MG
2 TABLET, EXTENDED RELEASE 24 HR ORAL
Qty: 60 | Refills: 3
Start: 2023-08-09 | End: 2023-12-06

## 2023-08-09 RX ADMIN — Medication 4 UNIT(S): at 10:04

## 2023-08-09 RX ADMIN — Medication 2: at 10:04

## 2023-08-09 RX ADMIN — INSULIN GLARGINE 12 UNIT(S): 100 INJECTION, SOLUTION SUBCUTANEOUS at 22:07

## 2023-08-09 RX ADMIN — Medication 25 GRAM(S): at 11:45

## 2023-08-09 RX ADMIN — Medication 4 UNIT(S): at 12:35

## 2023-08-09 RX ADMIN — Medication 4: at 12:35

## 2023-08-09 RX ADMIN — Medication 40 MILLIEQUIVALENT(S): at 12:31

## 2023-08-09 RX ADMIN — Medication 81 MILLIGRAM(S): at 12:31

## 2023-08-09 RX ADMIN — Medication 0.2 MILLIGRAM(S): at 12:31

## 2023-08-09 RX ADMIN — Medication 4 UNIT(S): at 18:28

## 2023-08-09 RX ADMIN — Medication 8: at 18:28

## 2023-08-09 NOTE — PROGRESS NOTE ADULT - PROBLEM SELECTOR PLAN 1
Chronic of 3 years. Patient ambulates with roller and walker, was at [facility] for a few days and left AMA 2/2 not liking the care he received. Presents today as he felt sick again.   CT with stranding and concern for osteo.     Plan:   - patient refused PT evaluation on 8/7  - patient refused wound care evaluation on 8/8  - f/u ID recs, consider MRI hip, surgical/ortho evaluation Chronic of 3 years. Patient ambulates with roller and walker, was at [facility] for a few days and left AMA 2/2 not liking the care he received. Presents today as he felt sick again.   CT with stranding and concern for osteo. Per ID, no concern for osteo as wound does not appear infected. Abx have been d/c'd.     Plan:   - patient refused PT evaluation on 8/7  - patient refused wound care evaluation on 8/8  - Per ID recs, abx unecessary as wound does not appear to be infected  - Wound care saw patient on 8/9, appropriately dressed wounds.  - f/u ID recs, consider MRI hip, surgical/ortho evaluation

## 2023-08-09 NOTE — PROGRESS NOTE ADULT - PROBLEM SELECTOR PLAN 3
severe sepsis ruled   only met sirs 1/4 + elevated lactate    received Vanc + Zosyn in ED. Lactate 2.8 on admission, now downtrended after fluids. Source is likely osteomyelitis of the gluteal wound.     Plan:   - f/u BCx  - daily CBC  - ID: no concern for infection. Stop abx.

## 2023-08-09 NOTE — PROGRESS NOTE ADULT - PROBLEM SELECTOR PLAN 6
Per patient, takes 10 U lantus and 4-6 premeal at home. A1C 8.2 on 8/8.    Plan:   - will start 4 U premeal   - 12 U basal   - miSS   - consistent carb renal diet

## 2023-08-09 NOTE — PROGRESS NOTE ADULT - SUBJECTIVE AND OBJECTIVE BOX
Patient is a 63y old  Male who presents with a chief complaint of wound infection (09 Aug 2023 08:04)      INTERVAL HPI/ OVERNIGHT EVENTS  Pt evaluated this morning at bedside. Dressing was not intact      LABS                        10.1   12.38 )-----------( 218      ( 09 Aug 2023 05:30 )             32.1     08-08    138  |  102  |  30<H>  ----------------------------<  203<H>  3.2<L>   |  26  |  2.03<H>    Ca    8.8      08 Aug 2023 07:32  Phos  2.9     08-09  Mg     1.9     08-09    TPro  7.0  /  Alb  2.9<L>  /  TBili  0.2  /  DBili  x   /  AST  13  /  ALT  <5<L>  /  AlkPhos  103  08-08        ICU Vital Signs Last 24 Hrs  T(C): 36.7 (09 Aug 2023 05:48), Max: 36.8 (08 Aug 2023 15:45)  T(F): 98.1 (09 Aug 2023 05:48), Max: 98.2 (08 Aug 2023 15:45)  HR: 89 (09 Aug 2023 05:48) (71 - 89)  BP: 163/88 (09 Aug 2023 05:48) (132/85 - 176/93)  BP(mean): 113 (09 Aug 2023 05:48) (113 - 113)  ABP: --  ABP(mean): --  RR: 18 (09 Aug 2023 05:48) (17 - 18)  SpO2: 95% (09 Aug 2023 05:48) (95% - 98%)    O2 Parameters below as of 09 Aug 2023 05:48  Patient On (Oxygen Delivery Method): room air      RADIOLOGY  < from: MR Foot No Cont, Left (08.07.23 @ 22:33) >  IMPRESSION:    Moderately limited exam by susceptibility artifact and poor fat   suppression. No confluent decreased T1 signal or significant erosive   change to suggest osteomyelitis. If clinical concern persists, nuclear   medicine study may be performed.  Midfoot arthrosis.  Hardware at the medial midfoot limited by susceptibility artifact.    < end of copied text >    MICROBIOLOGY    Culture - Other (collected 07 Aug 2023 12:01)  Source: Wound L foot submet 2 wound  Gram Stain (07 Aug 2023 16:11):    Rare Gram Positive Cocci in Clusters    Rare to few Gram Positive Rods    Few WBC's  Preliminary Report (08 Aug 2023 15:37):    Few Escherichia coli    Few Streptococcus agalactiae (Group B)    Few Staphylococcus simulans    Few Corynebacterium striatum group    Culture in progress    Urinalysis with Rflx Culture (collected 07 Aug 2023 03:50)    Urinalysis with Rflx Culture (collected 06 Aug 2023 23:57)    Culture - Blood (collected 06 Aug 2023 23:57)  Source: .Blood Blood  Preliminary Report (08 Aug 2023 14:00):    No growth at 1 day.    Culture - Blood (collected 06 Aug 2023 23:57)  Source: .Blood Blood  Preliminary Report (08 Aug 2023 14:00):    No growth at 1 day.        PHYSICAL EXAM  Lower Extremity Focused  Vasc: 1/4 DP/PT. negative for edema   Derm: L foot subMT 2/3  wound (3.0 x 3.0 cm), granular base, negative for undermining, malodor, purulent drainage or signs of infection. Negative PTB.  Diffuse Dry scaly skin present to b/l feet in mocassin distribution.   R Foot great toe nail with dried superficial hematoma, no open lesions.   Neuro: Protective sensation absent  MSK: 5/5 MMT b/l. -TTP of palpation to L foot.

## 2023-08-09 NOTE — PROGRESS NOTE ADULT - SUBJECTIVE AND OBJECTIVE BOX
OVERNIGHT EVENTS: ENEDINA    SUBJECTIVE:  Patient seen and examined at bedside. Pt says he is not being given an adequate dose of his clonidine. When discussing discharge planning, pt notes that the cane he was given is too short for him to use for ambulation even after its highest level of extension. Pt put off by the idea of d/c to shelter due to previous experiences with the population at shelters, and would like to be discharged to a nursing facility.     Vital Signs Last 12 Hrs  T(F): 98.1 (08-09-23 @ 05:48), Max: 98.1 (08-09-23 @ 05:48)  HR: 89 (08-09-23 @ 05:48) (77 - 89)  BP: 163/88 (08-09-23 @ 05:48) (163/88 - 165/90)  BP(mean): 113 (08-09-23 @ 05:48) (113 - 113)  RR: 18 (08-09-23 @ 05:48) (17 - 18)  SpO2: 95% (08-09-23 @ 05:48) (95% - 98%)  I&O's Summary      PHYSICAL EXAM:  Constitutional: NAD, comfortable in bed  HEENT: EOMI, no conjunctival pallor or scleral icterus, MMM  Neck: Supple  Respiratory: CTA B/L. No w/r/r.   Cardiovascular: RRR, normal S1 and S2, no m/r/g.   Gastrointestinal: +BS, soft NTND, no guarding or rebound tenderness, no palpable masses   Extremities: no edema; left foot wound is wrapped in gauze   Vascular: Pulses equal in UE  Neurological: AAOx3, no CN deficits grossly   Skin: various small healing scabs on bilateral LE        LABS:                        9.7    10.67 )-----------( 184      ( 08 Aug 2023 07:32 )             31.4     08-08    138  |  102  |  30<H>  ----------------------------<  203<H>  3.2<L>   |  26  |  2.03<H>    Ca    8.8      08 Aug 2023 07:32  Phos  2.7     08-08  Mg     1.8     08-08    TPro  7.0  /  Alb  2.9<L>  /  TBili  0.2  /  DBili  x   /  AST  13  /  ALT  <5<L>  /  AlkPhos  103  08-08      Urinalysis Basic - ( 08 Aug 2023 07:32 )    Color: x / Appearance: x / SG: x / pH: x  Gluc: 203 mg/dL / Ketone: x  / Bili: x / Urobili: x   Blood: x / Protein: x / Nitrite: x   Leuk Esterase: x / RBC: x / WBC x   Sq Epi: x / Non Sq Epi: x / Bacteria: x          RADIOLOGY & ADDITIONAL TESTS:    MEDICATIONS  (STANDING):  aspirin  chewable 81 milliGRAM(s) Oral daily  atorvastatin 40 milliGRAM(s) Oral at bedtime  dextrose 5%. 1000 milliLiter(s) (50 mL/Hr) IV Continuous <Continuous>  dextrose 5%. 1000 milliLiter(s) (100 mL/Hr) IV Continuous <Continuous>  dextrose 50% Injectable 25 Gram(s) IV Push once  dextrose 50% Injectable 25 Gram(s) IV Push once  dextrose 50% Injectable 12.5 Gram(s) IV Push once  glucagon  Injectable 1 milliGRAM(s) IntraMuscular once  insulin glargine Injectable (LANTUS) 12 Unit(s) SubCutaneous at bedtime  insulin lispro (ADMELOG) corrective regimen sliding scale   SubCutaneous three times a day before meals  insulin lispro Injectable (ADMELOG) 4 Unit(s) SubCutaneous three times a day before meals  lactated ringers. 1000 milliLiter(s) (100 mL/Hr) IV Continuous <Continuous>  NIFEdipine XL 90 milliGRAM(s) Oral every 24 hours    MEDICATIONS  (PRN):  acetaminophen     Tablet .. 650 milliGRAM(s) Oral every 6 hours PRN Mild Pain (1 - 3), Moderate Pain (4 - 6)  dextrose Oral Gel 15 Gram(s) Oral once PRN Blood Glucose LESS THAN 70 milliGRAM(s)/deciliter

## 2023-08-09 NOTE — PROGRESS NOTE ADULT - PROBLEM SELECTOR PLAN 2
Chronic, has had osteo in the past. Refused IV antibiotics and was discharged with PO linezolid. Per patient, he did complete course. Wound is worsened from last admission. Last cultures growing corynebacterium.     Plan:   - MRI L. foot 8/7 showed no evidence of osteomyelitis  - Podiatry evaluated, performed excisional debridement of left plantar wound, and sent wound Cx.

## 2023-08-09 NOTE — PROGRESS NOTE ADULT - ASSESSMENT
63 yo M pmhx DM, HTN, CAD, Hep C s/p treatment p/w L foot non-healing ulcer. 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 Pt cannot recall who his surgeon was or when exactly it was done, but notes it was at [facility]. Podiatry consulted for evaluation of wound, r/o OM, r/o gas. Of note XR wet read hardware in L 1st TMT joint, broken screws present. Evidence of previous 4th and 5th partial MT resection. No evidence of gas. No significant xr findings of the R foot. Pt labs with elevated wbc at 14.53 and ESR 62. Prior bone biopsy in January was of the left 5th digit. Low suspicion of cellulitis of the left foot, likely not source of leukocytosis. Upon re-evaluation of wound 8/8, soft tissue stop noted with the PTB test, low concern for OM. MRI final read negative for OM.      Plan:  - Pt evaluated, chart reviewed  - c/w IV abx   - Local wound care: Betadine DSD to L plantar wound, wrapped with DSD, Kerlix  - f/u final Left foot plantar foot deep wound cx   - WBAT w/ cane to L foot  - rest of care per primary team    Discharge dressing instructions: Cleanse wound with normal sailine daily, pat dry with sterile guaze, apply  betadine soaked gauze to wound base, cover with dry sterile gauze, wrap with Kerlix and light ACE bandage.     Patient should follow up with Dr. Tacos Nix within 1 week of discharge.    Office information:          [place] Address- [address], [place] [address] Phone: [phone]         [place] Address- [address], [place] [address] Phone: [phone]

## 2023-08-09 NOTE — ADVANCED PRACTICE NURSE CONSULT - ASSESSMENT
Patient is a 63 year old male, currently undomiciled, w/ DM and h/o osteomyelitis who presents to the ED for evaluation of chronic wounds, found to have severe sepsis and hypertensive urgency - being treated with nifedipine and clonidine. 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 Site irrigated with NS and hydrofiber lightly packed into wound bed, periwound protected with skin barrier wipes, site covered with foam dressing.

## 2023-08-09 NOTE — PROGRESS NOTE ADULT - PROBLEM SELECTOR PLAN 5
Creatinine at baseline appears to be 1.44. On admission - Cr 2.80. Patient s/p 2 L in ED. Voiding spontaneously in ED w/ light yellow urine. Appears to be prenal 2/2 sepsis .    Plan:   - daily BMP Creatinine at baseline appears to be 1.44. On admission - Cr 2.80. Patient s/p 2 L in ED. Voiding spontaneously in ED w/ light yellow urine. Appears to be prenal 2/2 sepsis. Resolved. 08/09 Cr is 1.78.

## 2023-08-09 NOTE — PROGRESS NOTE ADULT - PROBLEM SELECTOR PLAN 4
Patient w/ BP in ED to 200/111 asymptomatic. Cr was already elevated prior to this BP recording. Received nifedipine 90 mg and clonidine 0.1 mg, both of which are home meds and BP responded appropriately.     So far:   - nifedipine 90 mg   - clonidine 0.2 mg    Plan:   - close observation of BP   - restarted nifedipine 90mg Patient w/ BP in ED to 200/111 asymptomatic. Cr was already elevated prior to this BP recording. Received nifedipine 90 mg and clonidine 0.1 mg, both of which are home meds and BP responded appropriately.     Plan:  - nifedipine increased to 120 mg   - clonidine 0.2 mg  - close observation of BPs

## 2023-08-10 LAB
-  AMPICILLIN/SULBACTAM: SIGNIFICANT CHANGE UP
-  AMPICILLIN: SIGNIFICANT CHANGE UP
-  CEFAZOLIN: SIGNIFICANT CHANGE UP
-  CEFTRIAXONE: SIGNIFICANT CHANGE UP
-  CIPROFLOXACIN: SIGNIFICANT CHANGE UP
-  CLINDAMYCIN: SIGNIFICANT CHANGE UP
-  ERTAPENEM: SIGNIFICANT CHANGE UP
-  GENTAMICIN: SIGNIFICANT CHANGE UP
-  LEVOFLOXACIN: SIGNIFICANT CHANGE UP
-  MEROPENEM: SIGNIFICANT CHANGE UP
-  PIPERACILLIN/TAZOBACTAM: SIGNIFICANT CHANGE UP
-  TOBRAMYCIN: SIGNIFICANT CHANGE UP
-  TRIMETHOPRIM/SULFAMETHOXAZOLE: SIGNIFICANT CHANGE UP
CULTURE RESULTS: SIGNIFICANT CHANGE UP
GLUCOSE BLDC GLUCOMTR-MCNC: 121 MG/DL — HIGH (ref 70–99)
GLUCOSE BLDC GLUCOMTR-MCNC: 231 MG/DL — HIGH (ref 70–99)
GLUCOSE BLDC GLUCOMTR-MCNC: 267 MG/DL — HIGH (ref 70–99)
METHOD TYPE: SIGNIFICANT CHANGE UP
METHOD TYPE: SIGNIFICANT CHANGE UP
ORGANISM # SPEC MICROSCOPIC CNT: SIGNIFICANT CHANGE UP
SPECIMEN SOURCE: SIGNIFICANT CHANGE UP

## 2023-08-10 PROCEDURE — 99233 SBSQ HOSP IP/OBS HIGH 50: CPT | Mod: GC

## 2023-08-10 RX ORDER — INSULIN GLARGINE 100 [IU]/ML
14 INJECTION, SOLUTION SUBCUTANEOUS AT BEDTIME
Refills: 0 | Status: DISCONTINUED | OUTPATIENT
Start: 2023-08-10 | End: 2023-08-11

## 2023-08-10 RX ORDER — INSULIN LISPRO 100/ML
6 VIAL (ML) SUBCUTANEOUS
Refills: 0 | Status: DISCONTINUED | OUTPATIENT
Start: 2023-08-10 | End: 2023-08-11

## 2023-08-10 RX ADMIN — Medication 0.2 MILLIGRAM(S): at 06:56

## 2023-08-10 RX ADMIN — Medication 81 MILLIGRAM(S): at 13:36

## 2023-08-10 RX ADMIN — Medication 120 MILLIGRAM(S): at 06:55

## 2023-08-10 RX ADMIN — Medication 6 UNIT(S): at 18:30

## 2023-08-10 RX ADMIN — Medication 4: at 13:37

## 2023-08-10 RX ADMIN — Medication 6 UNIT(S): at 13:37

## 2023-08-10 RX ADMIN — Medication 6: at 09:49

## 2023-08-10 NOTE — PROGRESS NOTE ADULT - PROBLEM SELECTOR PLAN 4
Patient w/ BP in ED to 200/111 asymptomatic. Cr was already elevated prior to this BP recording. Received nifedipine 90 mg and clonidine 0.1 mg, both of which are home meds and BP responded appropriately.     Plan:  - nifedipine increased to 120 mg   - clonidine 0.2 mg  - close observation of BPs Patient w/ BP in ED to 200/111 asymptomatic. Cr was already elevated prior to this BP recording. Received nifedipine 90 mg and clonidine 0.1 mg, both of which are home meds and BP responded appropriately.     Plan:  - nifedipine increased to 120 mg   - clonidine 0.2 mg  - close observation of BPs - within normal limits.

## 2023-08-10 NOTE — PROGRESS NOTE ADULT - SUBJECTIVE AND OBJECTIVE BOX
Physical Medicine and Rehabilitation Progress Note :       Patient is a 63y old  Male who presents with a chief complaint of wound infetcction (09 Aug 2023 08:55)      HPI:  Patient is a 63 year old man with insulin dependent diabetes mellitus, CAD s/p stent 1 yr ago, osteo of the left foot in January who presents to the ED with complaints of nausea and chills and worsening wounds of several day onset. He was recently hospitalized at [facility] for his wounds and per patient was given Zosyn. He left AMA as he did not like the care he was receiving. Today, patient returns to the ED for evaluation. Of note, patient was hospitalized at [facility] for left foot osteomyelitis growing corynebacterium, ultimately refused IV antibiotics and was discharged home with Linezolid. Patient reports compliance with medications. For his stent, he is unsure who his cardiologist is but reports compliance with ASA and Plavix for six months and then discontinued Plavix. The patient is currently undomiciled and ambulates with a wheelchair. ROS is positive for chills and nausea in the past few days and bloody discharge from his gluteal wound. He also reports poor PO intake to the last "several days." Otherwise, ROS is negative.     In ED, patient hypertensive to 200/111 --> given nifedipine 90 mg and clonidine 0.1 mg --> blood pressure remains elevated. ICU consult was called for hypertensive urgency. Of note, patient is allergic to Labetalol and hydralazine, reports difficulty breathing with both. ICU assessed --> ordered 0.2 mg clonidine PO.  For his wounds, he was treated with Vanc and Zosyn as well as fluid resucitated w/ 2L normal saline.  (07 Aug 2023 05:35)                            10.1   12.38 )-----------( 218      ( 09 Aug 2023 05:30 )             32.1       08-09    135  |  101  |  23  ----------------------------<  173<H>  3.2<L>   |  24  |  1.78<H>    Ca    8.7      09 Aug 2023 05:30  Phos  2.9     08-09  Mg     1.9     08-09    TPro  7.3  /  Alb  3.0<L>  /  TBili  0.2  /  DBili  x   /  AST  16  /  ALT  5<L>  /  AlkPhos  110  08-09    Vital Signs Last 24 Hrs  T(C): 36.7 (10 Aug 2023 09:58), Max: 36.7 (10 Aug 2023 09:58)  T(F): 98 (10 Aug 2023 09:58), Max: 98 (10 Aug 2023 09:58)  HR: 78 (10 Aug 2023 09:58) (74 - 86)  BP: 159/90 (10 Aug 2023 09:58) (122/84 - 165/79)  BP(mean): --  RR: 18 (10 Aug 2023 09:58) (17 - 18)  SpO2: 99% (10 Aug 2023 09:58) (97% - 99%)    Parameters below as of 10 Aug 2023 09:58  Patient On (Oxygen Delivery Method): room air        MEDICATIONS  (STANDING):  aspirin  chewable 81 milliGRAM(s) Oral daily  atorvastatin 40 milliGRAM(s) Oral at bedtime  cloNIDine 0.2 milliGRAM(s) Oral daily  dextrose 5%. 1000 milliLiter(s) (50 mL/Hr) IV Continuous <Continuous>  dextrose 5%. 1000 milliLiter(s) (100 mL/Hr) IV Continuous <Continuous>  dextrose 50% Injectable 25 Gram(s) IV Push once  dextrose 50% Injectable 25 Gram(s) IV Push once  dextrose 50% Injectable 12.5 Gram(s) IV Push once  glucagon  Injectable 1 milliGRAM(s) IntraMuscular once  insulin glargine Injectable (LANTUS) 14 Unit(s) SubCutaneous at bedtime  insulin lispro (ADMELOG) corrective regimen sliding scale   SubCutaneous three times a day before meals  insulin lispro Injectable (ADMELOG) 6 Unit(s) SubCutaneous three times a day before meals  lactated ringers. 1000 milliLiter(s) (100 mL/Hr) IV Continuous <Continuous>  NIFEdipine  milliGRAM(s) Oral every 24 hours    MEDICATIONS  (PRN):  acetaminophen     Tablet .. 650 milliGRAM(s) Oral every 6 hours PRN Mild Pain (1 - 3), Moderate Pain (4 - 6)  dextrose Oral Gel 15 Gram(s) Oral once PRN Blood Glucose LESS THAN 70 milliGRAM(s)/deciliter          Initial Functional Status Assessment :       Previous Level of Function:     · Ambulation Skills	independent; needs device  · Transfer Skills	independent; needs device  · ADL Skills	independent; needs device  · Work/Leisure Activity	independent; needs device  · Additional Comments	Pt is undomiciled and reports to be indpt with all ADLs prior to admission. Pt reports to ambulate with SC  at baseline.    Cognitive Status Examination:   · Orientation	oriented to person, place, time and situation  · Level of Consciousness	alert; agitated  · Follows Commands and Answers Questions	100% of the time  · Short Term Memory	intact  · Long Term Memory	intact    Range of Motion Exam:   · Range of Motion Examination	bilateral lower extremity ROM was WFL (within functional limits)    Manual Muscle Testing:   · Manual Muscle Testing Results	grossly assessed due to  at least 3/5 BL UE & LE based on antigravity mobility.    Bed Mobility: Rolling/Turning:     · Level of [place]	independent    Bed Mobility: Scooting/Bridging:     · Level of [place]	independent    Bed Mobility: Sit to Supine:     · Level of [place]	independent    Bed Mobility: Supine to Sit:     · Level of [place]	independent    Transfer: Sit to Stand:     · Level of [place]	independent  · Weight-Bearing Restrictions	weight-bearing as tolerated  · Assistive Device	straight cane    Transfer: Stand to Sit:     · Level of [place]	independent  · Weight-Bearing Restrictions	weight-bearing as tolerated  · Assistive Device	straight cane    Gait Skills:     · Level of [place]	independent  · Weight-Bearing Restrictions	weight-bearing as tolerated  · Assistive Device	straight cane  · Gait Distance	50 feet    Gait Analysis:     · Gait Pattern Used	2-point gait    Balance Skills Assessment:     · Sitting Balance: Static	good balance  · Sitting Balance: Dynamic	good balance  · Sit-to-Stand Balance	good balance  · Standing Balance: Static	good balance  · Standing Balance: Dynamic	good balance    Sensory Examination:   Sensory Examination:    Grossly Intact:   · Gross Sensory Examination	Grossly Intact        PM&R Impression : as above    Current Disposition Plan Recommendations :     d/c home with no PT/OT needs

## 2023-08-10 NOTE — PROGRESS NOTE ADULT - PROBLEM SELECTOR PLAN 5
Creatinine at baseline appears to be 1.44. On admission - Cr 2.80. Patient s/p 2 L in ED. Voiding spontaneously in ED w/ light yellow urine. Appears to be prenal 2/2 sepsis. Resolved. 08/09 Cr is 1.78.

## 2023-08-10 NOTE — PROGRESS NOTE ADULT - PROBLEM SELECTOR PLAN 6
Per patient, takes 10 U lantus and 4-6 premeal at home. A1C 8.2 on 8/8.    Plan:   - will start 6 U premeal   - 14 U basal   - miSS   - consistent carb renal diet

## 2023-08-10 NOTE — PROGRESS NOTE ADULT - PROBLEM SELECTOR PLAN 3
severe sepsis ruled   only met sirs 1/4 + elevated lactate    received Vanc + Zosyn in ED. Lactate 2.8 on admission, now downtrended after fluids. Source is likely osteomyelitis of the gluteal wound.     Plan:   - ngtd BCx  - daily CBC  - ID: no concern for infection. Stop abx.

## 2023-08-10 NOTE — PROGRESS NOTE ADULT - ASSESSMENT
Patient is a 63 year old male, currently undomiciled, w/ DM and h/o osteomyelitis who presents to the ED for evaluation of chronic wounds, found to have severe sepsis and hypertensive urgency - being treated with nifedipine and clonidine.  Patient is a 63 year old male, currently undomiciled, w/ DM and h/o osteomyelitis who presents to the ED for evaluation of chronic wounds, found to have severe sepsis and hypertensive urgency - being treated with nifedipine and clonidine.

## 2023-08-10 NOTE — PROGRESS NOTE ADULT - PROBLEM SELECTOR PLAN 9
F: s/p 2 L in ED, per oral   E: K>4, Mg>2   N: Consistent carb, DASH  DVT: heparin subq  D/c: F: s/p 2 L in ED, per oral   E: K>4, Mg>2   N: Consistent carb, DASH  DVT: heparin subq  D/c: street

## 2023-08-10 NOTE — PROGRESS NOTE ADULT - PROBLEM SELECTOR PLAN 8
Patient w/ sodium to 128 on admission. On repeat  after fluids.   Likely hypovolemic hyponatremia.     Plan:   - resolved

## 2023-08-10 NOTE — PROGRESS NOTE ADULT - SUBJECTIVE AND OBJECTIVE BOX
Patient is a 63y old  Male who presents with a chief complaint of wound infection (10 Aug 2023 12:32)      INTERVAL HPI/OVERNIGHT EVENTS: offers no new complaints; current symptoms resolving    MEDICATIONS  (STANDING):  aspirin  chewable 81 milliGRAM(s) Oral daily  atorvastatin 40 milliGRAM(s) Oral at bedtime  cloNIDine 0.2 milliGRAM(s) Oral daily  dextrose 5%. 1000 milliLiter(s) (50 mL/Hr) IV Continuous <Continuous>  dextrose 5%. 1000 milliLiter(s) (100 mL/Hr) IV Continuous <Continuous>  dextrose 50% Injectable 25 Gram(s) IV Push once  dextrose 50% Injectable 25 Gram(s) IV Push once  dextrose 50% Injectable 12.5 Gram(s) IV Push once  glucagon  Injectable 1 milliGRAM(s) IntraMuscular once  insulin glargine Injectable (LANTUS) 14 Unit(s) SubCutaneous at bedtime  insulin lispro (ADMELOG) corrective regimen sliding scale   SubCutaneous three times a day before meals  insulin lispro Injectable (ADMELOG) 6 Unit(s) SubCutaneous three times a day before meals  lactated ringers. 1000 milliLiter(s) (100 mL/Hr) IV Continuous <Continuous>  NIFEdipine  milliGRAM(s) Oral every 24 hours    MEDICATIONS  (PRN):  acetaminophen     Tablet .. 650 milliGRAM(s) Oral every 6 hours PRN Mild Pain (1 - 3), Moderate Pain (4 - 6)  dextrose Oral Gel 15 Gram(s) Oral once PRN Blood Glucose LESS THAN 70 milliGRAM(s)/deciliter      __________________________________________________  REVIEW OF SYSTEMS:    CONSTITUTIONAL: No fever,   EYES: no acute visual disturbances  NECK: No pain or stiffness  RESPIRATORY: No cough; No shortness of breath  CARDIOVASCULAR: No chest pain, no palpitations  GASTROINTESTINAL: No pain. No nausea or vomiting; No diarrhea   NEUROLOGICAL: No headache or numbness, no tremors  MUSCULOSKELETAL: No joint pain, no muscle pain  GENITOURINARY: no dysuria, no frequency, no hesitancy  PSYCHIATRY: no depression , no anxiety  ALL OTHER  ROS negative        Vital Signs Last 24 Hrs  T(C): 36.7 (10 Aug 2023 09:58), Max: 36.7 (10 Aug 2023 09:58)  T(F): 98 (10 Aug 2023 09:58), Max: 98 (10 Aug 2023 09:58)  HR: 78 (10 Aug 2023 09:58) (74 - 86)  BP: 159/90 (10 Aug 2023 09:58) (122/84 - 165/79)  BP(mean): --  RR: 18 (10 Aug 2023 09:58) (17 - 18)  SpO2: 99% (10 Aug 2023 09:58) (97% - 99%)    Parameters below as of 10 Aug 2023 09:58  Patient On (Oxygen Delivery Method): room air        ________________________________________________  PHYSICAL EXAM:  GENERAL: NAD  HEENT: Normocephalic;  conjunctivae and sclerae clear; moist mucous membranes;   NECK : supple  CHEST/LUNG: Clear to auscultation bilaterally with good air entry   HEART: S1 S2  regular; no murmurs, gallops or rubs  ABDOMEN: Soft, Nontender, Nondistended; Bowel sounds present  EXTREMITIES: no cyanosis; no edema; no calf tenderness, left foot in kerlix, right gluteal wound w packing   SKIN: warm and dry; chronic wounds w no active s/s infection ( no erythema, warmth or tenderness)   NERVOUS SYSTEM:  Awake and alert; Oriented  to place, person and time ; no new deficits    _________________________________________________  LABS:                        10.1   12.38 )-----------( 218      ( 09 Aug 2023 05:30 )             32.1     08-09    135  |  101  |  23  ----------------------------<  173<H>  3.2<L>   |  24  |  1.78<H>    Ca    8.7      09 Aug 2023 05:30  Phos  2.9     08-09  Mg     1.9     08-09    TPro  7.3  /  Alb  3.0<L>  /  TBili  0.2  /  DBili  x   /  AST  16  /  ALT  5<L>  /  AlkPhos  110  08-09      Urinalysis Basic - ( 09 Aug 2023 05:30 )    Color: x / Appearance: x / SG: x / pH: x  Gluc: 173 mg/dL / Ketone: x  / Bili: x / Urobili: x   Blood: x / Protein: x / Nitrite: x   Leuk Esterase: x / RBC: x / WBC x   Sq Epi: x / Non Sq Epi: x / Bacteria: x      CAPILLARY BLOOD GLUCOSE      POCT Blood Glucose.: 231 mg/dL (10 Aug 2023 13:07)  POCT Blood Glucose.: 267 mg/dL (10 Aug 2023 09:47)  POCT Blood Glucose.: 174 mg/dL (09 Aug 2023 22:05)  POCT Blood Glucose.: 346 mg/dL (09 Aug 2023 17:35)        RADIOLOGY & ADDITIONAL TESTS:      Plan of care was discussed with patient and /or primary care giver; all questions and concerns were addressed and care was aligned with patient's wishes.     Patient is a 63y old  Male who presents with a chief complaint of wound infection, and was admitted for hypertensive urgency as well as evaluation for possible osteomyelitis.  (10 Aug 2023 12:32)      INTERVAL HPI/OVERNIGHT EVENTS: offers no new complaints; current symptoms resolving. Pt has been requesting a cane, which is being fulfilled by social work. Has appealed his d/c notice.       MEDICATIONS  (STANDING):  aspirin  chewable 81 milliGRAM(s) Oral daily  atorvastatin 40 milliGRAM(s) Oral at bedtime  cloNIDine 0.2 milliGRAM(s) Oral daily  dextrose 5%. 1000 milliLiter(s) (50 mL/Hr) IV Continuous <Continuous>  dextrose 5%. 1000 milliLiter(s) (100 mL/Hr) IV Continuous <Continuous>  dextrose 50% Injectable 25 Gram(s) IV Push once  dextrose 50% Injectable 25 Gram(s) IV Push once  dextrose 50% Injectable 12.5 Gram(s) IV Push once  glucagon  Injectable 1 milliGRAM(s) IntraMuscular once  insulin glargine Injectable (LANTUS) 14 Unit(s) SubCutaneous at bedtime  insulin lispro (ADMELOG) corrective regimen sliding scale   SubCutaneous three times a day before meals  insulin lispro Injectable (ADMELOG) 6 Unit(s) SubCutaneous three times a day before meals  lactated ringers. 1000 milliLiter(s) (100 mL/Hr) IV Continuous <Continuous>  NIFEdipine  milliGRAM(s) Oral every 24 hours    MEDICATIONS  (PRN):  acetaminophen     Tablet .. 650 milliGRAM(s) Oral every 6 hours PRN Mild Pain (1 - 3), Moderate Pain (4 - 6)  dextrose Oral Gel 15 Gram(s) Oral once PRN Blood Glucose LESS THAN 70 milliGRAM(s)/deciliter      __________________________________________________        Vital Signs Last 24 Hrs  T(C): 36.7 (10 Aug 2023 09:58), Max: 36.7 (10 Aug 2023 09:58)  T(F): 98 (10 Aug 2023 09:58), Max: 98 (10 Aug 2023 09:58)  HR: 78 (10 Aug 2023 09:58) (74 - 86)  BP: 159/90 (10 Aug 2023 09:58) (122/84 - 165/79)  BP(mean): --  RR: 18 (10 Aug 2023 09:58) (17 - 18)  SpO2: 99% (10 Aug 2023 09:58) (97% - 99%)    Parameters below as of 10 Aug 2023 09:58  Patient On (Oxygen Delivery Method): room air        ________________________________________________  PHYSICAL EXAM:  GENERAL: NAD  HEENT: Normocephalic;  conjunctivae and sclerae clear; moist mucous membranes;   NECK : supple  CHEST/LUNG: Clear to auscultation bilaterally with good air entry   HEART: S1 S2  regular; no murmurs, gallops or rubs  ABDOMEN: Soft, Nontender, Nondistended; Bowel sounds present  EXTREMITIES: no cyanosis; no edema; no calf tenderness, left foot in kerlix, right gluteal wound w packing   SKIN: warm and dry; chronic wounds w no active s/s infection ( no erythema, warmth or tenderness)   NERVOUS SYSTEM:  Awake and alert; Oriented  to place, person and time ; no new deficits    _________________________________________________  LABS:                        10.1   12.38 )-----------( 218      ( 09 Aug 2023 05:30 )             32.1     08-09    135  |  101  |  23  ----------------------------<  173<H>  3.2<L>   |  24  |  1.78<H>    Ca    8.7      09 Aug 2023 05:30  Phos  2.9     08-09  Mg     1.9     08-09    TPro  7.3  /  Alb  3.0<L>  /  TBili  0.2  /  DBili  x   /  AST  16  /  ALT  5<L>  /  AlkPhos  110  08-09      Urinalysis Basic - ( 09 Aug 2023 05:30 )    Color: x / Appearance: x / SG: x / pH: x  Gluc: 173 mg/dL / Ketone: x  / Bili: x / Urobili: x   Blood: x / Protein: x / Nitrite: x   Leuk Esterase: x / RBC: x / WBC x   Sq Epi: x / Non Sq Epi: x / Bacteria: x      CAPILLARY BLOOD GLUCOSE      POCT Blood Glucose.: 231 mg/dL (10 Aug 2023 13:07)  POCT Blood Glucose.: 267 mg/dL (10 Aug 2023 09:47)  POCT Blood Glucose.: 174 mg/dL (09 Aug 2023 22:05)  POCT Blood Glucose.: 346 mg/dL (09 Aug 2023 17:35)        RADIOLOGY & ADDITIONAL TESTS:      Plan of care was discussed with patient and /or primary care giver; all questions and concerns were addressed and care was aligned with patient's wishes.

## 2023-08-10 NOTE — PROGRESS NOTE ADULT - PROBLEM SELECTOR PLAN 1
Chronic of 3 years. Patient ambulates with roller and walker, was at [facility] for a few days and left AMA 2/2 not liking the care he received. Presents today as he felt sick again.   CT with stranding and concern for osteo. Per ID, no concern for osteo as wound does not appear infected. Abx have been d/c'd.     Plan:   - patient refused PT evaluation on 8/7  - patient refused wound care evaluation on 8/8  - Per ID recs, abx unnecessary as wound does not appear to be infected  - Wound care saw patient on 8/9, appropriately dressed wounds.  - f/u ID recs, consider MRI hip, surgical/ortho evaluation

## 2023-08-10 NOTE — CHART NOTE - NSCHARTNOTEFT_GEN_A_CORE
Mr. Balbuena has a mobility limitation that impairs his ability to walk unassisted and requires a cane and intermittent wheelchair use. The use of both of these devices will improve his ability ot participate in mradls. (mobility related activities of daily living.) It is my recommendation that Mr. Balbuena be provided with assistive devices stated above. Mr. Balbuena has a mobility limitation that impairs his ability to walk unassisted and requires a cane. The use of of these devices will improve his ability ot participate in mradls. (mobility related activities of daily living.) It is my recommendation that Mr. Balbuena be provided with assistive devices stated above. In order to be a safe discharge patient will need a straight cane.

## 2023-08-10 NOTE — PROGRESS NOTE ADULT - ASSESSMENT
I M    63 year old male, currently undomiciled, w/ DM and h/o osteomyelitis who presents to the ED for evaluation of chronic wounds, found to have severe sepsis and hypertensive urgency - being treated with nifedipine and clonidine.     Nutritional Assessment:  · Nutritional Assessment	This patient has been assessed with a concern for Malnutrition and has been determined to have a diagnosis/diagnoses of Severe protein-calorie malnutrition.    This patient is being managed with:   Diet Consistent Carbohydrate/No Snacks-  Low Sodium  Supplement Feeding Modality:  Oral  Ensure Plus High Protein Cans or Servings Per Day:  1       Frequency:  Two Times a day  Entered: Aug  8 2023 11:39AM    Problem/Plan - 1:  ·  Problem: Gluteal cleft wound.   ·  Plan: Chronic of 3 years. Patient ambulates with roller and walker, was at [facility] for a few days and left AMA 2/2 not liking the care he received. Presents today as he felt sick again.   CT with stranding and concern for osteo. Per ID, no concern for osteo as wound does not appear infected. Abx have been d/c'd.     Plan:   - patient refused PT evaluation on 8/7  - patient refused wound care evaluation on 8/8  - Per ID recs, abx unecessary as wound does not appear to be infected  - Wound care saw patient on 8/9, appropriately dressed wounds.  - f/u ID recs, consider MRI hip, surgical/ortho evaluation.    Problem/Plan - 2:  ·  Problem: Open wound of left foot.   ·  Plan: Chronic, has had osteo in the past. Refused IV antibiotics and was discharged with PO linezolid. Per patient, he did complete course. Wound is worsened from last admission. Last cultures growing corynebacterium.     Plan:   - MRI L. foot 8/7 showed no evidence of osteomyelitis  - Podiatry evaluated, performed excisional debridement of left plantar wound, and sent wound Cx.    Problem/Plan - 3:  ·  Problem: Severe sepsis.   ·  Plan: severe sepsis ruled   only met sirs 1/4 + elevated lactate    received Vanc + Zosyn in ED. Lactate 2.8 on admission, now downtrended after fluids. Source is likely osteomyelitis of the gluteal wound.     Plan:   - f/u BCx  - daily CBC  - ID: no concern for infection. Stop abx.    Problem/Plan - 4:  ·  Problem: Hypertensive urgency.   ·  Plan: Patient w/ BP in ED to 200/111 asymptomatic. Cr was already elevated prior to this BP recording. Received nifedipine 90 mg and clonidine 0.1 mg, both of which are home meds and BP responded appropriately.     Plan:  - nifedipine increased to 120 mg   - clonidine 0.2 mg  - close observation of BPs.    Problem/Plan - 5:  ·  Problem: DEBBY (acute kidney injury).   ·  Plan: Creatinine at baseline appears to be 1.44. On admission - Cr 2.80. Patient s/p 2 L in ED. Voiding spontaneously in ED w/ light yellow urine. Appears to be prenal 2/2 sepsis. Resolved. 08/09 Cr is 1.78.    Problem/Plan - 6:  ·  Problem: IDDM (insulin dependent diabetes mellitus).   ·  Plan: Per patient, takes 10 U lantus and 4-6 premeal at home. A1C 8.2 on 8/8.    Plan:   - will start 4 U premeal   - 12 U basal   - miSS   - consistent carb renal diet.    Problem/Plan - 7:  ·  Problem: CAD (coronary artery disease).   ·  Plan: Patient w/ stent approximately 1 yr ago.   EKG: NSR w/ possible L atrial enlargement   No chest pain on admission, EKG w/o evidence of acute ischemia.   Trops negative.     Plan:  - c.w ASA 81 mg.    Problem/Plan - 8:  ·  Problem: Hyponatremia.   ·  Plan: Patient w/ sodium to 128 on admission. On repeat  after fluids.   Likely hypovolemic hyponatremia.     Plan:   - f/u [facility].    Problem/Plan - 9:  ·  Problem: Healthcare maintenance.   ·  Plan: F: s/p 2 L in ED, per oral   E: K>4, Mg>2   N: Consistent carb, DASH  DVT: heparin subq  D/c: ; home

## 2023-08-11 ENCOUNTER — TRANSCRIPTION ENCOUNTER (OUTPATIENT)
Age: 63
End: 2023-08-11

## 2023-08-11 VITALS
SYSTOLIC BLOOD PRESSURE: 144 MMHG | HEART RATE: 90 BPM | RESPIRATION RATE: 16 BRPM | TEMPERATURE: 98 F | DIASTOLIC BLOOD PRESSURE: 80 MMHG | OXYGEN SATURATION: 96 %

## 2023-08-11 LAB — GLUCOSE BLDC GLUCOMTR-MCNC: 250 MG/DL — HIGH (ref 70–99)

## 2023-08-11 PROCEDURE — 87186 SC STD MICRODIL/AGAR DIL: CPT

## 2023-08-11 PROCEDURE — 83735 ASSAY OF MAGNESIUM: CPT

## 2023-08-11 PROCEDURE — 73630 X-RAY EXAM OF FOOT: CPT

## 2023-08-11 PROCEDURE — 81001 URINALYSIS AUTO W/SCOPE: CPT

## 2023-08-11 PROCEDURE — 83036 HEMOGLOBIN GLYCOSYLATED A1C: CPT

## 2023-08-11 PROCEDURE — 85025 COMPLETE CBC W/AUTO DIFF WBC: CPT

## 2023-08-11 PROCEDURE — 80053 COMPREHEN METABOLIC PANEL: CPT

## 2023-08-11 PROCEDURE — 83935 ASSAY OF URINE OSMOLALITY: CPT

## 2023-08-11 PROCEDURE — 99232 SBSQ HOSP IP/OBS MODERATE 35: CPT | Mod: GC

## 2023-08-11 PROCEDURE — 72192 CT PELVIS W/O DYE: CPT | Mod: MA

## 2023-08-11 PROCEDURE — 36415 COLL VENOUS BLD VENIPUNCTURE: CPT

## 2023-08-11 PROCEDURE — 83930 ASSAY OF BLOOD OSMOLALITY: CPT

## 2023-08-11 PROCEDURE — 73718 MRI LOWER EXTREMITY W/O DYE: CPT

## 2023-08-11 PROCEDURE — 82553 CREATINE MB FRACTION: CPT

## 2023-08-11 PROCEDURE — 83605 ASSAY OF LACTIC ACID: CPT

## 2023-08-11 PROCEDURE — 85652 RBC SED RATE AUTOMATED: CPT

## 2023-08-11 PROCEDURE — 87040 BLOOD CULTURE FOR BACTERIA: CPT

## 2023-08-11 PROCEDURE — 87070 CULTURE OTHR SPECIMN AEROBIC: CPT

## 2023-08-11 PROCEDURE — 84540 ASSAY OF URINE/UREA-N: CPT

## 2023-08-11 PROCEDURE — 82570 ASSAY OF URINE CREATININE: CPT

## 2023-08-11 PROCEDURE — 97161 PT EVAL LOW COMPLEX 20 MIN: CPT

## 2023-08-11 PROCEDURE — 84100 ASSAY OF PHOSPHORUS: CPT

## 2023-08-11 PROCEDURE — 84156 ASSAY OF PROTEIN URINE: CPT

## 2023-08-11 PROCEDURE — 86140 C-REACTIVE PROTEIN: CPT

## 2023-08-11 PROCEDURE — 99285 EMERGENCY DEPT VISIT HI MDM: CPT

## 2023-08-11 PROCEDURE — 96374 THER/PROPH/DIAG INJ IV PUSH: CPT

## 2023-08-11 PROCEDURE — 80076 HEPATIC FUNCTION PANEL: CPT

## 2023-08-11 PROCEDURE — 84484 ASSAY OF TROPONIN QUANT: CPT

## 2023-08-11 PROCEDURE — 82962 GLUCOSE BLOOD TEST: CPT

## 2023-08-11 PROCEDURE — 84133 ASSAY OF URINE POTASSIUM: CPT

## 2023-08-11 PROCEDURE — 96375 TX/PRO/DX INJ NEW DRUG ADDON: CPT

## 2023-08-11 PROCEDURE — 87184 SC STD DISK METHOD PER PLATE: CPT

## 2023-08-11 PROCEDURE — 84300 ASSAY OF URINE SODIUM: CPT

## 2023-08-11 PROCEDURE — 82550 ASSAY OF CK (CPK): CPT

## 2023-08-11 PROCEDURE — 80048 BASIC METABOLIC PNL TOTAL CA: CPT

## 2023-08-11 RX ADMIN — Medication 6 UNIT(S): at 09:54

## 2023-08-11 RX ADMIN — Medication 0.2 MILLIGRAM(S): at 08:50

## 2023-08-11 RX ADMIN — Medication 120 MILLIGRAM(S): at 08:48

## 2023-08-11 RX ADMIN — Medication 4: at 09:54

## 2023-08-11 RX ADMIN — Medication 81 MILLIGRAM(S): at 09:48

## 2023-08-11 NOTE — PROGRESS NOTE ADULT - PROBLEM SELECTOR PLAN 7
Patient w/ stent approximately 1 yr ago.   EKG: NSR w/ possible L atrial enlargement   No chest pain on admission, EKG w/o evidence of acute ischemia.   Trops negative.     Plan:  - c.w ASA 81 mg
Patient w/ stent approximately 1 yr ago.   EKG: NSR w/ possible L atrial enlargement   No chest pain on admission, EKG w/o evidence of acute ischemia.   Trops negative.     Plan:  - c.w ASA 81 mg atorvastatin
Patient w/ stent approximately 1 yr ago.   EKG: NSR w/ possible L atrial enlargement   No chest pain on admission, EKG w/o evidence of acute ischemia.   Trops negative.     Plan:  - c.w ASA 81 mg atorvastatin

## 2023-08-11 NOTE — PROGRESS NOTE ADULT - PROBLEM SELECTOR PLAN 2
Chronic, has had osteo in the past. Refused IV antibiotics and was discharged with PO linezolid. Per patient, he did complete course. Wound is worsened from last admission. Last cultures growing corynebacterium.     Plan:   - MRI L. foot 8/7 showed no evidence of osteomyelitis  - Podiatry evaluated, performed excisional debridement of left plantar wound, and sent wound Cx, which grew several bugs, but per ID, no infection and no need for abx.  - will d/c with appropriate wound care instructions and supplies and f/u with PCP

## 2023-08-11 NOTE — PROGRESS NOTE ADULT - PROBLEM SELECTOR PLAN 4
Patient w/ BP in ED to 200/111 asymptomatic. Cr was already elevated prior to this BP recording. Received nifedipine 90 mg and clonidine 0.1 mg, both of which are home meds and BP responded appropriately.     Plan:  - nifedipine increased to 120 mg   - clonidine 0.2 mg  - close observation of BPs - within normal limits after increase in BP meds

## 2023-08-11 NOTE — PROGRESS NOTE ADULT - PROBLEM SELECTOR PROBLEM 3
[Normal Conjunctiva] : normal conjunctiva [Normal Gait] : normal gait [No Edema] : no edema [Normal] : alert and oriented, normal memory [de-identified] : well appearing [de-identified] : supple [de-identified] : JVP ~ 7 cm H20, RRR, s1, s2, no murmurs Severe sepsis [de-identified] : unlabored respirations, clear lung fields [de-identified] : non-distended

## 2023-08-11 NOTE — PROGRESS NOTE ADULT - SUBJECTIVE AND OBJECTIVE BOX
OVERNIGHT EVENTS: o/n: Verbally aggressive. Refused sacral wound dressing change as per nurse.     SUBJECTIVE:  Patient seen and examined at bedside. Pt upset about the fact that he did not receive a wound dressing change last night. Pt continues to refuse morning labs.     Vital Signs Last 12 Hrs  T(F): 97.8 (08-11-23 @ 08:45), Max: 97.8 (08-11-23 @ 08:45)  HR: 90 (08-11-23 @ 08:45) (90 - 90)  BP: 144/80 (08-11-23 @ 08:45) (144/80 - 144/80)  BP(mean): --  RR: 16 (08-11-23 @ 08:45) (16 - 16)  SpO2: 96% (08-11-23 @ 08:45) (96% - 96%)  I&O's Summary      PHYSICAL EXAM:  GENERAL: NAD  HEENT: Normocephalic;  conjunctivae and sclerae clear; moist mucous membranes;   NECK : supple  CHEST/LUNG: Clear to auscultation bilaterally with good air entry   HEART: S1 S2  regular; no murmurs, gallops or rubs  ABDOMEN: Soft, Nontender, Nondistended; Bowel sounds present  EXTREMITIES: no cyanosis; no edema; no calf tenderness, left foot in kerlix, right gluteal wound w packing   SKIN: warm and dry; chronic wounds w no active s/s infection ( no erythema, warmth or tenderness)   NERVOUS SYSTEM:  Awake and alert; Oriented  to place, person and time ; no new deficits          LABS:                  RADIOLOGY & ADDITIONAL TESTS:    MEDICATIONS  (STANDING):  aspirin  chewable 81 milliGRAM(s) Oral daily  atorvastatin 40 milliGRAM(s) Oral at bedtime  cloNIDine 0.2 milliGRAM(s) Oral daily  dextrose 5%. 1000 milliLiter(s) (50 mL/Hr) IV Continuous <Continuous>  dextrose 5%. 1000 milliLiter(s) (100 mL/Hr) IV Continuous <Continuous>  dextrose 50% Injectable 25 Gram(s) IV Push once  dextrose 50% Injectable 25 Gram(s) IV Push once  dextrose 50% Injectable 12.5 Gram(s) IV Push once  glucagon  Injectable 1 milliGRAM(s) IntraMuscular once  insulin glargine Injectable (LANTUS) 14 Unit(s) SubCutaneous at bedtime  insulin lispro (ADMELOG) corrective regimen sliding scale   SubCutaneous three times a day before meals  insulin lispro Injectable (ADMELOG) 6 Unit(s) SubCutaneous three times a day before meals  NIFEdipine  milliGRAM(s) Oral every 24 hours    MEDICATIONS  (PRN):  acetaminophen     Tablet .. 650 milliGRAM(s) Oral every 6 hours PRN Mild Pain (1 - 3), Moderate Pain (4 - 6)  dextrose Oral Gel 15 Gram(s) Oral once PRN Blood Glucose LESS THAN 70 milliGRAM(s)/deciliter

## 2023-08-11 NOTE — DISCHARGE NOTE NURSING/CASE MANAGEMENT/SOCIAL WORK - NSDCFUADDAPPT_GEN_ALL_CORE_FT
[facility] Internal Medicine Physician  [address], [address] [place] [address]  Phone: [phone]  Follow up date/time: 08/19/2023 at 11:15am      Mendoza Lombardi  [facility]  [address]  Scheduled Appointment: 09/05/2023 at 1:30pm

## 2023-08-11 NOTE — DISCHARGE NOTE NURSING/CASE MANAGEMENT/SOCIAL WORK - NSDCPEFALRISK_GEN_ALL_CORE
For information on Fall & Injury Prevention, visit: [email or web address] OR  [email or web address] OR  https://www.cdc.gov/steadi/patient.html

## 2023-08-11 NOTE — PROGRESS NOTE ADULT - ASSESSMENT
Patient is a 63 year old male, currently undomiciled, w/ DM and h/o osteomyelitis who presents to the ED for evaluation of chronic wounds, found to have severe sepsis and hypertensive urgency - being treated with nifedipine and clonidine. Pt received d/c notice, appealed notice and was denied.

## 2023-08-11 NOTE — PROGRESS NOTE ADULT - PROBLEM/PLAN-1
Repeat liver labs every six months with Dr. Blake.   
DISPLAY PLAN FREE TEXT

## 2023-08-11 NOTE — PROGRESS NOTE ADULT - ATTENDING COMMENTS
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   wbc 15 k, lactate 2.8-- trended down to 1.2 after IVF.   DEBBY on admission w cr 2.8-- down to 2.5- 2 --1.78  Infection source was suspected osteomyelitis from left foot or chronic gluteal wound. MRI negative for OM- no MRi for Gluteal wound obtain as there is low suspicion for active infection - Pt rcvd 2 days of abx- discontinued after ID saw pt. Leukocytosis trended down, pt remains afebrile,   Pt has NO PT NEEDS. seen by wound care appreciate recs   Hyponatremia: 128 on admission resolved after ivf   HTN emergency: DEBYB improving. will cw nifedipine 120 qd and clonidine home dose .2 qd.   CAD: cw asa, statin  pt is ready for discharge - received dc notice 8/9 pt refusing to leave today- 2nd appeal 8/10
Patient received discharged notice yesterday   Please see attestation to discharge note.   Today,  patient is not having any fever, chest pain, dyspnea, nausea, or dysuria. Tolerating PO intake - ate full plate of breakfast. Patient is agreeable to leaving hospital with cane, car service.
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   wbc 15 k, lactate 2.8-- trended down to 1.2 after IVF.   DEBBY on admission w cr 2.8-- down to 2.5- 2 --1.78  Infection source was suspected osteomyelitis from left foot or chronic gluteal wound. MRI negative for OM- no MRi for Gluteal wound obtain as there is low suspicion for active infection - Pt rcvd 2 days of abx- discontinued after ID saw pt. Leukocytosis trended down, pt remains afebrile,   Pt has NO PT NEEDS. seen by wound care appreciate recs   Hyponatremi: 128 on admission resolved after ivf   HTN emergency: DEBBY improving. will cw nifedipine 120 qd and clonidine home dose .2 qd.   CAD: cw asa, statin  pt is ready for discharge - recieved dc notice if refusing to leave today
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   wbc 15 k, lactate 2.8-- trended down to 1.2 after IVF.   DEBBY on admission w cr 2.8-- down to 2.5- 2   Infection source was suspected osteomyelitis from left foot or chronic gluteal wound. MRI negative for OM- no MRi for Gluteal wound obtain as there is low suspicion for active infection - Pt rcvd 2 days of abx- discontinued after ID saw pt. Leukocytosis trended down, pt remains afebrile,   Pt refused wound care practioner to examine him and was disrespected to WC and PT   Hyponatremi: 128 on admission resolved after ivf   HTN emergency: DEBBY improving. will cw nifedipine 90 qd and clonidine home dose .2 qd. ICU recs appreciated  CAD: cw asa, statin  pt is ready for discharge - will give dc notice if refusing to leave today

## 2023-08-11 NOTE — DISCHARGE NOTE NURSING/CASE MANAGEMENT/SOCIAL WORK - PATIENT PORTAL LINK FT
You can access the FollowMyHealth Patient Portal offered by [facility] by registering at the following website: [email or web address]. By joining [facility]’s FollowMyHealth portal, you will also be able to view your health information using other applications (apps) compatible with our system.

## 2023-08-11 NOTE — PROGRESS NOTE ADULT - PROBLEM SELECTOR PLAN 9
F: s/p 2 L in ED, per oral   E: K>4, Mg>2   N: Consistent carb, DASH  DVT: heparin subq  D/c: street

## 2023-08-11 NOTE — PROGRESS NOTE ADULT - PROBLEM SELECTOR PROBLEM 6
IDDM (insulin dependent diabetes mellitus)

## 2023-08-11 NOTE — PROGRESS NOTE ADULT - PROBLEM SELECTOR PLAN 1
Chronic of 3 years. Patient ambulates with roller and walker, was at [facility] for a few days and left AMA 2/2 not liking the care he received. Presents today as he felt sick again.   CT with stranding and concern for osteo. Per ID, no concern for osteo as wound does not appear infected. Abx have been d/c'd.     Plan:   - patient refused PT evaluation on 8/7  - patient refused wound care evaluation on 8/8  - Per ID recs, abx unnecessary as wound does not appear to be infected  - Wound care saw patient on 8/9, appropriately dressed wounds.  - f/u ID recs, consider MRI hip, surgical/ortho evaluation  - will d/c with appropriate instructions and supplies

## 2023-08-11 NOTE — PROGRESS NOTE ADULT - SUBJECTIVE AND OBJECTIVE BOX
Patient is a 63y old  Male who presents with a chief complaint of wound infection (10 Aug 2023 15:16)      INTERVAL HPI/ OVERNIGHT EVENTS: Pt evaluated this morning. Verbally abusive and cursing during dressing changes. Insists on applying tegaderm and butterfly pad on foot wound. No other complaints.       LABS              ICU Vital Signs Last 24 Hrs  T(C): 36.6 (11 Aug 2023 08:45), Max: 36.6 (10 Aug 2023 20:35)  T(F): 97.8 (11 Aug 2023 08:45), Max: 97.8 (10 Aug 2023 20:35)  HR: 90 (11 Aug 2023 08:45) (77 - 90)  BP: 144/80 (11 Aug 2023 08:45) (134/75 - 152/74)  BP(mean): --  ABP: --  ABP(mean): --  RR: 16 (11 Aug 2023 08:45) (16 - 18)  SpO2: 96% (11 Aug 2023 08:45) (95% - 98%)    O2 Parameters below as of 11 Aug 2023 08:45  Patient On (Oxygen Delivery Method): room air          RADIOLOGY  < from: MR Foot No Cont, Left (08.07.23 @ 22:33) >  IMPRESSION:    Moderately limited exam by susceptibility artifact and poor fat   suppression. No confluent decreased T1 signal or significant erosive   change to suggest osteomyelitis. If clinical concern persists, nuclear   medicine study may be performed.  Midfoot arthrosis.  Hardware at the medial midfoot limited by susceptibility artifact.    < end of copied text >    MICROBIOLOGY    Culture - Other (collected 07 Aug 2023 12:01)  Source: Wound L foot submet 2 wound  Gram Stain (07 Aug 2023 16:11):    Rare Gram Positive Cocci in Clusters    Rare to few Gram Positive Rods    Few WBC's  Preliminary Report (08 Aug 2023 15:37):    Few Escherichia coli    Few Streptococcus agalactiae (Group B)    Few Staphylococcus simulans    Few Corynebacterium striatum group    Culture in progress    Urinalysis with Rflx Culture (collected 07 Aug 2023 03:50)    Urinalysis with Rflx Culture (collected 06 Aug 2023 23:57)    Culture - Blood (collected 06 Aug 2023 23:57)  Source: .Blood Blood  Preliminary Report (08 Aug 2023 14:00):    No growth at 1 day.    Culture - Blood (collected 06 Aug 2023 23:57)  Source: .Blood Blood  Preliminary Report (08 Aug 2023 14:00):    No growth at 1 day.        PHYSICAL EXAM  Lower Extremity Focused  Vasc: 1/4 DP/PT. negative for edema   Derm: L foot subMT 2/3  wound (3.0 x 3.0 cm), granular base, negative for undermining, malodor, purulent drainage or signs of infection. Negative PTB.  Diffuse Dry scaly skin present to b/l feet in mocassin distribution.   R Foot great toe nail with dried superficial hematoma, no open lesions.   Neuro: Protective sensation absent  MSK: 5/5 MMT b/l. -TTP of palpation to L foot.

## 2023-08-11 NOTE — PROGRESS NOTE ADULT - ASSESSMENT
63 yo M pmhx DM, HTN, CAD, Hep C s/p treatment p/w L foot non-healing ulcer. 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 Pt cannot recall who his surgeon was or when exactly it was done, but notes it was at [facility]. Podiatry consulted for evaluation of wound, r/o OM, r/o gas. Of note XR wet read hardware in L 1st TMT joint, broken screws present. Evidence of previous 4th and 5th partial MT resection. No evidence of gas. No significant xr findings of the R foot. Pt labs with elevated wbc at 14.53 and ESR 62. Prior bone biopsy in January was of the left 5th digit. Low suspicion of cellulitis of the left foot, likely not source of leukocytosis. Upon re-evaluation of wound 8/8, soft tissue stop noted with the PTB test, low concern for OM. MRI final read negative for OM.      Plan:  - Pt evaluated, chart reviewed  - c/w IV abx   - Local wound care: Betadine DSD to L plantar wound, wrapped with DSD, Kerlix  - f/u final Left foot plantar foot deep wound cx   - WBAT w/ cane to L foot  - rest of care per primary team    Discharge dressing instructions: Cleanse wound with normal sailine daily, pat dry with sterile guaze, apply  betadine soaked gauze to wound base, cover with dry sterile gauze, wrap with Kerlix and light ACE bandage.     Pt can follow up with the clinic of his choice listed below:     [facility] at the [employer]  [address] [place] [address] [id number] [phone]     [facility]   Address: [address] [place] [address]  Phone: [phone]    Patient should follow up with Dr. Tacos Nix within 1 week of discharge.    Office information:          [place] Address- [address], [place] [address] Phone: [phone]         [place] Address- [address], [place] [address] Phone: [phone]

## 2023-08-11 NOTE — PROGRESS NOTE ADULT - NUTRITIONAL ASSESSMENT
This patient has been assessed with a concern for Malnutrition and has been determined to have a diagnosis/diagnoses of Severe protein-calorie malnutrition.    This patient is being managed with:   Diet Consistent Carbohydrate/No Snacks-  Low Sodium  Entered: Aug  7 2023  6:46AM  
This patient has been assessed with a concern for Malnutrition and has been determined to have a diagnosis/diagnoses of Severe protein-calorie malnutrition.    This patient is being managed with:   Diet Consistent Carbohydrate/No Snacks-  Low Sodium  Supplement Feeding Modality:  Oral  Ensure Plus High Protein Cans or Servings Per Day:  1       Frequency:  Two Times a day  Entered: Aug  8 2023 11:39AM  

## 2023-08-11 NOTE — PROGRESS NOTE ADULT - PROVIDER SPECIALTY LIST ADULT
Podiatry
Rehab Medicine
Internal Medicine

## 2023-08-12 LAB
CULTURE RESULTS: SIGNIFICANT CHANGE UP
CULTURE RESULTS: SIGNIFICANT CHANGE UP
SPECIMEN SOURCE: SIGNIFICANT CHANGE UP
SPECIMEN SOURCE: SIGNIFICANT CHANGE UP

## 2023-08-18 ENCOUNTER — APPOINTMENT (OUTPATIENT)
Age: 63
End: 2023-08-18

## 2023-08-21 ENCOUNTER — INPATIENT (INPATIENT)
Facility: HOSPITAL | Age: 63
LOS: 1 days | Discharge: HOME | DRG: 682 | End: 2023-08-23
Attending: STUDENT IN AN ORGANIZED HEALTH CARE EDUCATION/TRAINING PROGRAM | Admitting: STUDENT IN AN ORGANIZED HEALTH CARE EDUCATION/TRAINING PROGRAM
Payer: MEDICAID

## 2023-08-21 VITALS
TEMPERATURE: 97 F | SYSTOLIC BLOOD PRESSURE: 170 MMHG | OXYGEN SATURATION: 97 % | RESPIRATION RATE: 16 BRPM | DIASTOLIC BLOOD PRESSURE: 100 MMHG | WEIGHT: 199.96 LBS | HEART RATE: 98 BPM

## 2023-08-21 DIAGNOSIS — Z90.49 ACQUIRED ABSENCE OF OTHER SPECIFIED PARTS OF DIGESTIVE TRACT: Chronic | ICD-10-CM

## 2023-08-21 DIAGNOSIS — Z89.422 ACQUIRED ABSENCE OF OTHER LEFT TOE(S): Chronic | ICD-10-CM

## 2023-08-21 DIAGNOSIS — E11.9 TYPE 2 DIABETES MELLITUS WITHOUT COMPLICATIONS: ICD-10-CM

## 2023-08-21 DIAGNOSIS — S91.302A UNSPECIFIED OPEN WOUND, LEFT FOOT, INITIAL ENCOUNTER: ICD-10-CM

## 2023-08-21 DIAGNOSIS — E87.6 HYPOKALEMIA: ICD-10-CM

## 2023-08-21 DIAGNOSIS — D72.829 ELEVATED WHITE BLOOD CELL COUNT, UNSPECIFIED: ICD-10-CM

## 2023-08-21 DIAGNOSIS — N17.9 ACUTE KIDNEY FAILURE, UNSPECIFIED: ICD-10-CM

## 2023-08-21 DIAGNOSIS — R53.1 WEAKNESS: ICD-10-CM

## 2023-08-21 DIAGNOSIS — I10 ESSENTIAL (PRIMARY) HYPERTENSION: ICD-10-CM

## 2023-08-21 DIAGNOSIS — I25.10 ATHEROSCLEROTIC HEART DISEASE OF NATIVE CORONARY ARTERY WITHOUT ANGINA PECTORIS: ICD-10-CM

## 2023-08-21 DIAGNOSIS — Z29.9 ENCOUNTER FOR PROPHYLACTIC MEASURES, UNSPECIFIED: ICD-10-CM

## 2023-08-21 DIAGNOSIS — S31.809A UNSPECIFIED OPEN WOUND OF UNSPECIFIED BUTTOCK, INITIAL ENCOUNTER: ICD-10-CM

## 2023-08-21 LAB
ALBUMIN SERPL ELPH-MCNC: 3.6 G/DL — SIGNIFICANT CHANGE UP (ref 3.3–5)
ALP SERPL-CCNC: 131 U/L — HIGH (ref 40–120)
ALT FLD-CCNC: <5 U/L — LOW (ref 10–45)
AMPHET UR-MCNC: NEGATIVE — SIGNIFICANT CHANGE UP
ANION GAP SERPL CALC-SCNC: 11 MMOL/L — SIGNIFICANT CHANGE UP (ref 5–17)
ANION GAP SERPL CALC-SCNC: 11 MMOL/L — SIGNIFICANT CHANGE UP (ref 5–17)
ANION GAP SERPL CALC-SCNC: 7 MMOL/L — SIGNIFICANT CHANGE UP (ref 5–17)
APPEARANCE UR: CLEAR — SIGNIFICANT CHANGE UP
AST SERPL-CCNC: 15 U/L — SIGNIFICANT CHANGE UP (ref 10–40)
B-OH-BUTYR SERPL-SCNC: 0.3 MMOL/L — SIGNIFICANT CHANGE UP
BACTERIA # UR AUTO: SIGNIFICANT CHANGE UP /HPF
BARBITURATES UR SCN-MCNC: NEGATIVE — SIGNIFICANT CHANGE UP
BASE EXCESS BLDV CALC-SCNC: 4.7 MMOL/L — HIGH (ref -2–3)
BASOPHILS # BLD AUTO: 0.05 K/UL — SIGNIFICANT CHANGE UP (ref 0–0.2)
BASOPHILS # BLD AUTO: 0.07 K/UL — SIGNIFICANT CHANGE UP (ref 0–0.2)
BASOPHILS NFR BLD AUTO: 0.4 % — SIGNIFICANT CHANGE UP (ref 0–2)
BASOPHILS NFR BLD AUTO: 0.5 % — SIGNIFICANT CHANGE UP (ref 0–2)
BENZODIAZ UR-MCNC: NEGATIVE — SIGNIFICANT CHANGE UP
BILIRUB SERPL-MCNC: 0.2 MG/DL — SIGNIFICANT CHANGE UP (ref 0.2–1.2)
BILIRUB UR-MCNC: NEGATIVE — SIGNIFICANT CHANGE UP
BUN SERPL-MCNC: 26 MG/DL — HIGH (ref 7–23)
BUN SERPL-MCNC: 29 MG/DL — HIGH (ref 7–23)
BUN SERPL-MCNC: 30 MG/DL — HIGH (ref 7–23)
CA-I SERPL-SCNC: 1.19 MMOL/L — SIGNIFICANT CHANGE UP (ref 1.15–1.33)
CALCIUM SERPL-MCNC: 8.9 MG/DL — SIGNIFICANT CHANGE UP (ref 8.4–10.5)
CALCIUM SERPL-MCNC: 9.4 MG/DL — SIGNIFICANT CHANGE UP (ref 8.4–10.5)
CALCIUM SERPL-MCNC: 9.6 MG/DL — SIGNIFICANT CHANGE UP (ref 8.4–10.5)
CHLORIDE SERPL-SCNC: 104 MMOL/L — SIGNIFICANT CHANGE UP (ref 96–108)
CHLORIDE SERPL-SCNC: 93 MMOL/L — LOW (ref 96–108)
CHLORIDE SERPL-SCNC: 98 MMOL/L — SIGNIFICANT CHANGE UP (ref 96–108)
CO2 BLDV-SCNC: 32.5 MMOL/L — HIGH (ref 22–26)
CO2 SERPL-SCNC: 24 MMOL/L — SIGNIFICANT CHANGE UP (ref 22–31)
CO2 SERPL-SCNC: 28 MMOL/L — SIGNIFICANT CHANGE UP (ref 22–31)
CO2 SERPL-SCNC: 28 MMOL/L — SIGNIFICANT CHANGE UP (ref 22–31)
COCAINE METAB.OTHER UR-MCNC: NEGATIVE — SIGNIFICANT CHANGE UP
COLOR SPEC: YELLOW — SIGNIFICANT CHANGE UP
CREAT SERPL-MCNC: 2.61 MG/DL — HIGH (ref 0.5–1.3)
CREAT SERPL-MCNC: 2.83 MG/DL — HIGH (ref 0.5–1.3)
CREAT SERPL-MCNC: 3.08 MG/DL — HIGH (ref 0.5–1.3)
CRP SERPL-MCNC: 21.8 MG/L — HIGH (ref 0–4)
DIFF PNL FLD: ABNORMAL
EGFR: 22 ML/MIN/1.73M2 — LOW
EGFR: 25 ML/MIN/1.73M2 — LOW
EGFR: 27 ML/MIN/1.73M2 — LOW
EOSINOPHIL # BLD AUTO: 0.24 K/UL — SIGNIFICANT CHANGE UP (ref 0–0.5)
EOSINOPHIL # BLD AUTO: 0.49 K/UL — SIGNIFICANT CHANGE UP (ref 0–0.5)
EOSINOPHIL NFR BLD AUTO: 1.7 % — SIGNIFICANT CHANGE UP (ref 0–6)
EOSINOPHIL NFR BLD AUTO: 3.8 % — SIGNIFICANT CHANGE UP (ref 0–6)
EPI CELLS # UR: SIGNIFICANT CHANGE UP /HPF (ref 0–5)
ERYTHROCYTE [SEDIMENTATION RATE] IN BLOOD: 78 MM/HR — HIGH
GAS PNL BLDV: 131 MMOL/L — LOW (ref 136–145)
GAS PNL BLDV: SIGNIFICANT CHANGE UP
GLUCOSE BLDC GLUCOMTR-MCNC: 398 MG/DL — HIGH (ref 70–99)
GLUCOSE BLDC GLUCOMTR-MCNC: 74 MG/DL — SIGNIFICANT CHANGE UP (ref 70–99)
GLUCOSE SERPL-MCNC: 323 MG/DL — HIGH (ref 70–99)
GLUCOSE SERPL-MCNC: 412 MG/DL — HIGH (ref 70–99)
GLUCOSE SERPL-MCNC: 73 MG/DL — SIGNIFICANT CHANGE UP (ref 70–99)
GLUCOSE UR QL: >=1000
HCO3 BLDV-SCNC: 31 MMOL/L — HIGH (ref 22–29)
HCT VFR BLD CALC: 31.5 % — LOW (ref 39–50)
HCT VFR BLD CALC: 31.7 % — LOW (ref 39–50)
HCT VFR BLD CALC: 34.3 % — LOW (ref 39–50)
HGB BLD-MCNC: 10.7 G/DL — LOW (ref 13–17)
HGB BLD-MCNC: 9.9 G/DL — LOW (ref 13–17)
HGB BLD-MCNC: 9.9 G/DL — LOW (ref 13–17)
IMM GRANULOCYTES NFR BLD AUTO: 0.4 % — SIGNIFICANT CHANGE UP (ref 0–0.9)
IMM GRANULOCYTES NFR BLD AUTO: 0.5 % — SIGNIFICANT CHANGE UP (ref 0–0.9)
KETONES UR-MCNC: NEGATIVE — SIGNIFICANT CHANGE UP
LACTATE SERPL-SCNC: 1.5 MMOL/L — SIGNIFICANT CHANGE UP (ref 0.5–2)
LEUKOCYTE ESTERASE UR-ACNC: NEGATIVE — SIGNIFICANT CHANGE UP
LIDOCAIN IGE QN: 23 U/L — SIGNIFICANT CHANGE UP (ref 7–60)
LYMPHOCYTES # BLD AUTO: 1.78 K/UL — SIGNIFICANT CHANGE UP (ref 1–3.3)
LYMPHOCYTES # BLD AUTO: 12.7 % — LOW (ref 13–44)
LYMPHOCYTES # BLD AUTO: 25.5 % — SIGNIFICANT CHANGE UP (ref 13–44)
LYMPHOCYTES # BLD AUTO: 3.27 K/UL — SIGNIFICANT CHANGE UP (ref 1–3.3)
MCHC RBC-ENTMCNC: 23.6 PG — LOW (ref 27–34)
MCHC RBC-ENTMCNC: 23.7 PG — LOW (ref 27–34)
MCHC RBC-ENTMCNC: 23.9 PG — LOW (ref 27–34)
MCHC RBC-ENTMCNC: 31.2 GM/DL — LOW (ref 32–36)
MCHC RBC-ENTMCNC: 31.2 GM/DL — LOW (ref 32–36)
MCHC RBC-ENTMCNC: 31.4 GM/DL — LOW (ref 32–36)
MCV RBC AUTO: 75.2 FL — LOW (ref 80–100)
MCV RBC AUTO: 76 FL — LOW (ref 80–100)
MCV RBC AUTO: 76.7 FL — LOW (ref 80–100)
METHADONE UR-MCNC: NEGATIVE — SIGNIFICANT CHANGE UP
MONOCYTES # BLD AUTO: 0.8 K/UL — SIGNIFICANT CHANGE UP (ref 0–0.9)
MONOCYTES # BLD AUTO: 0.84 K/UL — SIGNIFICANT CHANGE UP (ref 0–0.9)
MONOCYTES NFR BLD AUTO: 5.7 % — SIGNIFICANT CHANGE UP (ref 2–14)
MONOCYTES NFR BLD AUTO: 6.6 % — SIGNIFICANT CHANGE UP (ref 2–14)
NEUTROPHILS # BLD AUTO: 11.09 K/UL — HIGH (ref 1.8–7.4)
NEUTROPHILS # BLD AUTO: 8.1 K/UL — HIGH (ref 1.8–7.4)
NEUTROPHILS NFR BLD AUTO: 63.2 % — SIGNIFICANT CHANGE UP (ref 43–77)
NEUTROPHILS NFR BLD AUTO: 79 % — HIGH (ref 43–77)
NITRITE UR-MCNC: NEGATIVE — SIGNIFICANT CHANGE UP
NRBC # BLD: 0 /100 WBCS — SIGNIFICANT CHANGE UP (ref 0–0)
OPIATES UR-MCNC: NEGATIVE — SIGNIFICANT CHANGE UP
OSMOLALITY SERPL: 302 MOSM/KG — HIGH (ref 280–301)
PCO2 BLDV: 51 MMHG — SIGNIFICANT CHANGE UP (ref 42–55)
PCP SPEC-MCNC: SIGNIFICANT CHANGE UP
PCP UR-MCNC: NEGATIVE — SIGNIFICANT CHANGE UP
PH BLDV: 7.39 — SIGNIFICANT CHANGE UP (ref 7.32–7.43)
PH UR: 7 — SIGNIFICANT CHANGE UP (ref 5–8)
PLATELET # BLD AUTO: 240 K/UL — SIGNIFICANT CHANGE UP (ref 150–400)
PLATELET # BLD AUTO: 257 K/UL — SIGNIFICANT CHANGE UP (ref 150–400)
PLATELET # BLD AUTO: 275 K/UL — SIGNIFICANT CHANGE UP (ref 150–400)
PO2 BLDV: <33 MMHG — SIGNIFICANT CHANGE UP (ref 25–45)
POTASSIUM BLDV-SCNC: 3.5 MMOL/L — SIGNIFICANT CHANGE UP (ref 3.5–5.1)
POTASSIUM SERPL-MCNC: 3.4 MMOL/L — LOW (ref 3.5–5.3)
POTASSIUM SERPL-MCNC: 3.5 MMOL/L — SIGNIFICANT CHANGE UP (ref 3.5–5.3)
POTASSIUM SERPL-MCNC: 3.5 MMOL/L — SIGNIFICANT CHANGE UP (ref 3.5–5.3)
POTASSIUM SERPL-SCNC: 3.4 MMOL/L — LOW (ref 3.5–5.3)
POTASSIUM SERPL-SCNC: 3.5 MMOL/L — SIGNIFICANT CHANGE UP (ref 3.5–5.3)
POTASSIUM SERPL-SCNC: 3.5 MMOL/L — SIGNIFICANT CHANGE UP (ref 3.5–5.3)
PROT SERPL-MCNC: 8.3 G/DL — SIGNIFICANT CHANGE UP (ref 6–8.3)
PROT UR-MCNC: 100 MG/DL
RBC # BLD: 4.17 M/UL — LOW (ref 4.2–5.8)
RBC # BLD: 4.19 M/UL — LOW (ref 4.2–5.8)
RBC # BLD: 4.47 M/UL — SIGNIFICANT CHANGE UP (ref 4.2–5.8)
RBC # FLD: 15.3 % — HIGH (ref 10.3–14.5)
RBC # FLD: 15.3 % — HIGH (ref 10.3–14.5)
RBC # FLD: 15.5 % — HIGH (ref 10.3–14.5)
RBC CASTS # UR COMP ASSIST: < 5 /HPF — SIGNIFICANT CHANGE UP
SAO2 % BLDV: 46.6 % — LOW (ref 67–88)
SODIUM SERPL-SCNC: 132 MMOL/L — LOW (ref 135–145)
SODIUM SERPL-SCNC: 133 MMOL/L — LOW (ref 135–145)
SODIUM SERPL-SCNC: 139 MMOL/L — SIGNIFICANT CHANGE UP (ref 135–145)
SP GR SPEC: 1.01 — SIGNIFICANT CHANGE UP (ref 1–1.03)
THC UR QL: POSITIVE
UROBILINOGEN FLD QL: 0.2 E.U./DL — SIGNIFICANT CHANGE UP
WBC # BLD: 12.82 K/UL — HIGH (ref 3.8–10.5)
WBC # BLD: 14.03 K/UL — HIGH (ref 3.8–10.5)
WBC # BLD: 15.68 K/UL — HIGH (ref 3.8–10.5)
WBC # FLD AUTO: 12.82 K/UL — HIGH (ref 3.8–10.5)
WBC # FLD AUTO: 14.03 K/UL — HIGH (ref 3.8–10.5)
WBC # FLD AUTO: 15.68 K/UL — HIGH (ref 3.8–10.5)
WBC UR QL: < 5 /HPF — SIGNIFICANT CHANGE UP

## 2023-08-21 PROCEDURE — 99285 EMERGENCY DEPT VISIT HI MDM: CPT | Mod: 25

## 2023-08-21 PROCEDURE — 99223 1ST HOSP IP/OBS HIGH 75: CPT | Mod: GC

## 2023-08-21 PROCEDURE — 71045 X-RAY EXAM CHEST 1 VIEW: CPT | Mod: 26

## 2023-08-21 PROCEDURE — 73630 X-RAY EXAM OF FOOT: CPT | Mod: 26,LT,RT

## 2023-08-21 RX ORDER — DEXTROSE 50 % IN WATER 50 %
25 SYRINGE (ML) INTRAVENOUS ONCE
Refills: 0 | Status: DISCONTINUED | OUTPATIENT
Start: 2023-08-21 | End: 2023-08-23

## 2023-08-21 RX ORDER — SODIUM CHLORIDE 9 MG/ML
1000 INJECTION INTRAMUSCULAR; INTRAVENOUS; SUBCUTANEOUS ONCE
Refills: 0 | Status: COMPLETED | OUTPATIENT
Start: 2023-08-21 | End: 2023-08-21

## 2023-08-21 RX ORDER — POTASSIUM CHLORIDE 20 MEQ
40 PACKET (EA) ORAL ONCE
Refills: 0 | Status: COMPLETED | OUTPATIENT
Start: 2023-08-21 | End: 2023-08-21

## 2023-08-21 RX ORDER — NIFEDIPINE 30 MG
60 TABLET, EXTENDED RELEASE 24 HR ORAL EVERY 12 HOURS
Refills: 0 | Status: DISCONTINUED | OUTPATIENT
Start: 2023-08-21 | End: 2023-08-21

## 2023-08-21 RX ORDER — INSULIN GLARGINE 100 [IU]/ML
14 INJECTION, SOLUTION SUBCUTANEOUS AT BEDTIME
Refills: 0 | Status: DISCONTINUED | OUTPATIENT
Start: 2023-08-21 | End: 2023-08-23

## 2023-08-21 RX ORDER — LANOLIN ALCOHOL/MO/W.PET/CERES
3 CREAM (GRAM) TOPICAL AT BEDTIME
Refills: 0 | Status: DISCONTINUED | OUTPATIENT
Start: 2023-08-21 | End: 2023-08-23

## 2023-08-21 RX ORDER — SODIUM CHLORIDE 9 MG/ML
1000 INJECTION, SOLUTION INTRAVENOUS
Refills: 0 | Status: DISCONTINUED | OUTPATIENT
Start: 2023-08-21 | End: 2023-08-23

## 2023-08-21 RX ORDER — ONDANSETRON 8 MG/1
4 TABLET, FILM COATED ORAL EVERY 8 HOURS
Refills: 0 | Status: DISCONTINUED | OUTPATIENT
Start: 2023-08-21 | End: 2023-08-23

## 2023-08-21 RX ORDER — DEXTROSE 50 % IN WATER 50 %
15 SYRINGE (ML) INTRAVENOUS ONCE
Refills: 0 | Status: DISCONTINUED | OUTPATIENT
Start: 2023-08-21 | End: 2023-08-23

## 2023-08-21 RX ORDER — ACETAMINOPHEN 500 MG
650 TABLET ORAL EVERY 6 HOURS
Refills: 0 | Status: DISCONTINUED | OUTPATIENT
Start: 2023-08-21 | End: 2023-08-23

## 2023-08-21 RX ORDER — INSULIN LISPRO 100/ML
7 VIAL (ML) SUBCUTANEOUS
Refills: 0 | Status: DISCONTINUED | OUTPATIENT
Start: 2023-08-21 | End: 2023-08-23

## 2023-08-21 RX ORDER — GLUCAGON INJECTION, SOLUTION 0.5 MG/.1ML
1 INJECTION, SOLUTION SUBCUTANEOUS ONCE
Refills: 0 | Status: DISCONTINUED | OUTPATIENT
Start: 2023-08-21 | End: 2023-08-23

## 2023-08-21 RX ORDER — DEXTROSE 50 % IN WATER 50 %
12.5 SYRINGE (ML) INTRAVENOUS ONCE
Refills: 0 | Status: DISCONTINUED | OUTPATIENT
Start: 2023-08-21 | End: 2023-08-23

## 2023-08-21 RX ORDER — SODIUM CHLORIDE 9 MG/ML
1000 INJECTION, SOLUTION INTRAVENOUS
Refills: 0 | Status: DISCONTINUED | OUTPATIENT
Start: 2023-08-21 | End: 2023-08-22

## 2023-08-21 RX ORDER — ENOXAPARIN SODIUM 100 MG/ML
40 INJECTION SUBCUTANEOUS EVERY 24 HOURS
Refills: 0 | Status: DISCONTINUED | OUTPATIENT
Start: 2023-08-21 | End: 2023-08-23

## 2023-08-21 RX ORDER — ONDANSETRON 8 MG/1
4 TABLET, FILM COATED ORAL ONCE
Refills: 0 | Status: COMPLETED | OUTPATIENT
Start: 2023-08-21 | End: 2023-08-21

## 2023-08-21 RX ORDER — INSULIN LISPRO 100/ML
VIAL (ML) SUBCUTANEOUS
Refills: 0 | Status: DISCONTINUED | OUTPATIENT
Start: 2023-08-21 | End: 2023-08-23

## 2023-08-21 RX ORDER — ASPIRIN/CALCIUM CARB/MAGNESIUM 324 MG
81 TABLET ORAL DAILY
Refills: 0 | Status: DISCONTINUED | OUTPATIENT
Start: 2023-08-21 | End: 2023-08-23

## 2023-08-21 RX ORDER — NIFEDIPINE 30 MG
60 TABLET, EXTENDED RELEASE 24 HR ORAL EVERY 12 HOURS
Refills: 0 | Status: DISCONTINUED | OUTPATIENT
Start: 2023-08-21 | End: 2023-08-23

## 2023-08-21 RX ORDER — FAMOTIDINE 10 MG/ML
20 INJECTION INTRAVENOUS ONCE
Refills: 0 | Status: COMPLETED | OUTPATIENT
Start: 2023-08-21 | End: 2023-08-21

## 2023-08-21 RX ADMIN — Medication 0.2 MILLIGRAM(S): at 22:12

## 2023-08-21 RX ADMIN — SODIUM CHLORIDE 1000 MILLILITER(S): 9 INJECTION INTRAMUSCULAR; INTRAVENOUS; SUBCUTANEOUS at 07:52

## 2023-08-21 RX ADMIN — Medication 10: at 19:04

## 2023-08-21 RX ADMIN — Medication 60 MILLIGRAM(S): at 22:12

## 2023-08-21 RX ADMIN — Medication 40 MILLIEQUIVALENT(S): at 11:56

## 2023-08-21 RX ADMIN — Medication 7 UNIT(S): at 19:04

## 2023-08-21 RX ADMIN — ONDANSETRON 4 MILLIGRAM(S): 8 TABLET, FILM COATED ORAL at 07:52

## 2023-08-21 RX ADMIN — Medication 60 MILLIGRAM(S): at 12:37

## 2023-08-21 RX ADMIN — SODIUM CHLORIDE 1000 MILLILITER(S): 9 INJECTION INTRAMUSCULAR; INTRAVENOUS; SUBCUTANEOUS at 09:18

## 2023-08-21 RX ADMIN — SODIUM CHLORIDE 130 MILLILITER(S): 9 INJECTION, SOLUTION INTRAVENOUS at 19:05

## 2023-08-21 RX ADMIN — Medication 0.2 MILLIGRAM(S): at 12:42

## 2023-08-21 NOTE — H&P ADULT - PROBLEM SELECTOR PLAN 6
Per patient, takes Humalog TID and Lantus 12 U at bedtime. A1c 8.2, 8/8. Glucose on admission 323. Last admission pt on 6 U pre meal and 14 U basal.   - Start pt on 14 U Lantus and 7 U Lispro  - Consistent carbohydrate diet

## 2023-08-21 NOTE — H&P ADULT - ATTENDING COMMENTS
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 On admission patient had elevated BP values to 200/100s and significant hyperglycemia. On Labs WBC and ESR are elevated. DEBBY on CKD noted.  XR foot did not show evidence of OM. Gluteal wound was not re-imaged on this admission.   1. HTN urgency: cont nifedipine and clonidine. Pt will need to get meds to bed and assistance with med management on discharge.   2. IDDM: cont with long and short acting insulins while in the hospital. HgBA1C was 8 in August. Would ask for Endocrinology team input regarding easy to use hypoglycemic regimen given patient's undomicile status.   3. DEBBY on CKD: will cont with IV hydration with LR given presence of hypokalemia  4. Elevated ESR and WBC. Source is likely chronic OM in right ischeal tuberosity. Would ask ID consult if MRI of the pelvis might help with management  5. Dispo: will need referral to medical shelter

## 2023-08-21 NOTE — H&P ADULT - PROBLEM SELECTOR PLAN 2
2/4 SIRS criteria (WBC 14.03 and HR 98) w/ no sources of infection. S/p 2L of NS bolus. Repeat WBC 15.68. Pt has hx of L foot OM. Last admission to [facility] 8/7 for OM; ID stated no overt infection and abx not needed. He is afebrile. On exam, there is breakage of the dorsum of the left foot, but does not appear to be infected. No erythema, warmth or fluctuance. Denies symptoms of fevers, chills, abdominal pain, diarrhea, URI and dysuria.   - F/u blood cultures  - F/u ESR and CRP  - Repeat labs 10 PM 2/4 SIRS criteria (WBC 14.03 and HR 98) w/ no sources of infection. S/p 2L of NS bolus. Repeat WBC 15.68. Pt has hx of L foot OM. Last admission to [facility] 8/7 for OM; ID stated no overt infection and abx not needed. He is afebrile. On exam, there is breakage of the dorsum of the left foot, but does not appear to be infected. No erythema, warmth or fluctuance. Denies symptoms of fevers, chills, abdominal pain, diarrhea, URI and dysuria.   - F/u blood cultures  - F/u ESR and CRP  - Repeat labs 6 PM 2/4 SIRS criteria (WBC 14.03 and HR 98) w/ no sources of infection. S/p 2L of NS bolus. Repeat WBC 15.68. Pt has hx of L foot OM. Last admission to [facility] 8/7 for OM; ID stated no overt infection and abx not needed. He is afebrile. On exam, there is breakage of the dorsum of the left foot, but does not appear to be infected. No erythema, warmth or fluctuance. Denies symptoms of fevers, chills, abdominal pain, diarrhea, URI and dysuria.   - F/u blood cultures  - F/u ESR and CRP  - Repeat labs 10 PM

## 2023-08-21 NOTE — H&P ADULT - PROBLEM SELECTOR PLAN 10
F: 500 mL  cc/kg   E: Replete K<4 Mg<2   N: Consistence carbohydrate diet, extra protein   Dvt ppx: Lovenox 40 mg   Ambulate w/ assistance   FULL CODE

## 2023-08-21 NOTE — ED ADULT NURSE NOTE - NSFALLUNIVINTERV_ED_ALL_ED
Bed/Stretcher in lowest position, wheels locked, appropriate side rails in place/Call bell, personal items and telephone in reach/Instruct patient to call for assistance before getting out of bed/chair/stretcher/Non-slip footwear applied when patient is off stretcher/[place] to call system/Physically safe environment - no spills, clutter or unnecessary equipment/Purposeful proactive rounding/Room/bathroom lighting operational, light cord in reach

## 2023-08-21 NOTE — PATIENT PROFILE ADULT - PACKS YRS CALCULATION
CONST: no fevers, no chills   CV: no chest pain  RESP: no shortness of breath  MSK: right acute on chronic shoulder pain  NEURO: no headache or additional neurologic complaints  HEME: no easy bleeding  SKIN:  no rash 20

## 2023-08-21 NOTE — H&P ADULT - ASSESSMENT
64 YO male PMHx of CAD s/p stent placement (1 year ago), HTN, DM, L foot OM s/p partial digit amputation with digits intact, chronic R gluteal cleft wound, recent [facility] admission (8/7 - 8/11) for hypertensive urgency and DEBBY (presence of open L wound; per ID no need for abx) presenting for nausea and vomiting since yesterday, admitted for workup of DEBBY and leukocytosis.

## 2023-08-21 NOTE — H&P ADULT - NSHPSOCIALHISTORY_GEN_ALL_CORE
Ambulates w/ a cane  Does not have a place to live currently  Denies alcohol and drug use  Current smoker for 50 years, 1/2 ppd

## 2023-08-21 NOTE — ED PROVIDER NOTE - OBJECTIVE STATEMENT
63 M pmh CAD s/p stent, htn, dm, L foot OM s/p partial 4th/5th amputation, chronic R gluteal cleft wound, recent [facility] admit for 1/4 SIRs/htn urgency/DEBBY; no need for abx per ID for ?OM as wounds did not appear infected walked into ED c/o NV x24 hrs.  pt reports nbnb emesis since yesterday.  reports normal BMs and denies abd pain.  states he took his insulin and BP meds (nifedipine/clonidine) this morning.      triage RN noted pt sleeping on sidewalk outside ED overnight, walked into triage this morning reporting nausea but never seen vomiting. 63 M pmh CAD s/p stent, htn, dm, L foot OM s/p partial 4th/5th amputation, chronic R gluteal cleft wound, recent [facility] admit for 1/4 SIRs/htn urgency/DEBBY; no need for abx per ID for ?OM as wounds did not appear infected walked into ED c/o NV x24 hrs.  pt reports nbnb emesis since yesterday.  reports normal BMs and denies abd pain.  states he took his insulin and BP meds (nifedipine/clonidine) this morning.  denies etoh/drug use.  denies f/c, HA, dizziness, chest pain, sob, uri sxs, urinary sxs, fall/trauma   triage RN noted pt sleeping on sidewalk outside ED overnight, walked into triage this morning reporting nausea but never seen vomiting. 63 M pmh CAD s/p stent, htn, dm, L foot OM s/p (partial 4th and 5th ray amputation with digits intact), chronic R gluteal cleft wound, recent [facility] admit for 1/4 SIRs/htn urgency/DEBBY; no need for abx per ID for ?OM as wounds did not appear infected walked into ED c/o NV x24 hrs.  pt reports nbnb emesis since yesterday.  reports normal BMs and denies abd pain.  states he took his insulin and BP meds (nifedipine/clonidine) this morning.  denies etoh/drug use.  denies f/c, HA, dizziness, chest pain, sob, uri sxs, urinary sxs, fall/trauma   triage RN noted pt sleeping on sidewalk outside ED overnight, walked into triage this morning reporting nausea but never seen vomiting.

## 2023-08-21 NOTE — H&P ADULT - PROBLEM SELECTOR PLAN 9
F: 500 mL  cc/kg   E: Replete K<4 Mg<2   N: Consistence carbohydrate diet, extra protein   Dvt ppx: Lovenox 40 mg   Ambulate w/ assistance   FULL CODE Pt states he feels weak likely 2/2 to poor PO intake.   - Encourage PO intake Pt states he feels weak likely 2/2 to poor PO intake.   - Encourage PO intake  - PT Consult

## 2023-08-21 NOTE — PATIENT PROFILE ADULT - FALL HARM RISK - HARM RISK INTERVENTIONS

## 2023-08-21 NOTE — H&P ADULT - PROBLEM SELECTOR PLAN 7
Home meds: Nifedipine 60 mg BID and Clonidine 0.2 mg BID   - C/w home meds Pt stated he last took his medications yesterday. Hypertensive in the ED, given home meds STAT.   Home meds: Nifedipine 60 mg BID and Clonidine 0.2 mg BID   - C/w home meds

## 2023-08-21 NOTE — H&P ADULT - NSHPPHYSICALEXAM_GEN_ALL_CORE
.  VITAL SIGNS:  T(C): 37.1 (08-21-23 @ 12:05), Max: 37.1 (08-21-23 @ 12:05)  T(F): 98.7 (08-21-23 @ 12:05), Max: 98.7 (08-21-23 @ 12:05)  HR: 88 (08-21-23 @ 12:05) (84 - 98)  BP: 216/125 (08-21-23 @ 12:05) (160/83 - 216/125)  BP(mean): --  RR: 18 (08-21-23 @ 12:05) (6 - 18)  SpO2: 98% (08-21-23 @ 12:05) (97% - 98%)  Wt(kg): --    PHYSICAL EXAM:    Constitutional: resting comfortably in bed; NAD  Head: NC/AT  Eyes: PERRL, EOMI, anicteric sclera  ENT: no nasal discharge; uvula midline, no oropharyngeal erythema or exudates; MMM  Neck: supple; no JVD or thyromegaly  Respiratory: CTA B/L; no W/R/R, no retractions  Cardiac: +S1/S2; RRR; no M/R/G  Gastrointestinal: soft, NT/ND; no rebound or guarding; +BSx4  Back: no CVAT B/L  Extremities: WWP, no clubbing or cyanosis; no peripheral edema  Musculoskeletal: +LLE edema; no joint swelling or tenderness  Vascular: 2+ radial pulses B/L  Dermatologic: +open wound on dorsum of left foot, +skin breakage, no fluctuance, erythema or warmth  Neurologic: AAOx3; no focal deficits  Psychiatric: affect and characteristics of appearance, verbalizations, behaviors are appropriate .  VITAL SIGNS:  T(C): 37.1 (08-21-23 @ 12:05), Max: 37.1 (08-21-23 @ 12:05)  T(F): 98.7 (08-21-23 @ 12:05), Max: 98.7 (08-21-23 @ 12:05)  HR: 88 (08-21-23 @ 12:05) (84 - 98)  BP: 216/125 (08-21-23 @ 12:05) (160/83 - 216/125)  BP(mean): --  RR: 18 (08-21-23 @ 12:05) (6 - 18)  SpO2: 98% (08-21-23 @ 12:05) (97% - 98%)  Wt(kg): --    PHYSICAL EXAM:    Constitutional: resting comfortably in bed; NAD  Head: NC/AT  Eyes: PERRL, EOMI, anicteric sclera  ENT: no nasal discharge; uvula midline, no oropharyngeal erythema or exudates; MMM  Neck: supple; no JVD or thyromegaly  Respiratory: CTA B/L; no W/R/R, no retractions  Cardiac: +S1/S2; RRR; no M/R/G  Gastrointestinal: soft, NT/ND; no rebound or guarding; +BSx4  Back: no CVAT B/L  Extremities: WWP, no clubbing or cyanosis; no peripheral edema  Musculoskeletal: +LLE edema; no joint swelling or tenderness  Vascular: 2+ radial pulses B/L  Dermatologic: +open wound on dorsum of left foot, +skin breakage, no fluctuance, warmth or erythema; +stage 3 rt cleft ulcer, no fluctuance, erythema or warmth  Neurologic: AAOx3; no focal deficits  Psychiatric: affect and characteristics of appearance, verbalizations, behaviors are appropriate

## 2023-08-21 NOTE — ED PROVIDER NOTE - CARE PLAN
1 Principal Discharge DX:	Nausea and vomiting  Secondary Diagnosis:	Hyperglycemia   Principal Discharge DX:	Nausea and vomiting  Secondary Diagnosis:	Hyperglycemia  Secondary Diagnosis:	Acute kidney injury superimposed on CKD

## 2023-08-21 NOTE — ED PROVIDER NOTE - NSTIMEPROVIDERCAREINITIATE_GEN_ER
OPERATION        DATE OF OPERATION:  3/5/2018    PREOPERATIVE DIAGNOSIS:  Cholecystitis with cholelithiasis    POSTOPERATIVE DIAGNOSIS:  same as preop    PROCEDURE:   Laparoscopic Cholecystectomy    SURGEON:  Criss Bernard MD, FACS. ANESTHESIA:  General Endotracheal Anesthesia    FINDINGS:  Cholecystitis with cholelithiasis    SPECIMENS REMOVED:  Gallbladder and Contents    DESCRIPTION OF OPERATION:  After appropriate consent was obtained, patient who was competent was brought to the operating room, made comfortable in a supine position, and administered general endotracheal anesthesia. The patient was prepped and draped in standard fashion. A 0.5% plain Marcaine solution was used to infiltrate the skin at the trocar sites. Fifteen blade was then used to incise of the umbilicus. This was extended sharply down through the midline fascia. A Jhaveri trocar was placed and the abdominal cavity was insufflated with CO2 gas to 15 cm water pressure. Under direct visualization, two 5 mm trocars were placed in the upper abdomen. Using standard laparoscopic technique, the abdominal cavity was explored. The gallbladder was identified and grasped at the fundus and elevated over the inferior edge of the liver. The neck of the gallbladder was retracted laterally. The peritoneum was stripped down over Calot's triangle and the cystic duct and cystic artery were identified. These structures were freed up proximally and distally, ligated with clips, and divided between clips. The gallbladder was then reflected off the   gallbladder fossa with the Bovie hook electrocautery and once it was free, it was placed in the endoscopic retrieval bag and withdrawn from the abdomen through the umbilical trocar site. The gallbladder fossa was inspected. Hemostasis was confirmed. The trocars were removed under direct visualization with no evidence of bleeding. CO2 gas was fully desufflated from the abdominal cavity.  The umbilical fascia defect was approximated with 0 Vicryl suture. Skin incisions were closed with 5-0 Monocryl subcuticular stitch. The wounds were cleaned and dried and dressed with Dermabond and the patient was awakened and extubated and transferred to the recovery room in good condition. There were no complications and minimal blood loss during the case. Annalisa Huynh.  Rosibel Chavez MD, [facility] 21-Aug-2023 07:16

## 2023-08-21 NOTE — ED PROVIDER NOTE - CLINICAL SUMMARY MEDICAL DECISION MAKING FREE TEXT BOX
63 M pmh CAD s/p stent, htn, dm, L foot OM s/p partial 4th/5th amputation, chronic R gluteal cleft wound, recent [facility] admit for 1/4 SIRs/htn urgency/DEBBY; no need for abx per ID for ?OM as wounds did not appear infected walked into ED c/o NV x24 hrs.  on exam bp elevated, hyperglycemia, unkempt but comfortable appearing, lungs ctab, hrrr, abd soft/nt, + stage 4 gluteal ulcer w/o any e/o infection, ambulatory with steady gait.  r/o dka, ?gastritis vs pancreatitis, no abd ttp therefore do not think appy/acute joao.  will obtain labs, urine, give ivf/zofran/pepcid.  rpt bp and give home meds if still elevated 63 M pmh CAD s/p stent, htn, dm, L foot OM s/p (partial 4th and 5th ray amputation with digits intact), chronic R gluteal cleft wound, recent [facility] admit for 1/4 SIRs/htn urgency/DEBBY; no need for abx per ID for ?OM as wounds did not appear infected walked into ED c/o NV x24 hrs.  on exam bp elevated, hyperglycemia, unkempt but comfortable appearing, lungs ctab, hrrr, abd soft/nt, + stage 4 gluteal ulcer w/o any e/o infection, ambulatory with steady gait.  r/o dka, ?gastritis vs pancreatitis, no abd ttp therefore do not think appy/acute joao.  will obtain labs, urine, give ivf/zofran/pepcid.  rpt bp and give home meds if still elevated

## 2023-08-21 NOTE — H&P ADULT - HISTORY OF PRESENT ILLNESS
64 YO male PMHx of CAD s/p stent placement (1 year ago), HTN, DM, L foot OM s/p partial 4th and 5th amputation with digits intact, chronic R gluteal cleft wound, recent [facility] admission (8/7 - 8/11) for hypertensive urgency and DEBBY (presence of open L wound; per ID no need for abx) presenting for nausea and vomiting since yesterday. Patient reports NBNB emesis since yesterday. States he took his insulin and BP meds (nifedipine/clonidine) this morning.  Endorses normal BM. Denies alcohol and drug use, fevers and chills.     During last admission Triage RN noted pt sleeping on sidewalk outside ED overnight, walked into triage this morning reporting nausea but never seen vomiting.    ED Vitals: T 97 HR 98 /100 RR 16 sat97% RA  ED Labs: WBC 14.03 Hgb 10.7 Hct 34.3 Na 132 K 3.5 BUN 28 Cr 3.08  Imaging: chest x-ray pending   Interventions: Zofran 1 mg, 2L NS Bolus   64 YO male PMHx of CAD s/p stent placement (1 year ago), HTN, DM, L foot OM s/p partial 4th and 5th amputation with digits intact, chronic R gluteal cleft wound, recent [facility] admission (8/7 - 8/11) for hypertensive urgency and DEBBY (presence of open L wound; per ID no need for abx) presenting for nausea and vomiting since yesterday. Patient reports NBNB emesis since yesterday. Endorses poor PO intake. States he last took his insulin and BP meds (nifedipine/clonidine) yesterday. Endorses normal BM. Denies alcohol and drug use, fevers and chills.     During last admission Triage RN noted pt sleeping on sidewalk outside ED overnight, walked into triage this morning reporting nausea but never seen vomiting.    ED Vitals: T 97 HR 98 /100 RR 16 sat97% RA  ED Labs: WBC 14.03 Hgb 10.7 Hct 34.3 Na 132 K 3.5 BUN 28 Cr 3.08  Imaging: chest x-ray pending   Interventions: Zofran 1 mg, 2L NS Bolus   62 YO male PMHx of CAD s/p stent placement (1 year ago), HTN, DM, L foot OM s/p partial 4th and 5th amputation with digits intact, chronic R gluteal cleft wound, recent [facility] admission (8/7 - 8/11) for hypertensive urgency and DEBBY (presence of open L wound; per ID no need for abx) presenting for nausea and vomiting since yesterday. Patient reports NBNB emesis since yesterday. Endorses poor PO intake. States he last took his insulin and BP meds (nifedipine/clonidine) yesterday. Endorses normal BM. Denies alcohol and drug use, fevers/chills, dysuria or URI symptoms.       ED Vitals: T 97 HR 98 /100 RR 16 sat97% RA  ED Labs: WBC 14.03 Hgb 10.7 Hct 34.3 Na 132 K 3.5 BUN 28 Cr 3.08  Imaging: chest x-ray pending   Interventions: Zofran 1 mg, 2L NS Bolus   64 YO male PMHx of CAD s/p stent placement (1 year ago), HTN, DM, L foot OM s/p partial 4th and 5th amputation with digits intact, chronic R gluteal cleft wound, recent [facility] admission (8/7 - 8/11) for hypertensive urgency and DEBBY (presence of open L wound; per ID no need for abx) presenting for nausea and vomiting since yesterday. Patient reports NBNB emesis since yesterday. Endorses poor PO intake. States he last took his insulin and BP meds (nifedipine/clonidine) yesterday. Endorses normal BM. Denies alcohol and drug use, fevers/chills, dysuria or URI symptoms.       ED Vitals: T 97 HR 98 /100 RR 16 sat97% RA  ED Labs: WBC 14.03 Hgb 10.7 Hct 34.3 Na 132 K 3.5 BUN 28 Cr 3.08  Imaging:   ·	CXR  ·	Acute cardiopulmonary process   ·	Curvilinear opacity along peripheral right long zone most likely skinfold   Interventions: Zofran 1 mg, 2L NS Bolus

## 2023-08-21 NOTE — ED PROVIDER NOTE - NS ED ATTENDING STATEMENT MOD
This was a shared visit with the GENEV[facility]. I reviewed and verified the documentation and independently performed the documented:

## 2023-08-21 NOTE — H&P ADULT - NSHPLABSRESULTS_GEN_ALL_CORE
.  LABS:                         9.9    15.68 )-----------( 240      ( 21 Aug 2023 10:10 )             31.5     08-21    133<L>  |  98  |  26<H>  ----------------------------<  323<H>  3.4<L>   |  24  |  2.61<H>    Ca    8.9      21 Aug 2023 10:10    TPro  8.3  /  Alb  3.6  /  TBili  0.2  /  DBili  x   /  AST  15  /  ALT  <5<L>  /  AlkPhos  131<H>  08-21      Urinalysis Basic - ( 21 Aug 2023 10:10 )    Color: x / Appearance: x / SG: x / pH: x  Gluc: 323 mg/dL / Ketone: x  / Bili: x / Urobili: x   Blood: x / Protein: x / Nitrite: x   Leuk Esterase: x / RBC: x / WBC x   Sq Epi: x / Non Sq Epi: x / Bacteria: x            Lactate, Blood: 1.5 mmol/L (08-21 @ 08:41)      RADIOLOGY, EKG & ADDITIONAL TESTS: Reviewed.

## 2023-08-21 NOTE — CONSULT NOTE ADULT - ASSESSMENT
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 admission (8/7 - 8/11) for hypertensive urgency and DEBBY (presence of open L wound; per ID no need for abx) presenting for nausea and vomiting since yesterday. Since last admission, wound has decreased in size with an increase in granular tissue. No acute clinical signs of infection in the foot.     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 Since last admission, wound has decreased in size with an increase in granular tissue. No acute clinical signs of infection in the foot.     Plan:    - Local wound care: Betadine DSD to L plantar wound, wrapped with DSD, Kerlix, ACE  - WBAT w/ cane to L foot  - offloading boots b/l while in bed  - reviewed xrays  - rest of care per primary team    Podiatry following, Plan dw with attending

## 2023-08-21 NOTE — H&P ADULT - PROBLEM SELECTOR PLAN 5
K on admission 3.5 likely 2/2 to hx of vomiting.   - 40 mEq potassium PO   - LR maintenance   - Repeat CMP 10 PM K on admission 3.5 likely 2/2 to hx of vomiting.   - 40 mEq potassium PO   - LR maintenance   - Repeat BMP 6 PM K on admission 3.5 likely 2/2 to hx of vomiting.   - 40 mEq potassium PO   - LR maintenance   - Repeat BMP 10 PM

## 2023-08-21 NOTE — ED PROVIDER NOTE - PHYSICAL EXAMINATION
Vitals reviewed  Gen: unkempt but comfortable appearing, nad, speaking in full sentences  Skin: wwp, R gluteal stage 4 sacral w/ clean edges, no discharge or erythema/warmth   HEENT: ncat, eomi, mmm  CV: rrr, no audible m/r/g  Resp: symmetrical expansion, ctab, no w/r/r  Abd: nondistended, soft, nontender, no r/g, no cvat   Ext: FROM throughout, no peripheral edema  Neuro: alert/oriented, no focal deficits, steady gait w/ cane Vitals reviewed  Gen: unkempt but comfortable appearing, nad, speaking in full sentences  Skin: wwp, R gluteal stage 4 sacral w/ clean edges, no discharge or erythema/warmth   HEENT: ncat, eomi, mmm  CV: rrr, no audible m/r/g  Resp: symmetrical expansion, ctab, no w/r/r  Abd: nondistended, soft, nontender, no r/g, no cvat   Ext: FROM throughout, no peripheral edema, superficial wound to L plantar aspect foot w/ granular base- no e/o infection or abscess  Neuro: alert/oriented, no focal deficits, steady gait w/ cane

## 2023-08-21 NOTE — H&P ADULT - PROBLEM SELECTOR PLAN 1
On admission, BUN 29 and Cr 3.08 (baseline Cr 1.22 1/2022). Recent admission to [facility] for DEBBY 8/7. S/p 2L NS bolus Cr downtrended to 2.61. Endorses 1 day of NBNB vomiting and poor PO intake, Denies hx of dialysis.   Plan:   - 500 ml LR maintenance fluid  - Encourage PO intake   - Repeat CMP 8 PM On admission, BUN 29 and Cr 3.08 (baseline Cr 1.22 1/2022). Recent admission to [facility] for DEBBY 8/7. S/p 2L NS bolus Cr downtrended to 2.61. Endorses 1 day of NBNB vomiting and poor PO intake, Denies hx of dialysis.   Plan:   - 500 ml LR maintenance fluid  - Encourage PO intake   - Repeat BMP 6 PM On admission, BUN 29 and Cr 3.08 (baseline Cr 1.22 1/2022). Recent admission to [facility] for DEBBY 8/7. S/p 2L NS bolus Cr downtrended to 2.61. Endorses 1 day of NBNB vomiting and poor PO intake, Denies hx of dialysis.   Plan:   - 500 ml LR maintenance fluid  - Encourage PO intake   - Repeat BMP 10 PM

## 2023-08-21 NOTE — CONSULT NOTE ADULT - SUBJECTIVE AND OBJECTIVE BOX
Attending: Dr. 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 Patient reports NBNB emesis since yesterday. Endorses poor PO intake. States he last took his insulin and BP meds (nifedipine/clonidine) yesterday. Endorses normal BM. Denies alcohol and drug use, fevers/chills, dysuria or URI symptoms.     Podiatry addendum: Podiatry was following patient on last admission for management of L plantar foot wound. He was not given abx upon DC as wound did not appear clinically infected.  MRI from 8/7/23 was negative for OM as well. Patient states he has been taking great care of the wound with daily dressing changes and cleaning. Patient is not complaining of pain to his L foot today.       ED Vitals: T 97 HR 98 /100 RR 16 sat97% RA  ED Labs: WBC 14.03 Hgb 10.7 Hct 34.3 Na 132 K 3.5 BUN 28 Cr 3.08  Imaging: chest x-ray pending   Interventions: Zofran 1 mg, 2L NS Bolus   (21 Aug 2023 11:24)      Review of systems negative except per HPI and as stated below  General:	 no weakness; no fevers, no chills  Skin/Breast: no rash  Respiratory and Thorax: no SOB, no cough  Cardiovascular:	No chest pain  Gastrointestinal:	 no nausea, vomiting , diarrhea  Genitourinary:	no dysuria, no difficulty urinating, no hematuria  Musculoskeletal:	no weakness, no joint swelling/pain  Neurological:	no focal weakness/numbness  Endocrine:	no polyuria, no polydipsia    PAST MEDICAL & SURGICAL HISTORY:  IDDM (insulin dependent diabetes mellitus)      Hypertension      CAD (coronary artery disease)      Left toe amputee  Great toe bone removal      History of cholecystectomy        Home Medications:  cloNIDine 0.2 mg oral tablet: 1 tab(s) orally once a day (09 Aug 2023 11:33)    Allergies    vancomycin (Stomach Upset)  pork (Unknown)  hydrALAZINE (Rash)  labetalol (Stomach Upset)    Intolerances      FAMILY HISTORY:  FH: type 2 diabetes mellitus      Social History:       LABS                        9.9    15.68 )-----------( 240      ( 21 Aug 2023 10:10 )             31.5     08-21    133<L>  |  98  |  26<H>  ----------------------------<  323<H>  3.4<L>   |  24  |  2.61<H>    Ca    8.9      21 Aug 2023 10:10    TPro  8.3  /  Alb  3.6  /  TBili  0.2  /  DBili  x   /  AST  15  /  ALT  <5<L>  /  AlkPhos  131<H>  08-21        Vital Signs Last 24 Hrs  T(C): 37.1 (21 Aug 2023 12:05), Max: 37.1 (21 Aug 2023 12:05)  T(F): 98.7 (21 Aug 2023 12:05), Max: 98.7 (21 Aug 2023 12:05)  HR: 91 (21 Aug 2023 13:31) (84 - 98)  BP: 183/101 (21 Aug 2023 13:31) (160/83 - 216/125)  BP(mean): --  RR: 17 (21 Aug 2023 13:31) (6 - 18)  SpO2: 95% (21 Aug 2023 13:31) (95% - 98%)    Parameters below as of 21 Aug 2023 13:31  Patient On (Oxygen Delivery Method): room air        PHYSICAL EXAM  General: NAD, AA0x3    L Lower Extremity Focused:  Vasc: 1/4 DP/PT. negative for edema   Derm: L foot subMT 2/3  wound (2.0 x 2.0 cm), granular base, negative for undermining, malodor, purulent drainage or signs of infection. Negative PTB.   Compared to last admission, there is more granular tissue and wound has decreased in size.  Neuro: Protective sensation absent  MSK: 5/5 MMT b/l. -TTP of palpation to L foot.      RADIOLOGY    < from: MR Foot No Cont, Left (08.07.23 @ 22:33) >  IMPRESSION:    Moderately limited exam by susceptibility artifact and poor fat   suppression. No confluent decreased T1 signal or significant erosive   change to suggest osteomyelitis. If clinical concern persists, nuclear   medicine study may be performed.  Midfoot arthrosis.  Hardware at the medial midfoot limited by susceptibility artifact.      < from: Xray Foot AP + Lateral + Oblique, Bilat (08.21.23 @ 13:20) >  IMPRESSION: Frontal, lateral and oblique views of the leftfoot   demonstrate amputation of the distal aspect of the fourth and fifth   metatarsals. Hindfoot valgus. Pes planus. Fractured screws traversing the   medial aspect of the tarsometatarsal joint. Ankylosis of the base of the   second metatarsal withthe middle cuneiform. No focal osteopenia or   cortical destruction to suggest osteomyelitis. Arterial vascular   calcification.    Frontal, lateral and oblique views of the right foot are compared to   8/7/2023 and demonstrates mild hallux valgus deformity of the first MTP   joint. There is no fracture. No dislocation. Appropriate osseous   mineralization. Arterial vascular calcification.    MRI is more sensitive examination for detection of osteomyelitis.      < end of copied text >                           Attending: Dr. 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 Patient reports NBNB emesis since yesterday. Endorses poor PO intake. States he last took his insulin and BP meds (nifedipine/clonidine) yesterday. Endorses normal BM. Denies alcohol and drug use, fevers/chills, dysuria or URI symptoms.     Podiatry addendum: Podiatry was following patient on last admission for management of L plantar foot wound. He was not given abx upon DC as wound did not appear clinically infected.  MRI from 8/7/23 was negative for OM as well. Patient states he has been taking great care of the wound with daily dressing changes and cleaning. Patient is not complaining of pain to his L foot today.       ED Vitals: T 97 HR 98 /100 RR 16 sat97% RA  ED Labs: WBC 14.03 Hgb 10.7 Hct 34.3 Na 132 K 3.5 BUN 28 Cr 3.08  Imaging: chest x-ray pending   Interventions: Zofran 1 mg, 2L NS Bolus   (21 Aug 2023 11:24)      Review of systems negative except per HPI and as stated below  General:	 no weakness; no fevers, no chills  Skin/Breast: no rash  Respiratory and Thorax: no SOB, no cough  Cardiovascular:	No chest pain  Gastrointestinal:	 no nausea, vomiting , diarrhea  Genitourinary:	no dysuria, no difficulty urinating, no hematuria  Musculoskeletal:	no weakness, no joint swelling/pain  Neurological:	no focal weakness/numbness  Endocrine:	no polyuria, no polydipsia    PAST MEDICAL & SURGICAL HISTORY:  IDDM (insulin dependent diabetes mellitus)      Hypertension      CAD (coronary artery disease)      Left toe amputee  Great toe bone removal      History of cholecystectomy        Home Medications:  cloNIDine 0.2 mg oral tablet: 1 tab(s) orally once a day (09 Aug 2023 11:33)    Allergies    vancomycin (Stomach Upset)  pork (Unknown)  hydrALAZINE (Rash)  labetalol (Stomach Upset)    Intolerances      FAMILY HISTORY:  FH: type 2 diabetes mellitus      Social History:       LABS                        9.9    15.68 )-----------( 240      ( 21 Aug 2023 10:10 )             31.5     08-21    133<L>  |  98  |  26<H>  ----------------------------<  323<H>  3.4<L>   |  24  |  2.61<H>    Ca    8.9      21 Aug 2023 10:10    TPro  8.3  /  Alb  3.6  /  TBili  0.2  /  DBili  x   /  AST  15  /  ALT  <5<L>  /  AlkPhos  131<H>  08-21        Vital Signs Last 24 Hrs  T(C): 37.1 (21 Aug 2023 12:05), Max: 37.1 (21 Aug 2023 12:05)  T(F): 98.7 (21 Aug 2023 12:05), Max: 98.7 (21 Aug 2023 12:05)  HR: 91 (21 Aug 2023 13:31) (84 - 98)  BP: 183/101 (21 Aug 2023 13:31) (160/83 - 216/125)  BP(mean): --  RR: 17 (21 Aug 2023 13:31) (6 - 18)  SpO2: 95% (21 Aug 2023 13:31) (95% - 98%)    Parameters below as of 21 Aug 2023 13:31  Patient On (Oxygen Delivery Method): room air        PHYSICAL EXAM  General: NAD, AA0x3    L Lower Extremity Focused:  Vasc: 1/4 DP/PT. negative for edema   Derm: L foot subMT 2/3  wound (2.0 x 2.0 cm), granular base, negative for undermining, malodor, purulent drainage or signs of infection. Negative PTB.   Compared to last admission, there is more granular tissue and wound has decreased in size.  Neuro: Protective sensation absent  MSK: 5/5 MMT. -TTP of palpation to L foot.      RADIOLOGY    < from: MR Foot No Cont, Left (08.07.23 @ 22:33) >  IMPRESSION:    Moderately limited exam by susceptibility artifact and poor fat   suppression. No confluent decreased T1 signal or significant erosive   change to suggest osteomyelitis. If clinical concern persists, nuclear   medicine study may be performed.  Midfoot arthrosis.  Hardware at the medial midfoot limited by susceptibility artifact.      < from: Xray Foot AP + Lateral + Oblique, Bilat (08.21.23 @ 13:20) >  IMPRESSION: Frontal, lateral and oblique views of the leftfoot   demonstrate amputation of the distal aspect of the fourth and fifth   metatarsals. Hindfoot valgus. Pes planus. Fractured screws traversing the   medial aspect of the tarsometatarsal joint. Ankylosis of the base of the   second metatarsal withthe middle cuneiform. No focal osteopenia or   cortical destruction to suggest osteomyelitis. Arterial vascular   calcification.    Frontal, lateral and oblique views of the right foot are compared to   8/7/2023 and demonstrates mild hallux valgus deformity of the first MTP   joint. There is no fracture. No dislocation. Appropriate osseous   mineralization. Arterial vascular calcification.    MRI is more sensitive examination for detection of osteomyelitis.      < end of copied text >

## 2023-08-21 NOTE — H&P ADULT - PROBLEM SELECTOR PLAN 3
Pt with history of L foot OM and previous admission to [facility] 8/7- 8/11 for DEBBY and L foot OM. ID and podiatry following and concluded no overt infection and no need for abx. Pt is afebrile. On exam, foot is not erythematous or warm and there is no fluctuance.   - Podiatry following  - F/u ESR and CRP   - F/u L foot x-ray  - Offloading boot on L foot

## 2023-08-21 NOTE — ED PROVIDER NOTE - PROGRESS NOTE DETAILS
hyperglycemia but no DKA.  Cr improving s/p 2L NS but still elevated 2.6 from dc Cr 1.7 2 weeks ago.  wbc from 14>15.  will send cx but no indication for abx at this time.  cxr no i/e and UA no infection.  will admit to medicine for omar on ckd

## 2023-08-21 NOTE — H&P ADULT - PROBLEM SELECTOR PLAN 8
Pt states he feels weak likely 2/2 to poor PO intake.   - Encourage PO intake Pt w h/o stent placement 1 year ago. Home meds: ASA 81 mg   - C/w home med

## 2023-08-22 ENCOUNTER — TRANSCRIPTION ENCOUNTER (OUTPATIENT)
Age: 63
End: 2023-08-22

## 2023-08-22 LAB
ALBUMIN SERPL ELPH-MCNC: 3 G/DL — LOW (ref 3.3–5)
ALP SERPL-CCNC: 113 U/L — SIGNIFICANT CHANGE UP (ref 40–120)
ALT FLD-CCNC: <5 U/L — LOW (ref 10–45)
ANION GAP SERPL CALC-SCNC: 9 MMOL/L — SIGNIFICANT CHANGE UP (ref 5–17)
AST SERPL-CCNC: 12 U/L — SIGNIFICANT CHANGE UP (ref 10–40)
BASOPHILS # BLD AUTO: 0.05 K/UL — SIGNIFICANT CHANGE UP (ref 0–0.2)
BASOPHILS NFR BLD AUTO: 0.4 % — SIGNIFICANT CHANGE UP (ref 0–2)
BILIRUB SERPL-MCNC: <0.2 MG/DL — SIGNIFICANT CHANGE UP (ref 0.2–1.2)
BUN SERPL-MCNC: 34 MG/DL — HIGH (ref 7–23)
CALCIUM SERPL-MCNC: 9.2 MG/DL — SIGNIFICANT CHANGE UP (ref 8.4–10.5)
CHLORIDE SERPL-SCNC: 102 MMOL/L — SIGNIFICANT CHANGE UP (ref 96–108)
CO2 SERPL-SCNC: 25 MMOL/L — SIGNIFICANT CHANGE UP (ref 22–31)
CREAT SERPL-MCNC: 2.5 MG/DL — HIGH (ref 0.5–1.3)
EGFR: 29 ML/MIN/1.73M2 — LOW
EOSINOPHIL # BLD AUTO: 0.49 K/UL — SIGNIFICANT CHANGE UP (ref 0–0.5)
EOSINOPHIL NFR BLD AUTO: 4.2 % — SIGNIFICANT CHANGE UP (ref 0–6)
GLUCOSE BLDC GLUCOMTR-MCNC: 112 MG/DL — HIGH (ref 70–99)
GLUCOSE BLDC GLUCOMTR-MCNC: 122 MG/DL — HIGH (ref 70–99)
GLUCOSE BLDC GLUCOMTR-MCNC: 244 MG/DL — HIGH (ref 70–99)
GLUCOSE BLDC GLUCOMTR-MCNC: 259 MG/DL — HIGH (ref 70–99)
GLUCOSE BLDC GLUCOMTR-MCNC: 267 MG/DL — HIGH (ref 70–99)
GLUCOSE SERPL-MCNC: 252 MG/DL — HIGH (ref 70–99)
HCT VFR BLD CALC: 31.6 % — LOW (ref 39–50)
HGB BLD-MCNC: 9.7 G/DL — LOW (ref 13–17)
IMM GRANULOCYTES NFR BLD AUTO: 0.3 % — SIGNIFICANT CHANGE UP (ref 0–0.9)
LYMPHOCYTES # BLD AUTO: 2.95 K/UL — SIGNIFICANT CHANGE UP (ref 1–3.3)
LYMPHOCYTES # BLD AUTO: 25.3 % — SIGNIFICANT CHANGE UP (ref 13–44)
MAGNESIUM SERPL-MCNC: 1.8 MG/DL — SIGNIFICANT CHANGE UP (ref 1.6–2.6)
MCHC RBC-ENTMCNC: 23.5 PG — LOW (ref 27–34)
MCHC RBC-ENTMCNC: 30.7 GM/DL — LOW (ref 32–36)
MCV RBC AUTO: 76.5 FL — LOW (ref 80–100)
MONOCYTES # BLD AUTO: 0.7 K/UL — SIGNIFICANT CHANGE UP (ref 0–0.9)
MONOCYTES NFR BLD AUTO: 6 % — SIGNIFICANT CHANGE UP (ref 2–14)
NEUTROPHILS # BLD AUTO: 7.42 K/UL — HIGH (ref 1.8–7.4)
NEUTROPHILS NFR BLD AUTO: 63.8 % — SIGNIFICANT CHANGE UP (ref 43–77)
NRBC # BLD: 0 /100 WBCS — SIGNIFICANT CHANGE UP (ref 0–0)
PHOSPHATE SERPL-MCNC: 2.9 MG/DL — SIGNIFICANT CHANGE UP (ref 2.5–4.5)
PLATELET # BLD AUTO: 244 K/UL — SIGNIFICANT CHANGE UP (ref 150–400)
POTASSIUM SERPL-MCNC: 3.8 MMOL/L — SIGNIFICANT CHANGE UP (ref 3.5–5.3)
POTASSIUM SERPL-SCNC: 3.8 MMOL/L — SIGNIFICANT CHANGE UP (ref 3.5–5.3)
PROT SERPL-MCNC: 7.1 G/DL — SIGNIFICANT CHANGE UP (ref 6–8.3)
RBC # BLD: 4.13 M/UL — LOW (ref 4.2–5.8)
RBC # FLD: 15.1 % — HIGH (ref 10.3–14.5)
SODIUM SERPL-SCNC: 136 MMOL/L — SIGNIFICANT CHANGE UP (ref 135–145)
WBC # BLD: 11.65 K/UL — HIGH (ref 3.8–10.5)
WBC # FLD AUTO: 11.65 K/UL — HIGH (ref 3.8–10.5)

## 2023-08-22 PROCEDURE — 99233 SBSQ HOSP IP/OBS HIGH 50: CPT | Mod: GC

## 2023-08-22 RX ORDER — MAGNESIUM SULFATE 500 MG/ML
2 VIAL (ML) INJECTION ONCE
Refills: 0 | Status: COMPLETED | OUTPATIENT
Start: 2023-08-22 | End: 2023-08-22

## 2023-08-22 RX ORDER — HUMAN INSULIN 100 [IU]/ML
10 INJECTION, SUSPENSION SUBCUTANEOUS ONCE
Refills: 0 | Status: COMPLETED | OUTPATIENT
Start: 2023-08-22 | End: 2023-08-22

## 2023-08-22 RX ADMIN — Medication 7 UNIT(S): at 14:05

## 2023-08-22 RX ADMIN — Medication 0.2 MILLIGRAM(S): at 22:23

## 2023-08-22 RX ADMIN — ONDANSETRON 4 MILLIGRAM(S): 8 TABLET, FILM COATED ORAL at 09:45

## 2023-08-22 RX ADMIN — Medication 6: at 14:05

## 2023-08-22 RX ADMIN — HUMAN INSULIN 10 UNIT(S): 100 INJECTION, SUSPENSION SUBCUTANEOUS at 10:42

## 2023-08-22 RX ADMIN — Medication 60 MILLIGRAM(S): at 22:23

## 2023-08-22 RX ADMIN — INSULIN GLARGINE 14 UNIT(S): 100 INJECTION, SOLUTION SUBCUTANEOUS at 22:23

## 2023-08-22 RX ADMIN — Medication 0.2 MILLIGRAM(S): at 10:38

## 2023-08-22 RX ADMIN — Medication 650 MILLIGRAM(S): at 10:50

## 2023-08-22 RX ADMIN — Medication 7 UNIT(S): at 09:48

## 2023-08-22 RX ADMIN — Medication 60 MILLIGRAM(S): at 10:38

## 2023-08-22 RX ADMIN — Medication 25 GRAM(S): at 09:54

## 2023-08-22 RX ADMIN — Medication 6: at 10:05

## 2023-08-22 RX ADMIN — Medication 650 MILLIGRAM(S): at 09:54

## 2023-08-22 RX ADMIN — Medication 81 MILLIGRAM(S): at 11:57

## 2023-08-22 RX ADMIN — Medication 7 UNIT(S): at 18:45

## 2023-08-22 NOTE — PROGRESS NOTE ADULT - PROBLEM SELECTOR PLAN 7
Pt stated he last took his medications yesterday. Hypertensive in the ED, given home meds STAT. Normotensive on 8/22.   Home meds: Nifedipine 60 mg BID and Clonidine 0.2 mg BID   - C/w home meds Pt stated he last took his medications the day prior to presentation. Hypertensive in the ED, given home meds STAT. Normotensive on 8/22.   Home meds: Nifedipine 60 mg BID and Clonidine 0.2 mg BID   - C/w home meds

## 2023-08-22 NOTE — CONSULT NOTE ADULT - ASSESSMENT
I M    63 y o male PMHx of CAD s/p stent placement (1 year ago), HTN, DM, L foot OM s/p partial digit amputation with digits intact, chronic R gluteal cleft wound, recent [facility] admission (8/7 - 8/11) for hypertensive urgency and DEBBY (presence of open L wound; per ID no need for abx) presenting for nausea and vomiting since yesterday, admitted for workup of DEBBY and leukocytosis.     Problem/Plan - 1:  ·  Problem: DEBBY (acute kidney injury).   ·  Plan: On admission, BUN 29 and Cr 3.08 (baseline Cr 1.22 1/2022). Recent admission to [facility] for DEBBY 8/7. S/p 2L NS bolus Cr downtrended to 2.61. Endorses 1 day of NBNB vomiting and poor PO intake, Denies hx of dialysis.   Plan:   - 500 ml LR maintenance fluid  - Encourage PO intake   - Repeat BMP 10 PM.    Problem/Plan - 2:  ·  Problem: Leucocytosis.   ·  Plan: 2/4 SIRS criteria (WBC 14.03 and HR 98) w/ no sources of infection. S/p 2L of NS bolus. Repeat WBC 15.68. Pt has hx of L foot OM. Last admission to [facility] 8/7 for OM; ID stated no overt infection and abx not needed. He is afebrile. On exam, there is breakage of the dorsum of the left foot, but does not appear to be infected. No erythema, warmth or fluctuance. Denies symptoms of fevers, chills, abdominal pain, diarrhea, URI and dysuria.   - F/u blood cultures  - F/u ESR and CRP  - Repeat labs 10 PM.    Problem/Plan - 3:  ·  Problem: Wound of left foot.   ·  Plan: Pt with history of L foot OM and previous admission to [facility] 8/7- 8/11 for DEBBY and L foot OM. ID and podiatry following and concluded no overt infection and no need for abx. Pt is afebrile. On exam, foot is not erythematous or warm and there is no fluctuance.   - Podiatry following  - F/u ESR and CRP   - F/u L foot x-ray  - Offloading boot on L foot.    Problem/Plan - 4:  ·  Problem: Wound of gluteal cleft.   ·  Plan: Chronic wound of 3 years. Stage 3. Ambulates w/ a cane at home.   - Wound care consult.    Problem/Plan - 5:  ·  Problem: Hypokalemia.   ·  Plan: K on admission 3.5 likely 2/2 to hx of vomiting.   - 40 mEq potassium PO   - LR maintenance   - Repeat BMP 10 PM.    Problem/Plan - 6:  ·  Problem: Diabetes.   ·  Plan: Per patient, takes Humalog TID and Lantus 12 U at bedtime. A1c 8.2, 8/8. Glucose on admission 323. Last admission pt on 6 U pre meal and 14 U basal.   - Start pt on 14 U Lantus and 7 U Lispro  - Consistent carbohydrate diet.    Problem/Plan - 7:  ·  Problem: Hypertension.   ·  Plan: Pt stated he last took his medications yesterday. Hypertensive in the ED, given home meds STAT.   Home meds: Nifedipine 60 mg BID and Clonidine 0.2 mg BID   - C/w home meds.    Problem/Plan - 8:  ·  Problem: CAD (coronary artery disease).   ·  Plan: Pt w h/o stent placement 1 year ago. Home meds: ASA 81 mg   - C/w home med.    Problem/Plan - 9:  ·  Problem: General weakness.   ·  Plan: Pt states he feels weak likely 2/2 to poor PO intake.   - Encourage PO intake  - PT Consult.    Problem/Plan - 10:  ·  Problem: Prophylactic measure.   ·  Plan; F: 500 mL  cc/kg   E: Replete K<4 Mg<2   N: Consistence carbohydrate diet, extra protein   Dvt ppx: Lovenox 40 mg   Ambulate w/ assistance   FULL CODE.

## 2023-08-22 NOTE — CONSULT NOTE ADULT - SUBJECTIVE AND OBJECTIVE BOX
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 admission (8/7 - 8/11) for hypertensive urgency and DEBBY (presence of open L wound; per ID no need for abx) presenting for nausea and vomiting since yesterday. Patient reports NBNB emesis since yesterday. Endorses poor PO intake. States he last took his insulin and BP meds (nifedipine/clonidine) yesterday. Endorses normal BM. Denies alcohol and drug use, fevers/chills, dysuria or URI symptoms.       ED Vitals: T 97 HR 98 /100 RR 16 sat97% RA  ED Labs: WBC 14.03 Hgb 10.7 Hct 34.3 Na 132 K 3.5 BUN 28 Cr 3.08  Imaging:   ·	CXR  ·	Acute cardiopulmonary process   ·	Curvilinear opacity along peripheral right long zone most likely skinfold   Interventions: Zofran 1 mg, 2L NS Bolus   (21 Aug 2023 11:24)    PAST MEDICAL & SURGICAL HISTORY:  IDDM (insulin dependent diabetes mellitus)      Hypertension      CAD (coronary artery disease)      Left toe amputee  Great toe bone removal      History of cholecystectomy        MEDICATIONS  (STANDING):  aspirin enteric coated 81 milliGRAM(s) Oral daily  cloNIDine 0.2 milliGRAM(s) Oral every 12 hours  dextrose 5%. 1000 milliLiter(s) (100 mL/Hr) IV Continuous <Continuous>  dextrose 5%. 1000 milliLiter(s) (50 mL/Hr) IV Continuous <Continuous>  dextrose 50% Injectable 25 Gram(s) IV Push once  dextrose 50% Injectable 12.5 Gram(s) IV Push once  dextrose 50% Injectable 25 Gram(s) IV Push once  enoxaparin Injectable 40 milliGRAM(s) SubCutaneous every 24 hours  glucagon  Injectable 1 milliGRAM(s) IntraMuscular once  insulin glargine Injectable (LANTUS) 14 Unit(s) SubCutaneous at bedtime  insulin lispro (ADMELOG) corrective regimen sliding scale   SubCutaneous three times a day before meals  insulin lispro Injectable (ADMELOG) 7 Unit(s) SubCutaneous three times a day before meals  lactated ringers. 1000 milliLiter(s) (130 mL/Hr) IV Continuous <Continuous>  NIFEdipine XL 60 milliGRAM(s) Oral every 12 hours    MEDICATIONS  (PRN):  acetaminophen     Tablet .. 650 milliGRAM(s) Oral every 6 hours PRN Temp greater or equal to 38C (100.4F), Mild Pain (1 - 3)  aluminum hydroxide/magnesium hydroxide/simethicone Suspension 30 milliLiter(s) Oral every 4 hours PRN Dyspepsia  dextrose Oral Gel 15 Gram(s) Oral once PRN Blood Glucose LESS THAN 70 milliGRAM(s)/deciliter  melatonin 3 milliGRAM(s) Oral at bedtime PRN Insomnia  ondansetron Injectable 4 milliGRAM(s) IV Push every 8 hours PRN Nausea and/or Vomiting          FAMILY HISTORY:  FH: type 2 diabetes mellitus        CBC Full  -  ( 22 Aug 2023 05:30 )  WBC Count : 11.65 K/uL  RBC Count : 4.13 M/uL  Hemoglobin : 9.7 g/dL  Hematocrit : 31.6 %  Platelet Count - Automated : 244 K/uL  Mean Cell Volume : 76.5 fl  Mean Cell Hemoglobin : 23.5 pg  Mean Cell Hemoglobin Concentration : 30.7 gm/dL  Auto Neutrophil # : 7.42 K/uL  Auto Lymphocyte # : 2.95 K/uL  Auto Monocyte # : 0.70 K/uL  Auto Eosinophil # : 0.49 K/uL  Auto Basophil # : 0.05 K/uL  Auto Neutrophil % : 63.8 %  Auto Lymphocyte % : 25.3 %  Auto Monocyte % : 6.0 %  Auto Eosinophil % : 4.2 %  Auto Basophil % : 0.4 %      08-22    136  |  102  |  34<H>  ----------------------------<  252<H>  3.8   |  25  |  2.50<H>    Ca    9.2      22 Aug 2023 05:30  Phos  2.9     08-22  Mg     1.8     08-22    TPro  7.1  /  Alb  3.0<L>  /  TBili  <0.2  /  DBili  x   /  AST  12  /  ALT  <5<L>  /  AlkPhos  113  08-22      Urinalysis Basic - ( 22 Aug 2023 05:30 )    Color: x / Appearance: x / SG: x / pH: x  Gluc: 252 mg/dL / Ketone: x  / Bili: x / Urobili: x   Blood: x / Protein: x / Nitrite: x   Leuk Esterase: x / RBC: x / WBC x   Sq Epi: x / Non Sq Epi: x / Bacteria: x        Radiology :     < from: Xray Foot AP + Lateral + Oblique, Bilat (08.21.23 @ 13:20) >  ACC: [id number] EXAM:  XR FOOT COMP MIN 3 VIEWS BI   ORDERED BY: ANTONELLA DE LEON     PROCEDURE DATE:  08/21/2023          INTERPRETATION:  CLINICAL HISTORY: 63-year-old with wound.          IMPRESSION: Frontal, lateral and oblique views of the leftfoot   demonstrate amputation of the distal aspect of the fourth and fifth   metatarsals. Hindfoot valgus. Pes planus. Fractured screws traversing the   medial aspect of the tarsometatarsal joint. Ankylosis of the base of the   second metatarsal withthe middle cuneiform. No focal osteopenia or   cortical destruction to suggest osteomyelitis. Arterial vascular   calcification.    Frontal, lateral and oblique views of the right foot are compared to   8/7/2023 and demonstrates mild hallux valgus deformity of the first MTP   joint. There is no fracture. No dislocation. Appropriate osseous   mineralization. Arterial vascular calcification.    MRI is more sensitive examination for detection of osteomyelitis.              Review of Systems : per HPI         Vital Signs Last 24 Hrs  T(C): 37 (22 Aug 2023 07:43), Max: 37.1 (21 Aug 2023 12:05)  T(F): 98.6 (22 Aug 2023 07:43), Max: 98.7 (21 Aug 2023 12:05)  HR: 85 (22 Aug 2023 07:43) (76 - 91)  BP: 152/81 (22 Aug 2023 07:43) (132/79 - 216/125)  BP(mean): --  RR: 18 (22 Aug 2023 07:43) (17 - 18)  SpO2: 99% (22 Aug 2023 07:43) (95% - 100%)    Parameters below as of 22 Aug 2023 07:43  Patient On (Oxygen Delivery Method): room air            Physical Exam :  on CONTACT L foot ESBL/E Coli isolation ,  63 y o man lying comfortably in semi Rodriguez's position , awake , alert , no acute complaints     Head : normocephalic , atraumatic    Eyes : PERRLA , EOMI , no nystagmus , sclera anicteric    ENT / FACE : neg nasal discharge , uvula midline , no oropharyngeal erythema / exudate    Neck : supple , negative JVD , negative carotid bruits , no thyromegaly    Chest : CTA bilaterally , neg wheeze / rhonchi / rales / crackles / egophany    Cardiovascular : regular rate and rhythm , neg murmurs / rubs / gallops    Abdomen : soft , non distended , non tender to palpation in all 4 quadrants ,  normal bowel sounds     Extremities :  L foot bandaged     Neurologic Exam :    Alert and oriented x 3     Motor Exam:          Right UE:               no focal weakness ,  > 3+/5 throughout       Left UE:                 no focal weakness ,  > 3+/5 throughout       Right LE:    no focal weakness ,  > 3+/5 throughout       Left LE:    no focal weakness ,  > 3+/5 throughout         Sensation :         intact to light touch x 4 extremities        DTR :     biceps/brachioradialis : equal                      patella/ankle : equal       Gait :  not tested          PM&R Impression :         - deconditioned    - no focal weakness  neurologic deficits         Recommendations / Plan :      1) Physical / Occupational therapy focusing on therapeutic exercises , equipment evaluation , bed mobility/transfer out of bed evaluation , progressive ambulation with assistive devices prn .    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 Patient reports NBNB emesis since yesterday. Endorses poor PO intake. States he last took his insulin and BP meds (nifedipine/clonidine) yesterday. Endorses normal BM. Denies alcohol and drug use, fevers/chills, dysuria or URI symptoms.       ED Vitals: T 97 HR 98 /100 RR 16 sat97% RA  ED Labs: WBC 14.03 Hgb 10.7 Hct 34.3 Na 132 K 3.5 BUN 28 Cr 3.08  Imaging:   ·	CXR  ·	Acute cardiopulmonary process   ·	Curvilinear opacity along peripheral right long zone most likely skinfold   Interventions: Zofran 1 mg, 2L NS Bolus   (21 Aug 2023 11:24)    PAST MEDICAL & SURGICAL HISTORY:  IDDM (insulin dependent diabetes mellitus)      Hypertension      CAD (coronary artery disease)      Left toe amputee  Great toe bone removal      History of cholecystectomy        MEDICATIONS  (STANDING):  aspirin enteric coated 81 milliGRAM(s) Oral daily  cloNIDine 0.2 milliGRAM(s) Oral every 12 hours  dextrose 5%. 1000 milliLiter(s) (100 mL/Hr) IV Continuous <Continuous>  dextrose 5%. 1000 milliLiter(s) (50 mL/Hr) IV Continuous <Continuous>  dextrose 50% Injectable 25 Gram(s) IV Push once  dextrose 50% Injectable 12.5 Gram(s) IV Push once  dextrose 50% Injectable 25 Gram(s) IV Push once  enoxaparin Injectable 40 milliGRAM(s) SubCutaneous every 24 hours  glucagon  Injectable 1 milliGRAM(s) IntraMuscular once  insulin glargine Injectable (LANTUS) 14 Unit(s) SubCutaneous at bedtime  insulin lispro (ADMELOG) corrective regimen sliding scale   SubCutaneous three times a day before meals  insulin lispro Injectable (ADMELOG) 7 Unit(s) SubCutaneous three times a day before meals  lactated ringers. 1000 milliLiter(s) (130 mL/Hr) IV Continuous <Continuous>  NIFEdipine XL 60 milliGRAM(s) Oral every 12 hours    MEDICATIONS  (PRN):  acetaminophen     Tablet .. 650 milliGRAM(s) Oral every 6 hours PRN Temp greater or equal to 38C (100.4F), Mild Pain (1 - 3)  aluminum hydroxide/magnesium hydroxide/simethicone Suspension 30 milliLiter(s) Oral every 4 hours PRN Dyspepsia  dextrose Oral Gel 15 Gram(s) Oral once PRN Blood Glucose LESS THAN 70 milliGRAM(s)/deciliter  melatonin 3 milliGRAM(s) Oral at bedtime PRN Insomnia  ondansetron Injectable 4 milliGRAM(s) IV Push every 8 hours PRN Nausea and/or Vomiting          FAMILY HISTORY:  FH: type 2 diabetes mellitus        CBC Full  -  ( 22 Aug 2023 05:30 )  WBC Count : 11.65 K/uL  RBC Count : 4.13 M/uL  Hemoglobin : 9.7 g/dL  Hematocrit : 31.6 %  Platelet Count - Automated : 244 K/uL  Mean Cell Volume : 76.5 fl  Mean Cell Hemoglobin : 23.5 pg  Mean Cell Hemoglobin Concentration : 30.7 gm/dL  Auto Neutrophil # : 7.42 K/uL  Auto Lymphocyte # : 2.95 K/uL  Auto Monocyte # : 0.70 K/uL  Auto Eosinophil # : 0.49 K/uL  Auto Basophil # : 0.05 K/uL  Auto Neutrophil % : 63.8 %  Auto Lymphocyte % : 25.3 %  Auto Monocyte % : 6.0 %  Auto Eosinophil % : 4.2 %  Auto Basophil % : 0.4 %      08-22    136  |  102  |  34<H>  ----------------------------<  252<H>  3.8   |  25  |  2.50<H>    Ca    9.2      22 Aug 2023 05:30  Phos  2.9     08-22  Mg     1.8     08-22    TPro  7.1  /  Alb  3.0<L>  /  TBili  <0.2  /  DBili  x   /  AST  12  /  ALT  <5<L>  /  AlkPhos  113  08-22      Urinalysis Basic - ( 22 Aug 2023 05:30 )    Color: x / Appearance: x / SG: x / pH: x  Gluc: 252 mg/dL / Ketone: x  / Bili: x / Urobili: x   Blood: x / Protein: x / Nitrite: x   Leuk Esterase: x / RBC: x / WBC x   Sq Epi: x / Non Sq Epi: x / Bacteria: x        Radiology :     < from: Xray Foot AP + Lateral + Oblique, Bilat (08.21.23 @ 13:20) >  ACC: [id number] EXAM:  XR FOOT COMP MIN 3 VIEWS BI   ORDERED BY: ANTONELLA DE LEON     PROCEDURE DATE:  08/21/2023          INTERPRETATION:  CLINICAL HISTORY: 63-year-old with wound.          IMPRESSION: Frontal, lateral and oblique views of the leftfoot   demonstrate amputation of the distal aspect of the fourth and fifth   metatarsals. Hindfoot valgus. Pes planus. Fractured screws traversing the   medial aspect of the tarsometatarsal joint. Ankylosis of the base of the   second metatarsal withthe middle cuneiform. No focal osteopenia or   cortical destruction to suggest osteomyelitis. Arterial vascular   calcification.    Frontal, lateral and oblique views of the right foot are compared to   8/7/2023 and demonstrates mild hallux valgus deformity of the first MTP   joint. There is no fracture. No dislocation. Appropriate osseous   mineralization. Arterial vascular calcification.    MRI is more sensitive examination for detection of osteomyelitis.              Review of Systems : per HPI         Vital Signs Last 24 Hrs  T(C): 37 (22 Aug 2023 07:43), Max: 37.1 (21 Aug 2023 12:05)  T(F): 98.6 (22 Aug 2023 07:43), Max: 98.7 (21 Aug 2023 12:05)  HR: 85 (22 Aug 2023 07:43) (76 - 91)  BP: 152/81 (22 Aug 2023 07:43) (132/79 - 216/125)  BP(mean): --  RR: 18 (22 Aug 2023 07:43) (17 - 18)  SpO2: 99% (22 Aug 2023 07:43) (95% - 100%)    Parameters below as of 22 Aug 2023 07:43  Patient On (Oxygen Delivery Method): room air            Physical Exam :  on CONTACT L foot ESBL/E Coli isolation ,  63 y o man lying comfortably in semi Rodriguez's position , awake , alert , no acute complaints     Neurologic Exam :    Alert and oriented x 3     Motor Exam:          Right UE:               no focal weakness ,  > 3+/5 throughout       Left UE:                 no focal weakness ,  > 3+/5 throughout       Right LE:    no focal weakness ,  > 3+/5 throughout       Left LE:    no focal weakness ,  > 3+/5 throughout         Sensation :         intact to light touch x 4 extremities        DTR :     biceps/brachioradialis : equal                      patella/ankle : equal       Gait :  not tested          PM&R Impression :         - deconditioned    - no focal weakness  neurologic deficits         Recommendations / Plan :      1) Physical / Occupational therapy focusing on therapeutic exercises , equipment evaluation , bed mobility/transfer out of bed evaluation , progressive ambulation with assistive devices prn .    2) Current disposition plan recommendation  :    pending functional progress

## 2023-08-22 NOTE — DISCHARGE NOTE PROVIDER - NSDCFUSCHEDAPPT_GEN_ALL_CORE_FT
Mendoza Lombardi  [facility] Physician [facility]  NEPHRO [address]  Scheduled Appointment: 09/05/2023     [facility] Physician [facility]  INTMED [address]  Scheduled Appointment: 08/25/2023    Mendoza Lombardi  [facility]  NEPHRO [address]  Scheduled Appointment: 09/05/2023     Mendoza Lombardi  [facility] Physician [facility]  NEPHRO [address]  Scheduled Appointment: 09/05/2023

## 2023-08-22 NOTE — DISCHARGE NOTE PROVIDER - NSDCFUADDAPPT_GEN_ALL_CORE_FT
Please follow up with PCP on Friday, 8/25/2023 at 1:30 PM. Office: [address] [place] [address], [address] Floor, [place] [id number]. Phone: [phone]. Your PCP will be able to refer you to a Podiatrist who accepts Medicaid in order to manage the wound on your left foot.

## 2023-08-22 NOTE — DISCHARGE NOTE PROVIDER - ATTENDING DISCHARGE PHYSICAL EXAMINATION:
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  Hypertensive emergency (evidence of renal dysfunction), resolved - poorly adherent to medications, recent admission for same, blood pressure improved with reinitiation of home orals.  DEBBY on CKD - baseline unclear as patient frequently admitted for DEBBY on CKD, creatinine 1.78 at time of last discharge (8/9/23) and 3.08 at time of this admission (8/21), improved  with IVFH. Encourage PO hydration.   R gluteal wound - chronic wound, no erythema or purulence to suggest active infection, BC NGTD. Evaluated by ID during recent admission and felt MRI may be low yield. Evaluated by wound care, dressing changed and provided with supplies.   L foot wound - evaluated by podiatry, wound size decreased from prior admission, xray without signs of osteo. Cont wound care.   Leukocytosis - afebrile, HDS. Monitored off antibiotics.  DM2 - poorly controlled likely secondary to poor compliance, patient tolerating PO well now so resume home regimen.  Dispo - Refused shelter. Discharged to streets.

## 2023-08-22 NOTE — PHYSICAL THERAPY INITIAL EVALUATION ADULT - ADDITIONAL COMMENTS
Pt currently undomiciled. Primarily amb w/ SC. Denied falls within past 6 months. Reported to be indep w/ all ADL's prior to [facility] admission.

## 2023-08-22 NOTE — PROGRESS NOTE ADULT - PROBLEM SELECTOR PLAN 3
Pt with history of L foot OM and previous admission to [facility] 8/7- 8/11 for DEBBY and L foot OM. ID and podiatry following and concluded no overt infection and no need for abx. Pt is afebrile. On exam, foot is not erythematous or warm and there is no fluctuance. L foot X-ray with no signs of osteomyelitis. CRP 21.8.   - Podiatry following  - F/u ESR   - Consider L foot MRI   - Offloading boot on L foot Pt with history of L foot OM and previous admission to [facility] 8/7- 8/11 for DEBBY and L foot OM. ID and podiatry following and concluded no overt infection and no need for abx. Pt is afebrile. On exam, foot is not erythematous or warm and there is no fluctuance. L foot X-ray with no signs of osteomyelitis. ESR 78, CRP 21.8.   - Podiatry following  - Consider L foot MRI   - Offloading boot on L foot

## 2023-08-22 NOTE — PROGRESS NOTE ADULT - PROBLEM SELECTOR PLAN 2
2/4 SIRS criteria (WBC 14.03 and HR 98) w/ no sources of infection. S/p 2L of NS bolus. Repeat WBC 15.68. Pt has hx of L foot OM. Last admission to [facility] 8/7 for OM; ID stated no overt infection and abx not needed. He is afebrile. On exam, there is breakage of the dorsum of the left foot, but does not appear to be infected. No erythema, warmth or fluctuance. Denies symptoms of fevers, chills, abdominal pain, diarrhea, URI and dysuria. CRP 21.8.  - F/u blood cultures, no growth at 12 hours   - F/u ESR   - Monitor CBC 2/4 SIRS criteria (WBC 14.03 and HR 98) w/ no sources of infection. S/p 2L of NS bolus. Repeat WBC 15.68. Pt has hx of L foot OM. Last admission to [facility] 8/7 for OM; ID stated no overt infection and abx not needed. He is afebrile. On exam, there is breakage of the dorsum of the left foot, but does not appear to be infected. No erythema, warmth or fluctuance. Denies symptoms of fevers, chills, abdominal pain, diarrhea, URI and dysuria. ESR 78, CRP 21.8.  - F/u blood cultures, no growth at 12 hours   - Monitor CBC

## 2023-08-22 NOTE — PROGRESS NOTE ADULT - PROBLEM SELECTOR PLAN 1
On admission, BUN 29 and Cr 3.08 (baseline Cr 1.22 1/2022). Recent admission to [facility] for DEBBY 8/7. S/p 2L NS bolus Cr downtrended to 2.61. Endorses 1 day of NBNB vomiting and poor PO intake, Denies hx of dialysis. Cr continuing to downtrend, 2.50 on 8/22.   Plan:   - 500 ml LR maintenance fluid  - Encourage PO intake   - Monitor BMP

## 2023-08-22 NOTE — PHYSICAL THERAPY INITIAL EVALUATION ADULT - PERTINENT HX OF CURRENT PROBLEM, REHAB EVAL
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 Patient reports NBNB emesis since yesterday. Endorses poor PO intake. States he last took his insulin and BP meds (nifedipine/clonidine) yesterday. Endorses normal BM. Denies alcohol and drug use, fevers/chills, dysuria or URI symptoms.

## 2023-08-22 NOTE — DISCHARGE NOTE PROVIDER - CARE PROVIDER_API CALL
Mendoza Lombardi  Nephrology  [address] [place] [address]  Phone: [phone]  Fax: [phone]  Scheduled Appointment: 09/05/2023 01:30 PM

## 2023-08-22 NOTE — DISCHARGE NOTE PROVIDER - HOSPITAL COURSE
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 admission (8/7 - 8/11) for hypertensive urgency and DEBBY (presence of open L wound; per ID no need for abx) presenting for 1 day of nausea and vomiting, found to have DEBBY and leukocytosis.     Hospital course (by problem):     #DEBBY (acute kidney injury).   ·  Course: On admission, BUN 29 and Cr 3.08 (baseline Cr 1.22 1/2022). Recent admission to [facility] for DEBBY 8/7. S/p 2L NS bolus Cr downtrended to 2.61. Endorsed 1 day of NBNB vomiting and poor PO intake, Denied hx of dialysis. Pt was treated with LR maintenance fluids. Cr continued to downtrend, 2.50 on 8/22.     #Leukocytosis.   ·  Course: 2/4 SIRS criteria (WBC 14.03 and HR 98) w/ no sources of infection. S/p 2L of NS bolus. Repeat WBC 15.68. Pt has hx of L foot OM. Last admission to [facility] 8/7 for OM; ID stated no overt infection and abx not needed. He remained afebrile for the duration of admission. On exam, there was breakage of the dorsum of the left foot, but did not appear to be infected. No erythema, warmth or fluctuance. Denied fevers, chills, abdominal pain, diarrhea, URI and dysuria. ESR 78, CRP 21.8. Blood cultures had no growth at 12 hours. Leukocytosis trending down, 11.65 on 8/22.     #Wound of left foot.   ·  Course: Pt with history of L foot OM and previous admission to [facility] 8/7- 8/11 for DEBBY and L foot OM. ID and podiatry followed and concluded no overt infection and no need for abx. Pt remained afebrile. On exam, foot was not erythematous or warm and there was no fluctuance. L foot X-ray with no signs of osteomyelitis. ESR 78, CRP 21.8. Patient had offloading boot on L foot. Wound care consulted.     #Wound of gluteal cleft.   ·  Course: Chronic wound of 3 years. Stage 3. Ambulates w/ a cane at home. Wound care consulted.    #Hypokalemia.   ·  Course: RESOLVED. K on admission 3.5 likely 2/2 to hx of vomiting. Improved to 3.8 on 8/22 s/p 40 mEq potassium PO.    #Diabetes.   ·  Course: Per patient, takes Humalog TID and Lantus 12 U at bedtime but sometimes forgets to  prescription. A1c 8.2, 8/8. Glucose on admission 323. Pt started on 14 U Lantus and 7 U Lispro and consistent carbohydrate diet.     #Hypertension.   ·  Course: Pt has PMH of HTN, takes nifedipine 60 mg BID and clonidine 0.2 mg BID at home. Pt stated he last took his medications the day prior to presentation but that this was because he was not given them in the ED, reported that he is usually compliant with his antihypertensive meds. Hypertensive to 200s/100s in the ED, given home meds STAT. Normotensive for remainder of admission.     #CAD (coronary artery disease).   ·  Course: Pt w h/o stent placement 1 year ago. Home med ASA 81 mg continued during admission.    #General weakness.   ·  Course: Pt stated he feels weak likely 2/2 to poor PO intake. PT consulted.    Patient was discharged to: (home/[facility]/acute rehab/hospice, etc, and with what services – home health PT/RN? Home O2?)    New medications: None   Changes to old medications:  Medications that were stopped: None    Items to follow up as outpatient:    Physical exam at the time of discharge:    Physical Exam: .  VITAL SIGNS:  T(C): 37.1 (08-21-23 @ 12:05), Max: 37.1 (08-21-23 @ 12:05)  T(F): 98.7 (08-21-23 @ 12:05), Max: 98.7 (08-21-23 @ 12:05)  HR: 88 (08-21-23 @ 12:05) (84 - 98)  BP: 216/125 (08-21-23 @ 12:05) (160/83 - 216/125)  BP(mean): --  RR: 18 (08-21-23 @ 12:05) (6 - 18)  SpO2: 98% (08-21-23 @ 12:05) (97% - 98%)  Wt(kg): --    PHYSICAL EXAM:    Constitutional: NAD  Head: NCAT  Eyes: PERRL, EOMI, anicteric sclera  ENT: no nasal discharge; uvula midline, no oropharyngeal erythema or exudates; MMM  Neck: supple; no JVD or thyromegaly  Respiratory: CTA B/L; no W/R/R; no retractions  Cardiac: +S1/S2; RRR; no M/R/G  Gastrointestinal: soft, NT/ND; no rebound or guarding; normoactive bowel sounds  Back: no CVAT B/L  Extremities: WWP, no clubbing or cyanosis; no peripheral edema  Musculoskeletal: +LLE edema; no joint swelling or tenderness  Vascular: 2+ radial pulses B/L  Dermatologic: +open wound on dorsum of left foot, +skin breakage, no fluctuance, warmth or erythema; +stage 3 R gluteal cleft ulcer, no fluctuance, erythema or warmth  Neurologic: AAOx3; no focal deficits  Psychiatric: affect and characteristics of appearance, verbalizations, behaviors are appropriate         #Discharge: do not delete    Patient is a 64 yo M with past medical history of CAD s/p stent placement (1 year ago), HTN, DM, 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 admission (8/7 - 8/11) for hypertensive urgency and DEBBY (presence of open L wound; per ID no need for abx) presenting for 1 day of nausea and vomiting, found to have DEBBY and leukocytosis.     Hospital course (by problem):     #DEBBY (acute kidney injury).   ·  Course: On admission, BUN 29 and Cr 3.08 (baseline Cr 1.22 1/2022). Recent admission to [facility] for DEBBY 8/7. S/p 2L NS bolus Cr downtrended to 2.61. Endorsed 1 day of NBNB vomiting and poor PO intake, Denied hx of dialysis. Pt was treated with LR maintenance fluids. Cr continued to downtrend, 2.50 on 8/22.     #Leukocytosis.   ·  Course: 2/4 SIRS criteria (WBC 14.03 and HR 98) w/ no sources of infection. S/p 2L of NS bolus. Repeat WBC 15.68. Pt has hx of L foot OM. Last admission to [facility] 8/7 for OM; ID stated no overt infection and abx not needed. He remained afebrile for the duration of admission. On exam, there was breakage of the dorsum of the left foot, but did not appear to be infected. No erythema, warmth or fluctuance. Denied fevers, chills, abdominal pain, diarrhea, URI and dysuria. ESR 78, CRP 21.8. Blood cultures had no growth at 12 hours. Leukocytosis trending down, 11.65 on 8/22.     #Wound of left foot.   ·  Course: Pt with history of L foot OM and previous admission to [facility] 8/7- 8/11 for DEBBY and L foot OM. ID and podiatry followed and concluded no overt infection and no need for abx. Pt remained afebrile. On exam, foot was not erythematous or warm and there was no fluctuance. L foot X-ray with no signs of osteomyelitis. ESR 78, CRP 21.8. Patient had offloading boot on L foot. Wound care consulted.     #Wound of gluteal cleft.   ·  Course: Chronic wound of 3 years. Stage 3. Ambulates w/ a cane at home. Wound care consulted.    #Hypokalemia.   ·  Course: RESOLVED. K on admission 3.5 likely 2/2 to hx of vomiting. Improved to 3.8 on 8/22 s/p 40 mEq potassium PO.    #Diabetes.   ·  Course: Per patient, takes Humalog TID and Lantus 12 U at bedtime but sometimes forgets to  prescription. A1c 8.2, 8/8. Glucose on admission 323. Pt started on 14 U Lantus and 7 U Lispro and consistent carbohydrate diet.     #Hypertension.   ·  Course: Pt has PMH of HTN, takes nifedipine 60 mg BID and clonidine 0.2 mg BID at home. Pt stated he last took his medications the day prior to presentation but that this was because he was not given them in the ED, reported that he is usually compliant with his antihypertensive meds. Hypertensive to 200s/100s in the ED, given home meds STAT. Normotensive for remainder of admission.     #CAD (coronary artery disease).   ·  Course: Pt w h/o stent placement 1 year ago. Home med ASA 81 mg continued during admission.    #General weakness.   ·  Course: Pt stated he feels weak likely 2/2 to poor PO intake. PT consulted.    Patient was discharged to: home     New medications: None   Changes to old medications: None  Medications that were stopped: None    Items to follow up as outpatient:  - Follow up with nephrology (Dr. Lombardi) on 9/5/2023 at 1:30 PM. Office: [address] [place] [address], [address], [place] [address]. Phone: [phone]     Physical exam at the time of discharge:    Physical Exam: .  VITAL SIGNS:  T(C): 37.1 (08-21-23 @ 12:05), Max: 37.1 (08-21-23 @ 12:05)  T(F): 98.7 (08-21-23 @ 12:05), Max: 98.7 (08-21-23 @ 12:05)  HR: 88 (08-21-23 @ 12:05) (84 - 98)  BP: 216/125 (08-21-23 @ 12:05) (160/83 - 216/125)  BP(mean): --  RR: 18 (08-21-23 @ 12:05) (6 - 18)  SpO2: 98% (08-21-23 @ 12:05) (97% - 98%)  Wt(kg): --    PHYSICAL EXAM:    Constitutional: NAD  Head: NCAT  Eyes: PERRL, EOMI, anicteric sclera  ENT: no nasal discharge; uvula midline, no oropharyngeal erythema or exudates; MMM  Neck: supple; no JVD or thyromegaly  Respiratory: CTA B/L; no W/R/R; no retractions  Cardiac: +S1/S2; RRR; no M/R/G  Gastrointestinal: soft, NT/ND; no rebound or guarding; normoactive bowel sounds  Back: no CVAT B/L  Extremities: WWP, no clubbing or cyanosis; no peripheral edema  Musculoskeletal: +LLE edema; no joint swelling or tenderness  Vascular: 2+ radial pulses B/L  Dermatologic: +open wound on dorsum of left foot, +skin breakage, no fluctuance, warmth or erythema; +stage 3 R gluteal cleft ulcer, no fluctuance, erythema or warmth  Neurologic: AAOx3; no focal deficits  Psychiatric: affect and characteristics of appearance, verbalizations, behaviors are appropriate         #Discharge: do not delete    Patient is a 62 yo M with past medical history of CAD s/p stent placement (1 year ago), HTN, DM, 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     Hospital course (by problem):     #DEBBY (acute kidney injury).   ·  Course: On admission, BUN 29 and Cr 3.08 (baseline Cr 1.22 1/2022). Recent admission to [facility] for DEBBY 8/7. S/p 2L NS bolus Cr downtrended to 2.61. Endorsed 1 day of NBNB vomiting and poor PO intake, Denied hx of dialysis. Pt was treated with LR maintenance fluids. Cr continued to downtrend, 2.50 on 8/22.     #Leukocytosis.   ·  Course: 2/4 SIRS criteria (WBC 14.03 and HR 98) w/ no sources of infection. S/p 2L of NS bolus. Repeat WBC 15.68. Pt has hx of L foot OM. Last admission to [facility] 8/7 for OM; ID stated no overt infection and abx not needed. He remained afebrile for the duration of admission. On exam, there was breakage of the dorsum of the left foot, but did not appear to be infected. No erythema, warmth or fluctuance. Denied fevers, chills, abdominal pain, diarrhea, URI and dysuria. ESR 78, CRP 21.8. Blood cultures had no growth at 12 hours. Leukocytosis trending down, 11.65 on 8/22.     #Wound of left foot.   ·  Course: Pt with history of L foot OM and previous admission to [facility] 8/7- 8/11 for DEBBY and L foot OM. ID and podiatry followed and concluded no overt infection and no need for abx. Pt remained afebrile. On exam, foot was not erythematous or warm and there was no fluctuance. L foot X-ray with no signs of osteomyelitis. ESR 78, CRP 21.8. Patient had offloading boot on L foot. Wound care consulted.     #Wound of gluteal cleft.   ·  Course: Chronic wound of 3 years. Stage 3. Ambulates w/ a cane at home.     #Hypokalemia.   ·  Course: RESOLVED. K on admission 3.5 likely 2/2 to hx of vomiting. Improved to 3.8 on 8/22 s/p 40 mEq potassium PO.    #Diabetes.   ·  Course: Per patient, takes Humalog TID and Lantus 12 U at bedtime but sometimes forgets to  prescription. A1c 8.2, 8/8. Glucose on admission 323. Pt started on 14 U Lantus and 7 U Lispro and consistent carbohydrate diet.     #Hypertension.   ·  Course: Pt has PMH of HTN, takes nifedipine 60 mg BID and clonidine 0.2 mg BID at home. Pt stated he last took his medications the day prior to presentation but that this was because he was not given them in the ED, reported that he is usually compliant with his antihypertensive meds. Hypertensive to 200s/100s in the ED, given home meds STAT. Normotensive for remainder of admission.     #CAD (coronary artery disease).   ·  Course: Pt w h/o stent placement 1 year ago. Home med ASA 81 mg continued during admission.    #General weakness.   ·  Course: Pt stated he feels weak likely 2/2 to poor PO intake. PT consulted.    Patient was discharged to: home     New medications: None   Changes to old medications: None  Medications that were stopped: None    Items to follow up as outpatient:  - Follow up with nephrology (Dr. Lombardi) on 9/5/2023 at 1:30 PM. Office: [address] [place] [address], [address] [facility], [place] [address]. Phone: [phone]     Physical exam at the time of discharge:    Physical Exam: .  VITAL SIGNS:  T(C): 37.1 (08-21-23 @ 12:05), Max: 37.1 (08-21-23 @ 12:05)  T(F): 98.7 (08-21-23 @ 12:05), Max: 98.7 (08-21-23 @ 12:05)  HR: 88 (08-21-23 @ 12:05) (84 - 98)  BP: 216/125 (08-21-23 @ 12:05) (160/83 - 216/125)  BP(mean): --  RR: 18 (08-21-23 @ 12:05) (6 - 18)  SpO2: 98% (08-21-23 @ 12:05) (97% - 98%)  Wt(kg): --    PHYSICAL EXAM:    Constitutional: NAD  Head: NCAT  Eyes: PERRL, EOMI, anicteric sclera  ENT: no nasal discharge; uvula midline, no oropharyngeal erythema or exudates; MMM  Neck: supple; no JVD or thyromegaly  Respiratory: CTA B/L; no W/R/R; no retractions  Cardiac: +S1/S2; RRR; no M/R/G  Gastrointestinal: soft, NT/ND; no rebound or guarding; normoactive bowel sounds  Back: no CVAT B/L  Extremities: WWP, no clubbing or cyanosis; no peripheral edema  Musculoskeletal: +LLE edema; no joint swelling or tenderness  Vascular: 2+ radial pulses B/L  Dermatologic: +open wound on dorsum of left foot, +skin breakage, no fluctuance, warmth or erythema; +stage 3 R gluteal cleft ulcer, no fluctuance, erythema or warmth  Neurologic: AAOx3; no focal deficits  Psychiatric: affect and characteristics of appearance, verbalizations, behaviors are appropriate         #Discharge: do not delete    Patient is a 64 yo M with past medical history of CAD s/p stent placement (1 year ago), HTN, DM, 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     Hospital course (by problem):     #DEBBY (acute kidney injury).   ·  Course: On admission, BUN 29 and Cr 3.08 (baseline Cr 1.22 1/2022). Recent admission to [facility] for DEBBY 8/7. S/p 2L NS bolus Cr downtrended to 2.61. Endorsed 1 day of NBNB vomiting and poor PO intake, Denied hx of dialysis. Pt was treated with LR maintenance fluids. Cr continued to downtrend, 2.50 on 8/22.     #Leukocytosis.   ·  Course: 2/4 SIRS criteria (WBC 14.03 and HR 98) w/ no sources of infection. S/p 2L of NS bolus. Repeat WBC 15.68. Pt has hx of L foot OM. Last admission to [facility] 8/7 for OM; ID stated no overt infection and abx not needed. He remained afebrile for the duration of admission. On exam, there was breakage of the dorsum of the left foot, but did not appear to be infected. No erythema, warmth or fluctuance. Denied fevers, chills, abdominal pain, diarrhea, URI and dysuria. ESR 78, CRP 21.8. Blood cultures had no growth at 12 hours. Leukocytosis trending down, 11.65 on 8/22.     #Wound of left foot.   ·  Course: Pt with history of L foot OM and previous admission to [facility] 8/7- 8/11 for DEBBY and L foot OM. At that time, ID and podiatry followed and concluded no overt infection and no need for abx. Pt remained afebrile. At this admission, foot was not erythematous or warm and there was no fluctuance. L foot X-ray with no signs of osteomyelitis. ESR 78, CRP 21.8. Patient had offloading boot on L foot. Patient is scheduled for outpatient follow-up with PCP for referral to podiatrist who accepts Medicaid.     #Wound of gluteal cleft.   ·  Course: Chronic wound of 3 years. Stage 3. Ambulates w/ a cane at home.     #Hypokalemia.   ·  Course: RESOLVED. K on admission 3.5 likely 2/2 to hx of vomiting. Improved to 3.8 on 8/22 s/p 40 mEq potassium PO.    #Diabetes.   ·  Course: Per patient, takes Humalog TID and Lantus 12 U at bedtime but sometimes forgets to  prescription. A1c 8.2, 8/8. Glucose on admission 323. Pt started on 14 U Lantus and 7 U Lispro and consistent carbohydrate diet.     #Hypertension.   ·  Course: Pt has PMH of HTN, takes nifedipine 60 mg BID and clonidine 0.2 mg BID at home. Pt stated he last took his medications the day prior to presentation but that this was because he was not given them in the ED, reported that he is usually compliant with his antihypertensive meds. Hypertensive to 200s/100s in the ED, given home meds STAT. Normotensive for remainder of admission.     #CAD (coronary artery disease).   ·  Course: Pt w h/o stent placement 1 year ago. Home med ASA 81 mg continued during admission.    #General weakness.   ·  Course: Pt stated he feels weak likely 2/2 to poor PO intake. PT consulted.    Patient was discharged to: home     New medications: None   Changes to old medications: None  Medications that were stopped: None    Items to follow up as outpatient:  - Follow up with nephrology (Dr. Lombardi) on 9/5/2023 at 1:30 PM. Office: [address], [address], [place] [address]. Phone: [phone]   - Follow up with PCP on 8/25/2023 at 1:30 PM. Office: [address], [address], [place] [address]. Phone: [phone]    Physical exam at the time of discharge:    Physical Exam: .  VITAL SIGNS:  T(C): 37.1 (08-21-23 @ 12:05), Max: 37.1 (08-21-23 @ 12:05)  T(F): 98.7 (08-21-23 @ 12:05), Max: 98.7 (08-21-23 @ 12:05)  HR: 88 (08-21-23 @ 12:05) (84 - 98)  BP: 216/125 (08-21-23 @ 12:05) (160/83 - 216/125)  BP(mean): --  RR: 18 (08-21-23 @ 12:05) (6 - 18)  SpO2: 98% (08-21-23 @ 12:05) (97% - 98%)  Wt(kg): --    PHYSICAL EXAM:    Constitutional: NAD  Head: NCAT  Eyes: PERRL, EOMI, anicteric sclera  ENT: no nasal discharge; uvula midline, no oropharyngeal erythema or exudates; MMM  Neck: supple; no JVD or thyromegaly  Respiratory: CTA B/L; no W/R/R; no retractions  Cardiac: +S1/S2; RRR; no M/R/G  Gastrointestinal: soft, NT/ND; no rebound or guarding; normoactive bowel sounds  Back: no CVAT B/L  Extremities: WWP, no clubbing or cyanosis; no peripheral edema  Musculoskeletal: +LLE edema; no joint swelling or tenderness  Vascular: 2+ radial pulses B/L  Dermatologic: +open wound on dorsum of left foot, +skin breakage, no fluctuance, warmth or erythema; +stage 3 R gluteal cleft ulcer, no fluctuance, erythema or warmth  Neurologic: AAOx3; no focal deficits  Psychiatric: affect and characteristics of appearance, verbalizations, behaviors are appropriate         #Discharge: do not delete    Patient is a 64 yo M with past medical history of CAD s/p stent placement (1 year ago), HTN, DM, 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     Hospital course (by problem):     #DEBBY (acute kidney injury).   On admission, BUN 29 and Cr 3.08 (baseline Cr 1.22 1/2022). Recent admission to [facility] for DEBBY 8/7. S/p 2L NS bolus Cr downtrended to 2.61. Endorsed 1 day of NBNB vomiting and poor PO intake, Denied hx of dialysis. Pt was treated with LR maintenance fluids. Cr continued to downtrend, 2.50 on 8/22.     #Leukocytosis.   2/4 SIRS criteria (WBC 14.03 and HR 98) w/ no sources of infection. S/p 2L of NS bolus. Repeat WBC 15.68. Pt has hx of L foot OM. Last admission to [facility] 8/7 for OM; ID stated no overt infection and abx not needed. He remained afebrile for the duration of admission. On exam, there was breakage of the dorsum of the left foot, but did not appear to be infected. No erythema, warmth or fluctuance. Denied fevers, chills, abdominal pain, diarrhea, URI and dysuria. ESR 78, CRP 21.8. Blood cultures had no growth at 12 hours. Leukocytosis trending down, 11.65 on 8/22.     #Wound of left foot.   Pt with history of L foot OM and previous admission to [facility] 8/7- 8/11 for DEBBY and L foot OM. At that time, ID and podiatry followed and concluded no overt infection and no need for abx. Pt remained afebrile. At this admission, foot was not erythematous or warm and there was no fluctuance. L foot X-ray with no signs of osteomyelitis. ESR 78, CRP 21.8. Patient had offloading boot on L foot. Patient is scheduled for outpatient follow-up with PCP for referral to podiatrist who accepts Medicaid.     #Wound of gluteal cleft.   Chronic wound of 3 years. Stage 3. Ambulates w/ a cane at home.   - Wound care saw patient and provided him with equipment to redress wound.     #Hypokalemia.   RESOLVED. K on admission 3.5 likely 2/2 to hx of vomiting. Improved to 3.8 on 8/22 s/p 40 mEq potassium PO.    #Diabetes.   Per patient, takes Humalog TID and Lantus 12 U at bedtime but sometimes forgets to  prescription. A1c 8.2, 8/8. Glucose on admission 323. Pt started on 14 U Lantus and 7 U Lispro and consistent carbohydrate diet.   - C/w home regiment    #Hypertension.   Pt has PMH of HTN, takes nifedipine 60 mg BID and clonidine 0.2 mg BID at home. Pt stated he last took his medications the day prior to presentation but that this was because he was not given them in the ED, reported that he is usually compliant with his antihypertensive meds. Hypertensive to 200s/100s in the ED, given home meds STAT. Normotensive for remainder of admission.   - Continue with home medications    #CAD (coronary artery disease).   Pt w h/o stent placement 1 year ago. Home med ASA 81 mg continued during admission.  - C/w aspirin    #General weakness.   Pt stated he feels weak likely 2/2 to poor PO intake. PT consulted.    Patient was discharged to: home     New medications: None   Changes to old medications: None  Medications that were stopped: None    Items to follow up as outpatient:  - Follow up with nephrology (Dr. Lombardi) on 9/5/2023 at 1:30 PM. Office: [address], [address], [place] [address]. Phone: [phone]   - Follow up with PCP on 8/25/2023 at 1:30 PM. Office: [address], [address], [place] [address]. Phone: [phone]    Physical exam at the time of discharge:    Physical Exam: .  VITAL SIGNS:  T(C): 37.1 (08-21-23 @ 12:05), Max: 37.1 (08-21-23 @ 12:05)  T(F): 98.7 (08-21-23 @ 12:05), Max: 98.7 (08-21-23 @ 12:05)  HR: 88 (08-21-23 @ 12:05) (84 - 98)  BP: 216/125 (08-21-23 @ 12:05) (160/83 - 216/125)  BP(mean): --  RR: 18 (08-21-23 @ 12:05) (6 - 18)  SpO2: 98% (08-21-23 @ 12:05) (97% - 98%)  Wt(kg): --    PHYSICAL EXAM:    Constitutional: NAD  Head: NCAT  Eyes: PERRL, EOMI, anicteric sclera  ENT: no nasal discharge; uvula midline, no oropharyngeal erythema or exudates; MMM  Neck: supple; no JVD or thyromegaly  Respiratory: CTA B/L; no W/R/R; no retractions  Cardiac: +S1/S2; RRR; no M/R/G  Gastrointestinal: soft, NT/ND; no rebound or guarding; normoactive bowel sounds  Back: no CVAT B/L  Extremities: WWP, no clubbing or cyanosis; no peripheral edema  Musculoskeletal: +LLE edema; no joint swelling or tenderness  Vascular: 2+ radial pulses B/L  Dermatologic: +open wound on dorsum of left foot, +skin breakage, no fluctuance, warmth or erythema; +stage 3 R gluteal cleft ulcer, no fluctuance, erythema or warmth  Neurologic: AAOx3; no focal deficits  Psychiatric: affect and characteristics of appearance, verbalizations, behaviors are appropriate         Detail Level: Detailed

## 2023-08-22 NOTE — PROGRESS NOTE ADULT - PROBLEM SELECTOR PLAN 5
K on admission 3.5 likely 2/2 to hx of vomiting. Improved to 3.8 on 8/22 s/p 40 mEq potassium PO.  - LR maintenance   - Continue to monitor

## 2023-08-22 NOTE — DISCHARGE NOTE PROVIDER - NSDCMRMEDTOKEN_GEN_ALL_CORE_FT
Aspirin Enteric Coated 81 mg oral delayed release tablet: 1 tab(s) orally once a day  cloNIDine 0.2 mg oral tablet: 1 tab(s) orally once a day  HumaLOG KwikPen 100 units/mL injectable solution: 4 unit(s) injectable 3 times a day (before meals)  Lantus Solostar Pen 100 units/mL subcutaneous solution: 12 unit(s) subcutaneous once a day (at bedtime)  NIFEdipine 60 mg oral tablet, extended release: 2 tab(s) orally once a day

## 2023-08-22 NOTE — DISCHARGE NOTE PROVIDER - NSDCCPCAREPLAN_GEN_ALL_CORE_FT
PRINCIPAL DISCHARGE DIAGNOSIS  Diagnosis: Nausea and vomiting  Assessment and Plan of Treatment: You came to the hospital because you were having nausea and vomiting. You were treated with intravenous fluids and anti-nausea medications, which improved your symptoms.      SECONDARY DISCHARGE DIAGNOSES  Diagnosis: DEBBY (acute kidney injury)  Assessment and Plan of Treatment: You had labs done because of your nausea and vomiting and your results showed that your kidneys were not working properly, most likely because of dehydration from vomiting. You were given intravenous fluids in the hospital and encouraged to drink water, and your labs show that your kidney function is continuing to improve.    Diagnosis: Leucocytosis  Assessment and Plan of Treatment: You had labs done because of your nausea and vomiting and your results showed that you had a high amount of white blood cells (WBCs) in your blood. WBCs are cells that increase during periods of inflammation and fight off infection. You were admitted to the hospital to find out if you had an infection. Because of the wound on your left foot, you had an X-ray of the foot that did not show evidence of infection there. You had cultures of your blood done which did not grow any bacteria. You did not have any fevers and your WBCs improved while you were in the hospital.     PRINCIPAL DISCHARGE DIAGNOSIS  Diagnosis: Nausea and vomiting  Assessment and Plan of Treatment: You came to the hospital because you were having nausea and vomiting. You were treated with intravenous fluids and anti-nausea medications, which improved your symptoms.      SECONDARY DISCHARGE DIAGNOSES  Diagnosis: DEBBY (acute kidney injury)  Assessment and Plan of Treatment: You had labs done because of your nausea and vomiting and your results showed that your kidneys were not working properly, most likely because of dehydration from vomiting. You were given intravenous fluids in the hospital and encouraged to drink water, and your labs show that your kidney function is continuing to improve.    Diagnosis: Wound of gluteal cleft  Assessment and Plan of Treatment: Always wash your hands with soap and water for at least 20 seconds before and after changing your dressing. Change your dressing as told by your health care provider. To help with healing, eat foods that are rich in protein, vitamin A, vitamin C, and other nutrients.  Check your wound every day for signs of infection. Contact your health care provider if you think that your wound is infected. Take all your medications as directed, including your insulin. Poorly controlled diabetes is known to contribute to non-healing wounds.  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 Site irrigated with NS and hydrofiber lightly packed into wound bed, periwound protected with skin barrier wipes, site covered with foam dressing.  You should irrigate with saline and pack the wound with hydrofiber as above. Then you should cover the wound with foam dressing.      Diagnosis: Wound of left foot  Assessment and Plan of Treatment: You have a chronic wound on your left foot. You had X-rays done that showed no evidence of infection in the bone. Please see your PCP on Friday, 8/25/2023 at 1:30 PM in order to have the wound evaluated and to receive a referral to a Podiatrist who accepts Medicaid.

## 2023-08-22 NOTE — DISCHARGE NOTE PROVIDER - DETAILS OF MALNUTRITION DIAGNOSIS/DIAGNOSES
This patient has been assessed with a concern for Malnutrition and was treated during this hospitalization for the following Nutrition diagnosis/diagnoses:     -  08/23/2023: Moderate protein-calorie malnutrition

## 2023-08-23 ENCOUNTER — TRANSCRIPTION ENCOUNTER (OUTPATIENT)
Age: 63
End: 2023-08-23

## 2023-08-23 VITALS
RESPIRATION RATE: 18 BRPM | DIASTOLIC BLOOD PRESSURE: 81 MMHG | HEART RATE: 73 BPM | TEMPERATURE: 98 F | OXYGEN SATURATION: 96 % | SYSTOLIC BLOOD PRESSURE: 122 MMHG

## 2023-08-23 LAB
GLUCOSE BLDC GLUCOMTR-MCNC: 196 MG/DL — HIGH (ref 70–99)
GLUCOSE BLDC GLUCOMTR-MCNC: 263 MG/DL — HIGH (ref 70–99)

## 2023-08-23 PROCEDURE — 96374 THER/PROPH/DIAG INJ IV PUSH: CPT

## 2023-08-23 PROCEDURE — 82962 GLUCOSE BLOOD TEST: CPT

## 2023-08-23 PROCEDURE — 87040 BLOOD CULTURE FOR BACTERIA: CPT

## 2023-08-23 PROCEDURE — 83690 ASSAY OF LIPASE: CPT

## 2023-08-23 PROCEDURE — 80048 BASIC METABOLIC PNL TOTAL CA: CPT

## 2023-08-23 PROCEDURE — 83735 ASSAY OF MAGNESIUM: CPT

## 2023-08-23 PROCEDURE — 84484 ASSAY OF TROPONIN QUANT: CPT

## 2023-08-23 PROCEDURE — 84100 ASSAY OF PHOSPHORUS: CPT

## 2023-08-23 PROCEDURE — 83930 ASSAY OF BLOOD OSMOLALITY: CPT

## 2023-08-23 PROCEDURE — 82010 KETONE BODYS QUAN: CPT

## 2023-08-23 PROCEDURE — 86140 C-REACTIVE PROTEIN: CPT

## 2023-08-23 PROCEDURE — 80307 DRUG TEST PRSMV CHEM ANLYZR: CPT

## 2023-08-23 PROCEDURE — 97161 PT EVAL LOW COMPLEX 20 MIN: CPT

## 2023-08-23 PROCEDURE — 99285 EMERGENCY DEPT VISIT HI MDM: CPT

## 2023-08-23 PROCEDURE — 97530 THERAPEUTIC ACTIVITIES: CPT

## 2023-08-23 PROCEDURE — 36415 COLL VENOUS BLD VENIPUNCTURE: CPT

## 2023-08-23 PROCEDURE — 82803 BLOOD GASES ANY COMBINATION: CPT

## 2023-08-23 PROCEDURE — 82330 ASSAY OF CALCIUM: CPT

## 2023-08-23 PROCEDURE — 85652 RBC SED RATE AUTOMATED: CPT

## 2023-08-23 PROCEDURE — 71045 X-RAY EXAM CHEST 1 VIEW: CPT

## 2023-08-23 PROCEDURE — 97116 GAIT TRAINING THERAPY: CPT

## 2023-08-23 PROCEDURE — 84132 ASSAY OF SERUM POTASSIUM: CPT

## 2023-08-23 PROCEDURE — 73630 X-RAY EXAM OF FOOT: CPT

## 2023-08-23 PROCEDURE — 84295 ASSAY OF SERUM SODIUM: CPT

## 2023-08-23 PROCEDURE — 80053 COMPREHEN METABOLIC PANEL: CPT

## 2023-08-23 PROCEDURE — 85025 COMPLETE CBC W/AUTO DIFF WBC: CPT

## 2023-08-23 PROCEDURE — 83605 ASSAY OF LACTIC ACID: CPT

## 2023-08-23 PROCEDURE — 85027 COMPLETE CBC AUTOMATED: CPT

## 2023-08-23 PROCEDURE — 81001 URINALYSIS AUTO W/SCOPE: CPT

## 2023-08-23 RX ADMIN — Medication 7 UNIT(S): at 13:37

## 2023-08-23 RX ADMIN — Medication 7 UNIT(S): at 09:55

## 2023-08-23 RX ADMIN — Medication 81 MILLIGRAM(S): at 13:37

## 2023-08-23 RX ADMIN — Medication 2: at 13:38

## 2023-08-23 RX ADMIN — Medication 6: at 09:56

## 2023-08-23 RX ADMIN — Medication 0.2 MILLIGRAM(S): at 09:55

## 2023-08-23 RX ADMIN — Medication 60 MILLIGRAM(S): at 09:55

## 2023-08-23 NOTE — PROGRESS NOTE ADULT - SUBJECTIVE AND OBJECTIVE BOX
**INCOMPLETE NOTE    OVERNIGHT EVENTS: None    SUBJECTIVE:  Patient seen and examined at bedside, comfortable, NAD. Denied fever, chest pain, dyspnea, abdominal pain.     Vital Signs Last 12 Hrs  T(F): 98.1 (08-22-23 @ 22:20), Max: 98.1 (08-22-23 @ 22:20)  HR: 97 (08-22-23 @ 22:20) (97 - 97)  BP: 163/87 (08-22-23 @ 22:20) (163/87 - 163/87)  BP(mean): --  RR: 18 (08-22-23 @ 22:20) (18 - 18)  SpO2: 98% (08-22-23 @ 22:20) (98% - 98%)  I&O's Summary    22 Aug 2023 07:01  -  23 Aug 2023 07:00  --------------------------------------------------------  IN: 700 mL / OUT: 200 mL / NET: 500 mL        PHYSICAL EXAM:  Constitutional: NAD, comfortable in bed.  HEENT: NC/AT, PERRLA, EOMI, no conjunctival pallor or scleral icterus, MMM  Neck: Supple, no JVD  Respiratory: CTA B/L. No w/r/r.   Cardiovascular: RRR, normal S1 and S2, no m/r/g.   Gastrointestinal: +BS, soft NTND, no guarding or rebound tenderness, no palpable masses   Extremities: wwp; no cyanosis, clubbing or edema.   Vascular: Pulses equal and strong throughout.   Neurological: AAOx3, no CN deficits, strength and sensation intact throughout.   Skin: No gross skin abnormalities or rashes        LABS:                        9.7    11.65 )-----------( 244      ( 22 Aug 2023 05:30 )             31.6     08-22    136  |  102  |  34<H>  ----------------------------<  252<H>  3.8   |  25  |  2.50<H>    Ca    9.2      22 Aug 2023 05:30  Phos  2.9     08-22  Mg     1.8     08-22    TPro  7.1  /  Alb  3.0<L>  /  TBili  <0.2  /  DBili  x   /  AST  12  /  ALT  <5<L>  /  AlkPhos  113  08-22      Urinalysis Basic - ( 22 Aug 2023 05:30 )    Color: x / Appearance: x / SG: x / pH: x  Gluc: 252 mg/dL / Ketone: x  / Bili: x / Urobili: x   Blood: x / Protein: x / Nitrite: x   Leuk Esterase: x / RBC: x / WBC x   Sq Epi: x / Non Sq Epi: x / Bacteria: x          RADIOLOGY & ADDITIONAL TESTS:    MEDICATIONS  (STANDING):  aspirin enteric coated 81 milliGRAM(s) Oral daily  cloNIDine 0.2 milliGRAM(s) Oral every 12 hours  dextrose 5%. 1000 milliLiter(s) (100 mL/Hr) IV Continuous <Continuous>  dextrose 5%. 1000 milliLiter(s) (50 mL/Hr) IV Continuous <Continuous>  dextrose 50% Injectable 25 Gram(s) IV Push once  dextrose 50% Injectable 12.5 Gram(s) IV Push once  dextrose 50% Injectable 25 Gram(s) IV Push once  enoxaparin Injectable 40 milliGRAM(s) SubCutaneous every 24 hours  glucagon  Injectable 1 milliGRAM(s) IntraMuscular once  insulin glargine Injectable (LANTUS) 14 Unit(s) SubCutaneous at bedtime  insulin lispro (ADMELOG) corrective regimen sliding scale   SubCutaneous Before meals and at bedtime  insulin lispro Injectable (ADMELOG) 7 Unit(s) SubCutaneous three times a day before meals  NIFEdipine XL 60 milliGRAM(s) Oral every 12 hours    MEDICATIONS  (PRN):  acetaminophen     Tablet .. 650 milliGRAM(s) Oral every 6 hours PRN Temp greater or equal to 38C (100.4F), Mild Pain (1 - 3)  aluminum hydroxide/magnesium hydroxide/simethicone Suspension 30 milliLiter(s) Oral every 4 hours PRN Dyspepsia  dextrose Oral Gel 15 Gram(s) Oral once PRN Blood Glucose LESS THAN 70 milliGRAM(s)/deciliter  melatonin 3 milliGRAM(s) Oral at bedtime PRN Insomnia  ondansetron Injectable 4 milliGRAM(s) IV Push every 8 hours PRN Nausea and/or Vomiting  
Patient is a 63y old  Male who presents with a chief complaint of     INTERVAL HPI/ OVERNIGHT EVENTS  Pt seen and evaluated at bedside with offloading boot on the LLE      LABS                        9.7    11.65 )-----------( 244      ( 22 Aug 2023 05:30 )             31.6     08-22    136  |  102  |  34<H>  ----------------------------<  252<H>  3.8   |  25  |  2.50<H>    Ca    9.2      22 Aug 2023 05:30  Phos  2.9     08-22  Mg     1.8     08-22    TPro  7.1  /  Alb  3.0<L>  /  TBili  <0.2  /  DBili  x   /  AST  12  /  ALT  <5<L>  /  AlkPhos  113  08-22      ESR: 78  CRP: --  08-21 @ 10:10    ICU Vital Signs Last 24 Hrs  T(C): 37 (22 Aug 2023 07:43), Max: 37.1 (21 Aug 2023 12:05)  T(F): 98.6 (22 Aug 2023 07:43), Max: 98.7 (21 Aug 2023 12:05)  HR: 85 (22 Aug 2023 07:43) (76 - 91)  BP: 152/81 (22 Aug 2023 07:43) (132/79 - 216/125)  BP(mean): --  ABP: --  ABP(mean): --  RR: 18 (22 Aug 2023 07:43) (17 - 18)  SpO2: 99% (22 Aug 2023 07:43) (95% - 100%)    O2 Parameters below as of 22 Aug 2023 07:43  Patient On (Oxygen Delivery Method): room air        RADIOLOGY  < from: MR Foot No Cont, Left (08.07.23 @ 22:33) >  IMPRESSION:    Moderately limited exam by susceptibility artifact and poor fat   suppression. No confluent decreased T1 signal or significant erosive   change to suggest osteomyelitis. If clinical concern persists, nuclear   medicine study may be performed.  Midfoot arthrosis.  Hardware at the medial midfoot limited by susceptibility artifact.      < from: Xray Foot AP + Lateral + Oblique, Bilat (08.21.23 @ 13:20) >  IMPRESSION: Frontal, lateral and oblique views of the leftfoot   demonstrate amputation of the distal aspect of the fourth and fifth   metatarsals. Hindfoot valgus. Pes planus. Fractured screws traversing the   medial aspect of the tarsometatarsal joint. Ankylosis of the base of the   second metatarsal withthe middle cuneiform. No focal osteopenia or   cortical destruction to suggest osteomyelitis. Arterial vascular   calcification.    Frontal, lateral and oblique views of the right foot are compared to   8/7/2023 and demonstrates mild hallux valgus deformity of the first MTP   joint. There is no fracture. No dislocation. Appropriate osseous   mineralization. Arterial vascular calcification.    MRI is more sensitive examination for detection of osteomyelitis.      < end of copied text >      MICROBIOLOGY      PHYSICAL EXAM  Lower Extremity Focused  Vasc: 1/4 DP/PT. negative for edema   Derm: L foot subMT 2/3  wound (2.0 x 2.0 cm), granular base, negative for undermining, malodor, purulent drainage or signs of infection. Negative PTB.   Compared to last admission, there is more granular tissue and wound has decreased in size.  Neuro: Protective sensation absent  MSK: 5/5 MMT. -TTP of palpation to L foot.  
OVERNIGHT EVENTS: No acute events overnight.     SUBJECTIVE / INTERVAL HPI: Patient seen and examined at bedside. Pt states that he had 5 episodes of vomiting since admission, still with some nausea but able to keep fluids and some food down. Denies diarrhea, last BM approx 3 days ago and was normal. Denies SOB, chest pain, cough, URI symptoms, pain, dysuria, and chills.     VITAL SIGNS:  Vital Signs Last 24 Hrs  T(C): 37 (22 Aug 2023 07:43), Max: 37.1 (21 Aug 2023 12:05)  T(F): 98.6 (22 Aug 2023 07:43), Max: 98.7 (21 Aug 2023 12:05)  HR: 85 (22 Aug 2023 07:43) (76 - 91)  BP: 152/81 (22 Aug 2023 07:43) (132/79 - 216/125)  BP(mean): --  RR: 18 (22 Aug 2023 07:43) (17 - 18)  SpO2: 99% (22 Aug 2023 07:43) (95% - 100%)    Parameters below as of 22 Aug 2023 07:43  Patient On (Oxygen Delivery Method): room air        PHYSICAL EXAM:    General: NAD  HEENT: NC/AT; PERRL, anicteric sclera; MMM  Neck: supple; no lymphadenopathy  Cardiovascular: +S1/S2; RRR; no M/R/G  Respiratory: CTA B/L; no W/R/R; no retractions  Gastrointestinal: soft, NT/ND; normoactive bowel sounds  Extremities: WWP; no edema, clubbing or cyanosis; L foot wrapped and in offloading boot on exam   Vascular: 2+ radial, DP/PT pulses B/L  Neurological: AAOx3; no focal deficits    MEDICATIONS:  MEDICATIONS  (STANDING):  aspirin enteric coated 81 milliGRAM(s) Oral daily  cloNIDine 0.2 milliGRAM(s) Oral every 12 hours  dextrose 5%. 1000 milliLiter(s) (50 mL/Hr) IV Continuous <Continuous>  dextrose 5%. 1000 milliLiter(s) (100 mL/Hr) IV Continuous <Continuous>  dextrose 50% Injectable 12.5 Gram(s) IV Push once  dextrose 50% Injectable 25 Gram(s) IV Push once  dextrose 50% Injectable 25 Gram(s) IV Push once  enoxaparin Injectable 40 milliGRAM(s) SubCutaneous every 24 hours  glucagon  Injectable 1 milliGRAM(s) IntraMuscular once  insulin glargine Injectable (LANTUS) 14 Unit(s) SubCutaneous at bedtime  insulin lispro (ADMELOG) corrective regimen sliding scale   SubCutaneous three times a day before meals  insulin lispro Injectable (ADMELOG) 7 Unit(s) SubCutaneous three times a day before meals  lactated ringers. 1000 milliLiter(s) (130 mL/Hr) IV Continuous <Continuous>  NIFEdipine XL 60 milliGRAM(s) Oral every 12 hours    MEDICATIONS  (PRN):  acetaminophen     Tablet .. 650 milliGRAM(s) Oral every 6 hours PRN Temp greater or equal to 38C (100.4F), Mild Pain (1 - 3)  aluminum hydroxide/magnesium hydroxide/simethicone Suspension 30 milliLiter(s) Oral every 4 hours PRN Dyspepsia  dextrose Oral Gel 15 Gram(s) Oral once PRN Blood Glucose LESS THAN 70 milliGRAM(s)/deciliter  melatonin 3 milliGRAM(s) Oral at bedtime PRN Insomnia  ondansetron Injectable 4 milliGRAM(s) IV Push every 8 hours PRN Nausea and/or Vomiting      ALLERGIES:  Allergies    vancomycin (Stomach Upset)  pork (Unknown)  hydrALAZINE (Rash)  labetalol (Stomach Upset)    Intolerances        LABS:                        9.7    11.65 )-----------( 244      ( 22 Aug 2023 05:30 )             31.6     08-22    136  |  102  |  34<H>  ----------------------------<  252<H>  3.8   |  25  |  2.50<H>    Ca    9.2      22 Aug 2023 05:30  Phos  2.9     08-22  Mg     1.8     08-22    TPro  7.1  /  Alb  3.0<L>  /  TBili  <0.2  /  DBili  x   /  AST  12  /  ALT  <5<L>  /  AlkPhos  113  08-22      Urinalysis Basic - ( 22 Aug 2023 05:30 )    Color: x / Appearance: x / SG: x / pH: x  Gluc: 252 mg/dL / Ketone: x  / Bili: x / Urobili: x   Blood: x / Protein: x / Nitrite: x   Leuk Esterase: x / RBC: x / WBC x   Sq Epi: x / Non Sq Epi: x / Bacteria: x      CAPILLARY BLOOD GLUCOSE      POCT Blood Glucose.: 74 mg/dL (21 Aug 2023 22:08)      RADIOLOGY & ADDITIONAL TESTS: Reviewed.

## 2023-08-23 NOTE — DIETITIAN INITIAL EVALUATION ADULT - ORAL INTAKE PTA/DIET HISTORY
Writer met with patient at bedside on 8/22/23 to interview. patient presents as alert and able to easily feed and express self. Intake is 100% as observed of tray and patient report. No chewing or swallowing problems reported. No N/V/D/C reported. Per patient, UBW is 210# and he has lost about 10#/5% in 1 month with a decline in intake >5 days of <50% of usual intake estimated. Weight loss is significant x 30 days.  Patient examined (NFPE) with visible wasting at calves, clavicle. Education provided per DASH CST CHO diet, handout refused despite offer. Patient made aware of snacks on unit. Supplement well accepted. No specific preferences requested except liberalize lowfat restriction.

## 2023-08-23 NOTE — DISCHARGE NOTE NURSING/CASE MANAGEMENT/SOCIAL WORK - PATIENT PORTAL LINK FT
You can access the FollowMyHealth Patient Portal offered by [facility] by registering at the following website: [email or web address]. By joining [facility]’s FollowMyHealth portal, you will also be able to view your health information using other applications (apps) compatible with our system.

## 2023-08-23 NOTE — DIETITIAN INITIAL EVALUATION ADULT - SIGNS/SYMPTOMS
AEB decline in intake <50% of usual intake >5 days and weight loss of 5%/10# x 30 days (significant)

## 2023-08-23 NOTE — DIETITIAN NUTRITION RISK NOTIFICATION - ADDITIONAL COMMENTS/DIETITIAN RECOMMENDATIONS
Provided with education per high protein high calorie nutrient dense foods to promote repletion of nutrition stores provided by writer with verbalized understanding

## 2023-08-23 NOTE — PROGRESS NOTE ADULT - ATTENDING COMMENTS
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    Hypertensive emergency (evidence of renal dysfunction), resolved - poorly adherent to medications, recent admission for same, blood pressure improved with reinitiation of home orals.    DEBBY on CKD - baseline unclear as patient frequently admitted for DEBBY on CKD, creatinine 1.78 at time of last discharge (8/9/23) and 3.08 at time of this admission (8/21), improved  with IVFH. Encourage PO hydration.     R gluteal wound - chronic wound, no erythema or purulence to suggest active infection, BC NGTD. Evaluated by ID during recent admission and felt MRI may be low yield. Evaluated by wound care, dressing changed and provided with supplies.     L foot wound - evaluated by podiatry, wound size decreased from prior admission, xray without signs of osteo. Cont wound care.     Leukocytosis - afebrile, HDS. Monitor off antibiotics for now.      DM2 - poorly controlled likely secondary to poor compliance, patient tolerating PO well now so resume home regimen.    Dispo - Refused shelter. Was given discharge notice yesterday but stated would not leave until seen by wound care. Seen by would care nurse today, plan still for discharge.
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    Hypertensive emergency (evidence of renal dysfunction) - poorly adherent to medications, recent admission for same, blood pressure improved with reinitiation of home orals.    DEBBY on CKD - baseline unclear as patient frequently admitted for DEBBY on CKD, creatinine 1.78 at time of last discharge (8/9/23) and 3.08 at time of this admission (8/21), improved to 2.5 today with IVFH. CTM, avoid nephrotoxins, renally dose medications. Encourage PO hydration.     R gluteal wound - chronic wound, no erythema or purulence to suggest active infection, BC NGTD. Evaluated by ID during recent admission and felt MRI may be low yield. Discussed with wound care - recommend hydrofiber packing which will be supplied to patient.     L foot wound - podiatry following, wound size decreased from prior admission, xray without signs of osteo. Cont wound care.     Leukocytosis - afebrile, HDS. Monitor off antibiotics for now.      DM2 - poorly controlled likely secondary to poor compliance, missed dose of glargine overnight so will give NPH dose in morning (patient tolerating PO well) and resume home regimen starting this evening.     Dispo - plan for discharge today. Patient refusing shelter.

## 2023-08-23 NOTE — DIETITIAN INITIAL EVALUATION ADULT - PERTINENT MEDS FT
MEDICATIONS  (STANDING):  aspirin enteric coated 81 milliGRAM(s) Oral daily  cloNIDine 0.2 milliGRAM(s) Oral every 12 hours  dextrose 5%. 1000 milliLiter(s) (100 mL/Hr) IV Continuous <Continuous>  dextrose 5%. 1000 milliLiter(s) (50 mL/Hr) IV Continuous <Continuous>  dextrose 50% Injectable 25 Gram(s) IV Push once  dextrose 50% Injectable 12.5 Gram(s) IV Push once  dextrose 50% Injectable 25 Gram(s) IV Push once  enoxaparin Injectable 40 milliGRAM(s) SubCutaneous every 24 hours  glucagon  Injectable 1 milliGRAM(s) IntraMuscular once  insulin glargine Injectable (LANTUS) 14 Unit(s) SubCutaneous at bedtime  insulin lispro (ADMELOG) corrective regimen sliding scale   SubCutaneous Before meals and at bedtime  insulin lispro Injectable (ADMELOG) 7 Unit(s) SubCutaneous three times a day before meals  NIFEdipine XL 60 milliGRAM(s) Oral every 12 hours    MEDICATIONS  (PRN):  acetaminophen     Tablet .. 650 milliGRAM(s) Oral every 6 hours PRN Temp greater or equal to 38C (100.4F), Mild Pain (1 - 3)  aluminum hydroxide/magnesium hydroxide/simethicone Suspension 30 milliLiter(s) Oral every 4 hours PRN Dyspepsia  dextrose Oral Gel 15 Gram(s) Oral once PRN Blood Glucose LESS THAN 70 milliGRAM(s)/deciliter  melatonin 3 milliGRAM(s) Oral at bedtime PRN Insomnia  ondansetron Injectable 4 milliGRAM(s) IV Push every 8 hours PRN Nausea and/or Vomiting

## 2023-08-23 NOTE — DIETITIAN INITIAL EVALUATION ADULT - PROBLEM SELECTOR PLAN 7
Pt stated he last took his medications yesterday. Hypertensive in the ED, given home meds STAT.   Home meds: Nifedipine 60 mg BID and Clonidine 0.2 mg BID   - C/w home meds

## 2023-08-23 NOTE — DIETITIAN INITIAL EVALUATION ADULT - PROBLEM SELECTOR PLAN 3
Pt with history of L foot OM and previous admission to [facility] 8/7- 8/11 for DEBBY and L foot OM. ID and podiatry following and concluded no overt infection and no need for abx. Pt is afebrile. On exam, foot is not erythematous or warm and there is no fluctuance.   - Podiatry following  - F/u ESR and CRP   - F/u L foot x-ray  - Offloading boot on L foot

## 2023-08-23 NOTE — DISCHARGE NOTE NURSING/CASE MANAGEMENT/SOCIAL WORK - NSDCPEFALRISK_GEN_ALL_CORE
For information on Fall & Injury Prevention, visit: [email or web address] OR  [email or web address] OR  https://www.cdc.gov/steadi/patient.html

## 2023-08-23 NOTE — DIETITIAN NUTRITION RISK NOTIFICATION - TREATMENT: THE FOLLOWING DIET HAS BEEN RECOMMENDED
Diet, Consistent Carbohydrate w/Evening Snack:   DASH/TLC {Sodium & Cholesterol Restricted} (DASH)  Supplement Feeding Modality:  Oral  Glucerna Shake Cans or Servings Per Day:  1       Frequency:  Three Times a day (08-21-23 @ 14:15) [Active]

## 2023-08-23 NOTE — DIETITIAN INITIAL EVALUATION ADULT - NSPROEDAABILITYLEARN_GEN_A_NUR
none
48 years old male with PMH of DM, Psoriasis presents to [facility] today for presurgical evaluation. Patient is diagnosed with umbilical hernia and its now scheduled for umbilical hernia repair on 4/23/21.

## 2023-08-23 NOTE — DIETITIAN INITIAL EVALUATION ADULT - PROBLEM SELECTOR PLAN 1
On admission, BUN 29 and Cr 3.08 (baseline Cr 1.22 1/2022). Recent admission to [facility] for DEBBY 8/7. S/p 2L NS bolus Cr downtrended to 2.61. Endorses 1 day of NBNB vomiting and poor PO intake, Denies hx of dialysis.   Plan:   - 500 ml LR maintenance fluid  - Encourage PO intake   - Repeat BMP 10 PM

## 2023-08-23 NOTE — DIETITIAN INITIAL EVALUATION ADULT - PERTINENT LABORATORY DATA
08-22    136  |  102  |  34<H>  ----------------------------<  252<H>  3.8   |  25  |  2.50<H>    Ca    9.2      22 Aug 2023 05:30  Phos  2.9     08-22  Mg     1.8     08-22    TPro  7.1  /  Alb  3.0<L>  /  TBili  <0.2  /  DBili  x   /  AST  12  /  ALT  <5<L>  /  AlkPhos  113  08-22  POCT Blood Glucose.: 196 mg/dL (08-23-23 @ 13:34)  A1C with Estimated Average Glucose Result: 8.2 % (08-08-23 @ 07:32)  A1C with Estimated Average Glucose Result: 7.6 % (01-06-23 @ 07:08)  A1C with Estimated Average Glucose Result: 7.5 % (01-05-23 @ 06:52)

## 2023-08-23 NOTE — DISCHARGE NOTE NURSING/CASE MANAGEMENT/SOCIAL WORK - NSDCFUADDAPPT_GEN_ALL_CORE_FT
Please follow up with PCP on Friday, 8/25/2023 at 1:30 PM. Office: [address] [place] [address], [address] Floor, [place] [id number]. Phone: [phone]. Your PCP will be able to refer you to a Podiatrist who accepts Medicaid in order to manage the wound on your left foot.

## 2023-08-23 NOTE — DIETITIAN INITIAL EVALUATION ADULT - NSFNSADHERENCEPTAFT_GEN_A_CORE
Patient reported that "insulin is made to control diabetes" as he reports that he does not follow as specific diet for diabetes.

## 2023-08-23 NOTE — ADVANCED PRACTICE NURSE CONSULT - ASSESSMENT
Pt known to me from previous admit. Wound to right ischium measuring approx 2 x 1 x 4.8 cm, probable pressure injury, Stage 4, but pt declines to give any information at this time. Old dressing to site completely saturated with serous drainage and foul-smelling. Site irrigated with Vashe wound cleanser then Aquacel hydrofiber cut into spiral and lightly packed into wound bed, covered with foam dressing. Pt given extra supplies for discharge.

## 2023-08-25 ENCOUNTER — APPOINTMENT (OUTPATIENT)
Dept: INTERNAL MEDICINE | Facility: CLINIC | Age: 63
End: 2023-08-25

## 2023-08-28 DIAGNOSIS — I12.9 HYPERTENSIVE CHRONIC KIDNEY DISEASE WITH STAGE 1 THROUGH STAGE 4 CHRONIC KIDNEY DISEASE, OR UNSPECIFIED CHRONIC KIDNEY DISEASE: ICD-10-CM

## 2023-08-28 DIAGNOSIS — Z89.412 ACQUIRED ABSENCE OF LEFT GREAT TOE: ICD-10-CM

## 2023-08-28 DIAGNOSIS — R65.11 SYSTEMIC INFLAMMATORY RESPONSE SYNDROME (SIRS) OF NON-INFECTIOUS ORIGIN WITH ACUTE ORGAN DYSFUNCTION: ICD-10-CM

## 2023-08-28 DIAGNOSIS — I25.10 ATHEROSCLEROTIC HEART DISEASE OF NATIVE CORONARY ARTERY WITHOUT ANGINA PECTORIS: ICD-10-CM

## 2023-08-28 DIAGNOSIS — I16.1 HYPERTENSIVE EMERGENCY: ICD-10-CM

## 2023-08-28 DIAGNOSIS — N17.9 ACUTE KIDNEY FAILURE, UNSPECIFIED: ICD-10-CM

## 2023-08-28 DIAGNOSIS — E11.621 TYPE 2 DIABETES MELLITUS WITH FOOT ULCER: ICD-10-CM

## 2023-08-28 DIAGNOSIS — E87.6 HYPOKALEMIA: ICD-10-CM

## 2023-08-28 DIAGNOSIS — L97.521 NON-PRESSURE CHRONIC ULCER OF OTHER PART OF LEFT FOOT LIMITED TO BREAKDOWN OF SKIN: ICD-10-CM

## 2023-08-28 DIAGNOSIS — Z91.148 PATIENT'S OTHER NONCOMPLIANCE WITH MEDICATION REGIMEN FOR OTHER REASON: ICD-10-CM

## 2023-08-28 DIAGNOSIS — Z88.3 ALLERGY STATUS TO OTHER ANTI-INFECTIVE AGENTS: ICD-10-CM

## 2023-08-28 DIAGNOSIS — E86.0 DEHYDRATION: ICD-10-CM

## 2023-08-28 DIAGNOSIS — R11.10 VOMITING, UNSPECIFIED: ICD-10-CM

## 2023-08-28 DIAGNOSIS — Z95.5 PRESENCE OF CORONARY ANGIOPLASTY IMPLANT AND GRAFT: ICD-10-CM

## 2023-08-28 DIAGNOSIS — L89.154 PRESSURE ULCER OF SACRAL REGION, STAGE 4: ICD-10-CM

## 2023-08-28 DIAGNOSIS — E11.22 TYPE 2 DIABETES MELLITUS WITH DIABETIC CHRONIC KIDNEY DISEASE: ICD-10-CM

## 2023-08-28 DIAGNOSIS — E44.0 MODERATE PROTEIN-CALORIE MALNUTRITION: ICD-10-CM

## 2023-08-28 DIAGNOSIS — Z91.018 ALLERGY TO OTHER FOODS: ICD-10-CM

## 2023-08-28 DIAGNOSIS — E11.65 TYPE 2 DIABETES MELLITUS WITH HYPERGLYCEMIA: ICD-10-CM

## 2023-08-28 DIAGNOSIS — Z79.82 LONG TERM (CURRENT) USE OF ASPIRIN: ICD-10-CM

## 2023-08-28 DIAGNOSIS — Z91.09 OTHER ALLERGY STATUS, OTHER THAN TO DRUGS AND BIOLOGICAL SUBSTANCES: ICD-10-CM

## 2023-08-28 DIAGNOSIS — Z79.4 LONG TERM (CURRENT) USE OF INSULIN: ICD-10-CM

## 2023-09-01 PROBLEM — Z00.00 ENCOUNTER FOR PREVENTIVE HEALTH EXAMINATION: Status: ACTIVE | Noted: 2023-09-01

## 2023-09-05 ENCOUNTER — APPOINTMENT (OUTPATIENT)
Dept: NEPHROLOGY | Facility: CLINIC | Age: 63
End: 2023-09-05

## 2023-10-18 NOTE — PROGRESS NOTE ADULT - PROBLEM SELECTOR PLAN 4
Patient w/ sodium to 128 on admission. On repeat  after fluids.   Likely hypovolemic hyponatremia.     Plan:   - f/u [facility]

## 2024-01-01 NOTE — H&P ADULT - NSHPPOAURINARYCATHETER_GEN_ALL_CORE
no Pediatrician called to delivery for vaginal delivery of di/di twins. Male AGA infant born at 37.4 wks via VD to a 33 y/o  blood type A+ mother. Maternal history of anemia. No significant prenatal history. Prenatal labs nr/immune/-, GBS - on 10/1.  AROM at 2030 on 10/18 with clear fluids. EOS score 0.08, highest maternal temperature 37. Baby emerged vigorous, crying. Cord clamping delayed 30 sec. Infant was brought to radiant warmer and warmed, dried, stimulated and suctioned. HR>100, normal respiratory effort. APGARS of 9/9 . Mom is initiating breast feeding/formula feeding.     Consents to Hepatitis B vaccination. Desires for infant to be circumcised. Offered Beyfortus and declined. Pediatrician called to delivery for vaginal delivery of di/di twins. Male AGA infant born at 37.4 wks via VD to a 33 y/o  blood type A+ mother. Maternal history of anemia. No significant prenatal history. Prenatal labs nr/immune/-, GBS - on 10/1.  AROM at 2030 on 10/18 with clear fluids. EOS score 0.08, highest maternal temperature 37. Baby emerged vigorous, crying. Cord clamping delayed 30 sec. Infant was brought to radiant warmer and warmed, dried, stimulated and suctioned. HR>100, normal respiratory effort. APGARS of 9/9 . Mom is initiating breast feeding/formula feeding.     Consents to Hepatitis B vaccination. Desires for infant to be circumcised. Offered Beyfortus and declined.     Baby has been feeding well, stooling and making wet diapers. Vitals have remained stable. Baby received routine NBN care and passed CCHD and auditory screening and received Hepatitis B vaccine. Bilirubin was ___ at ___hours of life, which is ___ risk zone. Discharge weight was ___g (down ___% from birth weight). Stable for discharge to home after receiving routine  care education and instructions to follow up with pediatrician. Pediatrician called to delivery for vaginal delivery of di/di twins. Male AGA infant born at 37.4 wks via VD to a 35 y/o  blood type A+ mother. Maternal history of anemia. No significant prenatal history. Prenatal labs nr/immune/-, GBS - on 10/1.  AROM at 2030 on 10/18 with clear fluids. EOS score 0.08, highest maternal temperature 37. Baby emerged vigorous, crying. Cord clamping delayed 30 sec. Infant was brought to radiant warmer and warmed, dried, stimulated and suctioned. HR>100, normal respiratory effort. APGARS of 9/9 . Mom is initiating breast feeding/formula feeding.     Consents to Hepatitis B vaccination. Desires for infant to be circumcised. Offered Beyfortus and declined.     Baby has been feeding well, stooling and making wet diapers. Vitals have remained stable. Baby received routine NBN care and passed CCHD and auditory screening and received Hepatitis B vaccine. Bilirubin was 4.5 at 24 hours of life. Discharge weight was 2690 g (down 4.61 % from birth weight). Stable for discharge to home after receiving routine  care education and instructions to follow up with pediatrician.

## 2024-01-07 NOTE — PATIENT PROFILE ADULT - NSPRONUTRITIONRISK_GEN_A_NUR
Pressure injury stage 2 or greater
Patient requests all Lab, Cardiology, and Radiology Results on their Discharge Instructions

## 2024-01-07 NOTE — ED PROVIDER NOTE - IV ALTEPLASE EXCL ABS HIDDEN
[facility]-- Consult Note    Patient Name:  Indy Mullen   MRN:  [id number]   Date:  1/7/2024     Orthopedic Consultant:  Alvaro Zamora MD         Impression:  Concern for septic left knee on top of interest on extracellular uric acid crystals    Plan:  Secondary concern for possible septic knee on top of gouty flare patient is to be taken to surgery undergoing arthroscopic irrigation debridement of the left knee.  Patient was fully explained preoperatively in the holding area for surgery the need for surgical invention.  The patient understood that she does have gout.  There is a possibility that she could have an infection on top of the gout.  Patient had a Gram stain that demonstrated gram-positive cocci.  Secondary to finding of positive gram cocci patient has a risk for having a deeper infection and therefore arthroscopic I&D is suggested.  Patient is 82 years of age and has comorbidities and therefore there is increased risk for surgery.  The patient was given option to treat this with IV antibiotics and watch to cultures to see if the cultures become positive.  But we are highly suggested and surgical invention because of the positive gram-positive cocci.  Patient is point time secondary to her pain and the concern for infection is agreeable surgical invention.  She she accepts the risk for MI cardiac arrest death ICU support extended intubation.  She except the risk for need for revision and repeat irrigation debridement of the knee.  She except the risk for extended IV antibiotics.  She understands and answered of infection or gouty flares could propagate arthritic changes and increased risk for progress to knee replaced in the future.  And kind of infections in her knee could affect any type of conversion to total knee replacement in the future and increased risk for postoperative total knee infection.  Patient stands increased risk for postoperative DVT and secondary PE and  death.  The patient staying all above and understands the importance for physical therapy to work on range of motion of the knee to avoid postop arthrofibrosis she is upset consents to going for surgical invention.  All the above is meant to highlight not meant to be inclusive all risk events were discussed at the bedside.    All question addressed and answered no further questions      Thank you very much for his orthopedic consultation.  If any questions and or concerns regarding above please refill, cell phone at a time.    Alvaro Zamora MD   [facility]  [address]          [id number] [place] [address] [address] [place] [id number]  [email or web address]  [phone](office)  [phone](fax)       Chief Complaint: Painful left knee with a aspiration demonstrating positive intracellular and extracellular uric acid crystals complicated by positive cocci noted on Gram stain      HPI:  This is a 82 year old female who is is dealing with significant pain to her left knee.  Secondary to infusion as well as increasing pain and aspiration was performed  The fluid sent off to microbiology demonstrated positive intracellular and exercise uric acid crystals.  In addition Gram stain did demonstrate positive gram-positive cocci.  Patient increased risk for having a septic knee on top of the gouty flare.  Patient was explained the need for surgical invention consisting of arthroscopic irrigation pain of the left knee  Risk benefits of surgery full discussed  The highlighted above  The patient at this point time separately consents for surgical invention.  All questions were addressed and answered.    Histories:  Past Medical History:   Diagnosis Date    Arthritis     C. difficile colitis 06/06/2019    Cataracts, bilateral     Chronic kidney disease     Diabetes mellitus (CMD)     Essential (primary) hypertension     Failed moderate sedation during procedure     post op  excessive sedation    Fracture     Gout     High cholesterol     Inflammatory bowel disease     Malignant neoplasm (CMD)     Ovarian CA, left (CMD)     Pagon-Bora syndrome     right eye      Past Surgical History:   Procedure Laterality Date    Appendectomy      Cholecystectomy      Colon surgery      Eye surgery      Hysterectomy      Knee arthroplasty      Peg tube removal      Trach care      Tube insertion       Family History   Problem Relation Age of Onset    Diabetes Mother     Patient is unaware of any medical problems Father     Kidney disease Sister     Diabetes Sister     Hypertension Sister      Social History     Socioeconomic History    Marital status: /Civil Union     Spouse name: Luis    Number of children: 1    Years of education: Not on file    Highest education level: Not on file   Occupational History    Not on file   Tobacco Use    Smoking status: Never    Smokeless tobacco: Never   Vaping Use    Vaping Use: never used   Substance and Sexual Activity    Alcohol use: No    Drug use: No    Sexual activity: Not Currently   Other Topics Concern    Not on file   Social History Narrative    Not on file     Social Determinants of Health     Financial Resource Strain: Low Risk  (1/5/2024)    Financial Resource Strain     Unable to Get: None   Food Insecurity: Low Risk  (1/5/2024)    Food Insecurity     Worried about Food: Never true     Food is Gone: Never true   Transportation Needs: Not At Risk (1/5/2024)    Transportation Needs     Lack of Reliable Transportation: No   Physical Activity: Inactive (1/28/2020)    Exercise Vital Sign     Days of Exercise per Week: 0 days     Minutes of Exercise per Session: 0 min   Stress: Not on file   Social Connections: Low Risk  (1/5/2024)    Social Connections     Social Connectivity: 5 or more times a week   Interpersonal Safety: Low Risk  (1/5/2024)    Interpersonal Safety     How often physically hurt: Never     How often insulted or talked down  to: Never     How often threatened with harm: Never     How often scream or curse at: Never        Medications:  Medications Prior to Admission   Medication Sig Dispense Refill    baclofen (LIORESAL) 10 MG tablet Take 1 tablet by mouth at bedtime as needed (muscle spasm). Indications: Muscle Spasm, Muscle Spasticity 90 tablet 1    cloNIDine (CATAPRES) 0.1 MG tablet Take 1 tablet by mouth in the morning and 1 tablet in the evening. Indications: High Blood Pressure Disorder. 60 tablet 0    Lancets (OneTouch Delica Plus [id number]) Misc Apply 1 each topically in the morning and 1 each at noon and 1 each in the evening. Indications: DM. Check bg tid dx: e11.29 300 each 3    OneTouch Verio test strip Test blood sugar 3 times daily as directed. 300 strip 0    atorvastatin (LIPITOR) 40 MG tablet Take 1 tablet by mouth daily. 30 tablet 1    Polyethyl Glycol-Propyl Glycol (SYSTANE OP) Apply 4 drops to eye daily as needed (dry eyes).      metoPROLOL tartrate (LOPRESSOR) 25 MG tablet Take 1 tablet by mouth every 12 hours. 180 tablet 3    gabapentin (NEURONTIN) 300 MG capsule Take 300 mg by mouth every morning.      diphenhydrAMINE (BENADRYL) 25 MG tablet Take 25 mg by mouth every 6 hours as needed for Itching. Indications: Itching, Dialysis Only uses in Tuesday, Thursday, Saturday, and Sunday      famotidine (PEPCID) 20 MG tablet Take 1 tablet by mouth daily. 90 tablet 3    Cholecalciferol (VITAMIN D-3 PO) Take 3 tablets by mouth 3 days a week. Indications: Vitamin D Deficiency M-W-F at dialysis      amLODIPine (NORVASC) 10 MG tablet Take 1 tablet by mouth daily. 90 tablet 3    aspirin 81 MG EC tablet Take 1 tablet by mouth daily. 30 tablet 0    epoetin mary (PROCRIT,EPOGEN) 96246 UNIT/ML injection Inject 1 mL into the skin every 7 days. On Monday 4 mL 0    Blood Glucose Monitoring Suppl (ONE TOUCH ULTRA 2) w/Device Kit Use to check bg daily 1 kit 0       Allergies:    ALLERGIES:   Allergen Reactions    Allopurinol Other (See  Comments) and RASH     marcos johnsons  Price Tor Syndrome    Adhesive   (Environmental) RASH     OK for silk tape    Ceftriaxone Other (See Comments)    Cephalosporins RASH    Clindamycin Other (See Comments)     Unknown      Gentamicin RASH    Penicillins SWELLING     Unknown    Tacrolimus Other (See Comments)        Occupation:  Retired    Physical Exam  Visit Vitals  BP (!) 151/79 (BP Location: RUE - Right upper extremity, Patient Position: Supine)   Pulse (!) 104   Temp 98.2 °F (36.8 °C) (Oral)   Resp 18   Ht 5' 4.17\" (1.63 m)   Wt 78.4 kg (172 lb 13.5 oz)   SpO2 95%   BMI 29.51 kg/m²      Patient with complaints of pain to the left knee only  She is no complaints pain to her neck mid and low back  She is no complaints of range of motion about shoulders elbows wrist and hands.  She has no Pain complaints with range of motion of the right hip near foot and ankle.  She is icing complaints pain to her left knee  She has a 2+ effusion.  She is very tender and attempted range of motion holding approximate 30 degrees of flexion.  Pain in place in the area of the previous aspiration.  She has no calf pain she is no signs of DVT she neurovascular intact distally  She is no complaints pain with dorsiflexion plantarflexion by foot and ankle except for that from the left knee.  She has no complaints pain around her hips.  All the pain is isolated to the left knee only.  All compartments are soft and compressible  Is no signs symptoms of compartment syndrome    There is no involvement with range of motion of right hip knee or foot ankle  Right lower extremity subjectively and optically is pain-free with no complaints.  She is no complaints of back pain no complaints radiculopathies no complaints of tingling numbness or paresthesias.  Again no complaints pain to neck or mid back  No involvement of bilateral extremities with a painless range of motion both shoulders elbows wrist and hand intact 5-5 strength no sensory  no motor deficit to upper extremities.  All compartments are soft and compressible to her upper extremities.    Imaging:  CT ABDOMEN PELVIS W CONTRAST  Narrative: EXAM: CT ABDOMEN PELVIS W CONTRAST    CLINICAL HISTORY: LLQ abdominal pain, sepsis, vomtiing, fever N18.6 End  stage renal disease (CMD)       TECHNIQUE: CT abdomen and pelvis with intravenous contrast. Multiplanar  reformats were performed. Images obtained after intravenous administration  of 80 mL of Omnipaque 350.      COMPARISON: Chest x-ray dated 1/4/2024. CT of the chest, abdomen pelvis  dated 4/24/2022. CT of the abdomen pelvis dated 8/1/2021.    FINDINGS:  Lower Chest: Heart is enlarged. Multivessel calcific acemetacin native  coronary arteries is noted. Emphysematous changes are noted at the lung  bases.    Liver & Biliary Tract: The liver is normal in size and morphology. No  focal hepatic lesions identified. The gallbladder is absent. No biliary  ductal dilatation. The main portal vein is patent.    Spleen: No abnormalities.     Pancreas: No abnormalities.     Adrenal Glands: No abnormalities.     Kidneys, Ureters, & Bladder: There is bilateral cortical renal atrophy.  There are multiple bilateral cortical hypodense renal lesions. There is a  partially exophytic hyperdense renal lesion in the posterior cortex of the  left kidney measuring 1.6 cm (302/75). The density of this lesion is 75  Hounsfield units. It was noted on the prior exam. There is no  hydronephrosis    Reproductive Organs: Uterus and adnexa are not visualized.    GI Tract & Peritoneum: The stomach is partially decompressed. There is no  evidence of small bowel obstruction.Colon is unremarkable. There is  descending and sigmoid colonic diverticulosis. No free fluid or free air.     Vasculature: No abdominal aortic aneurysm.    Lymph Nodes: No adenopathy.     Body Wall: Changes of degenerative disc disease. There is scoliosis of the  lumbar spine no aggressive osseous lesions.  There is laxity along the  ventral lower abdominal wall measuring 8.5 cm in axial width.  Impression: No acute abdominal findings.    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 A 2.5 cm  hyperdense exophytic lesion in the posterior cortex of the left kidney,  indeterminate. It has been stable dating back to 2021 and may represent a  hemorrhagic cyst.    Electronically Signed by: JYOTI ANTHONY MD   Signed on: 1/5/2024 6:48 AM   Workstation ID: [id number]                 Alvaro Zamora MD   1/7/2024    show

## 2024-02-07 NOTE — ED ADULT NURSE NOTE - EXTENSIONS OF SELF_ADULT
Pt is wondering if she can just have a phone conversation for the appt instead of coming into the office please call    None

## 2024-02-13 ENCOUNTER — EMERGENCY (EMERGENCY)
Facility: HOSPITAL | Age: 64
LOS: 1 days | Discharge: HOME | End: 2024-02-13
Attending: EMERGENCY MEDICINE | Admitting: EMERGENCY MEDICINE
Payer: MEDICAID

## 2024-02-13 VITALS
SYSTOLIC BLOOD PRESSURE: 233 MMHG | DIASTOLIC BLOOD PRESSURE: 127 MMHG | WEIGHT: 190.04 LBS | TEMPERATURE: 98 F | RESPIRATION RATE: 19 BRPM | HEART RATE: 87 BPM | OXYGEN SATURATION: 100 %

## 2024-02-13 VITALS
SYSTOLIC BLOOD PRESSURE: 200 MMHG | DIASTOLIC BLOOD PRESSURE: 97 MMHG | TEMPERATURE: 98 F | RESPIRATION RATE: 18 BRPM | HEART RATE: 95 BPM | OXYGEN SATURATION: 98 %

## 2024-02-13 DIAGNOSIS — Z90.49 ACQUIRED ABSENCE OF OTHER SPECIFIED PARTS OF DIGESTIVE TRACT: Chronic | ICD-10-CM

## 2024-02-13 DIAGNOSIS — Z89.422 ACQUIRED ABSENCE OF OTHER LEFT TOE(S): Chronic | ICD-10-CM

## 2024-02-13 LAB
ALBUMIN SERPL ELPH-MCNC: 2.7 G/DL — LOW (ref 3.4–5)
ALP SERPL-CCNC: 139 U/L — HIGH (ref 40–120)
ALT FLD-CCNC: <6 U/L — LOW (ref 12–42)
ANION GAP SERPL CALC-SCNC: 11 MMOL/L — SIGNIFICANT CHANGE UP (ref 9–16)
ANISOCYTOSIS BLD QL: SLIGHT — SIGNIFICANT CHANGE UP
APPEARANCE UR: CLEAR — SIGNIFICANT CHANGE UP
AST SERPL-CCNC: 26 U/L — SIGNIFICANT CHANGE UP (ref 15–37)
BACTERIA # UR AUTO: ABNORMAL /HPF
BASOPHILS # BLD AUTO: 0.08 K/UL — SIGNIFICANT CHANGE UP (ref 0–0.2)
BASOPHILS NFR BLD AUTO: 0.7 % — SIGNIFICANT CHANGE UP (ref 0–2)
BILIRUB SERPL-MCNC: 0.3 MG/DL — SIGNIFICANT CHANGE UP (ref 0.2–1.2)
BILIRUB UR-MCNC: NEGATIVE — SIGNIFICANT CHANGE UP
BUN SERPL-MCNC: 33 MG/DL — HIGH (ref 7–23)
CALCIUM SERPL-MCNC: 9.5 MG/DL — SIGNIFICANT CHANGE UP (ref 8.5–10.5)
CHLORIDE SERPL-SCNC: 105 MMOL/L — SIGNIFICANT CHANGE UP (ref 96–108)
CK MB BLD-MCNC: 0.38 % — SIGNIFICANT CHANGE UP
CK MB CFR SERPL CALC: 1.2 NG/ML — SIGNIFICANT CHANGE UP (ref 0.5–3.6)
CK SERPL-CCNC: 312 U/L — HIGH (ref 39–308)
CO2 SERPL-SCNC: 25 MMOL/L — SIGNIFICANT CHANGE UP (ref 22–31)
COLOR SPEC: YELLOW — SIGNIFICANT CHANGE UP
CREAT SERPL-MCNC: 2.81 MG/DL — HIGH (ref 0.5–1.3)
DIFF PNL FLD: ABNORMAL
EGFR: 25 ML/MIN/1.73M2 — LOW
EOSINOPHIL # BLD AUTO: 0.24 K/UL — SIGNIFICANT CHANGE UP (ref 0–0.5)
EOSINOPHIL NFR BLD AUTO: 2 % — SIGNIFICANT CHANGE UP (ref 0–6)
EPI CELLS # UR: PRESENT
GLUCOSE SERPL-MCNC: 259 MG/DL — HIGH (ref 70–99)
GLUCOSE UR QL: 500 MG/DL
HCT VFR BLD CALC: 32.4 % — LOW (ref 39–50)
HGB BLD-MCNC: 10 G/DL — LOW (ref 13–17)
HYPOCHROMIA BLD QL: SLIGHT — SIGNIFICANT CHANGE UP
IMM GRANULOCYTES NFR BLD AUTO: 0.4 % — SIGNIFICANT CHANGE UP (ref 0–0.9)
KETONES UR-MCNC: NEGATIVE MG/DL — SIGNIFICANT CHANGE UP
LACTATE BLDV-MCNC: 1.1 MMOL/L — SIGNIFICANT CHANGE UP (ref 0.5–2)
LEUKOCYTE ESTERASE UR-ACNC: NEGATIVE — SIGNIFICANT CHANGE UP
LYMPHOCYTES # BLD AUTO: 1.57 K/UL — SIGNIFICANT CHANGE UP (ref 1–3.3)
LYMPHOCYTES # BLD AUTO: 13.1 % — SIGNIFICANT CHANGE UP (ref 13–44)
MANUAL SMEAR VERIFICATION: SIGNIFICANT CHANGE UP
MCHC RBC-ENTMCNC: 23 PG — LOW (ref 27–34)
MCHC RBC-ENTMCNC: 30.9 GM/DL — LOW (ref 32–36)
MCV RBC AUTO: 74.7 FL — LOW (ref 80–100)
MICROCYTES BLD QL: SLIGHT — SIGNIFICANT CHANGE UP
MONOCYTES # BLD AUTO: 0.63 K/UL — SIGNIFICANT CHANGE UP (ref 0–0.9)
MONOCYTES NFR BLD AUTO: 5.2 % — SIGNIFICANT CHANGE UP (ref 2–14)
NEUTROPHILS # BLD AUTO: 9.46 K/UL — HIGH (ref 1.8–7.4)
NEUTROPHILS NFR BLD AUTO: 78.6 % — HIGH (ref 43–77)
NITRITE UR-MCNC: NEGATIVE — SIGNIFICANT CHANGE UP
NRBC # BLD: 0 /100 WBCS — SIGNIFICANT CHANGE UP (ref 0–0)
PCO2 BLDV: 45 MMHG — SIGNIFICANT CHANGE UP (ref 42–55)
PH BLDV: 7.38 — SIGNIFICANT CHANGE UP (ref 7.32–7.43)
PH UR: 7 — SIGNIFICANT CHANGE UP (ref 5–8)
PLAT MORPH BLD: NORMAL — SIGNIFICANT CHANGE UP
PLATELET # BLD AUTO: 297 K/UL — SIGNIFICANT CHANGE UP (ref 150–400)
PO2 BLDV: 51 MMHG — HIGH (ref 25–45)
POTASSIUM SERPL-MCNC: 3.1 MMOL/L — LOW (ref 3.5–5.3)
POTASSIUM SERPL-SCNC: 3.1 MMOL/L — LOW (ref 3.5–5.3)
PROT SERPL-MCNC: 8.7 G/DL — HIGH (ref 6.4–8.2)
PROT UR-MCNC: 300 MG/DL
RBC # BLD: 4.34 M/UL — SIGNIFICANT CHANGE UP (ref 4.2–5.8)
RBC # FLD: 16.8 % — HIGH (ref 10.3–14.5)
RBC BLD AUTO: ABNORMAL
RBC CASTS # UR COMP ASSIST: 12 /HPF — HIGH (ref 0–4)
SAO2 % BLDV: 85.1 % — SIGNIFICANT CHANGE UP (ref 67–88)
SODIUM SERPL-SCNC: 141 MMOL/L — SIGNIFICANT CHANGE UP (ref 132–145)
SP GR SPEC: 1.01 — SIGNIFICANT CHANGE UP (ref 1–1.03)
TROPONIN I, HIGH SENSITIVITY RESULT: 20.6 NG/L — SIGNIFICANT CHANGE UP
UROBILINOGEN FLD QL: 0.2 MG/DL — SIGNIFICANT CHANGE UP (ref 0.2–1)
WBC # BLD: 12.03 K/UL — HIGH (ref 3.8–10.5)
WBC # FLD AUTO: 12.03 K/UL — HIGH (ref 3.8–10.5)
WBC UR QL: 3 /HPF — SIGNIFICANT CHANGE UP (ref 0–5)

## 2024-02-13 PROCEDURE — 74176 CT ABD & PELVIS W/O CONTRAST: CPT | Mod: 26

## 2024-02-13 PROCEDURE — 99285 EMERGENCY DEPT VISIT HI MDM: CPT

## 2024-02-13 PROCEDURE — 70450 CT HEAD/BRAIN W/O DYE: CPT | Mod: 26

## 2024-02-13 RX ORDER — LISINOPRIL 2.5 MG/1
20 TABLET ORAL ONCE
Refills: 0 | Status: COMPLETED | OUTPATIENT
Start: 2024-02-13 | End: 2024-02-13

## 2024-02-13 RX ORDER — CEFTRIAXONE 500 MG/1
1000 INJECTION, POWDER, FOR SOLUTION INTRAMUSCULAR; INTRAVENOUS ONCE
Refills: 0 | Status: COMPLETED | OUTPATIENT
Start: 2024-02-13 | End: 2024-02-13

## 2024-02-13 RX ORDER — ONDANSETRON 8 MG/1
4 TABLET, FILM COATED ORAL ONCE
Refills: 0 | Status: COMPLETED | OUTPATIENT
Start: 2024-02-13 | End: 2024-02-13

## 2024-02-13 RX ORDER — AMLODIPINE BESYLATE 2.5 MG/1
10 TABLET ORAL ONCE
Refills: 0 | Status: COMPLETED | OUTPATIENT
Start: 2024-02-13 | End: 2024-02-13

## 2024-02-13 RX ORDER — FAMOTIDINE 10 MG/ML
20 INJECTION INTRAVENOUS ONCE
Refills: 0 | Status: COMPLETED | OUTPATIENT
Start: 2024-02-13 | End: 2024-02-13

## 2024-02-13 RX ORDER — SODIUM CHLORIDE 9 MG/ML
1000 INJECTION INTRAMUSCULAR; INTRAVENOUS; SUBCUTANEOUS ONCE
Refills: 0 | Status: COMPLETED | OUTPATIENT
Start: 2024-02-13 | End: 2024-02-13

## 2024-02-13 RX ADMIN — LISINOPRIL 20 MILLIGRAM(S): 2.5 TABLET ORAL at 13:02

## 2024-02-13 RX ADMIN — ONDANSETRON 4 MILLIGRAM(S): 8 TABLET, FILM COATED ORAL at 13:02

## 2024-02-13 RX ADMIN — CEFTRIAXONE 100 MILLIGRAM(S): 500 INJECTION, POWDER, FOR SOLUTION INTRAMUSCULAR; INTRAVENOUS at 19:10

## 2024-02-13 RX ADMIN — FAMOTIDINE 20 MILLIGRAM(S): 10 INJECTION INTRAVENOUS at 13:33

## 2024-02-13 RX ADMIN — AMLODIPINE BESYLATE 10 MILLIGRAM(S): 2.5 TABLET ORAL at 13:02

## 2024-02-13 RX ADMIN — ONDANSETRON 4 MILLIGRAM(S): 8 TABLET, FILM COATED ORAL at 19:08

## 2024-02-13 RX ADMIN — SODIUM CHLORIDE 1000 MILLILITER(S): 9 INJECTION INTRAMUSCULAR; INTRAVENOUS; SUBCUTANEOUS at 13:28

## 2024-02-13 RX ADMIN — Medication 0.1 MILLIGRAM(S): at 16:44

## 2024-02-13 NOTE — ED PROVIDER NOTE - IV ALTEPLASE ADMIN OUTSIDE HIDDEN
show Acceptable shape position of pinnae/No pits or tags/External auditory canal size and shape acceptable/Tympanic membranes clear

## 2024-02-13 NOTE — ED ADULT TRIAGE NOTE - ARRIVAL FROM
----- Message from STEVE Kim sent at 2/13/2024 12:44 PM CST -----  Lipids are now superbly controlled; on Atorvastatin 40 mg  No change in medication  Annual reassessment  
Called and advised the patient below. The patient verbalizes understanding of this information and has no further questions or concerns regarding this matter but will call if any should arise.      
Street

## 2024-02-13 NOTE — ED PROVIDER NOTE - PHYSICAL EXAMINATION
hypertenisve in NAD non toxic appearing   NCAT EOMI PERRL OP clear  heart RRR no murmur   lungs CTA no wheezing no rales no rhonchi   abd soft NT ND no CVAT no guarding no rebound   normal neuro exam CN I-XII grossly intact, no groos motor or sensory deficits  no peripheral c/c/e  refused exam or /sacral area, refused to get undressed.

## 2024-02-13 NOTE — ED PROVIDER NOTE - CLINICAL SUMMARY MEDICAL DECISION MAKING FREE TEXT BOX
Chronic gluteal wounds, refused exam, CT noted with cystitis, UA noted with mild UTI.  Started ceftriaxone, patient became agitated hostile and aggressive and refused further care.  DC home with prescription for outpatient antibiotics.

## 2024-02-13 NOTE — ED ADULT NURSE NOTE - OBJECTIVE STATEMENT
Pt to ED for n/v and " not feeling well" x 1 day- pt is extremely hypertensive on arrival. takes lisinopril and amlodipine, reports he took BP medicine and insulin yesterday. No abdominal pain. Reports no drug/ etoh use.

## 2024-02-13 NOTE — ED PROVIDER NOTE - OBJECTIVE STATEMENT
64-year-old male with history of insulin-dependent diabetes, hypertension, coronary artery disease, chronic wounds of the sacral area, here with complaints of nausea and vomiting, vague generalized abdominal pain.  Patient is very cooperative and minimally contributed to history. Chart from prior admission was reviewed, patient was hypertensive, medication regimen was changed, he is noncompliant with follow-up and homeless and unable to maintain appointments and medications.

## 2024-02-13 NOTE — ED PROVIDER NOTE - PATIENT PORTAL LINK FT
You can access the FollowMyHealth Patient Portal offered by [facility] by registering at the following website: [email or web address]. By joining [employer]’s FollowMyHealth portal, you will also be able to view your health information using other applications (apps) compatible with our system.

## 2024-02-13 NOTE — ED ADULT NURSE NOTE - CHIEF COMPLAINT QUOTE
picked up from [facility] for n/v and " not feeling well" x 1 day- pt is extremely hypertensive on arrival. takes lisinopril and amlodipine, unknown last dose

## 2024-02-13 NOTE — ED PROVIDER NOTE - CARE PROVIDER_API CALL
[facility]  [address], [facility] [place] [address]  Phone: [phone]  Fax: [phone]  Follow Up Time: 7-10 Days

## 2024-02-13 NOTE — ED PROVIDER NOTE - CARE PLAN
1 Principal Discharge DX:	Abdominal pain  Secondary Diagnosis:	Wound of gluteal cleft, unspecified laterality, subsequent encounter  Secondary Diagnosis:	Acute cystitis

## 2024-02-13 NOTE — ED ADULT TRIAGE NOTE - CHIEF COMPLAINT QUOTE
picked up from [facility] for n/v and " not feeling well" x 1 day- pt is extremely hypertensive on arrival. takes lisinopril and amlodipine, unknown last dose

## 2024-02-16 DIAGNOSIS — Z79.4 LONG TERM (CURRENT) USE OF INSULIN: ICD-10-CM

## 2024-02-16 DIAGNOSIS — Z88.1 ALLERGY STATUS TO OTHER ANTIBIOTIC AGENTS STATUS: ICD-10-CM

## 2024-02-16 DIAGNOSIS — Z88.8 ALLERGY STATUS TO OTHER DRUGS, MEDICAMENTS AND BIOLOGICAL SUBSTANCES: ICD-10-CM

## 2024-02-16 DIAGNOSIS — R11.2 NAUSEA WITH VOMITING, UNSPECIFIED: ICD-10-CM

## 2024-02-16 DIAGNOSIS — N30.00 ACUTE CYSTITIS WITHOUT HEMATURIA: ICD-10-CM

## 2024-02-16 DIAGNOSIS — I25.10 ATHEROSCLEROTIC HEART DISEASE OF NATIVE CORONARY ARTERY WITHOUT ANGINA PECTORIS: ICD-10-CM

## 2024-02-16 DIAGNOSIS — Z91.018 ALLERGY TO OTHER FOODS: ICD-10-CM

## 2024-02-16 DIAGNOSIS — E11.9 TYPE 2 DIABETES MELLITUS WITHOUT COMPLICATIONS: ICD-10-CM

## 2024-02-16 DIAGNOSIS — I10 ESSENTIAL (PRIMARY) HYPERTENSION: ICD-10-CM

## 2024-02-16 DIAGNOSIS — S31.000D UNSPECIFIED OPEN WOUND OF LOWER BACK AND PELVIS WITHOUT PENETRATION INTO RETROPERITONEUM, SUBSEQUENT ENCOUNTER: ICD-10-CM

## 2024-07-12 NOTE — ED PROVIDER NOTE - HISTORY ATTESTATION, MLM
Chart Prep for clinical office visit completed for upcoming appointment.      
I have reviewed and confirmed nurses' notes...

## 2025-05-25 NOTE — DIETITIAN INITIAL EVALUATION ADULT. - PROBLEM SELECTOR PLAN 7
7
Incidentally COVID+. Vaccinated with J&J. On RA. CXR clear.  -MAB as high risk w/ DM  -f/u COVID labs in AM